# Patient Record
Sex: MALE | Race: BLACK OR AFRICAN AMERICAN | NOT HISPANIC OR LATINO | Employment: OTHER | URBAN - METROPOLITAN AREA
[De-identification: names, ages, dates, MRNs, and addresses within clinical notes are randomized per-mention and may not be internally consistent; named-entity substitution may affect disease eponyms.]

---

## 2017-01-25 ENCOUNTER — TELEPHONE (OUTPATIENT)
Dept: ENDOCRINOLOGY | Facility: CLINIC | Age: 82
End: 2017-01-25

## 2017-01-25 RX ORDER — CLOPIDOGREL BISULFATE 75 MG/1
TABLET ORAL
Qty: 90 TABLET | Refills: 4 | Status: SHIPPED | OUTPATIENT
Start: 2017-01-25 | End: 2017-05-25 | Stop reason: SDUPTHER

## 2017-01-25 NOTE — TELEPHONE ENCOUNTER
----- Message from Erika Cardenas sent at 1/25/2017  9:43 AM CST -----  Contact: Patient's wife Ramila  Patient's wife Ramila called to advised that patient cannot attend appointment. Patient is not feeling well. Thanks,

## 2017-03-09 DIAGNOSIS — E11.9 TYPE 2 DIABETES MELLITUS WITHOUT COMPLICATION: ICD-10-CM

## 2017-03-16 DIAGNOSIS — E11.9 TYPE 2 DIABETES MELLITUS WITHOUT COMPLICATION: ICD-10-CM

## 2017-05-25 ENCOUNTER — TELEPHONE (OUTPATIENT)
Dept: HEMATOLOGY/ONCOLOGY | Facility: CLINIC | Age: 82
End: 2017-05-25

## 2017-05-25 ENCOUNTER — HOSPITAL ENCOUNTER (OUTPATIENT)
Dept: RADIOLOGY | Facility: CLINIC | Age: 82
Discharge: HOME | End: 2017-05-25
Attending: FAMILY MEDICINE
Payer: MEDICARE

## 2017-05-25 ENCOUNTER — OFFICE VISIT (OUTPATIENT)
Dept: FAMILY MEDICINE | Facility: CLINIC | Age: 82
End: 2017-05-25
Payer: MEDICARE

## 2017-05-25 VITALS
HEIGHT: 71 IN | HEART RATE: 71 BPM | SYSTOLIC BLOOD PRESSURE: 134 MMHG | WEIGHT: 213.88 LBS | BODY MASS INDEX: 29.94 KG/M2 | DIASTOLIC BLOOD PRESSURE: 76 MMHG | TEMPERATURE: 99 F

## 2017-05-25 DIAGNOSIS — M79.605 PAIN OF LEFT LEG: ICD-10-CM

## 2017-05-25 DIAGNOSIS — I82.4Z3 DEEP VEIN THROMBOSIS (DVT) OF DISTAL VEIN OF BOTH LOWER EXTREMITIES, UNSPECIFIED CHRONICITY: ICD-10-CM

## 2017-05-25 DIAGNOSIS — I82.4Z3 DEEP VEIN THROMBOSIS (DVT) OF DISTAL VEIN OF BOTH LOWER EXTREMITIES, UNSPECIFIED CHRONICITY: Primary | ICD-10-CM

## 2017-05-25 PROCEDURE — 1126F AMNT PAIN NOTED NONE PRSNT: CPT | Mod: S$GLB,,, | Performed by: FAMILY MEDICINE

## 2017-05-25 PROCEDURE — 99213 OFFICE O/P EST LOW 20 MIN: CPT | Mod: S$GLB,,, | Performed by: FAMILY MEDICINE

## 2017-05-25 PROCEDURE — 1159F MED LIST DOCD IN RCRD: CPT | Mod: S$GLB,,, | Performed by: FAMILY MEDICINE

## 2017-05-25 PROCEDURE — 99499 UNLISTED E&M SERVICE: CPT | Mod: S$GLB,,, | Performed by: FAMILY MEDICINE

## 2017-05-25 PROCEDURE — 99999 PR PBB SHADOW E&M-EST. PATIENT-LVL III: CPT | Mod: PBBFAC,,, | Performed by: FAMILY MEDICINE

## 2017-05-25 RX ORDER — FUROSEMIDE 10 MG/ML
20 INJECTION INTRAMUSCULAR; INTRAVENOUS
Status: DISCONTINUED | OUTPATIENT
Start: 2017-05-25 | End: 2017-06-02

## 2017-05-25 NOTE — TELEPHONE ENCOUNTER
----- Message from Mayra Sung sent at 5/25/2017 11:39 AM CDT -----  Contact: self  Patient [phone] is returning call from this morning/please call

## 2017-05-26 ENCOUNTER — HOSPITAL ENCOUNTER (EMERGENCY)
Facility: HOSPITAL | Age: 82
Discharge: HOME | End: 2017-05-26
Attending: EMERGENCY MEDICINE
Payer: MEDICARE

## 2017-05-26 ENCOUNTER — HOSPITAL ENCOUNTER (OUTPATIENT)
Dept: RADIOLOGY | Facility: CLINIC | Age: 82
Discharge: HOME | End: 2017-05-26
Attending: FAMILY MEDICINE
Payer: MEDICARE

## 2017-05-26 ENCOUNTER — ANTI-COAG VISIT (OUTPATIENT)
Dept: CARDIOLOGY | Facility: CLINIC | Age: 82
End: 2017-05-26

## 2017-05-26 VITALS
WEIGHT: 213 LBS | HEIGHT: 71 IN | TEMPERATURE: 98 F | HEART RATE: 58 BPM | DIASTOLIC BLOOD PRESSURE: 79 MMHG | BODY MASS INDEX: 29.82 KG/M2 | SYSTOLIC BLOOD PRESSURE: 191 MMHG | RESPIRATION RATE: 20 BRPM | OXYGEN SATURATION: 100 %

## 2017-05-26 DIAGNOSIS — I50.9 CHF, ACUTE: ICD-10-CM

## 2017-05-26 DIAGNOSIS — I82.4Y3 ACUTE DEEP VEIN THROMBOSIS (DVT) OF PROXIMAL VEIN OF BOTH LOWER EXTREMITIES: Primary | ICD-10-CM

## 2017-05-26 DIAGNOSIS — Z79.01 LONG TERM (CURRENT) USE OF ANTICOAGULANTS: ICD-10-CM

## 2017-05-26 DIAGNOSIS — I82.4Y3 ACUTE DEEP VEIN THROMBOSIS (DVT) OF PROXIMAL VEIN OF BOTH LOWER EXTREMITIES: ICD-10-CM

## 2017-05-26 DIAGNOSIS — M79.605 PAIN OF LEFT LEG: ICD-10-CM

## 2017-05-26 DIAGNOSIS — Z79.01 CURRENT USE OF LONG TERM ANTICOAGULATION: Primary | ICD-10-CM

## 2017-05-26 DIAGNOSIS — I82.4Z3 DEEP VEIN THROMBOSIS (DVT) OF DISTAL VEIN OF BOTH LOWER EXTREMITIES, UNSPECIFIED CHRONICITY: ICD-10-CM

## 2017-05-26 DIAGNOSIS — L03.116 CELLULITIS OF LEFT LEG: ICD-10-CM

## 2017-05-26 PROCEDURE — 96372 THER/PROPH/DIAG INJ SC/IM: CPT

## 2017-05-26 PROCEDURE — 99284 EMERGENCY DEPT VISIT MOD MDM: CPT | Mod: 25

## 2017-05-26 PROCEDURE — 25000003 PHARM REV CODE 250: Performed by: HOSPITALIST

## 2017-05-26 PROCEDURE — 63600175 PHARM REV CODE 636 W HCPCS: Performed by: EMERGENCY MEDICINE

## 2017-05-26 PROCEDURE — 93970 EXTREMITY STUDY: CPT | Mod: TC,PO

## 2017-05-26 PROCEDURE — 93970 EXTREMITY STUDY: CPT | Mod: 26,,, | Performed by: RADIOLOGY

## 2017-05-26 RX ORDER — CEPHALEXIN 500 MG/1
500 CAPSULE ORAL 4 TIMES DAILY
Qty: 28 CAPSULE | Refills: 0 | Status: SHIPPED | OUTPATIENT
Start: 2017-05-26 | End: 2017-06-02

## 2017-05-26 RX ORDER — WARFARIN SODIUM 5 MG/1
5 TABLET ORAL DAILY
Status: DISCONTINUED | OUTPATIENT
Start: 2017-05-26 | End: 2017-05-26 | Stop reason: HOSPADM

## 2017-05-26 RX ORDER — ENOXAPARIN SODIUM 100 MG/ML
150 INJECTION SUBCUTANEOUS EVERY 24 HOURS
Status: DISCONTINUED | OUTPATIENT
Start: 2017-05-26 | End: 2017-05-26

## 2017-05-26 RX ORDER — WARFARIN SODIUM 5 MG/1
5 TABLET ORAL DAILY
Qty: 30 TABLET | Refills: 0 | Status: SHIPPED | OUTPATIENT
Start: 2017-05-26 | End: 2017-06-08

## 2017-05-26 RX ORDER — ENOXAPARIN SODIUM 150 MG/ML
150 INJECTION SUBCUTANEOUS
Status: DISCONTINUED | OUTPATIENT
Start: 2017-05-26 | End: 2017-05-26 | Stop reason: HOSPADM

## 2017-05-26 RX ORDER — ENOXAPARIN SODIUM 100 MG/ML
150 INJECTION SUBCUTANEOUS DAILY
Qty: 7.5 ML | Refills: 0 | Status: SHIPPED | OUTPATIENT
Start: 2017-05-26 | End: 2017-05-31

## 2017-05-26 RX ADMIN — ENOXAPARIN SODIUM 150 MG: 150 INJECTION, SOLUTION INTRAVENOUS; SUBCUTANEOUS at 03:05

## 2017-05-26 RX ADMIN — WARFARIN SODIUM 5 MG: 5 TABLET ORAL at 02:05

## 2017-05-26 NOTE — ED PROVIDER NOTES
Encounter Date: 5/26/2017    SCRIBE #1 NOTE: ICary, am scribing for, and in the presence of, Dr. Franklin.       History     Chief Complaint   Patient presents with    Deep Vein Thrombosis     sent from Dr whitaker     Review of patient's allergies indicates:   Allergen Reactions    No known drug allergies      05/26/2017  12:56 PM     Chief Complaint: Left leg swelling      The patient is a 81 y.o. male with a PMHx of adrenal insufficiency; anemia; anticoagulant long-term use; blood transfusion; CAD; cancer; COPD; depression; DM; DM type II; DVT; hemorrhoids; HTN; PE; peripheral vascular disease; and UTI who is presenting with worsening chronic left leg swelling that has been worsening over the past couple of weeks with associated pain, warmth, and redness. Pt endorsed chronic bilateral leg swelling, but his left leg has been worsening over the past couple of weeks. Pt sent by Dr. Whitaker today for further evaluation of DVTs in bilateral legs noted on US done yesterday. Pt also c/o SOB on exertion with associated fast HR, which resolves with rest. Pt also c/o left lower back pain. Pt currently only on Plavix for circulation in his legs and he denied compliance with taking his Plavix daily because he forgets to take the medication. Pt denied any pain in right foot, calf, or thigh. No CP. No N/V/D. No dysuria. Pt sees Dr. Lee on a regular basis. Pt has a past surgical history that includes Cholecystectomy; Exploratory laparotomy w/ bowel resection; Right Femur Repair; Coronary stent placement; Small intestine surgery; Colon surgery; and ostate surgery.        The history is provided by the patient, the spouse and medical records.     Past Medical History:   Diagnosis Date    Adrenal insufficiency     Anemia     Anticoagulant long-term use     plavix for blood clots legs and stents years    Blood transfusion     CAD (coronary artery disease)     Cancer     prostate    Cataract     COPD (chronic  obstructive pulmonary disease) 11/6/2013    Depression     Diabetes mellitus     Diabetes mellitus type II     DVT (deep venous thrombosis)     Hemorrhoids     Hypertension     PE (pulmonary embolism)     [facility]    Peripheral vascular disease     Urinary tract infection      Past Surgical History:   Procedure Laterality Date    CHOLECYSTECTOMY  8/2011    COLON SURGERY      CORONARY STENT PLACEMENT      2007, last 2011  stent  3 vessel.    EXPLORATORY LAPAROTOMY W/ BOWEL RESECTION  1987    PROSTATE SURGERY  2001    Radical for cancer - Dr Luke Chung    RIGHT FEMUR REPAIR  1987    SMALL INTESTINE SURGERY       Family History   Problem Relation Age of Onset    Diabetes Mother     Hypertension Mother     Hypertension Father     Diabetes Sister     Diabetes Brother     Cancer Brother      prostate    Cancer Brother      colon    Cancer Brother      prostate    Cancer Brother      prostate     Social History   Substance Use Topics    Smoking status: Never Smoker    Smokeless tobacco: Never Used    Alcohol use No     Review of Systems   Constitutional: Negative for fever.   HENT: Negative for sore throat.    Eyes: Negative for visual disturbance.   Respiratory: Positive for shortness of breath (SOB on exertion with associated fast HR, which resolves with rest.).    Cardiovascular: Positive for leg swelling (Chronic bilateral leg swelling with worsening left leg swelling.). Negative for chest pain.   Gastrointestinal: Negative for diarrhea, nausea and vomiting.   Genitourinary: Negative for dysuria.   Musculoskeletal: Positive for back pain (Left lower back pain.) and myalgias (Left leg pain.).        No pain in right foot, calf, or thigh.    Skin:        +Left leg redness and warmth.   Neurological: Negative for weakness.   Hematological: Does not bruise/bleed easily.   Psychiatric/Behavioral: Negative for confusion.       Physical Exam     Initial Vitals [05/26/17 1234]   BP Pulse Resp  Temp SpO2   (!) 149/70 (!) 58 20 98.2 °F (36.8 °C) 100 %     Physical Exam    Nursing note and vitals reviewed.  Constitutional: He appears well-developed and well-nourished. No distress.   HENT:   Head: Normocephalic and atraumatic.   Eyes: Conjunctivae and EOM are normal. Pupils are equal, round, and reactive to light.   Neck: Neck supple.   Cardiovascular: Normal rate, regular rhythm and normal heart sounds.   Pulses:       Radial pulses are 2+ on the right side, and 2+ on the left side.   Pulmonary/Chest: Breath sounds normal. No respiratory distress. He has no wheezes. He has no rhonchi. He has no rales.   Abdominal: Soft. He exhibits no distension. There is no tenderness.   Old vertical scar down the abdomen.   Musculoskeletal:   Edema in bilateral legs. Left leg edema is much greater than the right. Left leg larger than the right.  Erythema, warmth, and tenderness along the left calf and anterior left leg.  No tenderness over bilateral thighs.  Pain over left lumbosacral region. No muscle spasms. No midline tenderness.   Neurological: He is alert and oriented to person, place, and time.   Pt can only feel pressure on the feet.   Skin:   No mottling of lower extremities.  Baker's cyst over right wrist on the palmar surface.   Psychiatric: He has a normal mood and affect.         ED Course   Procedures  Labs Reviewed - No data to display          Medical Decision Making:   The hospitalist, Dr. Favio Kaur saw the patient in the emergency room and has made plan with the family.  We will start Lovenox 150 mg a day for 5 days and then transition with Coumadin 5 mg per day with follow-up to the clinic clinic.  Patient also follow-up with his primary care physician.  Patient has a filter in place and has had dyspnea on exertion for quite some time.  Any correlation between needs.  He doesn't appear to be unstable at this time.  Chest shows no signs of heart failure.  Patient does not have fall risk.  There is no  signs of cerulea dolens              Scribe Attestation:   Scribe #1: I performed the above scribed service and the documentation accurately describes the services I performed. I attest to the accuracy of the note.    Attending Attestation:           Physician Attestation for Scribe:  Physician Attestation Statement for Scribe #1: I, Dr. Franklin, reviewed documentation, as scribed by Cary Lopez in my presence, and it is both accurate and complete.                 ED Course     Clinical Impression:   The primary encounter diagnosis was Acute deep vein thrombosis (DVT) of proximal vein of both lower extremities. A diagnosis of CHF, acute was also pertinent to this visit.          Erasmo Franklin MD  05/26/17 1088

## 2017-05-26 NOTE — PROGRESS NOTES
Pt still in [facility] ER for DVT.  Could be that he will be admitted.  Will f/u on Monday for enrollment. Per notes in ER:       The patient is a 81 y.o. male with a PMHx of adrenal insufficiency; anemia; anticoagulant long-term use; blood transfusion; CAD; cancer; COPD; depression; DM; DM type II; DVT; hemorrhoids; HTN; PE; peripheral vascular disease; and UTI who is presenting with worsening chronic left leg swelling that has been worsening over the past couple of weeks with associated pain, warmth, and redness. Pt endorsed chronic bilateral leg swelling, but his left leg has been worsening over the past couple of weeks. Pt sent by Dr. Whitaker today for further evaluation of DVTs in bilateral legs noted on US done yesterday. Pt also c/o SOB on exertion with associated fast HR, which resolves with rest. Pt also c/o left lower back pain. Pt currently only on Plavix for circulation in his legs and he denied compliance with taking his Plavix daily because he forgets to take the medication. Pt denied any pain in right foot, calf, or thigh. No CP. No N/V/D. No dysuria. Pt sees Dr. Lee on a regular basis. Pt has a past surgical history that includes Cholecystectomy; Exploratory laparotomy w/ bowel resection; Right Femur Repair; Coronary stent placement; Small intestine surgery; Colon surgery; and ostate surgery      I have placed an order for INR on Monday; seems pt already has a lab this day so if he is out of the hospital, will have the level to address

## 2017-05-26 NOTE — PLAN OF CARE
Spoke with patient's wife, Ramila Hurtado to notify of follow up appointment scheduled with Dr Whitaker for Friday, June 2, 2017 at 1000.  Mrs. Hurtado verbalized understanding and will advise her  of appointment when he awakens from his nap.

## 2017-05-26 NOTE — DISCHARGE INSTRUCTIONS
You need to take Lovenox injections 150 mg once a day in the morning for 5 days and take Coumadin 5 mg every day thereafter.

## 2017-05-26 NOTE — PLAN OF CARE
Spoke with [place] at patient's pharmacy, [address]  ([phone]).  Lovenox 150mg SQ X5 days will have a co-pay of 10.00.  Confirmed with Richmond that medication is available.    Referral for Coumadin Clinic placed, pt will be contacted directly by clinic staff to schedule appointments.    Message left for Dr. Whitaker's nurse requesting follow-up appointment for patient.  Awaiting call back.    Patient and family updated on prescription, coumadin clinic, and awaiting follow up appointment with Dr. Whitaker.    Verified patient contact [phone].  Will call patient with appointment information when available.  Pt verbalized understanding

## 2017-05-29 ENCOUNTER — TELEPHONE (OUTPATIENT)
Dept: FAMILY MEDICINE | Facility: CLINIC | Age: 82
End: 2017-05-29

## 2017-05-29 ENCOUNTER — LAB VISIT (OUTPATIENT)
Dept: LAB | Facility: HOSPITAL | Age: 82
End: 2017-05-29
Attending: INTERNAL MEDICINE
Payer: MEDICARE

## 2017-05-29 DIAGNOSIS — D64.9 ANEMIA, UNSPECIFIED: ICD-10-CM

## 2017-05-29 DIAGNOSIS — C61 PROSTATE CA: ICD-10-CM

## 2017-05-29 DIAGNOSIS — E11.8 UNCONTROLLED TYPE 2 DIABETES MELLITUS WITH COMPLICATION, UNSPECIFIED LONG TERM INSULIN USE STATUS: ICD-10-CM

## 2017-05-29 DIAGNOSIS — D50.8 IRON DEFICIENCY ANEMIA SECONDARY TO INADEQUATE DIETARY IRON INTAKE: ICD-10-CM

## 2017-05-29 DIAGNOSIS — N18.2 CKD (CHRONIC KIDNEY DISEASE) STAGE 2, GFR 60-89 ML/MIN: ICD-10-CM

## 2017-05-29 DIAGNOSIS — E11.65 UNCONTROLLED TYPE 2 DIABETES MELLITUS WITH COMPLICATION, UNSPECIFIED LONG TERM INSULIN USE STATUS: ICD-10-CM

## 2017-05-29 PROBLEM — Z79.01 LONG TERM (CURRENT) USE OF ANTICOAGULANTS: Status: ACTIVE | Noted: 2017-05-29

## 2017-05-29 LAB
ALBUMIN SERPL BCP-MCNC: 3.9 G/DL
ALP SERPL-CCNC: 68 U/L
ALT SERPL W/O P-5'-P-CCNC: 23 U/L
ANION GAP SERPL CALC-SCNC: 9 MMOL/L
AST SERPL-CCNC: 30 U/L
BASOPHILS # BLD AUTO: 0 K/UL
BASOPHILS NFR BLD: 0.2 %
BILIRUB SERPL-MCNC: 0.3 MG/DL
BUN SERPL-MCNC: 23 MG/DL
CALCIUM SERPL-MCNC: 10.9 MG/DL
CHLORIDE SERPL-SCNC: 108 MMOL/L
CO2 SERPL-SCNC: 25 MMOL/L
COMPLEXED PSA SERPL-MCNC: <0.01 NG/ML
CREAT SERPL-MCNC: 1.3 MG/DL
DIFFERENTIAL METHOD: ABNORMAL
EOSINOPHIL # BLD AUTO: 0 K/UL
EOSINOPHIL NFR BLD: 0 %
ERYTHROCYTE [DISTWIDTH] IN BLOOD BY AUTOMATED COUNT: 13.2 %
EST. GFR  (AFRICAN AMERICAN): 60 ML/MIN/1.73 M^2
EST. GFR  (NON AFRICAN AMERICAN): 52 ML/MIN/1.73 M^2
FERRITIN SERPL-MCNC: 120 NG/ML
FERRITIN SERPL-MCNC: 120 NG/ML
GLUCOSE SERPL-MCNC: 135 MG/DL
HCT VFR BLD AUTO: 37.9 %
HGB BLD-MCNC: 12.2 G/DL
LYMPHOCYTES # BLD AUTO: 0.8 K/UL
LYMPHOCYTES NFR BLD: 28 %
MCH RBC QN AUTO: 28.6 PG
MCHC RBC AUTO-ENTMCNC: 32.1 %
MCV RBC AUTO: 89 FL
MONOCYTES # BLD AUTO: 0.4 K/UL
MONOCYTES NFR BLD: 14.5 %
NEUTROPHILS # BLD AUTO: 1.6 K/UL
NEUTROPHILS NFR BLD: 57.3 %
PLATELET # BLD AUTO: 135 K/UL
PMV BLD AUTO: 8.2 FL
POTASSIUM SERPL-SCNC: 4.9 MMOL/L
PROT SERPL-MCNC: 7.2 G/DL
RBC # BLD AUTO: 4.25 M/UL
SODIUM SERPL-SCNC: 142 MMOL/L
WBC # BLD AUTO: 2.8 K/UL

## 2017-05-29 PROCEDURE — 83540 ASSAY OF IRON: CPT

## 2017-05-29 PROCEDURE — 83036 HEMOGLOBIN GLYCOSYLATED A1C: CPT

## 2017-05-29 PROCEDURE — 80053 COMPREHEN METABOLIC PANEL: CPT

## 2017-05-29 PROCEDURE — 82728 ASSAY OF FERRITIN: CPT

## 2017-05-29 PROCEDURE — 84153 ASSAY OF PSA TOTAL: CPT

## 2017-05-29 PROCEDURE — 36415 COLL VENOUS BLD VENIPUNCTURE: CPT

## 2017-05-29 PROCEDURE — 84238 ASSAY NONENDOCRINE RECEPTOR: CPT

## 2017-05-29 PROCEDURE — 85025 COMPLETE CBC W/AUTO DIFF WBC: CPT

## 2017-05-29 NOTE — TELEPHONE ENCOUNTER
Received message from patient's daughter (Nehla) requesting to know why her father was not admitted to the hospital on Friday 5-26-17 after being sent there by Dr. Whitaker. No involvement of care noted. Call placed to patient's daughter to explain the need of an involvement of care or verbal permission from patient in order to preceded with above request. No answer received. Left message. Call also placed to patient regarding. Verbal permission to give personal information to daughter was given by patient. Will attempt to contact patient's daughter regarding.

## 2017-05-29 NOTE — PROGRESS NOTES
Subjective:       Patient ID: Chon Hurtado is a 81 y.o. male.    Chief Complaint: Diabetes; Edema; and Back Pain    82 y/o male with progressive swollen legs, fatigue and tenderness despite blood thinners, and SVC filter. He has poor exercise compliance, poor activity and elevated blood sugars.      Diabetes   He presents for his follow-up diabetic visit. His disease course has been worsening. Hypoglycemia symptoms include confusion, dizziness, mood changes and sleepiness. Pertinent negatives for hypoglycemia include no headaches, pallor, speech difficulty or tremors. Associated symptoms include fatigue. Pertinent negatives for diabetes include no blurred vision, no chest pain, no polydipsia, no polyphagia and no polyuria. Diabetic complications include a CVA, heart disease, nephropathy and peripheral neuropathy. Current diabetic treatment includes intensive insulin program. He is following a generally healthy diet. He never participates in exercise. His breakfast blood glucose is taken between 9-10 am. His breakfast blood glucose range is generally 180-200 mg/dl. His dinner blood glucose is taken between 6-7 pm. His dinner blood glucose range is generally 180-200 mg/dl.   Edema   This is a recurrent problem. The current episode started more than 1 month ago. The problem occurs constantly. Associated symptoms include congestion, diaphoresis, fatigue and a sore throat. Pertinent negatives include no abdominal pain, arthralgias, chest pain, chills, coughing, fever, headaches, neck pain, numbness or rash.   Back Pain   Pertinent negatives include no abdominal pain, chest pain, dysuria, fever, headaches or numbness.     Review of Systems   Constitutional: Positive for diaphoresis, fatigue and unexpected weight change. Negative for activity change, appetite change, chills and fever.   HENT: Positive for congestion, postnasal drip and sore throat. Negative for drooling, ear discharge, ear pain, hearing loss, mouth  sores, nosebleeds, rhinorrhea, sinus pressure, tinnitus, trouble swallowing and voice change.    Eyes: Negative.  Negative for blurred vision, pain, discharge, redness, itching and visual disturbance.   Respiratory: Negative.  Negative for apnea, cough, choking, chest tightness and shortness of breath.    Cardiovascular: Positive for palpitations and leg swelling. Negative for chest pain.   Gastrointestinal: Negative.  Negative for abdominal distention, abdominal pain and anal bleeding.   Endocrine: Negative.  Negative for cold intolerance, heat intolerance, polydipsia, polyphagia and polyuria.   Genitourinary: Negative.  Negative for difficulty urinating, dysuria, enuresis, flank pain, frequency, hematuria, scrotal swelling, testicular pain and urgency.   Musculoskeletal: Positive for back pain. Negative for arthralgias, gait problem, neck pain and neck stiffness.   Skin: Negative.  Negative for color change, pallor and rash.   Allergic/Immunologic: Negative.  Negative for environmental allergies and food allergies.   Neurological: Positive for dizziness. Negative for tremors, syncope, facial asymmetry, speech difficulty, light-headedness, numbness and headaches.   Hematological: Negative for adenopathy. Does not bruise/bleed easily.   Psychiatric/Behavioral: Positive for confusion. Negative for agitation, behavioral problems, decreased concentration, dysphoric mood and hallucinations. The patient is not hyperactive.        Objective:      Physical Exam   Constitutional: He is oriented to person, place, and time. He appears well-developed and well-nourished. No distress.   HENT:   Head: Normocephalic and atraumatic.   Right Ear: External ear normal.   Left Ear: External ear normal.   Nose: Nose normal.   Mouth/Throat: Oropharynx is clear and moist. No oropharyngeal exudate.   Eyes: Conjunctivae and EOM are normal. Pupils are equal, round, and reactive to light. Right eye exhibits no discharge. Left eye exhibits no  discharge. No scleral icterus.   Neck: Normal range of motion. Neck supple. No JVD present. No tracheal deviation present. No thyromegaly present.   Cardiovascular: Normal rate, normal heart sounds and intact distal pulses.    No murmur heard.  Pulmonary/Chest: Effort normal and breath sounds normal. No respiratory distress. He has no wheezes. He has no rales.   Abdominal: Soft. Bowel sounds are normal. He exhibits no distension and no mass. There is no tenderness. There is no rebound and no guarding.   Musculoskeletal: He exhibits no edema or tenderness.        Right knee: He exhibits swelling and effusion.        Left knee: He exhibits swelling and effusion.        Right ankle: He exhibits decreased range of motion and swelling.        Left ankle: He exhibits decreased range of motion and swelling.          Lymphadenopathy:     He has no cervical adenopathy.   Neurological: He is alert and oriented to person, place, and time. No cranial nerve deficit. Coordination normal.   Skin: Skin is warm and dry. No rash noted. He is not diaphoretic. No erythema. No pallor.   Psychiatric: His speech is normal and behavior is normal. Judgment and thought content normal. His affect is inappropriate. Cognition and memory are impaired. He exhibits abnormal recent memory.   Vitals reviewed.      Lab Results   Component Value Date    WBC 4.35 05/25/2017    HGB 12.5 (L) 05/25/2017    HCT 38.7 (L) 05/25/2017    MCV 90 05/25/2017     (L) 05/25/2017   .DVD  With [id number] DVD. Needs vascular and Lovenox and more aggressive diabetes care.    Assessment:       1. Deep vein thrombosis (DVT) of distal vein of both lower extremities, unspecified chronicity    2. Pain of left leg     3. Uncontrolled type 2 diabetes mellitus without complication, without long-term current use of insulin        Plan:       Deep vein thrombosis (DVT) of distal vein of both lower extremities, unspecified chronicity  -     Cancel: US Lower Extremity Veins  Bilateral; Future; Expected date: 05/25/2017  -     Cancel: US Lower Extremity Veins Left; Future; Expected date: 05/25/2017  -     US Lower Extremity Veins Bilateral; Future; Expected date: 05/25/2017    Pain of left leg   -     Cancel: US Lower Extremity Veins Bilateral; Future; Expected date: 05/25/2017  -     Brain natriuretic peptide; Future; Expected date: 05/25/2017  -     furosemide injection 20 mg; Inject 2 mLs (20 mg total) into the muscle one time.  -     Cancel: US Lower Extremity Veins Left; Future; Expected date: 05/25/2017  -     US Lower Extremity Veins Bilateral; Future; Expected date: 05/25/2017    Uncontrolled type 2 diabetes mellitus without complication, without long-term current use of insulin  -     Basic metabolic panel; Future; Expected date: 05/25/2017  -     CBC auto differential; Future; Expected date: 05/25/2017  -     Hemoglobin A1c; Future; Expected date: 05/25/2017      Patient readiness: acceptance and barriers:readiness    During the course of the visit the patient was educated and counseled about the following:     Diabetes:  Discussed general issues about diabetes pathophysiology and management.  Discussed foot care.  Discussed ways to avoid symptomatic hypoglycemia.  Discussed sick day management.  Reminded to bring in blood sugar diary at next visit.  Hypertension:   Regular aerobic exercise.  Check blood pressures weekly and record.  Obesity:   General weight loss/lifestyle modification strategies discussed (elicit support from others; identify saboteurs; non-food rewards, etc).  Informal exercise measures discussed, e.g. taking stairs instead of elevator.    Goals: Diabetes: Maintain Hemoglobin A1C below 7    Did patient meet goals/outcomes: Yes    The following self management tools provided: blood glucose log    Patient Instructions (the written plan) was given to the patient/family.     Time spent with patient: 45 minutes

## 2017-05-29 NOTE — TELEPHONE ENCOUNTER
----- Message from Sharmin Miramontes sent at 5/27/2017 11:06 AM CDT -----  Contact: patient daughter rose goncalves [phone]  patient daughter rose goncalves [phone] stated dr bang told patient to go to ER on yesterday due to SOB and swelling in his legs, he was treated and sent back home and want to know why he wasn't admitted.  Requesting advice, please call

## 2017-05-29 NOTE — PROGRESS NOTES
Pt was not admitted, wife reports she did not know there was lab work ordered for today; pt had labs for Dr Pérez in addition to the INR I ordered.  She will take him this evening and will give 5mg coumadin tonnight  Will set up for clinic appt later this week

## 2017-05-30 ENCOUNTER — TELEPHONE (OUTPATIENT)
Dept: FAMILY MEDICINE | Facility: CLINIC | Age: 82
End: 2017-05-30

## 2017-05-30 ENCOUNTER — ANTI-COAG VISIT (OUTPATIENT)
Dept: CARDIOLOGY | Facility: CLINIC | Age: 82
End: 2017-05-30

## 2017-05-30 DIAGNOSIS — I82.4Y3 ACUTE DEEP VEIN THROMBOSIS (DVT) OF PROXIMAL VEIN OF BOTH LOWER EXTREMITIES: ICD-10-CM

## 2017-05-30 DIAGNOSIS — Z79.01 LONG TERM (CURRENT) USE OF ANTICOAGULANTS: ICD-10-CM

## 2017-05-30 LAB
ESTIMATED AVG GLUCOSE: 203 MG/DL
HBA1C MFR BLD HPLC: 8.7 %
IRON SERPL-MCNC: 103 UG/DL
IRON SERPL-MCNC: 103 UG/DL
SATURATED IRON: 28 %
SATURATED IRON: 28 %
TOTAL IRON BINDING CAPACITY: 364 UG/DL
TOTAL IRON BINDING CAPACITY: 364 UG/DL
TRANSFERRIN SERPL-MCNC: 246 MG/DL
TRANSFERRIN SERPL-MCNC: 246 MG/DL

## 2017-05-30 NOTE — TELEPHONE ENCOUNTER
Patient is requesting a personal telephone call from Dr. Whitaker, states he wants to discuss his blood clots. Patient has an upcoming appointment on 6-2-17.

## 2017-05-30 NOTE — TELEPHONE ENCOUNTER
----- Message from Faye Milner sent at 5/30/2017 12:39 PM CDT -----  Contact: wife - Ramila  Patient would like to speak to the nurse in reference to his blood clots.     Please call patient back at [phone].     Thank you

## 2017-05-31 ENCOUNTER — DOCUMENTATION ONLY (OUTPATIENT)
Dept: FAMILY MEDICINE | Facility: CLINIC | Age: 82
End: 2017-05-31

## 2017-05-31 LAB — STFR SERPL-MCNC: 4.5 MG/L

## 2017-05-31 NOTE — PROGRESS NOTES
Pre-Visit Chart Review  For Appointment Scheduled on 06/01/2017    Health Maintenance Due   Topic Date Due    TETANUS VACCINE  12/25/1953    Zoster Vaccine  12/25/1997    Eye Exam  04/16/2014    Lipid Panel  12/22/2015    Urine Microalbumin  12/22/2015

## 2017-06-02 ENCOUNTER — LAB VISIT (OUTPATIENT)
Dept: LAB | Facility: HOSPITAL | Age: 82
End: 2017-06-02
Attending: FAMILY MEDICINE
Payer: MEDICARE

## 2017-06-02 ENCOUNTER — OFFICE VISIT (OUTPATIENT)
Dept: FAMILY MEDICINE | Facility: CLINIC | Age: 82
End: 2017-06-02
Payer: MEDICARE

## 2017-06-02 VITALS
DIASTOLIC BLOOD PRESSURE: 67 MMHG | WEIGHT: 211.63 LBS | HEIGHT: 72 IN | BODY MASS INDEX: 28.66 KG/M2 | HEART RATE: 59 BPM | TEMPERATURE: 98 F | SYSTOLIC BLOOD PRESSURE: 128 MMHG

## 2017-06-02 DIAGNOSIS — I82.593 CHRONIC DEEP VEIN THROMBOSIS (DVT) OF OTHER VEIN OF BOTH LOWER EXTREMITIES: Primary | ICD-10-CM

## 2017-06-02 DIAGNOSIS — Z79.4 CONTROLLED TYPE 2 DIABETES MELLITUS WITH DIABETIC POLYNEUROPATHY, WITH LONG-TERM CURRENT USE OF INSULIN: ICD-10-CM

## 2017-06-02 DIAGNOSIS — I50.22 CHF (CONGESTIVE HEART FAILURE), NYHA CLASS I, CHRONIC, SYSTOLIC: ICD-10-CM

## 2017-06-02 DIAGNOSIS — N18.30 CONTROLLED TYPE 2 DIABETES MELLITUS WITH STAGE 3 CHRONIC KIDNEY DISEASE, WITH LONG-TERM CURRENT USE OF INSULIN: ICD-10-CM

## 2017-06-02 DIAGNOSIS — E11.22 CONTROLLED TYPE 2 DIABETES MELLITUS WITH STAGE 3 CHRONIC KIDNEY DISEASE, WITH LONG-TERM CURRENT USE OF INSULIN: ICD-10-CM

## 2017-06-02 DIAGNOSIS — I82.593 CHRONIC DEEP VEIN THROMBOSIS (DVT) OF OTHER VEIN OF BOTH LOWER EXTREMITIES: ICD-10-CM

## 2017-06-02 DIAGNOSIS — Z79.4 CONTROLLED TYPE 2 DIABETES MELLITUS WITH STAGE 3 CHRONIC KIDNEY DISEASE, WITH LONG-TERM CURRENT USE OF INSULIN: ICD-10-CM

## 2017-06-02 DIAGNOSIS — E11.42 CONTROLLED TYPE 2 DIABETES MELLITUS WITH DIABETIC POLYNEUROPATHY, WITH LONG-TERM CURRENT USE OF INSULIN: ICD-10-CM

## 2017-06-02 LAB
INR PPP: 1.4
PROTHROMBIN TIME: 14.6 SEC

## 2017-06-02 PROCEDURE — 99214 OFFICE O/P EST MOD 30 MIN: CPT | Mod: S$GLB,,, | Performed by: FAMILY MEDICINE

## 2017-06-02 PROCEDURE — 99999 PR PBB SHADOW E&M-EST. PATIENT-LVL IV: CPT | Mod: PBBFAC,,, | Performed by: FAMILY MEDICINE

## 2017-06-02 PROCEDURE — 1126F AMNT PAIN NOTED NONE PRSNT: CPT | Mod: S$GLB,,, | Performed by: FAMILY MEDICINE

## 2017-06-02 PROCEDURE — 85610 PROTHROMBIN TIME: CPT

## 2017-06-02 PROCEDURE — 99499 UNLISTED E&M SERVICE: CPT | Mod: S$GLB,,, | Performed by: FAMILY MEDICINE

## 2017-06-02 PROCEDURE — 36415 COLL VENOUS BLD VENIPUNCTURE: CPT | Mod: PO

## 2017-06-02 PROCEDURE — 1159F MED LIST DOCD IN RCRD: CPT | Mod: S$GLB,,, | Performed by: FAMILY MEDICINE

## 2017-06-02 RX ORDER — METOPROLOL SUCCINATE 25 MG/1
25 TABLET, EXTENDED RELEASE ORAL DAILY
Qty: 90 TABLET | Refills: 3 | Status: SHIPPED | OUTPATIENT
Start: 2017-06-02 | End: 2018-07-12 | Stop reason: SDUPTHER

## 2017-06-02 RX ORDER — INSULIN GLARGINE 100 [IU]/ML
30 INJECTION, SOLUTION SUBCUTANEOUS NIGHTLY
Qty: 30 ML | Refills: 12 | Status: SHIPPED | OUTPATIENT
Start: 2017-06-02 | End: 2018-12-05 | Stop reason: SDUPTHER

## 2017-06-02 RX ORDER — ENOXAPARIN SODIUM 100 MG/ML
100 INJECTION SUBCUTANEOUS DAILY
Qty: 7 SYRINGE | Refills: 3 | Status: SHIPPED | OUTPATIENT
Start: 2017-06-02 | End: 2017-06-05

## 2017-06-02 RX ORDER — TORSEMIDE 20 MG/1
20 TABLET ORAL
Qty: 30 TABLET | Refills: 4 | Status: SHIPPED | OUTPATIENT
Start: 2017-06-02 | End: 2017-06-27 | Stop reason: SDUPTHER

## 2017-06-02 RX ORDER — CLOPIDOGREL BISULFATE 75 MG/1
75 TABLET ORAL DAILY
Qty: 90 TABLET | Refills: 11 | Status: SHIPPED | OUTPATIENT
Start: 2017-06-02 | End: 2017-08-03

## 2017-06-02 NOTE — PROGRESS NOTES
Subjective:       Patient ID: Chon Hurtado is a 81 y.o. male.    Chief Complaint: Deep Vein Thrombosis    Follow up of bilateral DVT, edema, and poorly controlled diabetes. He has been on Keflex tid, Lovenox bid, Coumadin 5 mg. He has improved the edema, but still elevated bs. Chronic dyspnea, and episodes of confussion.      Review of Systems   Constitutional: Negative.  Negative for activity change, appetite change, chills, diaphoresis, fatigue, fever and unexpected weight change.   HENT: Negative.  Negative for congestion, drooling, ear discharge, ear pain, hearing loss, mouth sores, nosebleeds, postnasal drip, rhinorrhea, sinus pressure, sore throat, tinnitus, trouble swallowing and voice change.    Eyes: Negative.  Negative for pain, discharge, redness, itching and visual disturbance.   Respiratory: Positive for cough and shortness of breath. Negative for apnea, choking and chest tightness.    Cardiovascular: Negative.  Negative for chest pain, palpitations and leg swelling.   Gastrointestinal: Negative.  Negative for abdominal distention, abdominal pain and anal bleeding.   Endocrine: Negative.  Negative for cold intolerance, heat intolerance, polydipsia, polyphagia and polyuria.   Genitourinary: Negative.  Negative for difficulty urinating, dysuria, enuresis, flank pain, frequency, hematuria, scrotal swelling, testicular pain and urgency.   Musculoskeletal: Positive for arthralgias, back pain and gait problem. Negative for neck pain and neck stiffness.   Skin: Negative.  Negative for color change, pallor and rash.   Allergic/Immunologic: Negative.  Negative for environmental allergies and food allergies.   Neurological: Negative for dizziness, tremors, syncope, facial asymmetry, speech difficulty, light-headedness, numbness and headaches.   Hematological: Negative for adenopathy. Does not bruise/bleed easily.   Psychiatric/Behavioral: Positive for behavioral problems, confusion and decreased concentration.  Negative for agitation, dysphoric mood and hallucinations. The patient is not hyperactive.        Objective:      Physical Exam   Constitutional: He is oriented to person, place, and time. He appears well-developed and well-nourished. No distress.   HENT:   Head: Normocephalic and atraumatic.   Right Ear: External ear normal.   Left Ear: External ear normal.   Nose: Nose normal.   Mouth/Throat: Oropharynx is clear and moist. No oropharyngeal exudate.   Eyes: Conjunctivae and EOM are normal. Pupils are equal, round, and reactive to light. Right eye exhibits no discharge. Left eye exhibits no discharge. No scleral icterus.   Neck: Normal range of motion. Neck supple. No JVD present. No tracheal deviation present. No thyromegaly present.   Cardiovascular: Normal rate, normal heart sounds and intact distal pulses.    No murmur heard.  Pulmonary/Chest: He is in respiratory distress. He has decreased breath sounds in the right lower field and the left lower field. He has no wheezes. He has no rales.   Abdominal: Soft. Bowel sounds are normal. He exhibits no distension and no mass. There is no tenderness. There is no rebound and no guarding.   Musculoskeletal: He exhibits edema and tenderness.        Right ankle: He exhibits swelling.        Left ankle: He exhibits swelling.          Lymphadenopathy:     He has no cervical adenopathy.   Neurological: He is alert and oriented to person, place, and time. He displays abnormal reflex. No cranial nerve deficit. Coordination normal.   Skin: Skin is warm and dry. No rash noted. He is not diaphoretic. No erythema. No pallor.   Psychiatric: He has a normal mood and affect. His behavior is normal. Judgment and thought content normal.   Vitals reviewed.      Assessment:       1. Chronic deep vein thrombosis (DVT) of other vein of both lower extremities    2. Controlled type 2 diabetes mellitus with diabetic polyneuropathy, with long-term current use of insulin    3. Controlled type 2  diabetes mellitus with stage 3 chronic kidney disease, with long-term current use of insulin    4. CHF (congestive heart failure), NYHA class I, chronic, systolic        Plan:       Chronic deep vein thrombosis (DVT) of other vein of both lower extremities  -     Ambulatory referral to Home Health  -     CBC auto differential; Future; Expected date: 06/02/2017  -     Protime-INR; Future; Expected date: 06/02/2017  -     Basic metabolic panel; Future; Expected date: 06/02/2017  -     enoxaparin (LOVENOX) 100 mg/mL Syrg; Inject 1 mL (100 mg total) into the skin once daily at 6am.  Dispense: 7 Syringe; Refill: 3  -     Ambulatory consult to Diabetic Education    Controlled type 2 diabetes mellitus with diabetic polyneuropathy, with long-term current use of insulin  -     insulin glargine (LANTUS SOLOSTAR) 100 unit/mL (3 mL) InPn pen; Inject 30 Units into the skin every evening.  Dispense: 30 mL; Refill: 12  -     Ambulatory consult to Diabetic Education    Controlled type 2 diabetes mellitus with stage 3 chronic kidney disease, with long-term current use of insulin  -     torsemide (DEMADEX) 20 MG Tab; Take 1 tablet (20 mg total) by mouth as needed. PRN  Dispense: 30 tablet; Refill: 4  -     Ambulatory consult to Diabetic Education    CHF (congestive heart failure), NYHA class I, chronic, systolic  -     Ambulatory referral to Cardiology  -     Ambulatory consult to [facility]    Other orders  -     clopidogrel (PLAVIX) 75 mg tablet; Take 1 tablet (75 mg total) by mouth once daily.  Dispense: 90 tablet; Refill: 11  -     metoprolol succinate (TOPROL XL) 25 MG 24 hr tablet; Take 1 tablet (25 mg total) by mouth once daily. Every day  Dispense: 90 tablet; Refill: 3  -     blood sugar diagnostic Strp; 1 strip by Misc.(Non-Drug; Combo Route) route once daily.  Dispense: 50 strip; Refill: 11      Patient readiness: acceptance and barriers:readiness    During the course of the visit the patient was educated and  counseled about the following:     Hypertension:   Dietary sodium restriction.  Regular aerobic exercise.  Check blood pressures daily and record.  Obesity:   General weight loss/lifestyle modification strategies discussed (elicit support from others; identify saboteurs; non-food rewards, etc).  Regular aerobic exercise program discussed.    Goals: Hypertension: Reduce Blood Pressure and Obesity: Reduce calorie intake and BMI    Did patient meet goals/outcomes: Yes    The following self management tools provided: blood pressure log  excercise log    Patient Instructions (the written plan) was given to the patient/family.     Time spent with patient: 30 minutes

## 2017-06-05 ENCOUNTER — ANTI-COAG VISIT (OUTPATIENT)
Dept: CARDIOLOGY | Facility: CLINIC | Age: 82
End: 2017-06-05

## 2017-06-05 ENCOUNTER — OFFICE VISIT (OUTPATIENT)
Dept: HEMATOLOGY/ONCOLOGY | Facility: CLINIC | Age: 82
End: 2017-06-05
Payer: MEDICARE

## 2017-06-05 VITALS
HEIGHT: 71 IN | TEMPERATURE: 98 F | RESPIRATION RATE: 20 BRPM | DIASTOLIC BLOOD PRESSURE: 68 MMHG | SYSTOLIC BLOOD PRESSURE: 158 MMHG | HEART RATE: 56 BPM | WEIGHT: 210.56 LBS | BODY MASS INDEX: 29.48 KG/M2

## 2017-06-05 DIAGNOSIS — I82.4Y3 ACUTE DEEP VEIN THROMBOSIS (DVT) OF PROXIMAL VEIN OF BOTH LOWER EXTREMITIES: ICD-10-CM

## 2017-06-05 DIAGNOSIS — Z79.01 LONG TERM (CURRENT) USE OF ANTICOAGULANTS: ICD-10-CM

## 2017-06-05 DIAGNOSIS — E11.21 CONTROLLED TYPE 2 DIABETES MELLITUS WITH DIABETIC NEPHROPATHY, WITHOUT LONG-TERM CURRENT USE OF INSULIN: ICD-10-CM

## 2017-06-05 DIAGNOSIS — Z79.01 LONG TERM (CURRENT) USE OF ANTICOAGULANTS: Primary | ICD-10-CM

## 2017-06-05 DIAGNOSIS — D64.9 SEVERE ANEMIA: ICD-10-CM

## 2017-06-05 DIAGNOSIS — N18.30 CKD (CHRONIC KIDNEY DISEASE) STAGE 3, GFR 30-59 ML/MIN: Chronic | ICD-10-CM

## 2017-06-05 DIAGNOSIS — I25.10 CORONARY ARTERY DISEASE INVOLVING NATIVE CORONARY ARTERY OF NATIVE HEART WITHOUT ANGINA PECTORIS: ICD-10-CM

## 2017-06-05 DIAGNOSIS — C61 PROSTATE CA: ICD-10-CM

## 2017-06-05 DIAGNOSIS — I10 ESSENTIAL HYPERTENSION: ICD-10-CM

## 2017-06-05 DIAGNOSIS — M15.9 PRIMARY OSTEOARTHRITIS INVOLVING MULTIPLE JOINTS: Primary | ICD-10-CM

## 2017-06-05 DIAGNOSIS — I82.403 DEEP VEIN THROMBOSIS (DVT) OF BOTH LOWER EXTREMITIES, UNSPECIFIED CHRONICITY, UNSPECIFIED VEIN: ICD-10-CM

## 2017-06-05 LAB — INR PPP: 2.4

## 2017-06-05 PROCEDURE — 1159F MED LIST DOCD IN RCRD: CPT | Mod: S$GLB,,, | Performed by: INTERNAL MEDICINE

## 2017-06-05 PROCEDURE — 1126F AMNT PAIN NOTED NONE PRSNT: CPT | Mod: S$GLB,,, | Performed by: INTERNAL MEDICINE

## 2017-06-05 PROCEDURE — 99499 UNLISTED E&M SERVICE: CPT | Mod: S$GLB,,, | Performed by: INTERNAL MEDICINE

## 2017-06-05 PROCEDURE — 99214 OFFICE O/P EST MOD 30 MIN: CPT | Mod: S$GLB,,, | Performed by: INTERNAL MEDICINE

## 2017-06-05 PROCEDURE — 99999 PR PBB SHADOW E&M-EST. PATIENT-LVL III: CPT | Mod: PBBFAC,,, | Performed by: INTERNAL MEDICINE

## 2017-06-05 NOTE — PROGRESS NOTES
Subjective:       Patient ID: Chon Hurtado is a 81 y.o. male.    Chief Complaint: Results    HPI:   A 80-year-old -American male who has finally decided to go for radiation   therapy for his prostate cancer. The patient has B12 deficiency and iron   deficiency. He has been following with me for mild anemia for years     The patient completed radiation for prostate ca and f/u has been with good controlled psa, pt is here with his wife, recently had both legs painful and swollen and dx with bilateral extensive DVT and started coumadin, INR being monitored by coumadin clinic, feels well, wife reports eating too much carbs and blood sugar not in good control      REVIEW OF SYSTEMS:     CONSTITUTIONAL some  weight loss. There is no apparent    change in appetite, fever, night sweats, headaches, fatigue, dizziness, or    weakness.      SKIN: Denies rash, issues with nails, non-healing sores, bleeding, blotching    skin or abnormal bruising. Denies new moles or changes to existing moles.      EYES: Denies eye pain, blurred vision, swelling, redness or discharge.      ENT AND MOUTH: Denies runny nose, stuffiness, sinus trouble or sores. Denies    nosebleeds. Denies, hoarseness, change in voice or swelling in front of the    neck.  waiting on the dentures, now edentulous    CARDIOVASCULAR: Denies chest pain, discomfort or palpitations. Denies neck    swelling or episodes of passing out.      RESPIRATORY: Denies cough, sputum production, blood in sputum, and denies    shortness of breath.      GI: Denies trouble swallowing, indigestion, heartburn, abdominal pain, nausea,    vomiting, diarrhea, altered bowel habits, blood in stool, discoloration of    stools, change in nature of stool, bloating, increased abdominal girth.      GENITOURINARY: No discharge. No pelvic pain or lumps. No rash around groin or  lesions. No urinary frequency, hesitation, painful urination or blood in    urine. Denies incontinence. No  problems with intercourse.      New bilat dvt and swelling of both legs    PHYSICAL EXAM:     Vitals:    06/05/17 1050   BP: (!) 158/68   Pulse: (!) 56   Resp: 20   Temp: 98.2 °F (36.8 °C)       GENERAL: Comfortable looking patient. Patient is in no distress.  Awake, alert and oriented to time, person and place.  No anxiety, or agitation.      HEENT: Normal conjunctivae and eyelids. WNL.  PERRLA 3 to 4 mm. No icterus, no pallor, no congestion, and no discharge noted.edentulous     NECK:  Supple. Trachea is central.  No crepitus.  No JVD or masses.    RESPIRATORY:  No intercostal retractions.  No dullness to percussion.  Chest is clear to auscultation.  No rales, rhonchi or wheezes.  No crepitus.  Good air entry bilaterally.    CARDIOVASCULAR:  S1 and S2 are normally heard without murmurs or gallops.  All peripheral pulses are present.    ABDOMEN:  Normal abdomen.  No hepatosplenomegaly.  No free fluid.  Bowel sounds are present.  No hernia noted. No masses.  No rebound or tenderness.  No guarding or rigidity.  Umbilicus is midline.    LYMPHATICS:  No axillary, cervical, supraclavicular, submental, or inguinal lymphadenopathy.    SKIN/MUSCULOSKELETAL:  There is no evidence of excoriation marks or ecchmosis.  No rashes.  No cyanosis.  No clubbing.  No joint or skeletal deformities noted.  Normal range of motion.bilat leg swelling+    NEUROLOGIC:  Higher functions are appropriate.  No cranial nerve deficits.  Normal lisa.  Normal strength.  Motor and sensory functions are normal.  Deep tendon reflexes are normal.      Laboratory:     CBC:  Lab Results   Component Value Date    WBC 2.80 (L) 05/29/2017    RBC 4.25 (L) 05/29/2017    HGB 12.2 (L) 05/29/2017    HCT 37.9 (L) 05/29/2017    MCV 89 05/29/2017    MCH 28.6 05/29/2017    MCHC 32.1 05/29/2017    RDW 13.2 05/29/2017     (L) 05/29/2017    MPV 8.2 (L) 05/29/2017    GRAN 1.6 (L) 05/29/2017    GRAN 57.3 05/29/2017    LYMPH 0.8 (L) 05/29/2017    LYMPH 28.0  05/29/2017    MONO 0.4 05/29/2017    MONO 14.5 05/29/2017    EOS 0.0 05/29/2017    BASO 0.00 05/29/2017    EOSINOPHIL 0.0 05/29/2017    BASOPHIL 0.2 05/29/2017       BMP: BMP  Lab Results   Component Value Date     05/29/2017    K 4.9 05/29/2017     05/29/2017    CO2 25 05/29/2017    BUN 23 05/29/2017    CREATININE 1.3 05/29/2017    CALCIUM 10.9 (H) 05/29/2017    ANIONGAP 9 05/29/2017    ESTGFRAFRICA 60 05/29/2017    EGFRNONAA 52 (A) 05/29/2017       LFT:   Lab Results   Component Value Date    ALT 23 05/29/2017    AST 30 05/29/2017    ALKPHOS 68 05/29/2017    BILITOT 0.3 05/29/2017     Lab Results   Component Value Date    PSA 3.6 11/05/2015    PSA 2.68 01/24/2013    PSA 2.45 04/19/2012   now less than 0.01    Assessment/Plan:       1. Primary osteoarthritis involving multiple joints    2. Severe anemia    3. Coronary artery disease involving native coronary artery of native heart without angina pectoris    4. CKD (chronic kidney disease) stage 3, GFR 30-59 ml/min    5. Essential hypertension    6. Prostate CA    7. Controlled type 2 diabetes mellitus with diabetic nephropathy, without long-term current use of insulin    8. Long term (current) use of anticoagulants [Z79.01]    9. Deep vein thrombosis (DVT) of both lower extremities, unspecified chronicity, unspecified vein        Anemia is stable. Cont  Observation no intervention needed.    ckd and anemia: cont observation  prostate ca : stable and better post xrt cont urology  New dvt would recommend life long anticoag due to cancer hx and increased risk  DM : needs much better control discussed at length  HTN: cont meds  rtc 3 months with cbc. Cmp, edison,

## 2017-06-06 ENCOUNTER — DOCUMENTATION ONLY (OUTPATIENT)
Dept: FAMILY MEDICINE | Facility: CLINIC | Age: 82
End: 2017-06-06

## 2017-06-06 NOTE — PROGRESS NOTES
Pre-Visit Chart Review  For Appointment Scheduled on 06/07/2017    Health Maintenance Due   Topic Date Due    TETANUS VACCINE  12[birth year]25/1953    Zoster Vaccine  12/25/1997    Eye Exam  04/16/2014    Lipid Panel  12/22/2015    Urine Microalbumin  12/22/2015

## 2017-06-08 ENCOUNTER — DOCUMENTATION ONLY (OUTPATIENT)
Dept: FAMILY MEDICINE | Facility: CLINIC | Age: 82
End: 2017-06-08

## 2017-06-08 ENCOUNTER — ANTI-COAG VISIT (OUTPATIENT)
Dept: CARDIOLOGY | Facility: CLINIC | Age: 82
End: 2017-06-08

## 2017-06-08 DIAGNOSIS — Z79.01 LONG TERM (CURRENT) USE OF ANTICOAGULANTS: ICD-10-CM

## 2017-06-08 DIAGNOSIS — I82.4Y3 ACUTE DEEP VEIN THROMBOSIS (DVT) OF PROXIMAL VEIN OF BOTH LOWER EXTREMITIES: ICD-10-CM

## 2017-06-08 NOTE — PROGRESS NOTES
pt is no longer on coumadin, he is currently taking eliquis this was done in the hospital and the pt has confirmed they have switched

## 2017-06-08 NOTE — PROGRESS NOTES
Pre-Visit Chart Review  For Appointment Scheduled on 06/09/2017      Health Maintenance Due   Topic Date Due    TETANUS VACCINE  12/25/1953    Zoster Vaccine  12/25/1997    Eye Exam  04/16/2014    Lipid Panel  12/22/2015    Urine Microalbumin  12/22/2015

## 2017-06-15 ENCOUNTER — LAB VISIT (OUTPATIENT)
Dept: LAB | Facility: HOSPITAL | Age: 82
End: 2017-06-15
Attending: FAMILY MEDICINE
Payer: MEDICARE

## 2017-06-15 DIAGNOSIS — E11.9 DIABETES MELLITUS WITHOUT COMPLICATION: Primary | ICD-10-CM

## 2017-06-15 DIAGNOSIS — I82.553: ICD-10-CM

## 2017-06-15 LAB
ANION GAP SERPL CALC-SCNC: 11 MMOL/L
BASOPHILS # BLD AUTO: 0 K/UL
BASOPHILS NFR BLD: 0 %
BUN SERPL-MCNC: 21 MG/DL
CALCIUM SERPL-MCNC: 9.9 MG/DL
CHLORIDE SERPL-SCNC: 106 MMOL/L
CO2 SERPL-SCNC: 23 MMOL/L
CREAT SERPL-MCNC: 1.4 MG/DL
DIFFERENTIAL METHOD: ABNORMAL
EOSINOPHIL # BLD AUTO: 0 K/UL
EOSINOPHIL NFR BLD: 0 %
ERYTHROCYTE [DISTWIDTH] IN BLOOD BY AUTOMATED COUNT: 12.8 %
EST. GFR  (AFRICAN AMERICAN): 54.9 ML/MIN/1.73 M^2
EST. GFR  (NON AFRICAN AMERICAN): 47.4 ML/MIN/1.73 M^2
GLUCOSE SERPL-MCNC: 161 MG/DL
HCT VFR BLD AUTO: 38 %
HGB BLD-MCNC: 12.2 G/DL
LYMPHOCYTES # BLD AUTO: 1.3 K/UL
LYMPHOCYTES NFR BLD: 36.9 %
MCH RBC QN AUTO: 28.8 PG
MCHC RBC AUTO-ENTMCNC: 32.1 %
MCV RBC AUTO: 90 FL
MONOCYTES # BLD AUTO: 0.4 K/UL
MONOCYTES NFR BLD: 10.7 %
NEUTROPHILS # BLD AUTO: 1.9 K/UL
NEUTROPHILS NFR BLD: 52.1 %
PLATELET # BLD AUTO: 158 K/UL
PMV BLD AUTO: 11 FL
POTASSIUM SERPL-SCNC: 4.7 MMOL/L
RBC # BLD AUTO: 4.24 M/UL
SODIUM SERPL-SCNC: 140 MMOL/L
WBC # BLD AUTO: 3.55 K/UL

## 2017-06-15 PROCEDURE — 80048 BASIC METABOLIC PNL TOTAL CA: CPT

## 2017-06-15 PROCEDURE — 85025 COMPLETE CBC W/AUTO DIFF WBC: CPT

## 2017-06-22 ENCOUNTER — LAB VISIT (OUTPATIENT)
Dept: LAB | Facility: HOSPITAL | Age: 82
End: 2017-06-22
Attending: FAMILY MEDICINE
Payer: MEDICARE

## 2017-06-22 DIAGNOSIS — I82.4Y3: Primary | ICD-10-CM

## 2017-06-22 DIAGNOSIS — E11.22 DIABETES MELLITUS WITH CHRONIC KIDNEY DISEASE: ICD-10-CM

## 2017-06-22 DIAGNOSIS — I12.9 MALIGNANT HYPERTENSIVE KIDNEY DISEASE WITH CHRONIC KIDNEY DISEASE STAGE I THROUGH STAGE IV, OR UNSPECIFIED(403.00): ICD-10-CM

## 2017-06-22 DIAGNOSIS — I25.10 CORONARY ATHEROSCLEROSIS OF UNSPECIFIED TYPE OF VESSEL, NATIVE OR GRAFT: ICD-10-CM

## 2017-06-22 LAB
ANION GAP SERPL CALC-SCNC: 10 MMOL/L
BASOPHILS # BLD AUTO: 0 K/UL
BASOPHILS NFR BLD: 0 %
BUN SERPL-MCNC: 23 MG/DL
CALCIUM SERPL-MCNC: 10.9 MG/DL
CHLORIDE SERPL-SCNC: 107 MMOL/L
CO2 SERPL-SCNC: 24 MMOL/L
CREAT SERPL-MCNC: 1.5 MG/DL
DIFFERENTIAL METHOD: ABNORMAL
EOSINOPHIL # BLD AUTO: 0 K/UL
EOSINOPHIL NFR BLD: 0 %
ERYTHROCYTE [DISTWIDTH] IN BLOOD BY AUTOMATED COUNT: 12.7 %
EST. GFR  (AFRICAN AMERICAN): 50.5 ML/MIN/1.73 M^2
EST. GFR  (NON AFRICAN AMERICAN): 43.7 ML/MIN/1.73 M^2
GLUCOSE SERPL-MCNC: 187 MG/DL
HCT VFR BLD AUTO: 38.9 %
HGB BLD-MCNC: 12.2 G/DL
LYMPHOCYTES # BLD AUTO: 1.1 K/UL
LYMPHOCYTES NFR BLD: 37.1 %
MCH RBC QN AUTO: 28 PG
MCHC RBC AUTO-ENTMCNC: 31.4 %
MCV RBC AUTO: 89 FL
MONOCYTES # BLD AUTO: 0.3 K/UL
MONOCYTES NFR BLD: 10.7 %
NEUTROPHILS # BLD AUTO: 1.6 K/UL
NEUTROPHILS NFR BLD: 51.9 %
PLATELET # BLD AUTO: 128 K/UL
PMV BLD AUTO: 11 FL
POTASSIUM SERPL-SCNC: 5.1 MMOL/L
RBC # BLD AUTO: 4.35 M/UL
SODIUM SERPL-SCNC: 141 MMOL/L
WBC # BLD AUTO: 3.07 K/UL

## 2017-06-22 PROCEDURE — 80048 BASIC METABOLIC PNL TOTAL CA: CPT

## 2017-06-22 PROCEDURE — 85025 COMPLETE CBC W/AUTO DIFF WBC: CPT

## 2017-06-27 ENCOUNTER — TELEPHONE (OUTPATIENT)
Dept: FAMILY MEDICINE | Facility: CLINIC | Age: 82
End: 2017-06-27

## 2017-06-27 DIAGNOSIS — N18.30 CONTROLLED TYPE 2 DIABETES MELLITUS WITH STAGE 3 CHRONIC KIDNEY DISEASE, WITH LONG-TERM CURRENT USE OF INSULIN: ICD-10-CM

## 2017-06-27 DIAGNOSIS — E11.22 CONTROLLED TYPE 2 DIABETES MELLITUS WITH STAGE 3 CHRONIC KIDNEY DISEASE, WITH LONG-TERM CURRENT USE OF INSULIN: ICD-10-CM

## 2017-06-27 DIAGNOSIS — Z79.4 CONTROLLED TYPE 2 DIABETES MELLITUS WITH STAGE 3 CHRONIC KIDNEY DISEASE, WITH LONG-TERM CURRENT USE OF INSULIN: ICD-10-CM

## 2017-06-27 RX ORDER — TORSEMIDE 20 MG/1
20 TABLET ORAL DAILY
Qty: 30 TABLET | Refills: 1 | Status: SHIPPED | OUTPATIENT
Start: 2017-06-27 | End: 2018-03-21 | Stop reason: ALTCHOICE

## 2017-06-27 NOTE — TELEPHONE ENCOUNTER
----- Message from Krystina Goddard sent at 6/27/2017  1:07 PM CDT -----  Karo with [facility] stated patient had six pound weight gain since June 22nd/was a random weight check around noon time/please call back at [phone].

## 2017-06-27 NOTE — TELEPHONE ENCOUNTER
New orders placed regarding weight gain. Karo with [facility] notified. Verbalized understanding. States will notify patient of orders.

## 2017-07-03 ENCOUNTER — TELEPHONE (OUTPATIENT)
Dept: FAMILY MEDICINE | Facility: CLINIC | Age: 82
End: 2017-07-03

## 2017-07-03 NOTE — TELEPHONE ENCOUNTER
----- Message from Delta Vega sent at 7/3/2017 10:30 AM CDT -----  Contact: [facility] nurse-   Kala- [phone]  Patient had 5 lb weight gain since last Tuesday. The did not  adhere or comply with fluid restrictions didn't take medications since Thursday..  Thanks!

## 2017-07-03 NOTE — TELEPHONE ENCOUNTER
Received message from Kala with [facility] stating patient has a 5 pound weight since last Tuesday. Patient was given MD instructions regarding fluid restriction and medication change but did not comply. Writer notified Dr. Whitaker verbally of above, received verbal order for patient to follow orders for fluid restriction and medication change as given previously. Patient notified, verbalized understanding. Call placed to Kala with home health for notification, no answer received. Left message.

## 2017-07-10 RX ORDER — PEN NEEDLE, DIABETIC 30 GX3/16"
NEEDLE, DISPOSABLE MISCELLANEOUS
Qty: 100 EACH | Refills: 11 | Status: SHIPPED | OUTPATIENT
Start: 2017-07-10 | End: 2019-02-25 | Stop reason: SDUPTHER

## 2017-07-10 NOTE — TELEPHONE ENCOUNTER
----- Message from RT Edilson sent at 7/10/2017 11:12 AM CDT -----  Contact: pt    pt , requesting a call back soon to discuss his a medical issue and to confirm a medication refill, thanks.

## 2017-07-27 ENCOUNTER — DOCUMENTATION ONLY (OUTPATIENT)
Dept: FAMILY MEDICINE | Facility: CLINIC | Age: 82
End: 2017-07-27

## 2017-07-27 NOTE — PROGRESS NOTES
Pre-Visit Chart Review  For Appointment Scheduled on 08/03/2017    Health Maintenance Due   Topic Date Due    TETANUS VACCINE  12[birth year]25/1953    Zoster Vaccine  12/25/1997    Eye Exam  04/16/2014    Lipid Panel  12/22/2015    Urine Microalbumin  12/22/2015

## 2017-08-01 ENCOUNTER — DOCUMENTATION ONLY (OUTPATIENT)
Dept: FAMILY MEDICINE | Facility: CLINIC | Age: 82
End: 2017-08-01

## 2017-08-01 NOTE — PROGRESS NOTES
Pre-Visit Chart Review  For Appointment Scheduled on 08/03/2017    Health Maintenance Due   Topic Date Due    TETANUS VACCINE  12[birth year]25/1953    Zoster Vaccine  12/25/1997    Eye Exam  04/16/2014    Lipid Panel  12/22/2015    Urine Microalbumin  12/22/2015    Influenza Vaccine  08/01/2017

## 2017-08-03 ENCOUNTER — LAB VISIT (OUTPATIENT)
Dept: LAB | Facility: HOSPITAL | Age: 82
End: 2017-08-03
Attending: FAMILY MEDICINE
Payer: MEDICARE

## 2017-08-03 ENCOUNTER — OFFICE VISIT (OUTPATIENT)
Dept: FAMILY MEDICINE | Facility: CLINIC | Age: 82
End: 2017-08-03
Payer: MEDICARE

## 2017-08-03 ENCOUNTER — DOCUMENTATION ONLY (OUTPATIENT)
Dept: FAMILY MEDICINE | Facility: CLINIC | Age: 82
End: 2017-08-03

## 2017-08-03 VITALS
TEMPERATURE: 98 F | HEART RATE: 59 BPM | SYSTOLIC BLOOD PRESSURE: 132 MMHG | BODY MASS INDEX: 26.03 KG/M2 | DIASTOLIC BLOOD PRESSURE: 70 MMHG | WEIGHT: 202.81 LBS | HEIGHT: 74 IN

## 2017-08-03 DIAGNOSIS — E11.22 CKD STAGE 3 SECONDARY TO DIABETES: ICD-10-CM

## 2017-08-03 DIAGNOSIS — E11.22 CKD STAGE 3 SECONDARY TO DIABETES: Primary | ICD-10-CM

## 2017-08-03 DIAGNOSIS — N18.30 CKD STAGE 3 SECONDARY TO DIABETES: ICD-10-CM

## 2017-08-03 DIAGNOSIS — N18.30 CKD STAGE 3 SECONDARY TO DIABETES: Primary | ICD-10-CM

## 2017-08-03 LAB
ANION GAP SERPL CALC-SCNC: 10 MMOL/L
BUN SERPL-MCNC: 23 MG/DL
CALCIUM SERPL-MCNC: 11 MG/DL
CHLORIDE SERPL-SCNC: 105 MMOL/L
CO2 SERPL-SCNC: 26 MMOL/L
CREAT SERPL-MCNC: 1.6 MG/DL
EST. GFR  (AFRICAN AMERICAN): 46.7 ML/MIN/1.73 M^2
EST. GFR  (NON AFRICAN AMERICAN): 40.4 ML/MIN/1.73 M^2
GLUCOSE SERPL-MCNC: 187 MG/DL
POTASSIUM SERPL-SCNC: 4.4 MMOL/L
PTH-INTACT SERPL-MCNC: 271 PG/ML
SODIUM SERPL-SCNC: 141 MMOL/L

## 2017-08-03 PROCEDURE — 3078F DIAST BP <80 MM HG: CPT | Mod: S$GLB,,, | Performed by: FAMILY MEDICINE

## 2017-08-03 PROCEDURE — 99499 UNLISTED E&M SERVICE: CPT | Mod: S$GLB,,, | Performed by: FAMILY MEDICINE

## 2017-08-03 PROCEDURE — 80048 BASIC METABOLIC PNL TOTAL CA: CPT

## 2017-08-03 PROCEDURE — 3075F SYST BP GE 130 - 139MM HG: CPT | Mod: S$GLB,,, | Performed by: FAMILY MEDICINE

## 2017-08-03 PROCEDURE — 83970 ASSAY OF PARATHORMONE: CPT

## 2017-08-03 PROCEDURE — 3008F BODY MASS INDEX DOCD: CPT | Mod: S$GLB,,, | Performed by: FAMILY MEDICINE

## 2017-08-03 PROCEDURE — 99214 OFFICE O/P EST MOD 30 MIN: CPT | Mod: S$GLB,,, | Performed by: FAMILY MEDICINE

## 2017-08-03 PROCEDURE — 99999 PR PBB SHADOW E&M-EST. PATIENT-LVL III: CPT | Mod: PBBFAC,,, | Performed by: FAMILY MEDICINE

## 2017-08-03 PROCEDURE — 1159F MED LIST DOCD IN RCRD: CPT | Mod: S$GLB,,, | Performed by: FAMILY MEDICINE

## 2017-08-03 PROCEDURE — 1126F AMNT PAIN NOTED NONE PRSNT: CPT | Mod: S$GLB,,, | Performed by: FAMILY MEDICINE

## 2017-08-03 PROCEDURE — 83036 HEMOGLOBIN GLYCOSYLATED A1C: CPT

## 2017-08-03 PROCEDURE — 36415 COLL VENOUS BLD VENIPUNCTURE: CPT | Mod: PO

## 2017-08-03 NOTE — PROGRESS NOTES
Pre-Visit Chart Review  For Appointment Scheduled on 08/10/2017    Health Maintenance Due   Topic Date Due    TETANUS VACCINE  12[birth year]25/1953    Zoster Vaccine  12/25/1997    Eye Exam  04/16/2014    Lipid Panel  12/22/2015    Urine Microalbumin  12/22/2015    Influenza Vaccine  08/01/2017

## 2017-08-03 NOTE — PROGRESS NOTES
Subjective:       Patient ID: Chon Hurtado is a 81 y.o. male.    Chief Complaint: Diabetes    Diabetes   He presents for his follow-up diabetic visit. He has type 2 diabetes mellitus. His disease course has been fluctuating. Pertinent negatives for hypoglycemia include no dizziness or headaches. Associated symptoms include blurred vision, chest pain, fatigue, foot paresthesias, visual change and weakness.     Review of Systems   Constitutional: Positive for fatigue. Negative for unexpected weight change.   Eyes: Positive for blurred vision.   Respiratory: Negative for chest tightness and shortness of breath.    Cardiovascular: Positive for chest pain. Negative for palpitations and leg swelling.   Gastrointestinal: Negative for abdominal pain.   Musculoskeletal: Negative for arthralgias.   Neurological: Positive for weakness. Negative for dizziness, syncope, light-headedness and headaches.       Objective:      Physical Exam   Constitutional: He is oriented to person, place, and time. He appears well-developed. He appears lethargic. He appears ill.   HENT:   Mouth/Throat: Oropharynx is clear and moist. He has dentures. Abnormal dentition.   Cardiovascular: Normal rate, regular rhythm and normal heart sounds.    Pulmonary/Chest: Effort normal and breath sounds normal.   Musculoskeletal:        Right knee: He exhibits swelling.        Right lower leg: He exhibits swelling and edema.        Left lower leg: He exhibits swelling and edema.   Neurological: He is oriented to person, place, and time. He appears lethargic.   Skin: Skin is warm and dry.   Psychiatric: His speech is normal and behavior is normal. Judgment and thought content normal. His affect is blunt. Cognition and memory are impaired. He exhibits a depressed mood.   Nursing note and vitals reviewed.      Assessment:       1. CKD stage 3 secondary to diabetes        Plan:       CKD stage 3 secondary to diabetes  -     Hemoglobin A1c; Future; Expected date:  08/03/2017  -     URINALYSIS; Future; Expected date: 08/03/2017  -     MICROALBUMIN / CREATININE RATIO URINE; Future; Expected date: 08/03/2017  -     Basic metabolic panel; Future; Expected date: 08/03/2017  -     PTH, intact; Future; Expected date: 08/03/2017      Patient readiness: nonacceptance and barriers:social stressors and household issues    During the course of the visit the patient was educated and counseled about the following:     Diabetes:  Discussed general issues about diabetes pathophysiology and management.  Hypertension:   Screening for causes of secondary hypertension: GFR (chronic kidney disease).  Dietary sodium restriction.  Regular aerobic exercise.  Check blood pressures daily and record.  Follow up: 3 weeks and as needed.  Obesity:   Regular aerobic exercise program discussed.    Goals: Diabetes: Maintain Hemoglobin A1C below 7 and Hypertension: Reduce Blood Pressure    Did patient meet goals/outcomes: No    The following self management tools provided: blood pressure log  excercise log    Patient Instructions (the written plan) was given to the patient/family.     Time spent with patient: 45 minutes

## 2017-08-04 ENCOUNTER — TELEPHONE (OUTPATIENT)
Dept: FAMILY MEDICINE | Facility: CLINIC | Age: 82
End: 2017-08-04

## 2017-08-04 DIAGNOSIS — I48.20 CHRONIC ATRIAL FIBRILLATION: Primary | ICD-10-CM

## 2017-08-04 LAB
ESTIMATED AVG GLUCOSE: 192 MG/DL
HBA1C MFR BLD HPLC: 8.3 %

## 2017-08-04 NOTE — TELEPHONE ENCOUNTER
----- Message from Oliverio KING Bruce sent at 8/4/2017  2:48 PM CDT -----  Contact: Taryn/[facility]  Taryn called in regarding the attached patient.  Taryn stated that she needs to get a new Rx for patients blood thinner ( Eliquis).        [facility] [address] [place] [address] [place] [address]  [address] [address]  Phone: [phone] Fax: [phone]    Taryn's call back number is:  [phone])

## 2017-08-17 ENCOUNTER — TELEPHONE (OUTPATIENT)
Dept: FAMILY MEDICINE | Facility: CLINIC | Age: 82
End: 2017-08-17

## 2017-08-17 DIAGNOSIS — N18.4 CKD (CHRONIC KIDNEY DISEASE) STAGE 4, GFR 15-29 ML/MIN: Primary | ICD-10-CM

## 2017-08-17 NOTE — TELEPHONE ENCOUNTER
----- Message from Isabel Scruggs sent at 8/16/2017  9:46 AM CDT -----  Next available appt with Dr White (kidney ) his associate  would be 10/09 .. But if Dr Whitaker wants the patient seen sooner .. He will need  to notify office / please call pt at   [phone] to advise

## 2017-08-17 NOTE — TELEPHONE ENCOUNTER
----- Message from Sharmin Miramontes sent at 8/17/2017 10:00 AM CDT -----  Contact: denise with [facility] [phone]  denise with [facility] [phone] requesting a call from nurse stated patient was advised to see dr giraldo and need a sooner appt than October 2017, in regards to his kidneys.

## 2017-08-17 NOTE — TELEPHONE ENCOUNTER
Received message from Krystina with [facility] who states patient's appointment with Dr. Gomes is in October, requesting a sooner appointment. Writer placed a call to Dr. Gomes's office, spoke with Karoline who states patient's 10-9-17 appointment is the earliest appointment available. Requested patient be placed on wait list. Call placed to Krystina with [facility] for notification of above. Mrs. Flaherty verbalized understanding and states she will notify the patient.

## 2017-08-24 NOTE — TELEPHONE ENCOUNTER
Spoke to LENI Flaherty, states the second physicians office she tried to schedule this pt with, cannot see pt sooner than September as well.  PLease advise.

## 2017-08-31 ENCOUNTER — LAB VISIT (OUTPATIENT)
Dept: LAB | Facility: HOSPITAL | Age: 82
End: 2017-08-31
Attending: INTERNAL MEDICINE
Payer: MEDICARE

## 2017-08-31 DIAGNOSIS — M15.9 PRIMARY OSTEOARTHRITIS INVOLVING MULTIPLE JOINTS: ICD-10-CM

## 2017-08-31 DIAGNOSIS — D64.9 SEVERE ANEMIA: ICD-10-CM

## 2017-08-31 LAB
ALBUMIN SERPL BCP-MCNC: 4.1 G/DL
ALP SERPL-CCNC: 86 U/L
ALT SERPL W/O P-5'-P-CCNC: 19 U/L
ANION GAP SERPL CALC-SCNC: 7 MMOL/L
AST SERPL-CCNC: 21 U/L
BASOPHILS # BLD AUTO: 0 K/UL
BASOPHILS NFR BLD: 0.2 %
BILIRUB SERPL-MCNC: 0.7 MG/DL
BUN SERPL-MCNC: 25 MG/DL
CALCIUM SERPL-MCNC: 11.3 MG/DL
CHLORIDE SERPL-SCNC: 109 MMOL/L
CO2 SERPL-SCNC: 23 MMOL/L
CREAT SERPL-MCNC: 1.5 MG/DL
DIFFERENTIAL METHOD: ABNORMAL
EOSINOPHIL # BLD AUTO: 0 K/UL
EOSINOPHIL NFR BLD: 0 %
ERYTHROCYTE [DISTWIDTH] IN BLOOD BY AUTOMATED COUNT: 13.6 %
EST. GFR  (AFRICAN AMERICAN): 50 ML/MIN/1.73 M^2
EST. GFR  (NON AFRICAN AMERICAN): 44 ML/MIN/1.73 M^2
GLUCOSE SERPL-MCNC: 149 MG/DL
HCT VFR BLD AUTO: 38.8 %
HGB BLD-MCNC: 12.8 G/DL
LYMPHOCYTES # BLD AUTO: 1.3 K/UL
LYMPHOCYTES NFR BLD: 27.2 %
MCH RBC QN AUTO: 29.6 PG
MCHC RBC AUTO-ENTMCNC: 33 G/DL
MCV RBC AUTO: 90 FL
MONOCYTES # BLD AUTO: 0.5 K/UL
MONOCYTES NFR BLD: 11.6 %
NEUTROPHILS # BLD AUTO: 2.8 K/UL
NEUTROPHILS NFR BLD: 61 %
PLATELET # BLD AUTO: 136 K/UL
PMV BLD AUTO: 8.6 FL
POTASSIUM SERPL-SCNC: 5.1 MMOL/L
PROT SERPL-MCNC: 7.6 G/DL
RBC # BLD AUTO: 4.32 M/UL
SODIUM SERPL-SCNC: 139 MMOL/L
WBC # BLD AUTO: 4.7 K/UL

## 2017-08-31 PROCEDURE — 80053 COMPREHEN METABOLIC PANEL: CPT

## 2017-08-31 PROCEDURE — 85025 COMPLETE CBC W/AUTO DIFF WBC: CPT

## 2017-08-31 PROCEDURE — 36415 COLL VENOUS BLD VENIPUNCTURE: CPT

## 2017-09-01 ENCOUNTER — OFFICE VISIT (OUTPATIENT)
Dept: FAMILY MEDICINE | Facility: CLINIC | Age: 82
End: 2017-09-01
Payer: MEDICARE

## 2017-09-01 VITALS
OXYGEN SATURATION: 99 % | HEART RATE: 70 BPM | SYSTOLIC BLOOD PRESSURE: 128 MMHG | HEIGHT: 74 IN | RESPIRATION RATE: 14 BRPM | DIASTOLIC BLOOD PRESSURE: 80 MMHG | BODY MASS INDEX: 26.25 KG/M2 | WEIGHT: 204.56 LBS

## 2017-09-01 DIAGNOSIS — I10 ESSENTIAL HYPERTENSION: Primary | ICD-10-CM

## 2017-09-01 DIAGNOSIS — I82.5Y3 CHRONIC DEEP VEIN THROMBOSIS (DVT) OF PROXIMAL VEIN OF BOTH LOWER EXTREMITIES: ICD-10-CM

## 2017-09-01 DIAGNOSIS — R79.89 INCREASED PTH LEVEL: ICD-10-CM

## 2017-09-01 DIAGNOSIS — N18.30 CKD (CHRONIC KIDNEY DISEASE) STAGE 3, GFR 30-59 ML/MIN: Chronic | ICD-10-CM

## 2017-09-01 PROCEDURE — 99213 OFFICE O/P EST LOW 20 MIN: CPT | Mod: S$GLB,,, | Performed by: PHYSICIAN ASSISTANT

## 2017-09-01 PROCEDURE — 3074F SYST BP LT 130 MM HG: CPT | Mod: S$GLB,,, | Performed by: PHYSICIAN ASSISTANT

## 2017-09-01 PROCEDURE — 1126F AMNT PAIN NOTED NONE PRSNT: CPT | Mod: S$GLB,,, | Performed by: PHYSICIAN ASSISTANT

## 2017-09-01 PROCEDURE — 1159F MED LIST DOCD IN RCRD: CPT | Mod: S$GLB,,, | Performed by: PHYSICIAN ASSISTANT

## 2017-09-01 PROCEDURE — 99499 UNLISTED E&M SERVICE: CPT | Mod: S$GLB,,, | Performed by: PHYSICIAN ASSISTANT

## 2017-09-01 PROCEDURE — 99999 PR PBB SHADOW E&M-EST. PATIENT-LVL IV: CPT | Mod: PBBFAC,,, | Performed by: PHYSICIAN ASSISTANT

## 2017-09-01 PROCEDURE — 3079F DIAST BP 80-89 MM HG: CPT | Mod: S$GLB,,, | Performed by: PHYSICIAN ASSISTANT

## 2017-09-01 PROCEDURE — 3008F BODY MASS INDEX DOCD: CPT | Mod: S$GLB,,, | Performed by: PHYSICIAN ASSISTANT

## 2017-09-01 NOTE — PROGRESS NOTES
Subjective:       Patient ID: Chon Hurtado is a 81 y.o. male.    Chief Complaint: Follow-up (1mth f/u)    Mr. Hurtado comes to clinic today for follow up. He had blood work completed recently that revealed a decline in renal function and elevated PTH. The patient has an appointment with a nephrologist this month but he cannot recall the name of the nephrologist he will be seeing.  The patient's HGA1c was improved but remains elevated. He has not been monitoring his blood sugar. The patient does have chronic DVT and he reports that his anticoagulant was recently changed while hospitalized at [facility]. We do not have these records available to review. The patient reports he went to [facility] due to feeling weak and short of breath. The studies completed revealed no abnormality.       Review of Systems   Constitutional: Negative for activity change and appetite change.   Eyes: Negative for visual disturbance.   Respiratory: Negative for cough and shortness of breath.    Cardiovascular: Positive for leg swelling. Negative for chest pain.        Chronic leg swelling due to DVT   Gastrointestinal: Negative for abdominal distention and abdominal pain.   Genitourinary: Negative for difficulty urinating and dysuria.   Musculoskeletal: Negative for arthralgias and myalgias.   Neurological: Negative for headaches.   Hematological: Negative for adenopathy.   Psychiatric/Behavioral: The patient is not nervous/anxious.        Objective:      Physical Exam   Constitutional: He is oriented to person, place, and time.   Cardiovascular: Normal rate and regular rhythm.    Pulmonary/Chest: Effort normal and breath sounds normal. He has no wheezes.   Abdominal: Soft. Bowel sounds are normal. He exhibits no distension. There is no tenderness.   Musculoskeletal: He exhibits edema.   Chronic edema due to DVT   Neurological: He is alert and oriented to person, place, and time.   Skin: No erythema.   Psychiatric: His behavior is normal.        Assessment:       1. Essential hypertension    2. Uncontrolled type 2 diabetes mellitus with stage 3 chronic kidney disease, with long-term current use of insulin    3. Chronic deep vein thrombosis (DVT) of proximal vein of both lower extremities        Plan:   Chon was seen today for follow-up.    Diagnoses and all orders for this visit:    Essential hypertension  Controlled  Low salt diet  Continue current medication  Uncontrolled type 2 diabetes mellitus with stage 3 chronic kidney disease, with long-term current use of insulin  Diabetic diet  Monitor and record blood sugars  Chronic deep vein thrombosis (DVT) of proximal vein of both lower extremities  Follow up with Dr. Pérez  Will request records from [facility] as patient reports he had repeat US of lower extremities done there  CKD (chronic kidney disease) stage 3, GFR 30-59 ml/min  Follow up with nephrologist; patient to call back with nephrologist   Increased PTH level  Follow up with nephrologist; patient to call back with nephrologist     Request [facility] records

## 2017-09-01 NOTE — LETTER
September 1, 2017      Doug Whitaker MD  [address] [address]           [facility]  [address]  [place] [address]  Phone: [phone]  Fax: [phone]          Patient: Chon Hurtado   MR Number: [id number]   YOB: 1935   Date of Visit: 9/1/2017       Dear Dr. Doug Whitaker:    Thank you for referring Chon Hurtado to me for evaluation. Attached you will find relevant portions of my assessment and plan of care.    If you have questions, please do not hesitate to call me. I look forward to following Chon Hurtado along with you.    Sincerely,    Meenakshi Masters PA-C    Enclosure  CC:  No Recipients    If you would like to receive this communication electronically, please contact [email or web address] or [phone] to request more information on [employer] access.    For providers and/or their staff who would like to refer a patient to [facility], please contact us through our one-stop-shop provider referral line, [facility], at [phone].    If you feel you have received this communication in error or would no longer like to receive these types of communications, please e-mail [email or web address]

## 2017-09-01 NOTE — PATIENT INSTRUCTIONS
Established High Blood Pressure    High blood pressure (hypertension) is a chronic disease. Often health care providers dont know what causes it. But it can be caused by certain health conditions and medicines.  If you have high blood pressure, you may not have any symptoms. If you do have symptoms, they may include headache, dizziness, changes in your vision, chest pain, and shortness of breath. But even without symptoms, high blood pressure thats not treated raises your risk for heart attack and stroke. High blood pressure is a serious health risk and shouldnt be ignored.  A blood pressure reading is made up of two numbers: a higher number over a lower number. The top number is the systolic pressure. The bottom number is the diastolic pressure. A normal blood pressure is less than 120 over less than 80.  High blood pressure is when either the top number is 140 or higher, or the bottom number is 90 or higher. This must be the result when taking your blood pressure a number of times. The blood pressures between normal and high are called prehypertension.  Home care  If you have high blood pressure, you should do what is listed below to lower your blood pressure. If you are taking medicines for high blood pressure, these methods may reduce or end your need for medicines in the future.  · Begin a weight-loss program if you are overweight.  · Cut back on how much salt you get in your diet. Heres how to do this:  ¨ Dont eat foods that have a lot of salt. These include olives, pickles, smoked meats, and salted potato chips.  ¨ Dont add salt to your food at the table.  ¨ Use only small amounts of salt when cooking.  · Begin an exercise program. Talk with your health care provider about the type of exercise program that would be best for you. It doesn't have to be hard. Even brisk walking for 20 minutes 3 times a week is a good form of exercise.  · Dont take medicines that have heart stimulants. This includes many  cold and sinus decongestant pills and sprays, as well as diet pills. Check the warnings about hypertension on the label. Stimulants such as amphetamine or cocaine could be lethal for someone with high blood pressure. Never take these.  · Limit how much caffeine you get in your diet. Switch to caffeine-free products.  · Stop smoking. If you are a long-time smoker, this can be hard. Enroll in a stop-smoking program to make it more likely that you will quit for good.  · Learn how to handle stress. This is an important part of any program to lower blood pressure. Learn about relaxation methods like meditation, yoga, or biofeedback.  · If your provider prescribed medicines, take them exactly as directed. Missing doses may cause your blood pressure get out of control.  · Consider buying an automatic blood pressure machine. You can get one of these at most pharmacies. Use this to watch your blood pressure at home. Give the results to your provider.  Follow-up care  You will need to make regular visits to your health care provider. This is to check your blood pressure and to make changes to your medicines. Make a follow-up appointment as directed.  When to seek medical advice  Call your health care provider right away if any of these occur:  · Chest pain or shortness of breath  · Severe headache  · Throbbing or rushing sound in the ears  · Nosebleed  · Sudden severe pain in your belly (abdomen)  · Extreme drowsiness, confusion, or fainting  · Dizziness or dizziness with a spinning sensation (vertigo)  · Weakness of an arm or leg or one side of the face  · You have problems speaking or seeing   Date Last Reviewed: 11/25/2014  © 2675-4413 [employer]. [address] [place] [address], [place] [address]. All rights reserved. This information is not intended as a substitute for professional medical care. Always follow your healthcare professional's instructions.            Diabetes (General Information)  Diabetes is a  long-term health problem. It means your body does not make enough insulin. Or it may mean that your body cannot use the insulin it makes. Insulin is a hormone in your body. It lets blood sugar (glucose) reach the cells in your body. All of your cells need glucose for fuel.  When you have diabetes, the glucose in your blood builds up because it cannot get into the cells. This buildup is called high blood sugar (hyperglycemia).  Your blood sugar level depends on several things. It depends on what kind of food you eat and how much of it you eat. It also depends on how much exercise you get, and how much insulin you have in your body. Eating too much of the wrong kinds of food or not taking diabetes medicine on time can cause high blood sugar. Infections can cause high blood sugar even if you are taking medicines correctly.  These things can also cause low blood sugar:  · Missing meals  · Not eating enough food  · Taking too much diabetes medicine  Diabetes can cause serious problems over time if you do not get treated. These problems include heart disease, stroke, kidney failure, and blindness. They also include nerve pain or loss of feeling in your legs and feet, and gangrene of the feet. By keeping your blood sugar under control you can prevent or delay these problems.  Normal blood sugar levels are 80 to 100 before a meal and less than 180 in the 1 to 2 hours after a meal.  Home care  Follow these guidelines when caring for yourself at home:  · Follow the diet your healthcare provider gives you. Take insulin or other diabetes medicine exactly as told to.  · Watch your blood sugar as you are told to. Keep a log of your results. This will help your provider change your medicines to keep your blood sugar under control.  · Try to reach your ideal weight. You may be able to cut back on or not have to take diabetes medicine if you eat the right foods and get exercise.  · Do not smoke. Smoking worsens the effects of  diabetes on your circulation. You are much more likely to have a heart attack if you have diabetes and you smoke.  · Take good care of your feet. If you have lost feeling in your feet, you may not see an injury or infection. Check your feet and between your toes at least once a week.  · Wear a medical alert bracelet or necklace, or carry a card in your wallet that says you have diabetes. This will help healthcare providers give you the right care if you get very ill and cannot tell them that you have diabetes.  Sick day plan  If you get a cold, the flu, or a bacterial or viral infection, take these steps:  · Look at your diabetes sick plan and call your healthcare provider as you were told to. You may need to call your provider right away if:  ¨ Your blood sugar is above 240 while taking your diabetes medicine  ¨ Your urine ketone levels are above normal or high  ¨ You have been vomiting more than 6 hours  ¨ You have trouble breathing or your breath ha s a fruity smell  ¨ You have a high fever  ¨ You have a fever for several days and you are not getting better  ¨ You get light-headed and are sleepier than usual  · Keep taking your diabetes pills (oral medicine) even if you have been vomiting and are feeling sick. Call your provider right away because you may need insulin to lower your blood sugar until you recover from your illness.  · Keep taking your insulin even if you have been vomiting and are feeling sick. Call your provider right away to ask if you need to change your insulin dose. This will depend on your blood sugar results.  · Check your blood sugar every 2 to 4 hours, or at least 4 times a day.  · Check your ketones often. If you are vomiting and having diarrhea, watch them more often.  · Do not skip meals. Try to eat small meals on a regular schedule. Do this even if you do not feel like eating.  · Drink water or other liquids that do not have caffeine or calories. This will keep you from getting  dehydrated. If you are nauseated or vomiting, takes small sips every 5 minutes. To prevent dehydration try to drink a cup (8 ounces) of fluids every hour while you are awake.  General care  Always bring a source of fast-acting sugar with you in case you have symptoms of low blood sugar (below 70). At the first sign of low blood sugar, eat or drink 15 to 20 grams of fast-acting sugar to raise your blood sugar. Examples are:  · 3 to 4 glucose tablets. You can buy these at most [employer].  · 4 ounces (1/2 cup) of regular (not diet) soft drinks  · 4 ounces (1/2 cup) of any fruit juice  · 8 ounces (1 cup) of milk  · 5 to 6 pieces of hard candy  · 1 tablespoon of honey  Check your blood sugar 15 minutes after treating yourself. If it is still below 70, take 15 to 20 more grams of fast-acting sugar. Test again in 15 minutes. If it returns to normal (70 or above), eat a snack or meal to keep your blood sugar in a safe range. If it stays low, call your doctor or go to an emergency room.  Follow-up care  Follow-up with your healthcare provider, or as advised. For more information about diabetes, visit the American Diabetes Association website at www.diabetes.org or call 577-944-3790.  When to seek medical advice  Call your healthcare provider right away if you have any of these symptoms of high blood sugar:  · Frequent urination  · Dizziness  · Drowsiness  · Thirst  · Headache  · Nausea or vomiting  · Abdominal pain  · Eyesight changes  · Fast breathing  · Confusion or loss of consciousness  Also call your provider right away if you have any of these signs of low blood sugar:  · Fatigue  · Headache  · Shakes  · Excess sweating  · Hunger  · Feeling anxious or restless  · Eyesight changes  · Drowsiness  · Weakness  · Confusion or loss of consciousness  Call 911  Call for emergency help right away if any of these occur:  · Chest pain or shortness of breath  · Dizziness or fainting  · Weakness of an arm or leg or one side of the  face  · Trouble speaking or seeing   Date Last Reviewed: 6/1/2016  © 5945-4122 [employer]. [address] [place] [address], [place] [address]. All rights reserved. This information is not intended as a substitute for professional medical care. Always follow your healthcare professional's instructions.

## 2017-09-27 NOTE — PROGRESS NOTES
No answer and no voicemail-pt needs to be advised mychart message sent   
Pt is inpatient at [facility]. Will check status  
None

## 2017-09-28 ENCOUNTER — TELEPHONE (OUTPATIENT)
Dept: FAMILY MEDICINE | Facility: CLINIC | Age: 82
End: 2017-09-28

## 2017-09-28 NOTE — TELEPHONE ENCOUNTER
----- Message from Margie Hernández sent at 9/28/2017  9:14 AM CDT -----  [facility] [phone] is calling concerning the patient has a nephrology consult. The patient needs to know who and where since office set it up. Please call patient with information at [phone]. Call [facility] if you cannot get in touch with patient.

## 2017-10-10 ENCOUNTER — DOCUMENTATION ONLY (OUTPATIENT)
Dept: FAMILY MEDICINE | Facility: CLINIC | Age: 82
End: 2017-10-10

## 2017-10-10 NOTE — PROGRESS NOTES
Pre-Visit Chart Review  For Appointment Scheduled on 10/16/2017      Health Maintenance Due   Topic Date Due    TETANUS VACCINE  [birth year]25/1953    Zoster Vaccine  12/25/1997    Eye Exam  04/16/2014    Lipid Panel  12/22/2015    Influenza Vaccine  08/01/2017

## 2017-10-11 ENCOUNTER — HOSPITAL ENCOUNTER (OUTPATIENT)
Dept: RADIOLOGY | Facility: HOSPITAL | Age: 82
Discharge: HOME | End: 2017-10-11
Attending: INTERNAL MEDICINE
Payer: MEDICARE

## 2017-10-11 DIAGNOSIS — E21.3 HPTH (HYPERPARATHYROIDISM): ICD-10-CM

## 2017-10-11 PROCEDURE — 78071 PARATHYRD PLANAR W/WO SUBTRJ: CPT | Mod: 26,,, | Performed by: RADIOLOGY

## 2017-10-11 PROCEDURE — A9500 TC99M SESTAMIBI: HCPCS

## 2017-10-11 PROCEDURE — 78071 PARATHYRD PLANAR W/WO SUBTRJ: CPT | Mod: TC

## 2017-10-16 ENCOUNTER — OFFICE VISIT (OUTPATIENT)
Dept: FAMILY MEDICINE | Facility: CLINIC | Age: 82
End: 2017-10-16
Payer: MEDICARE

## 2017-10-16 VITALS
HEART RATE: 69 BPM | BODY MASS INDEX: 26.28 KG/M2 | DIASTOLIC BLOOD PRESSURE: 68 MMHG | HEIGHT: 74 IN | SYSTOLIC BLOOD PRESSURE: 114 MMHG | WEIGHT: 204.81 LBS | TEMPERATURE: 98 F

## 2017-10-16 DIAGNOSIS — Z79.4 ENCOUNTER FOR LONG-TERM (CURRENT) USE OF INSULIN: ICD-10-CM

## 2017-10-16 DIAGNOSIS — G31.84 MILD COGNITIVE IMPAIRMENT: ICD-10-CM

## 2017-10-16 DIAGNOSIS — Z23 FLU VACCINE NEED: ICD-10-CM

## 2017-10-16 DIAGNOSIS — E21.4 OTHER SPECIFIED DISORDERS OF PARATHYROID GLAND: ICD-10-CM

## 2017-10-16 DIAGNOSIS — E11.69 HYPERLIPIDEMIA ASSOCIATED WITH TYPE 2 DIABETES MELLITUS: ICD-10-CM

## 2017-10-16 DIAGNOSIS — E27.40 ADRENAL INSUFFICIENCY: ICD-10-CM

## 2017-10-16 DIAGNOSIS — Z00.00 ENCOUNTER FOR PREVENTIVE HEALTH EXAMINATION: ICD-10-CM

## 2017-10-16 DIAGNOSIS — I15.2 HYPERTENSION ASSOCIATED WITH DIABETES: ICD-10-CM

## 2017-10-16 DIAGNOSIS — Z79.4 CONTROLLED TYPE 2 DIABETES MELLITUS WITH DIABETIC NEPHROPATHY, WITH LONG-TERM CURRENT USE OF INSULIN: Primary | ICD-10-CM

## 2017-10-16 DIAGNOSIS — E78.5 HYPERLIPIDEMIA ASSOCIATED WITH TYPE 2 DIABETES MELLITUS: ICD-10-CM

## 2017-10-16 DIAGNOSIS — E11.21 CONTROLLED TYPE 2 DIABETES MELLITUS WITH DIABETIC NEPHROPATHY, WITH LONG-TERM CURRENT USE OF INSULIN: Primary | ICD-10-CM

## 2017-10-16 DIAGNOSIS — N18.30 CKD (CHRONIC KIDNEY DISEASE) STAGE 3, GFR 30-59 ML/MIN: Chronic | ICD-10-CM

## 2017-10-16 DIAGNOSIS — C61 PROSTATE CA: ICD-10-CM

## 2017-10-16 DIAGNOSIS — E11.59 HYPERTENSION ASSOCIATED WITH DIABETES: ICD-10-CM

## 2017-10-16 DIAGNOSIS — I82.403 DEEP VEIN THROMBOSIS (DVT) OF BOTH LOWER EXTREMITIES, UNSPECIFIED CHRONICITY, UNSPECIFIED VEIN: ICD-10-CM

## 2017-10-16 PROBLEM — I82.4Y3 ACUTE DEEP VEIN THROMBOSIS (DVT) OF PROXIMAL VEIN OF BOTH LOWER EXTREMITIES: Status: RESOLVED | Noted: 2017-05-26 | Resolved: 2017-10-16

## 2017-10-16 PROCEDURE — 99499 UNLISTED E&M SERVICE: CPT | Mod: S$GLB,,, | Performed by: PHYSICIAN ASSISTANT

## 2017-10-16 PROCEDURE — 90662 IIV NO PRSV INCREASED AG IM: CPT | Mod: S$GLB,,, | Performed by: PHYSICIAN ASSISTANT

## 2017-10-16 PROCEDURE — G0439 PPPS, SUBSEQ VISIT: HCPCS | Mod: S$GLB,,, | Performed by: PHYSICIAN ASSISTANT

## 2017-10-16 PROCEDURE — G0008 ADMIN INFLUENZA VIRUS VAC: HCPCS | Mod: S$GLB,,, | Performed by: PHYSICIAN ASSISTANT

## 2017-10-16 PROCEDURE — 99999 PR PBB SHADOW E&M-EST. PATIENT-LVL IV: CPT | Mod: PBBFAC,,, | Performed by: PHYSICIAN ASSISTANT

## 2017-10-16 RX ORDER — CLOPIDOGREL BISULFATE 75 MG/1
1 TABLET ORAL DAILY
COMMUNITY
Start: 2017-08-31 | End: 2018-01-08

## 2017-10-16 RX ORDER — ASPIRIN 81 MG/1
81 TABLET ORAL DAILY
COMMUNITY

## 2017-10-16 NOTE — Clinical Note
Primary Care Providers: Doug Whitaker MD, MD (General)  Your patient was seen today for a HRA visit. Gap(s) in care (HEDIS gaps) have been identified during this visit that require additional testing and possible follow up.  Orders Placed This Encounter     Influenza - High Dose (65+) (PF) (IM)     LIPID PANEL         Standing Status: Future         Standing Expiration Date: 12/15/2018     CBC auto differential         Standing Status: Future         Standing Expiration Date: 12/15/2018     Comprehensive metabolic panel         Standing Status: Future         Standing Expiration Date: 12/15/2018     Hemoglobin A1c         Standing Status: Future         Standing Expiration Date: 12/15/2018     Ambulatory Referral to Ophthalmology         Referral Priority:Routine         Referral Type:Consultation         Referral Reason:Specialty Services Required         Referred to Provider:Ramesh Epperson II, MD         Requested Specialty:Ophthalmology         Number of Visits Requested:1 Th

## 2017-10-16 NOTE — PROGRESS NOTES
"Chon Hurtado presented for a  Medicare AWV and comprehensive Health Risk Assessment today. The following components were reviewed and updated:    · Medical history  · Family History  · Social history  · Allergies and Current Medications  · Health Risk Assessment  · Health Maintenance  · Care Team     ** See Completed Assessments for Annual Wellness Visit within the encounter summary.**       The following assessments were completed:  · Living Situation  · CAGE  · Depression Screening  · Timed Get Up and Go  · Whisper Test  · Cognitive Function Screening  · Nutrition Screening  · ADL Screening  · PAQ Screening    Vitals:    10/16/17 1418   BP: 114/68   BP Location: Right arm   Patient Position: Sitting   BP Method: Large (Automatic)   Pulse: 69   Temp: 98.4 °F (36.9 °C)   TempSrc: Oral   Weight: 92.9 kg (204 lb 12.9 oz)   Height: 6' 1.5" (1.867 m)     Body mass index is 26.65 kg/m².  Physical Exam   Constitutional: He is oriented to person, place, and time. He appears well-developed and well-nourished. No distress.   HENT:   Head: Normocephalic and atraumatic.   Eyes: Conjunctivae are normal. Pupils are equal, round, and reactive to light.   Neck: Normal range of motion. Neck supple. No JVD present. No thyromegaly present.   Cardiovascular: Normal rate and regular rhythm.  Exam reveals no gallop and no friction rub.    No murmur heard.  Pulmonary/Chest: Effort normal. No respiratory distress. He has no wheezes. He has no rales. He exhibits no tenderness.   Neurological: He is alert and oriented to person, place, and time.   Skin: He is not diaphoretic.               Diagnoses and health risks identified today and associated recommendations/orders:    Chon was seen today for health risk assessment.    Diagnoses and all orders for this visit:    Controlled type 2 diabetes mellitus with diabetic nephropathy, with long-term current use of insulin  Comments:  uncontrolled, continue meds, labs ordered  Orders:  -     " Ambulatory Referral to Ophthalmology  -     LIPID PANEL; Future  -     CBC auto differential; Future  -     Comprehensive metabolic panel; Future  -     Hemoglobin A1c; Future    Hypertension associated with diabetes  Comments:  controlled, continue meds    Hyperlipidemia associated with type 2 diabetes mellitus  Comments:  uncontrolled, labs ordered    Deep vein thrombosis (DVT) of both lower extremities, unspecified chronicity, unspecified vein  Comments:  stable, continue meds    Other specified disorders of parathyroid gland  Comments:  uncontrolled, refer to endocrine    Adrenal insufficiency  Comments:  uncontrolled, needs to see endocrine    Prostate CA  Comments:  ongoing, followed by heme/onc    Mild cognitive impairment  Comments:  stable, 6/7 on MMSE    CKD (chronic kidney disease) stage 3, GFR 30-59 ml/min  Comments:  controlled, continue to monitor    Encounter for long-term (current) use of insulin  Comments:  continue current dose    Encounter for preventive health examination    Flu vaccine need  -     Influenza - High Dose (65+) (PF) (IM)        Provided Chon with a 5-10 year written screening schedule and personal prevention plan. Recommendations were developed using the USPSTF age appropriate recommendations. Education, counseling, and referrals were provided as needed. After Visit Summary printed and given to patient which includes a list of additional screenings\tests needed.    No Follow-up on file.    JUAN PABLO Dawson     Patient readiness: acceptance and barriers:environmental    During the course of the visit the patient was educated and counseled about the following:     Diabetes:  Discussed general issues about diabetes pathophysiology and management.  Hypertension:   Regular aerobic exercise.    Goals: Diabetes: Maintain Hemoglobin A1C below 7 and Hypertension: Reduce Blood Pressure    Did patient meet goals/outcomes: No    The following self management tools provided: blood  pressure log  blood glucose log    Patient Instructions (the written plan) was given to the patient/family.     Time spent with patient: 55 minutes

## 2017-10-16 NOTE — PROGRESS NOTES
Pt. Identified using  and name. VIS given to pt. Procedure was explained and pt. verbalized full understanding. FLU zone HD administered IM in the left deltoid using sterile technique. No bleeding at injection site. Pt. tolerated procedure well.

## 2017-10-17 ENCOUNTER — LAB VISIT (OUTPATIENT)
Dept: LAB | Facility: HOSPITAL | Age: 82
End: 2017-10-17
Attending: PHYSICIAN ASSISTANT
Payer: MEDICARE

## 2017-10-17 DIAGNOSIS — E11.21 CONTROLLED TYPE 2 DIABETES MELLITUS WITH DIABETIC NEPHROPATHY, WITH LONG-TERM CURRENT USE OF INSULIN: ICD-10-CM

## 2017-10-17 DIAGNOSIS — Z79.4 CONTROLLED TYPE 2 DIABETES MELLITUS WITH DIABETIC NEPHROPATHY, WITH LONG-TERM CURRENT USE OF INSULIN: ICD-10-CM

## 2017-10-17 LAB
ALBUMIN SERPL BCP-MCNC: 3.9 G/DL
ALP SERPL-CCNC: 97 U/L
ALT SERPL W/O P-5'-P-CCNC: 16 U/L
ANION GAP SERPL CALC-SCNC: 8 MMOL/L
AST SERPL-CCNC: 18 U/L
BASOPHILS # BLD AUTO: 0 K/UL
BASOPHILS NFR BLD: 0 %
BILIRUB SERPL-MCNC: 0.5 MG/DL
BUN SERPL-MCNC: 17 MG/DL
CALCIUM SERPL-MCNC: 10.5 MG/DL
CHLORIDE SERPL-SCNC: 109 MMOL/L
CHOLEST SERPL-MCNC: 190 MG/DL
CHOLEST/HDLC SERPL: 5.1 {RATIO}
CO2 SERPL-SCNC: 25 MMOL/L
CREAT SERPL-MCNC: 1.3 MG/DL
DIFFERENTIAL METHOD: ABNORMAL
EOSINOPHIL # BLD AUTO: 0 K/UL
EOSINOPHIL NFR BLD: 0 %
ERYTHROCYTE [DISTWIDTH] IN BLOOD BY AUTOMATED COUNT: 12.6 %
EST. GFR  (AFRICAN AMERICAN): 60 ML/MIN/1.73 M^2
EST. GFR  (NON AFRICAN AMERICAN): 51.9 ML/MIN/1.73 M^2
GLUCOSE SERPL-MCNC: 181 MG/DL
HCT VFR BLD AUTO: 38.2 %
HDLC SERPL-MCNC: 37 MG/DL
HDLC SERPL: 19.5 %
HGB BLD-MCNC: 11.9 G/DL
IMM GRANULOCYTES # BLD AUTO: 0.01 K/UL
IMM GRANULOCYTES NFR BLD AUTO: 0.3 %
LDLC SERPL CALC-MCNC: 129.4 MG/DL
LYMPHOCYTES # BLD AUTO: 0.8 K/UL
LYMPHOCYTES NFR BLD: 26.9 %
MCH RBC QN AUTO: 28.7 PG
MCHC RBC AUTO-ENTMCNC: 31.2 G/DL
MCV RBC AUTO: 92 FL
MONOCYTES # BLD AUTO: 0.4 K/UL
MONOCYTES NFR BLD: 14.5 %
NEUTROPHILS # BLD AUTO: 1.7 K/UL
NEUTROPHILS NFR BLD: 58.3 %
NONHDLC SERPL-MCNC: 153 MG/DL
NRBC BLD-RTO: 0 /100 WBC
PLATELET # BLD AUTO: 146 K/UL
PMV BLD AUTO: 10.9 FL
POTASSIUM SERPL-SCNC: 4.4 MMOL/L
PROT SERPL-MCNC: 7.5 G/DL
RBC # BLD AUTO: 4.14 M/UL
SODIUM SERPL-SCNC: 142 MMOL/L
TRIGL SERPL-MCNC: 118 MG/DL
WBC # BLD AUTO: 2.97 K/UL

## 2017-10-17 PROCEDURE — 80053 COMPREHEN METABOLIC PANEL: CPT

## 2017-10-17 PROCEDURE — 80061 LIPID PANEL: CPT

## 2017-10-17 PROCEDURE — 83036 HEMOGLOBIN GLYCOSYLATED A1C: CPT

## 2017-10-17 PROCEDURE — 36415 COLL VENOUS BLD VENIPUNCTURE: CPT | Mod: PO

## 2017-10-17 PROCEDURE — 85025 COMPLETE CBC W/AUTO DIFF WBC: CPT

## 2017-10-18 LAB
ESTIMATED AVG GLUCOSE: 200 MG/DL
HBA1C MFR BLD HPLC: 8.6 %

## 2017-12-09 RX ORDER — CALCIUM CITRATE/VITAMIN D3 200MG-6.25
TABLET ORAL
Qty: 50 STRIP | Refills: 0 | Status: SHIPPED | OUTPATIENT
Start: 2017-12-09 | End: 2018-01-31 | Stop reason: SDUPTHER

## 2017-12-17 ENCOUNTER — TELEPHONE (OUTPATIENT)
Dept: FAMILY MEDICINE | Facility: CLINIC | Age: 82
End: 2017-12-17

## 2017-12-17 DIAGNOSIS — D70.9 NEUTROPENIA, UNSPECIFIED TYPE: Primary | ICD-10-CM

## 2017-12-21 NOTE — TELEPHONE ENCOUNTER
Lab orders placed by Dr. Whitaker. Patient notified. Verbalized understanding. Lab appointment scheduled for 12-22-17.  Patient agreed to lab appointment date and time.

## 2017-12-28 ENCOUNTER — TELEPHONE (OUTPATIENT)
Dept: HEMATOLOGY/ONCOLOGY | Facility: CLINIC | Age: 82
End: 2017-12-28

## 2017-12-28 NOTE — TELEPHONE ENCOUNTER
----- Message from Shanika Mercado sent at 12/28/2017  3:22 PM CST -----  Contact: Wife Ramila  Patient was discharged on 12/24 [facility] and needs follow up appointment in two weeks from that date.  Please call [phone] (home).  Thank you!

## 2017-12-28 NOTE — TELEPHONE ENCOUNTER
Spoke with pt wife, made a hospital appt for patient on 1/17 at wife's request. She voiced all understanding.

## 2018-01-04 ENCOUNTER — TELEPHONE (OUTPATIENT)
Dept: FAMILY MEDICINE | Facility: CLINIC | Age: 83
End: 2018-01-04

## 2018-01-04 NOTE — TELEPHONE ENCOUNTER
Called pt regarding below message. Pt states he was hospitalized recently and was unable to have labs completed due to the Admission. Pt would like to reschedule labs. Appt date, time, and location given to pt. Pt verbalized understanding with no further questions.     ----- Message from RT Edilson sent at 1/4/2018  1:33 PM CST -----  Contact: Pt    Pt , requesting to have his kidney test rescheduled since his was hospitalized, thanks.

## 2018-01-05 ENCOUNTER — TELEPHONE (OUTPATIENT)
Dept: FAMILY MEDICINE | Facility: CLINIC | Age: 83
End: 2018-01-05

## 2018-01-05 ENCOUNTER — LAB VISIT (OUTPATIENT)
Dept: LAB | Facility: HOSPITAL | Age: 83
End: 2018-01-05
Attending: FAMILY MEDICINE
Payer: MEDICARE

## 2018-01-05 DIAGNOSIS — D70.9 NEUTROPENIA, UNSPECIFIED TYPE: ICD-10-CM

## 2018-01-05 LAB
ANION GAP SERPL CALC-SCNC: 7 MMOL/L
BASOPHILS # BLD AUTO: 0 K/UL
BASOPHILS NFR BLD: 0 %
BUN SERPL-MCNC: 18 MG/DL
CALCIUM SERPL-MCNC: 10.5 MG/DL
CHLORIDE SERPL-SCNC: 108 MMOL/L
CO2 SERPL-SCNC: 27 MMOL/L
CREAT SERPL-MCNC: 1.5 MG/DL
DIFFERENTIAL METHOD: ABNORMAL
EOSINOPHIL # BLD AUTO: 0 K/UL
EOSINOPHIL NFR BLD: 0 %
ERYTHROCYTE [DISTWIDTH] IN BLOOD BY AUTOMATED COUNT: 12.7 %
EST. GFR  (AFRICAN AMERICAN): 50.1 ML/MIN/1.73 M^2
EST. GFR  (NON AFRICAN AMERICAN): 43.3 ML/MIN/1.73 M^2
GLUCOSE SERPL-MCNC: 161 MG/DL
HCT VFR BLD AUTO: 40 %
HGB BLD-MCNC: 12.5 G/DL
IMM GRANULOCYTES # BLD AUTO: 0.01 K/UL
IMM GRANULOCYTES NFR BLD AUTO: 0.2 %
LYMPHOCYTES # BLD AUTO: 0.9 K/UL
LYMPHOCYTES NFR BLD: 21.1 %
MCH RBC QN AUTO: 28.3 PG
MCHC RBC AUTO-ENTMCNC: 31.3 G/DL
MCV RBC AUTO: 91 FL
MONOCYTES # BLD AUTO: 0.5 K/UL
MONOCYTES NFR BLD: 10.9 %
NEUTROPHILS # BLD AUTO: 2.9 K/UL
NEUTROPHILS NFR BLD: 67.8 %
NRBC BLD-RTO: 0 /100 WBC
PLATELET # BLD AUTO: 150 K/UL
PMV BLD AUTO: 11 FL
POTASSIUM SERPL-SCNC: 4.9 MMOL/L
RBC # BLD AUTO: 4.41 M/UL
SODIUM SERPL-SCNC: 142 MMOL/L
WBC # BLD AUTO: 4.31 K/UL

## 2018-01-05 PROCEDURE — 85025 COMPLETE CBC W/AUTO DIFF WBC: CPT

## 2018-01-05 PROCEDURE — 36415 COLL VENOUS BLD VENIPUNCTURE: CPT | Mod: PO

## 2018-01-05 PROCEDURE — 80048 BASIC METABOLIC PNL TOTAL CA: CPT

## 2018-01-05 NOTE — TELEPHONE ENCOUNTER
Patient is coming in for [facility] follow up next week. Please be sure we have his records. Thanks!

## 2018-01-08 ENCOUNTER — LAB VISIT (OUTPATIENT)
Dept: LAB | Facility: HOSPITAL | Age: 83
End: 2018-01-08
Attending: PHYSICIAN ASSISTANT
Payer: MEDICARE

## 2018-01-08 ENCOUNTER — OFFICE VISIT (OUTPATIENT)
Dept: FAMILY MEDICINE | Facility: CLINIC | Age: 83
End: 2018-01-08
Payer: MEDICARE

## 2018-01-08 VITALS
RESPIRATION RATE: 16 BRPM | WEIGHT: 212.5 LBS | DIASTOLIC BLOOD PRESSURE: 70 MMHG | OXYGEN SATURATION: 96 % | TEMPERATURE: 99 F | HEART RATE: 59 BPM | SYSTOLIC BLOOD PRESSURE: 118 MMHG | HEIGHT: 74 IN | BODY MASS INDEX: 27.27 KG/M2

## 2018-01-08 DIAGNOSIS — Z79.4 CONTROLLED TYPE 2 DIABETES MELLITUS WITH DIABETIC NEPHROPATHY, WITH LONG-TERM CURRENT USE OF INSULIN: ICD-10-CM

## 2018-01-08 DIAGNOSIS — D64.9 ANEMIA, UNSPECIFIED TYPE: ICD-10-CM

## 2018-01-08 DIAGNOSIS — E11.59 HYPERTENSION ASSOCIATED WITH DIABETES: ICD-10-CM

## 2018-01-08 DIAGNOSIS — I15.2 HYPERTENSION ASSOCIATED WITH DIABETES: ICD-10-CM

## 2018-01-08 DIAGNOSIS — N18.30 CKD (CHRONIC KIDNEY DISEASE) STAGE 3, GFR 30-59 ML/MIN: Chronic | ICD-10-CM

## 2018-01-08 DIAGNOSIS — R53.81 DEBILITY: ICD-10-CM

## 2018-01-08 DIAGNOSIS — I82.403 ACUTE DEEP VEIN THROMBOSIS (DVT) OF BOTH LOWER EXTREMITIES, UNSPECIFIED VEIN: Primary | ICD-10-CM

## 2018-01-08 DIAGNOSIS — I25.10 CORONARY ARTERY DISEASE INVOLVING NATIVE CORONARY ARTERY OF NATIVE HEART WITHOUT ANGINA PECTORIS: ICD-10-CM

## 2018-01-08 DIAGNOSIS — E11.69 HYPERLIPIDEMIA ASSOCIATED WITH TYPE 2 DIABETES MELLITUS: ICD-10-CM

## 2018-01-08 DIAGNOSIS — E11.21 CONTROLLED TYPE 2 DIABETES MELLITUS WITH DIABETIC NEPHROPATHY, WITH LONG-TERM CURRENT USE OF INSULIN: ICD-10-CM

## 2018-01-08 DIAGNOSIS — E78.5 HYPERLIPIDEMIA ASSOCIATED WITH TYPE 2 DIABETES MELLITUS: ICD-10-CM

## 2018-01-08 LAB
ALBUMIN SERPL BCP-MCNC: 3.8 G/DL
ALP SERPL-CCNC: 87 U/L
ALT SERPL W/O P-5'-P-CCNC: 15 U/L
ANION GAP SERPL CALC-SCNC: 7 MMOL/L
AST SERPL-CCNC: 19 U/L
BASOPHILS # BLD AUTO: 0 K/UL
BASOPHILS NFR BLD: 0 %
BILIRUB SERPL-MCNC: 0.3 MG/DL
BUN SERPL-MCNC: 19 MG/DL
CALCIUM SERPL-MCNC: 10.3 MG/DL
CHLORIDE SERPL-SCNC: 109 MMOL/L
CO2 SERPL-SCNC: 25 MMOL/L
CREAT SERPL-MCNC: 1.4 MG/DL
DIFFERENTIAL METHOD: ABNORMAL
EOSINOPHIL # BLD AUTO: 0 K/UL
EOSINOPHIL NFR BLD: 0 %
ERYTHROCYTE [DISTWIDTH] IN BLOOD BY AUTOMATED COUNT: 12.9 %
EST. GFR  (AFRICAN AMERICAN): 54.5 ML/MIN/1.73 M^2
EST. GFR  (NON AFRICAN AMERICAN): 47.1 ML/MIN/1.73 M^2
ESTIMATED AVG GLUCOSE: 192 MG/DL
FERRITIN SERPL-MCNC: 148 NG/ML
GLUCOSE SERPL-MCNC: 147 MG/DL
HBA1C MFR BLD HPLC: 8.3 %
HCT VFR BLD AUTO: 38.6 %
HGB BLD-MCNC: 12.2 G/DL
IMM GRANULOCYTES # BLD AUTO: 0.01 K/UL
IMM GRANULOCYTES NFR BLD AUTO: 0.3 %
IRON SERPL-MCNC: 62 UG/DL
LYMPHOCYTES # BLD AUTO: 1 K/UL
LYMPHOCYTES NFR BLD: 27.3 %
MCH RBC QN AUTO: 28.4 PG
MCHC RBC AUTO-ENTMCNC: 31.6 G/DL
MCV RBC AUTO: 90 FL
MONOCYTES # BLD AUTO: 0.4 K/UL
MONOCYTES NFR BLD: 9.9 %
NEUTROPHILS # BLD AUTO: 2.2 K/UL
NEUTROPHILS NFR BLD: 62.5 %
NRBC BLD-RTO: 0 /100 WBC
PLATELET # BLD AUTO: 145 K/UL
PMV BLD AUTO: 11.5 FL
POTASSIUM SERPL-SCNC: 4.5 MMOL/L
PROT SERPL-MCNC: 7.2 G/DL
RBC # BLD AUTO: 4.3 M/UL
SATURATED IRON: 16 %
SODIUM SERPL-SCNC: 141 MMOL/L
TOTAL IRON BINDING CAPACITY: 376 UG/DL
TRANSFERRIN SERPL-MCNC: 254 MG/DL
WBC # BLD AUTO: 3.52 K/UL

## 2018-01-08 PROCEDURE — 83540 ASSAY OF IRON: CPT

## 2018-01-08 PROCEDURE — 82728 ASSAY OF FERRITIN: CPT

## 2018-01-08 PROCEDURE — 85025 COMPLETE CBC W/AUTO DIFF WBC: CPT

## 2018-01-08 PROCEDURE — 99499 UNLISTED E&M SERVICE: CPT | Mod: S$GLB,,, | Performed by: PHYSICIAN ASSISTANT

## 2018-01-08 PROCEDURE — 99999 PR PBB SHADOW E&M-EST. PATIENT-LVL IV: CPT | Mod: PBBFAC,,, | Performed by: PHYSICIAN ASSISTANT

## 2018-01-08 PROCEDURE — 99214 OFFICE O/P EST MOD 30 MIN: CPT | Mod: S$GLB,,, | Performed by: PHYSICIAN ASSISTANT

## 2018-01-08 PROCEDURE — 83036 HEMOGLOBIN GLYCOSYLATED A1C: CPT

## 2018-01-08 PROCEDURE — 80053 COMPREHEN METABOLIC PANEL: CPT

## 2018-01-08 PROCEDURE — 36415 COLL VENOUS BLD VENIPUNCTURE: CPT | Mod: PO

## 2018-01-08 NOTE — PATIENT INSTRUCTIONS

## 2018-01-09 NOTE — PROGRESS NOTES
"Subjective:       Patient ID: Chon Hurtado is a 82 y.o. male.    Chief Complaint: Transitional Care    Mr. Hurtado comes to clinic today for hospital follow up. He was admitted to [facility] on 12/22/17 for bilateral acute on chronic DVT of bilateral lower extremities. The patient is prescribed eliquis but he stopped this medication. The patient reports he "forgets" to take his medication. Hematology and cardiology were both consulted while in the hospital. The patient is followed by Dr. Pérez, hematology, and Dr. Lee, cardiology. Dr. Pérez recommended that patient continue on the same anticoagulation. The patient had some anemia and decline in kidney function while hospitalized. The patient had a stress test that revealed baseline abnormalities with no new changes. Patient is symptomatic with MAYES and lightheadedness. The patient has since followed up with Dr. Lee. Dr. Lee has recommended cardiac stenting. They will coordinate with Dr. Pérez, hematology to schedule this. The patient was discharged on 12/24/17; no changes were made to medications. The patient was not discharged with home health. The patient and his wife do report that he might benefit from a shower chair as he does get fatigued and dizzy at times while standing in the shower. The patient did answer positively to the depression screen. He reports he does not feel chronically depressed. He was just a "little down" due to recent hospitalization.      Review of Systems   Constitutional: Negative for activity change and appetite change.   Eyes: Negative for visual disturbance.   Respiratory: Negative for cough and shortness of breath.    Cardiovascular: Positive for leg swelling. Negative for chest pain.        Chronic leg swelling due to DVT   Gastrointestinal: Negative for abdominal distention and abdominal pain.   Genitourinary: Negative for difficulty urinating and dysuria.   Musculoskeletal: Negative for arthralgias and myalgias. "   Neurological: Negative for headaches.   Hematological: Negative for adenopathy.   Psychiatric/Behavioral: The patient is not nervous/anxious.        Objective:      Physical Exam   Constitutional: He is oriented to person, place, and time.   Cardiovascular: Normal rate and regular rhythm.    Pulmonary/Chest: Effort normal and breath sounds normal. He has no wheezes.   Abdominal: Soft. Bowel sounds are normal. He exhibits no distension. There is no tenderness.   Musculoskeletal: He exhibits edema.   Chronic edema due to DVT   Neurological: He is alert and oriented to person, place, and time.   Skin: No erythema.   Psychiatric: His behavior is normal.       Assessment:       1. Acute deep vein thrombosis (DVT) of both lower extremities, unspecified vein    2. Hypertension associated with diabetes    3. Hyperlipidemia associated with type 2 diabetes mellitus    4. Anemia, unspecified type    5. CKD (chronic kidney disease) stage 3, GFR 30-59 ml/min    6. Controlled type 2 diabetes mellitus with diabetic nephropathy, with long-term current use of insulin    7. Debility    8. Coronary artery disease involving native coronary artery of native heart without angina pectoris        Plan:   Chon was seen today for transitional care.    Diagnoses and all orders for this visit:    Acute deep vein thrombosis (DVT) of both lower extremities, unspecified vein  Continue eliquis  Follow up with hematology  Hypertension associated with diabetes  -     Comprehensive metabolic panel; Future  Controlled  Low salt diet  Continue current medication  Hyperlipidemia associated with type 2 diabetes mellitus  High fiber diet    Anemia, unspecified type  -     CBC auto differential; Future  -     Iron and TIBC; Future  -     Ferritin; Future    CKD (chronic kidney disease) stage 3, GFR 30-59 ml/min  -     Comprehensive metabolic panel; Future    Controlled type 2 diabetes mellitus with diabetic nephropathy, with long-term current use of  insulin  -     Comprehensive metabolic panel; Future  -     Hemoglobin A1c; Future    Debility  -     BATH/SHOWER CHAIR FOR HOME USE      Coronary artery disease involving native coronary artery of native heart without angina pectoris  Follow up with Dr. Lee    Patient readiness: acceptance and barriers:none    During the course of the visit the patient was educated and counseled about the following:     Diabetes:  Discussed general issues about diabetes pathophysiology and management.  Educational material distributed.  Addressed ADA diet.  Suggested low cholesterol diet.  Encouraged aerobic exercise.  Discussed foot care.  Reminded to get yearly retinal exam.  Hypertension:   Medication: no change.  Dietary sodium restriction.  Regular aerobic exercise.    Goals: Diabetes: Maintain Hemoglobin A1C below 7 and Hypertension: Reduce Blood Pressure    Did patient meet goals/outcomes: No    The following self management tools provided: declined    Patient Instructions (the written plan) was given to the patient/family.     Time spent with patient: 30 minutes    Barriers to medications present (no )    Adverse reactions to current medications (no)    Over the counter medications reviewed (Yes)

## 2018-01-19 ENCOUNTER — PATIENT MESSAGE (OUTPATIENT)
Dept: FAMILY MEDICINE | Facility: CLINIC | Age: 83
End: 2018-01-19

## 2018-01-24 ENCOUNTER — OFFICE VISIT (OUTPATIENT)
Dept: HEMATOLOGY/ONCOLOGY | Facility: CLINIC | Age: 83
End: 2018-01-24
Payer: MEDICARE

## 2018-01-24 VITALS
DIASTOLIC BLOOD PRESSURE: 70 MMHG | TEMPERATURE: 99 F | HEIGHT: 74 IN | RESPIRATION RATE: 20 BRPM | SYSTOLIC BLOOD PRESSURE: 156 MMHG | HEART RATE: 62 BPM | WEIGHT: 207.69 LBS | BODY MASS INDEX: 26.66 KG/M2

## 2018-01-24 DIAGNOSIS — E11.59 HYPERTENSION ASSOCIATED WITH DIABETES: ICD-10-CM

## 2018-01-24 DIAGNOSIS — E21.4 OTHER SPECIFIED DISORDERS OF PARATHYROID GLAND: ICD-10-CM

## 2018-01-24 DIAGNOSIS — D63.8 ANEMIA OF CHRONIC DISEASE: Primary | ICD-10-CM

## 2018-01-24 DIAGNOSIS — I15.2 HYPERTENSION ASSOCIATED WITH DIABETES: ICD-10-CM

## 2018-01-24 DIAGNOSIS — E78.5 HYPERLIPIDEMIA ASSOCIATED WITH TYPE 2 DIABETES MELLITUS: ICD-10-CM

## 2018-01-24 DIAGNOSIS — E11.69 HYPERLIPIDEMIA ASSOCIATED WITH TYPE 2 DIABETES MELLITUS: ICD-10-CM

## 2018-01-24 DIAGNOSIS — I25.10 CORONARY ARTERY DISEASE INVOLVING NATIVE CORONARY ARTERY OF NATIVE HEART WITHOUT ANGINA PECTORIS: ICD-10-CM

## 2018-01-24 PROCEDURE — 99499 UNLISTED E&M SERVICE: CPT | Mod: S$GLB,,, | Performed by: INTERNAL MEDICINE

## 2018-01-24 PROCEDURE — 99999 PR PBB SHADOW E&M-EST. PATIENT-LVL III: CPT | Mod: PBBFAC,,, | Performed by: INTERNAL MEDICINE

## 2018-01-24 PROCEDURE — 99214 OFFICE O/P EST MOD 30 MIN: CPT | Mod: S$GLB,,, | Performed by: INTERNAL MEDICINE

## 2018-01-26 NOTE — PROGRESS NOTES
Patient, Chon Hurtado (MRN #[id number]), presented with a recent Platelet count less than 150 K/uL consistent with the definition of thrombocytopenia (ICD10 - D69.6).    Platelets   Date Value Ref Range Status   01/08/2018 145 (L) 150 - 350 K/uL Final     The patient's thrombocytopenia was monitored, evaluated, addressed and/or treated. This addendum to the medical record is made on 01/26/2018.

## 2018-01-26 NOTE — PROGRESS NOTES
Subjective:       Patient ID: Chon Hurtado is a 82 y.o. male.    Chief Complaint: Follow-up (2 week follow up (hospital f/u))    HPI:   A 80-year-old -American male who has finally decided to go for radiation   therapy for his prostate cancer. The patient has B12 deficiency and iron   deficiency. He has been following with me for mild anemia for years     The patient completed radiation for prostate ca and f/u has been with good controlled psa, pt is here with his wife, recently had both legs painful and swollen and dx with bilateral extensive DVT and started coumadin, INR being monitored by coumadin clinic, feels well, wife reports eating too much carbs and blood sugar not in good control      REVIEW OF SYSTEMS:     CONSTITUTIONAL some  weight loss. There is no apparent    change in appetite, fever, night sweats, headaches, fatigue, dizziness, or    weakness.      SKIN: Denies rash, issues with nails, non-healing sores, bleeding, blotching    skin or abnormal bruising. Denies new moles or changes to existing moles.      EYES: Denies eye pain, blurred vision, swelling, redness or discharge.      ENT AND MOUTH: Denies runny nose, stuffiness, sinus trouble or sores. Denies    nosebleeds. Denies, hoarseness, change in voice or swelling in front of the    neck.  waiting on the dentures, now edentulous    CARDIOVASCULAR: Denies chest pain, discomfort or palpitations. Denies neck    swelling or episodes of passing out.      RESPIRATORY: Denies cough, sputum production, blood in sputum, and denies    shortness of breath.      GI: Denies trouble swallowing, indigestion, heartburn, abdominal pain, nausea,    vomiting, diarrhea, altered bowel habits, blood in stool, discoloration of    stools, change in nature of stool, bloating, increased abdominal girth.      GENITOURINARY: No discharge. No pelvic pain or lumps. No rash around groin or  lesions. No urinary frequency, hesitation, painful urination or blood in     urine. Denies incontinence. No problems with intercourse.      New bilat dvt and swelling of both legs    PHYSICAL EXAM:     Vitals:    01/24/18 1508   BP: (!) 156/70   Pulse: 62   Resp: 20   Temp: 98.5 °F (36.9 °C)       GENERAL: Comfortable looking patient. Patient is in no distress.  Awake, alert and oriented to time, person and place.  No anxiety, or agitation.      HEENT: Normal conjunctivae and eyelids. WNL.  PERRLA 3 to 4 mm. No icterus, no pallor, no congestion, and no discharge noted.edentulous     NECK:  Supple. Trachea is central.  No crepitus.  No JVD or masses.    RESPIRATORY:  No intercostal retractions.  No dullness to percussion.  Chest is clear to auscultation.  No rales, rhonchi or wheezes.  No crepitus.  Good air entry bilaterally.    CARDIOVASCULAR:  S1 and S2 are normally heard without murmurs or gallops.  All peripheral pulses are present.    ABDOMEN:  Normal abdomen.  No hepatosplenomegaly.  No free fluid.  Bowel sounds are present.  No hernia noted. No masses.  No rebound or tenderness.  No guarding or rigidity.  Umbilicus is midline.    LYMPHATICS:  No axillary, cervical, supraclavicular, submental, or inguinal lymphadenopathy.    SKIN/MUSCULOSKELETAL:  There is no evidence of excoriation marks or ecchmosis.  No rashes.  No cyanosis.  No clubbing.  No joint or skeletal deformities noted.  Normal range of motion.bilat leg swelling+    NEUROLOGIC:  Higher functions are appropriate.  No cranial nerve deficits.  Normal lisa.  Normal strength.  Motor and sensory functions are normal.  Deep tendon reflexes are normal.      Laboratory:     CBC:  Lab Results   Component Value Date    WBC 3.52 (L) 01/08/2018    RBC 4.30 (L) 01/08/2018    HGB 12.2 (L) 01/08/2018    HCT 38.6 (L) 01/08/2018    MCV 90 01/08/2018    MCH 28.4 01/08/2018    MCHC 31.6 (L) 01/08/2018    RDW 12.9 01/08/2018     (L) 01/08/2018    MPV 11.5 01/08/2018    GRAN 2.2 01/08/2018    GRAN 62.5 01/08/2018    LYMPH 1.0  01/08/2018    LYMPH 27.3 01/08/2018    MONO 0.4 01/08/2018    MONO 9.9 01/08/2018    EOS 0.0 01/08/2018    BASO 0.00 01/08/2018    EOSINOPHIL 0.0 01/08/2018    BASOPHIL 0.0 01/08/2018       BMP: BMP  Lab Results   Component Value Date     01/08/2018    K 4.5 01/08/2018     01/08/2018    CO2 25 01/08/2018    BUN 19 01/08/2018    CREATININE 1.4 01/08/2018    CALCIUM 10.3 01/08/2018    ANIONGAP 7 (L) 01/08/2018    ESTGFRAFRICA 54.5 (A) 01/08/2018    EGFRNONAA 47.1 (A) 01/08/2018       LFT:   Lab Results   Component Value Date    ALT 15 01/08/2018    AST 19 01/08/2018    ALKPHOS 87 01/08/2018    BILITOT 0.3 01/08/2018     Lab Results   Component Value Date    PSA 3.6 11/05/2015    PSA 2.68 01/24/2013    PSA 2.45 04/19/2012   now less than 0.01    Assessment/Plan:       1. Anemia of chronic disease    2. Coronary artery disease involving native coronary artery of native heart without angina pectoris    3. Hyperlipidemia associated with type 2 diabetes mellitus    4. Hypertension associated with diabetes    5. Other specified disorders of parathyroid gland        Anemia is stable. Cont  Observation no intervention needed.    ckd and anemia: cont observation  prostate ca : stable and better post xrt cont urology  New dvt would recommend life long anticoag due to cancer hx and increased risk  DM : needs much better control discussed at length  HTN: cont meds  rtc 3 months with cbc. Cmp, [place],

## 2018-01-31 RX ORDER — CALCIUM CITRATE/VITAMIN D3 200MG-6.25
TABLET ORAL
Qty: 50 STRIP | Refills: 0 | Status: SHIPPED | OUTPATIENT
Start: 2018-01-31 | End: 2018-03-26 | Stop reason: SDUPTHER

## 2018-02-08 ENCOUNTER — OFFICE VISIT (OUTPATIENT)
Dept: FAMILY MEDICINE | Facility: CLINIC | Age: 83
End: 2018-02-08
Payer: MEDICARE

## 2018-02-08 ENCOUNTER — DOCUMENTATION ONLY (OUTPATIENT)
Dept: FAMILY MEDICINE | Facility: CLINIC | Age: 83
End: 2018-02-08

## 2018-02-08 VITALS
SYSTOLIC BLOOD PRESSURE: 115 MMHG | HEART RATE: 73 BPM | TEMPERATURE: 99 F | WEIGHT: 205 LBS | HEIGHT: 71 IN | BODY MASS INDEX: 28.7 KG/M2 | DIASTOLIC BLOOD PRESSURE: 71 MMHG

## 2018-02-08 DIAGNOSIS — E53.8 B12 DEFICIENCY: ICD-10-CM

## 2018-02-08 DIAGNOSIS — I67.89 ISCHEMIC ENCEPHALOPATHY: ICD-10-CM

## 2018-02-08 DIAGNOSIS — E16.2 HYPOGLYCEMIA: Primary | ICD-10-CM

## 2018-02-08 DIAGNOSIS — E11.21 TYPE II DIABETES MELLITUS WITH NEPHROPATHY: ICD-10-CM

## 2018-02-08 DIAGNOSIS — F03.90 DEMENTIA WITHOUT BEHAVIORAL DISTURBANCE, UNSPECIFIED DEMENTIA TYPE: ICD-10-CM

## 2018-02-08 LAB — GLUCOSE SERPL-MCNC: 197 MG/DL (ref 70–110)

## 2018-02-08 PROCEDURE — 99214 OFFICE O/P EST MOD 30 MIN: CPT | Mod: S$GLB,,, | Performed by: FAMILY MEDICINE

## 2018-02-08 PROCEDURE — 1159F MED LIST DOCD IN RCRD: CPT | Mod: S$GLB,,, | Performed by: FAMILY MEDICINE

## 2018-02-08 PROCEDURE — 3008F BODY MASS INDEX DOCD: CPT | Mod: S$GLB,,, | Performed by: FAMILY MEDICINE

## 2018-02-08 PROCEDURE — 99999 PR PBB SHADOW E&M-EST. PATIENT-LVL III: CPT | Mod: PBBFAC,,, | Performed by: FAMILY MEDICINE

## 2018-02-08 PROCEDURE — 99499 UNLISTED E&M SERVICE: CPT | Mod: S$GLB,,, | Performed by: FAMILY MEDICINE

## 2018-02-08 PROCEDURE — 1126F AMNT PAIN NOTED NONE PRSNT: CPT | Mod: S$GLB,,, | Performed by: FAMILY MEDICINE

## 2018-02-08 PROCEDURE — 82948 REAGENT STRIP/BLOOD GLUCOSE: CPT | Mod: S$GLB,,, | Performed by: FAMILY MEDICINE

## 2018-02-08 RX ORDER — ACETAMINOPHEN, DIPHENHYDRAMINE HCL, PHENYLEPHRINE HCL 325; 25; 5 MG/1; MG/1; MG/1
5 TABLET ORAL DAILY
Qty: 180 TABLET | Refills: 11 | Status: SHIPPED | OUTPATIENT
Start: 2018-02-08

## 2018-02-08 RX ORDER — FOLIC ACID 1 MG/1
1 TABLET ORAL DAILY
Qty: 90 TABLET | Refills: 4 | Status: SHIPPED | OUTPATIENT
Start: 2018-02-08 | End: 2018-08-07

## 2018-02-08 RX ORDER — SERTRALINE HYDROCHLORIDE 50 MG/1
50 TABLET, FILM COATED ORAL DAILY
Qty: 90 TABLET | Refills: 11 | Status: SHIPPED | OUTPATIENT
Start: 2018-02-08 | End: 2018-08-09

## 2018-02-08 NOTE — PROGRESS NOTES
Pre-Visit Chart Review  For Appointment Scheduled on 02/08/2018    Health Maintenance Due   Topic Date Due    Eye Exam  04/16/2014    Pneumococcal (65+) (2 of 2 - PCV13) 12/21/2017    Foot Exam  12/21/2017

## 2018-02-08 NOTE — PATIENT INSTRUCTIONS
Insulin Reaction (Low Blood Sugar)  You have been treated for an insulin reaction today. This occurs when insulin causes your blood sugar to go too low (hypoglycemia). It may happen if you take too much insulin. It can also occur from taking your usual amount of insulin but not getting enough food. This can be due to vomiting or loss of appetite. Other causes of low blood sugar include heavy exercise, strong emotions, and alcohol use.  Your blood sugar level may also be affected by tobacco, caffeine, and certain medicines, including:  · Aspirin  · Haloperidol  · Propoxyphene  · Chlorpromazine  · Propranolol  · Disopyramide  · ACE inhibitors  · Fluoroquinolone antibiotics  If you suspect that caffeine may be affecting you, switch to caffeine-free drinks. If you smoke, try to quit. Quitting smoking is one of the most important things you can do to protect your health. If you are taking any of the listed medicines, talk to your healthcare provider about switching to another type.  A class of medicines called beta-blockers is used for high blood pressure, rapid heart rate, and other conditions. Beta-blockers may prevent the early symptoms of low blood sugar. If you are taking a beta-blocker, you might not realize that your blood sugar level is getting low. If you are taking a beta-blocker, talk to your healthcare provider about switching to a different class. The beta-blocker class includes:  · Propranolol  · Atenolol  · Metoprolol  · Nadolol  · Labetalol  · Carvedilol  Home care  · During the next 24 hours rest and eat frequent small meals. This will help prevent the return of low blood sugar.  · Learn the signals your body gives as your blood sugar drops (see below).  If symptoms of hypoglycemia return  · Keep a source of fast-acting sugar with you. At the first sign of low blood sugar, eat or drink 15 to 20 grams of fast-acting sugar. Examples include:  ¨ 3 to 4 glucose tablets (found at most drugstores)  ¨ 4  ounces of regular soda  ¨ 4 ounces of fruit juice  ¨ 2 tablespoons of raisins  ¨ 1 tablespoon of honey  · Check your blood sugar 15 minutes after treating yourself. If it is still low, take another 15 to 20 grams of fast-acting sugar. Test again in 15 minutes. If its still low, go to an emergency room.  · Once blood sugar returns to normal, eat a snack or meal to keep your blood sugar in a safe range.     In the future, if you are not able to eat your normal amount at each meal due to illness or vomiting, you may need to reduce your insulin dose. Contact your healthcare provider as soon as possible to ask for a plan for a short-term adjustment of your dose if this happens again.     Check your blood sugar every 4 to 6 hours. Do this until you can begin eating normal amounts again.     Wear a medical alert bracelet or necklace, or carry a card in your wallet or a thumb drive explaining that you have diabetes. If you have a severe hypoglycemic reaction and can't give this information, it will help medical personnel provide proper care.  Follow-up care  Follow up with your healthcare provider, or as advised.  Check and write down your blood sugar and insulin dose twice a day--before breakfast and before dinner. Do this for the next 5 days. See your healthcare provider during the next week to review these records. This will help determine if you need to adjust your insulin dose.  If you have frequent or severe episodes of low blood sugar, your healthcare provider may recommend glucagon injection, which raises your blood sugar. One of your family members or friends would need to learn how to give you this injection.  For more information about diabetes, contact the American Diabetes Association, at www.diabetes.org.  When to seek medical advice  Call your healthcare provider right away if any of these symptoms of low blood sugar occur.  · Fatigue  · Headache  · Shakes  · Excess sweating  · Hunger  · Feeling anxious or  restless  · Vision changes  · Drowsiness  · Weakness  · Confusion  · Personality changes  · Seizure or loss of consciousness  Date Last Reviewed: 6/1/2016  © 8302-0192 [employer]. [address] [place] [address], [place] [address]. All rights reserved. This information is not intended as a substitute for professional medical care. Always follow your healthcare professional's instructions.

## 2018-02-19 NOTE — PROGRESS NOTES
Subjective:       Patient ID: Chon Hurtado is a 82 y.o. male.    Chief Complaint: Diabetes    Diabetes   He presents for his follow-up diabetic visit. He has type 2 diabetes mellitus. His disease course has been fluctuating. Hypoglycemia symptoms include confusion, nervousness/anxiousness, sleepiness and tremors. Pertinent negatives for hypoglycemia include no dizziness or headaches. Associated symptoms include blurred vision, fatigue, foot paresthesias, visual change, weakness and weight loss. Pertinent negatives for diabetes include no chest pain, no foot ulcerations and no polydipsia. Hypoglycemia complications include nocturnal hypoglycemia. Symptoms are improving. Diabetic complications include autonomic neuropathy, nephropathy and peripheral neuropathy. Risk factors for coronary artery disease include diabetes mellitus, hypertension, sedentary lifestyle, family history and stress.     Review of Systems   Constitutional: Positive for fatigue and weight loss. Negative for unexpected weight change.   Eyes: Positive for blurred vision.   Respiratory: Negative for chest tightness and shortness of breath.    Cardiovascular: Negative for chest pain, palpitations and leg swelling.   Gastrointestinal: Negative for abdominal pain.   Endocrine: Negative for polydipsia.   Musculoskeletal: Negative for arthralgias.   Neurological: Positive for tremors and weakness. Negative for dizziness, syncope, light-headedness and headaches.   Psychiatric/Behavioral: Positive for confusion. The patient is nervous/anxious.        Objective:      Physical Exam   Constitutional: He is oriented to person, place, and time. He appears well-developed. He appears lethargic. He appears ill.   HENT:   Mouth/Throat: Oropharynx is clear and moist. He has dentures. Abnormal dentition.   Cardiovascular: Normal rate, regular rhythm and normal heart sounds.    Pulmonary/Chest: Effort normal and breath sounds normal.   Musculoskeletal:        Right  knee: He exhibits swelling.        Right lower leg: He exhibits swelling and edema.        Left lower leg: He exhibits swelling and edema.   Neurological: He is oriented to person, place, and time. He appears lethargic. He displays atrophy. A sensory deficit is present. He exhibits abnormal muscle tone. Coordination abnormal. GCS eye subscore is 4. GCS verbal subscore is 5. GCS motor subscore is 6.   Skin: Skin is warm. He is diaphoretic.   Psychiatric: His speech is normal and behavior is normal. Judgment and thought content normal. His affect is blunt. Cognition and memory are impaired. He exhibits a depressed mood.   Nursing note and vitals reviewed.      Assessment:       1. Hypoglycemia    2. Type II diabetes mellitus with nephropathy    3. B12 deficiency    4. Ischemic encephalopathy    5. Dementia without behavioral disturbance, unspecified dementia type         Plan:       Hypoglycemia  -     POCT Glucose    Type II diabetes mellitus with nephropathy  -     cyanocobalamin, vitamin B-12, 5,000 mcg Subl; Place 5 mg under the tongue once daily.  Dispense: 180 tablet; Refill: 11    B12 deficiency  -     cyanocobalamin, vitamin B-12, 5,000 mcg Subl; Place 5 mg under the tongue once daily.  Dispense: 180 tablet; Refill: 11    Ischemic encephalopathy  -     Magnesium; Future; Expected date: 02/08/2018  -     Vitamin B12; Future; Expected date: 02/08/2018  -     Basic metabolic panel; Future; Expected date: 02/08/2018    Dementia without behavioral disturbance, unspecified dementia type   -     Vitamin B12; Future; Expected date: 02/08/2018    Other orders  -     folic acid (FOLVITE) 1 MG tablet; Take 1 tablet (1 mg total) by mouth once daily.  Dispense: 90 tablet; Refill: 4  -     sertraline (ZOLOFT) 50 MG tablet; Take 1 tablet (50 mg total) by mouth once daily.  Dispense: 90 tablet; Refill: 11      Patient readiness: nonacceptance and barriers:social stressors and household issues    During the course of the visit  the patient was educated and counseled about the following:     Diabetes:  Discussed general issues about diabetes pathophysiology and management.  Hypertension:   Screening for causes of secondary hypertension: GFR (chronic kidney disease).  Dietary sodium restriction.  Regular aerobic exercise.  Check blood pressures daily and record.  Follow up: 3 weeks and as needed.  Obesity:   Regular aerobic exercise program discussed.    Goals: Diabetes: Maintain Hemoglobin A1C below 7 and Hypertension: Reduce Blood Pressure    Did patient meet goals/outcomes: No    The following self management tools provided: blood pressure log  excercise log    Patient Instructions (the written plan) was given to the patient/family.     Time spent with patient: 45 minutes

## 2018-02-22 ENCOUNTER — OUTSIDE PLACE OF SERVICE (OUTPATIENT)
Dept: ADMINISTRATIVE | Facility: OTHER | Age: 83
End: 2018-02-22
Payer: MEDICARE

## 2018-02-22 PROCEDURE — 33508 ENDOSCOPIC VEIN HARVEST: CPT | Mod: ,,, | Performed by: THORACIC SURGERY (CARDIOTHORACIC VASCULAR SURGERY)

## 2018-02-22 PROCEDURE — 33519 CABG ARTERY-VEIN THREE: CPT | Mod: ,,, | Performed by: THORACIC SURGERY (CARDIOTHORACIC VASCULAR SURGERY)

## 2018-02-22 PROCEDURE — 35600 OPEN HRV UXTR ART 1 SGM CAB: CPT | Mod: ,,, | Performed by: THORACIC SURGERY (CARDIOTHORACIC VASCULAR SURGERY)

## 2018-02-22 PROCEDURE — 33534 CABG ARTERIAL TWO: CPT | Mod: ,,, | Performed by: THORACIC SURGERY (CARDIOTHORACIC VASCULAR SURGERY)

## 2018-02-22 PROCEDURE — 99232 SBSQ HOSP IP/OBS MODERATE 35: CPT | Mod: 57,,, | Performed by: THORACIC SURGERY (CARDIOTHORACIC VASCULAR SURGERY)

## 2018-03-12 RX ORDER — GLUCOSAM/CHON-MSM1/C/MANG/BOSW 500-416.6
TABLET ORAL
Qty: 100 EACH | Refills: 0 | Status: SHIPPED | OUTPATIENT
Start: 2018-03-12 | End: 2019-02-25 | Stop reason: SDUPTHER

## 2018-03-21 ENCOUNTER — OFFICE VISIT (OUTPATIENT)
Dept: VASCULAR SURGERY | Facility: CLINIC | Age: 83
End: 2018-03-21
Payer: MEDICARE

## 2018-03-21 VITALS
BODY MASS INDEX: 28.73 KG/M2 | SYSTOLIC BLOOD PRESSURE: 140 MMHG | DIASTOLIC BLOOD PRESSURE: 72 MMHG | HEART RATE: 79 BPM | WEIGHT: 205.25 LBS | HEIGHT: 71 IN

## 2018-03-21 DIAGNOSIS — Z95.1 S/P CABG (CORONARY ARTERY BYPASS GRAFT): ICD-10-CM

## 2018-03-21 PROCEDURE — 99024 POSTOP FOLLOW-UP VISIT: CPT | Mod: S$GLB,,, | Performed by: THORACIC SURGERY (CARDIOTHORACIC VASCULAR SURGERY)

## 2018-03-21 PROCEDURE — 99999 PR PBB SHADOW E&M-EST. PATIENT-LVL III: CPT | Mod: PBBFAC,,, | Performed by: THORACIC SURGERY (CARDIOTHORACIC VASCULAR SURGERY)

## 2018-03-21 RX ORDER — ATORVASTATIN CALCIUM 40 MG/1
TABLET, FILM COATED ORAL
COMMUNITY
Start: 2018-03-03 | End: 2018-08-30 | Stop reason: SDUPTHER

## 2018-03-21 RX ORDER — FUROSEMIDE 20 MG/1
TABLET ORAL
COMMUNITY
Start: 2018-03-03 | End: 2018-07-22 | Stop reason: SDUPTHER

## 2018-03-21 RX ORDER — AMLODIPINE BESYLATE 5 MG/1
5 TABLET ORAL NIGHTLY
COMMUNITY
Start: 2018-03-03 | End: 2020-01-01 | Stop reason: ALTCHOICE

## 2018-03-21 RX ORDER — ISOSORBIDE MONONITRATE 30 MG/1
TABLET, EXTENDED RELEASE ORAL
Refills: 3 | COMMUNITY
Start: 2017-12-17 | End: 2020-01-01 | Stop reason: CLARIF

## 2018-03-21 RX ORDER — ESCITALOPRAM OXALATE 10 MG/1
TABLET ORAL
Refills: 3 | COMMUNITY
Start: 2018-01-03 | End: 2018-03-21 | Stop reason: ALTCHOICE

## 2018-03-21 RX ORDER — PEN NEEDLE, DIABETIC 31 GX5/16"
NEEDLE, DISPOSABLE MISCELLANEOUS
Qty: 100 EACH | Refills: 0 | Status: SHIPPED | OUTPATIENT
Start: 2018-03-21 | End: 2019-06-18 | Stop reason: SDUPTHER

## 2018-03-21 RX ORDER — METOPROLOL SUCCINATE 50 MG/1
TABLET, EXTENDED RELEASE ORAL
COMMUNITY
Start: 2018-03-03 | End: 2018-08-07

## 2018-03-22 NOTE — PROGRESS NOTES
OFFICE NOTE    This is an 82-year-old gentleman status post coronary artery bypass grafting on   02/22/2018.  He returns to the office today in followup.  He has done reasonably   well, but he still feels weak at times and has a low energy level according to   his wife.  Medicines are noted.  They are part of recent EPIC record.  He saw   his cardiologist this week already and changes were made in his medicines, but   on exam today, his vital signs are stable.  His surgical wounds are clean and   dry.  There is no evidence of infection.  His lungs are clear.  His heart is in   a regular rate and rhythm.  His abdomen is benign.    At this point, I think ongoing medical management is indicated.  He should   continue his medical followup with his cardiologist.  I will defer his medical   management to Cardiology Service.  He can see us on an as as-needed basis, but   overall I think despite his age, he is progressing fairly well.  He should   continue to improve.  He can see me on an as-needed basis, but I would   anticipate a fairly good long-term outlook.      TUAN/ANA  dd: 03/21/2018 13:15:05 (CDT)  td: 03/22/2018 01:54:57 (CDT)  Doc ID   #[id number]  Job ID #[id number]    CC: Esvin Lee MD

## 2018-03-26 ENCOUNTER — TELEPHONE (OUTPATIENT)
Dept: FAMILY MEDICINE | Facility: CLINIC | Age: 83
End: 2018-03-26

## 2018-03-26 DIAGNOSIS — Z79.4 CONTROLLED TYPE 2 DIABETES MELLITUS WITH STAGE 3 CHRONIC KIDNEY DISEASE, WITH LONG-TERM CURRENT USE OF INSULIN: Primary | ICD-10-CM

## 2018-03-26 DIAGNOSIS — E11.22 CONTROLLED TYPE 2 DIABETES MELLITUS WITH STAGE 3 CHRONIC KIDNEY DISEASE, WITH LONG-TERM CURRENT USE OF INSULIN: Primary | ICD-10-CM

## 2018-03-26 DIAGNOSIS — N18.30 CONTROLLED TYPE 2 DIABETES MELLITUS WITH STAGE 3 CHRONIC KIDNEY DISEASE, WITH LONG-TERM CURRENT USE OF INSULIN: Primary | ICD-10-CM

## 2018-03-26 NOTE — TELEPHONE ENCOUNTER
Please see attached message from patient's wife (Ramila).  Mrs. Mcgrath states patient needs a prescription for True Metrix Glucose Test Strips.  States patient's last prescription states test once daily, but since patient's surgery patient is now testing twice daily. Please advise.

## 2018-03-26 NOTE — TELEPHONE ENCOUNTER
----- Message from Constance CHRISTINE Jesus sent at 3/26/2018 10:12 AM CDT -----  Contact: wife  Ramila, wife [phone], Calling about the patient needing test strips. Patient is now having to test twice daily after his surgery. They are requesting a new Rx TRUE METRIX GLUCOSE TEST STRIP Strp. Patient is currently out and will need double the amount now. Please advise. Thanks.   [facility] [id number]  [place] - [address] AT [place] [address]  [address] [address]  Phone: [phone] Fax: [phone]

## 2018-03-27 ENCOUNTER — TELEPHONE (OUTPATIENT)
Dept: FAMILY MEDICINE | Facility: CLINIC | Age: 83
End: 2018-03-27

## 2018-03-27 NOTE — TELEPHONE ENCOUNTER
----- Message from RT Edilson sent at 3/27/2018 10:11 AM CDT -----  Contact: Linwoodty,wife,[phone]   Dorothty,wife,[phone], requesting refill glucometer test strips, thanks.

## 2018-03-27 NOTE — TELEPHONE ENCOUNTER
Received message from patient's wife (Ramila) requesting refill of blood glucose testing strips. Upon further inspection it was noted prescription for testing strips was e-scribed on yesterday (3-26-18). Mrs. Mcgrath notified. Verbalized understanding.

## 2018-03-28 NOTE — TELEPHONE ENCOUNTER
Patient's wife (Ramila) notified blood glucose strips were sent to pharmacy. Mrs. Mcgrath verbalized understanding and states she picked the prescription up from pharmacy on last night.

## 2018-04-13 DIAGNOSIS — M25.562 LEFT KNEE PAIN, UNSPECIFIED CHRONICITY: Primary | ICD-10-CM

## 2018-04-16 ENCOUNTER — TELEPHONE (OUTPATIENT)
Dept: FAMILY MEDICINE | Facility: CLINIC | Age: 83
End: 2018-04-16

## 2018-04-16 NOTE — TELEPHONE ENCOUNTER
Please see attached message from patient. Upon further inspection it was noted a prescription for glucose meter, lancets, and strips was e-scribed to pharmacy on 3-26-18.  Call placed to pharmacy to confirm receipt of prescription. Spoke to Mr. Fragoso who states he has a prescription dated 3-26-18 for meter, states he will have this available for patient to . Patient notified. Verbalized understanding.

## 2018-04-16 NOTE — TELEPHONE ENCOUNTER
----- Message from Neeta Wasserman sent at 4/16/2018  9:19 AM CDT -----  Contact: self  Patient called regarding a new blood glucose meter needed. Staring his stopped working and been using his wife's for the past week. Please contact [phone] (home)       [id number] [place] - [address] AT [place] [address]  [address] [address]  Phone: [phone] Fax: [phone]

## 2018-04-25 ENCOUNTER — OFFICE VISIT (OUTPATIENT)
Dept: HEMATOLOGY/ONCOLOGY | Facility: CLINIC | Age: 83
End: 2018-04-25
Payer: MEDICARE

## 2018-04-25 ENCOUNTER — LAB VISIT (OUTPATIENT)
Dept: LAB | Facility: HOSPITAL | Age: 83
End: 2018-04-25
Attending: INTERNAL MEDICINE
Payer: MEDICARE

## 2018-04-25 VITALS
HEART RATE: 106 BPM | DIASTOLIC BLOOD PRESSURE: 60 MMHG | WEIGHT: 203.06 LBS | BODY MASS INDEX: 28.43 KG/M2 | HEIGHT: 71 IN | RESPIRATION RATE: 20 BRPM | TEMPERATURE: 100 F | SYSTOLIC BLOOD PRESSURE: 121 MMHG

## 2018-04-25 DIAGNOSIS — I25.10 CORONARY ARTERY DISEASE INVOLVING NATIVE CORONARY ARTERY OF NATIVE HEART WITHOUT ANGINA PECTORIS: Primary | ICD-10-CM

## 2018-04-25 DIAGNOSIS — L02.419 LEG ABSCESS: ICD-10-CM

## 2018-04-25 DIAGNOSIS — D63.8 ANEMIA OF CHRONIC DISEASE: ICD-10-CM

## 2018-04-25 DIAGNOSIS — N18.30 CKD (CHRONIC KIDNEY DISEASE) STAGE 3, GFR 30-59 ML/MIN: Chronic | ICD-10-CM

## 2018-04-25 DIAGNOSIS — C61 PROSTATE CA: ICD-10-CM

## 2018-04-25 DIAGNOSIS — Z95.1 S/P CABG (CORONARY ARTERY BYPASS GRAFT): ICD-10-CM

## 2018-04-25 DIAGNOSIS — I82.403 DEEP VEIN THROMBOSIS (DVT) OF BOTH LOWER EXTREMITIES, UNSPECIFIED CHRONICITY, UNSPECIFIED VEIN: ICD-10-CM

## 2018-04-25 LAB
ALBUMIN SERPL BCP-MCNC: 3.9 G/DL
ALP SERPL-CCNC: 104 U/L
ALT SERPL W/O P-5'-P-CCNC: 32 U/L
ANION GAP SERPL CALC-SCNC: 10 MMOL/L
AST SERPL-CCNC: 31 U/L
BASOPHILS # BLD AUTO: 0 K/UL
BASOPHILS NFR BLD: 0.2 %
BILIRUB SERPL-MCNC: 0.6 MG/DL
BUN SERPL-MCNC: 21 MG/DL
CALCIUM SERPL-MCNC: 11.1 MG/DL
CHLORIDE SERPL-SCNC: 103 MMOL/L
CO2 SERPL-SCNC: 23 MMOL/L
CREAT SERPL-MCNC: 1.4 MG/DL
DIFFERENTIAL METHOD: ABNORMAL
EOSINOPHIL # BLD AUTO: 0 K/UL
EOSINOPHIL NFR BLD: 0 %
ERYTHROCYTE [DISTWIDTH] IN BLOOD BY AUTOMATED COUNT: 14.1 %
EST. GFR  (AFRICAN AMERICAN): 54 ML/MIN/1.73 M^2
EST. GFR  (NON AFRICAN AMERICAN): 47 ML/MIN/1.73 M^2
GLUCOSE SERPL-MCNC: 262 MG/DL
HCT VFR BLD AUTO: 34.6 %
HGB BLD-MCNC: 11.3 G/DL
LYMPHOCYTES # BLD AUTO: 0.7 K/UL
LYMPHOCYTES NFR BLD: 7.4 %
MCH RBC QN AUTO: 29 PG
MCHC RBC AUTO-ENTMCNC: 32.5 G/DL
MCV RBC AUTO: 89 FL
MONOCYTES # BLD AUTO: 0.8 K/UL
MONOCYTES NFR BLD: 9.3 %
NEUTROPHILS # BLD AUTO: 7.5 K/UL
NEUTROPHILS NFR BLD: 83.1 %
PLATELET # BLD AUTO: 138 K/UL
PMV BLD AUTO: 9.5 FL
POTASSIUM SERPL-SCNC: 4.4 MMOL/L
PROT SERPL-MCNC: 7.4 G/DL
RBC # BLD AUTO: 3.89 M/UL
SODIUM SERPL-SCNC: 136 MMOL/L
WBC # BLD AUTO: 9 K/UL

## 2018-04-25 PROCEDURE — 80053 COMPREHEN METABOLIC PANEL: CPT

## 2018-04-25 PROCEDURE — 3074F SYST BP LT 130 MM HG: CPT | Mod: CPTII,S$GLB,, | Performed by: INTERNAL MEDICINE

## 2018-04-25 PROCEDURE — 85025 COMPLETE CBC W/AUTO DIFF WBC: CPT

## 2018-04-25 PROCEDURE — 99214 OFFICE O/P EST MOD 30 MIN: CPT | Mod: S$GLB,,, | Performed by: INTERNAL MEDICINE

## 2018-04-25 PROCEDURE — 3078F DIAST BP <80 MM HG: CPT | Mod: CPTII,S$GLB,, | Performed by: INTERNAL MEDICINE

## 2018-04-25 PROCEDURE — 36415 COLL VENOUS BLD VENIPUNCTURE: CPT

## 2018-04-25 PROCEDURE — 99999 PR PBB SHADOW E&M-EST. PATIENT-LVL IV: CPT | Mod: PBBFAC,,, | Performed by: INTERNAL MEDICINE

## 2018-04-25 PROCEDURE — 99499 UNLISTED E&M SERVICE: CPT | Mod: S$GLB,,, | Performed by: INTERNAL MEDICINE

## 2018-04-25 NOTE — PROGRESS NOTES
Subjective:       Patient ID: Chon Hurtado is a 82 y.o. male.    Chief Complaint: Follow-up (3 month follow up)    HPI:   A 80-year-old -American male who has finally decided to go for radiation   therapy for his prostate cancer. The patient has B12 deficiency and iron   deficiency. He has been following with me for mild anemia for years     The patient completed radiation for prostate ca and f/u has been with good controlled psa, pt is here with his wife, recently had both legs painful and swollen and dx with bilateral extensive DVT and started coumadin, INR being monitored by coumadin clinic, feels well, wife reports eating too much carbs and blood sugar not in good controlleft leg has hot , red abcsess that is painful and whole leg is swollen    REVIEW OF SYSTEMS:     CONSTITUTIONAL some  weight loss. There is no apparent    change in appetite, fever, night sweats, headaches, fatigue, dizziness, or    weakness.      SKIN: Denies rash, issues with nails, non-healing sores, bleeding, blotching    skin or abnormal bruising. Denies new moles or changes to existing moles.      EYES: Denies eye pain, blurred vision, swelling, redness or discharge.      ENT AND MOUTH: Denies runny nose, stuffiness, sinus trouble or sores. Denies    nosebleeds. Denies, hoarseness, change in voice or swelling in front of the    neck.  waiting on the dentures, now edentulous    CARDIOVASCULAR: Denies chest pain, discomfort or palpitations. Denies neck    swelling or episodes of passing out.      RESPIRATORY: Denies cough, sputum production, blood in sputum, and denies    shortness of breath.      GI: Denies trouble swallowing, indigestion, heartburn, abdominal pain, nausea,    vomiting, diarrhea, altered bowel habits, blood in stool, discoloration of    stools, change in nature of stool, bloating, increased abdominal girth.      GENITOURINARY: No discharge. No pelvic pain or lumps. No rash around groin or  lesions. No urinary  frequency, hesitation, painful urination or blood in    urine. Denies incontinence. No problems with intercourse.      Has bilat dvt now with an abscess    PHYSICAL EXAM:     Vitals:    04/25/18 1442   BP: 121/60   Pulse: 106   Resp: 20   Temp: 99.8 °F (37.7 °C)       GENERAL: Comfortable looking patient. Patient is in no distress.  Awake, alert and oriented to time, person and place.  No anxiety, or agitation.      HEENT: Normal conjunctivae and eyelids. WNL.  PERRLA 3 to 4 mm. No icterus, no pallor, no congestion, and no discharge noted.edentulous     NECK:  Supple. Trachea is central.  No crepitus.  No JVD or masses.    RESPIRATORY:  No intercostal retractions.  No dullness to percussion.  Chest is clear to auscultation.  No rales, rhonchi or wheezes.  No crepitus.  Good air entry bilaterally.    CARDIOVASCULAR:  S1 and S2 are normally heard without murmurs or gallops.  All peripheral pulses are present.    ABDOMEN:  Normal abdomen.  No hepatosplenomegaly.  No free fluid.  Bowel sounds are present.  No hernia noted. No masses.  No rebound or tenderness.  No guarding or rigidity.  Umbilicus is midline.    LYMPHATICS:  No axillary, cervical, supraclavicular, submental, or inguinal lymphadenopathy.    SKIN/MUSCULOSKELETAL:  There is no evidence of excoriation marks or ecchmosis.  No rashes.  No cyanosis.  No clubbing.  No joint or skeletal deformities noted.  Normal range of motion.bilat leg swelling+rt leg absss+ hot and red    NEUROLOGIC:  Higher functions are appropriate.  No cranial nerve deficits.  Normal lisa.  Normal strength.  Motor and sensory functions are normal.  Deep tendon reflexes are normal.      Laboratory:     CBC:  Lab Results   Component Value Date    WBC 9.00 04/25/2018    RBC 3.89 (L) 04/25/2018    HGB 11.3 (L) 04/25/2018    HCT 34.6 (L) 04/25/2018    MCV 89 04/25/2018    MCH 29.0 04/25/2018    MCHC 32.5 04/25/2018    RDW 14.1 04/25/2018     (L) 04/25/2018    MPV 9.5 04/25/2018    GRAN  7.5 04/25/2018    GRAN 83.1 (H) 04/25/2018    LYMPH 0.7 (L) 04/25/2018    LYMPH 7.4 (L) 04/25/2018    MONO 0.8 04/25/2018    MONO 9.3 04/25/2018    EOS 0.0 04/25/2018    BASO 0.00 04/25/2018    EOSINOPHIL 0.0 04/25/2018    BASOPHIL 0.2 04/25/2018       BMP: BMP  Lab Results   Component Value Date     04/25/2018    K 4.4 04/25/2018     04/25/2018    CO2 23 04/25/2018    BUN 21 04/25/2018    CREATININE 1.4 04/25/2018    CALCIUM 11.1 (H) 04/25/2018    ANIONGAP 10 04/25/2018    ESTGFRAFRICA 54 (A) 04/25/2018    EGFRNONAA 47 (A) 04/25/2018       LFT:   Lab Results   Component Value Date    ALT 32 04/25/2018    AST 31 04/25/2018    ALKPHOS 104 04/25/2018    BILITOT 0.6 04/25/2018     Lab Results   Component Value Date    PSA 3.6 11/05/2015    PSA 2.68 01/24/2013    PSA 2.45 04/19/2012   now less than 0.01    Assessment/Plan:       Needs to have the abscess on leg rt Iand D.   May need another usg to r/u dvt on that leg and needs abx . Went to ED for this    Anemia is stable. Cont  Observation no intervention needed.    ckd and anemia: cont observation  prostate ca : stable and better post xrt cont urology  New dvt would recommend life long anticoag due to cancer hx and increased risk  DM : needs much better control discussed at length  HTN: cont meds  rtc 3 months with cbc. Cmp, edison,

## 2018-05-23 ENCOUNTER — PES CALL (OUTPATIENT)
Dept: ADMINISTRATIVE | Facility: CLINIC | Age: 83
End: 2018-05-23

## 2018-07-12 RX ORDER — METOPROLOL SUCCINATE 25 MG/1
TABLET, EXTENDED RELEASE ORAL
Qty: 90 TABLET | Refills: 0 | Status: SHIPPED | OUTPATIENT
Start: 2018-07-12 | End: 2020-01-01 | Stop reason: CLARIF

## 2018-07-17 ENCOUNTER — TELEPHONE (OUTPATIENT)
Dept: HEMATOLOGY/ONCOLOGY | Facility: CLINIC | Age: 83
End: 2018-07-17

## 2018-07-17 NOTE — TELEPHONE ENCOUNTER
Spoke with pt's wife. Rescheduled lab & appointment with Dr. Pérze. Wife verbalized understanding. New appointment letters placed in mail.

## 2018-07-22 RX ORDER — FUROSEMIDE 20 MG/1
TABLET ORAL
Qty: 30 TABLET | Refills: 0 | Status: SHIPPED | OUTPATIENT
Start: 2018-07-22 | End: 2018-08-21 | Stop reason: SDUPTHER

## 2018-08-07 ENCOUNTER — OFFICE VISIT (OUTPATIENT)
Dept: PODIATRY | Facility: CLINIC | Age: 83
End: 2018-08-07
Payer: MEDICARE

## 2018-08-07 VITALS — WEIGHT: 209 LBS | BODY MASS INDEX: 27.7 KG/M2 | HEIGHT: 73 IN | RESPIRATION RATE: 15 BRPM

## 2018-08-07 DIAGNOSIS — E11.49 TYPE II DIABETES MELLITUS WITH NEUROLOGICAL MANIFESTATIONS: ICD-10-CM

## 2018-08-07 DIAGNOSIS — Z86.718 HISTORY OF DEEP VEIN THROMBOSIS (DVT) OF LOWER EXTREMITY: ICD-10-CM

## 2018-08-07 DIAGNOSIS — Z79.4 TYPE 2 DIABETES MELLITUS WITH COMPLICATION, WITH LONG-TERM CURRENT USE OF INSULIN: Primary | ICD-10-CM

## 2018-08-07 DIAGNOSIS — E11.8 TYPE 2 DIABETES MELLITUS WITH COMPLICATION, WITH LONG-TERM CURRENT USE OF INSULIN: Primary | ICD-10-CM

## 2018-08-07 DIAGNOSIS — B35.1 ONYCHOMYCOSIS DUE TO DERMATOPHYTE: Primary | ICD-10-CM

## 2018-08-07 DIAGNOSIS — R60.0 LEG EDEMA: ICD-10-CM

## 2018-08-07 PROCEDURE — 99203 OFFICE O/P NEW LOW 30 MIN: CPT | Mod: S$GLB,,, | Performed by: PODIATRIST

## 2018-08-07 PROCEDURE — 17999 UNLISTD PX SKN MUC MEMB SUBQ: CPT | Mod: CSM,,, | Performed by: PODIATRIST

## 2018-08-07 PROCEDURE — 99999 PR PBB SHADOW E&M-EST. PATIENT-LVL III: CPT | Mod: PBBFAC,,, | Performed by: PODIATRIST

## 2018-08-07 RX ORDER — CICLOPIROX 80 MG/ML
SOLUTION TOPICAL NIGHTLY
Qty: 6.6 ML | Refills: 11 | Status: SHIPPED | OUTPATIENT
Start: 2018-08-07 | End: 2019-06-14

## 2018-08-07 NOTE — PROGRESS NOTES
Subjective:      Patient ID: Chon Hurtado is a 82 y.o. male.    Chief Complaint: Nail Care (left great toenail trim )    Chon is a 82 y.o. male who presents to the clinic for evaluation and treatment of high risk feet. Chon has a past medical history of Adrenal insufficiency; Anemia; Anticoagulant long-term use; Blood transfusion; CAD (coronary artery disease); Cancer; Cataract; COPD (chronic obstructive pulmonary disease) (11/6/2013); Depression; Diabetes mellitus; Diabetes mellitus type II; DVT (deep venous thrombosis); Hemorrhoids; History of colon polyps (6/3/2015); Hypertension; PE (pulmonary embolism); Peripheral vascular disease; and Urinary tract infection. The patient's chief complaint is long, thick toenails. Gradual onset, worsening over past several weeks, aggravated by increased weight bearing, shoe gear, pressure.  No previous medical treatment.  OTC pain med not helping. Denies trauma, surgery.    PCP: Doug Whitaker MD    Date Last Seen by PCP:   Chief Complaint   Patient presents with    Nail Care     left great toenail trim          Current shoe gear:  Affected Foot: Casual shoes     Unaffected Foot: Casual shoes    Hemoglobin A1C   Date Value Ref Range Status   01/08/2018 8.3 (H) 4.0 - 5.6 % Final     Comment:     According to ADA guidelines, hemoglobin A1c <7.0% represents  optimal control in non-pregnant diabetic patients. Different  metrics may apply to specific patient populations.   Standards of Medical Care in Diabetes-2016.  For the purpose of screening for the presence of diabetes:  <5.7%     Consistent with the absence of diabetes  5.7-6.4%  Consistent with increasing risk for diabetes   (prediabetes)  >or=6.5%  Consistent with diabetes  Currently, no consensus exists for use of hemoglobin A1c  for diagnosis of diabetes for children.  This Hemoglobin A1c assay has significant interference with fetal   hemoglobin   (HbF). The results are invalid for patients with abnormal amounts of    HbF,   including those with known Hereditary Persistence   of Fetal Hemoglobin. Heterozygous hemoglobin variants (HbAS, HbAC,   HbAD, HbAE, HbA2) do not significantly interfere with this assay;   however, presence of multiple variants in a sample may impact the %   interference.     10/17/2017 8.6 (H) 4.0 - 5.6 % Final     Comment:     According to ADA guidelines, hemoglobin A1c <7.0% represents  optimal control in non-pregnant diabetic patients. Different  metrics may apply to specific patient populations.   Standards of Medical Care in Diabetes-2016.  For the purpose of screening for the presence of diabetes:  <5.7%     Consistent with the absence of diabetes  5.7-6.4%  Consistent with increasing risk for diabetes   (prediabetes)  >or=6.5%  Consistent with diabetes  Currently, no consensus exists for use of hemoglobin A1c  for diagnosis of diabetes for children.  This Hemoglobin A1c assay has significant interference with fetal   hemoglobin   (HbF). The results are invalid for patients with abnormal amounts of   HbF,   including those with known Hereditary Persistence   of Fetal Hemoglobin. Heterozygous hemoglobin variants (HbAS, HbAC,   HbAD, HbAE, HbA2) do not significantly interfere with this assay;   however, presence of multiple variants in a sample may impact the %   interference.     08/03/2017 8.3 (H) 4.0 - 5.6 % Final     Comment:     According to ADA guidelines, hemoglobin A1c <7.0% represents  optimal control in non-pregnant diabetic patients. Different  metrics may apply to specific patient populations.   Standards of Medical Care in Diabetes-2016.  For the purpose of screening for the presence of diabetes:  <5.7%     Consistent with the absence of diabetes  5.7-6.4%  Consistent with increasing risk for diabetes   (prediabetes)  >or=6.5%  Consistent with diabetes  Currently, no consensus exists for use of hemoglobin A1c  for diagnosis of diabetes for children.  This Hemoglobin A1c assay has  significant interference with fetal   hemoglobin   (HbF). The results are invalid for patients with abnormal amounts of   HbF,   including those with known Hereditary Persistence   of Fetal Hemoglobin. Heterozygous hemoglobin variants (HbAS, HbAC,   HbAD, HbAE, HbA2) do not significantly interfere with this assay;   however, presence of multiple variants in a sample may impact the %   interference.         Review of Systems   Constitution: Negative for chills, diaphoresis, fever, malaise/fatigue and night sweats.   Cardiovascular: Positive for leg swelling. Negative for claudication, cyanosis and syncope.   Skin: Positive for nail changes. Negative for color change, dry skin, rash, suspicious lesions and unusual hair distribution.   Musculoskeletal: Negative for falls, joint pain, joint swelling, muscle cramps, muscle weakness and stiffness.   Gastrointestinal: Negative for constipation, diarrhea, nausea and vomiting.   Neurological: Positive for sensory change. Negative for brief paralysis, disturbances in coordination, focal weakness, numbness, paresthesias and tremors.           Objective:      Physical Exam   Constitutional: He is oriented to person, place, and time. He appears well-developed and well-nourished. He is cooperative. No distress.   Cardiovascular:   Pulses:       Popliteal pulses are 2+ on the right side, and 2+ on the left side.        Dorsalis pedis pulses are 2+ on the right side, and 2+ on the left side.        Posterior tibial pulses are 1+ on the right side, and 1+ on the left side.   Capillary refill 3 seconds all toes/distal feet, all toes/both feet warm to touch.      Negative lymphadenopathy bilateral popliteal fossa and tarsal tunnel.     <2+ pitting lower extremity edema bilateral.     Musculoskeletal:        Right ankle: He exhibits normal range of motion, no swelling, no ecchymosis, no deformity, no laceration and normal pulse. Achilles tendon normal. Achilles tendon exhibits no pain,  no defect and normal Sullivan's test results.   Normal angle, base, station of gait. All ten toes without clubbing, cyanosis, or signs of ischemia.  No pain to palpation bilateral lower extremities.  Range of motion, stability, muscle strength, and muscle tone normal bilateral feet and legs.     Lymphadenopathy: No inguinal adenopathy noted on the right or left side.   Negative lymphadenopathy bilateral popliteal fossa and tarsal tunnel.    Negative lymphangitic streaking bilateral feet/ankles/legs.   Neurological: He is alert and oriented to person, place, and time. He has normal strength. He displays no atrophy and no tremor. A sensory deficit is present. He exhibits normal muscle tone. Gait normal.   Reflex Scores:       Patellar reflexes are 2+ on the right side and 2+ on the left side.       Achilles reflexes are 2+ on the right side and 2+ on the left side.  Negative tinel sign to percussion sural, superficial peroneal, deep peroneal, saphenous, and posterior tibial nerves right and left ankles and feet.  Diminished/loss of protective sensation all toes bilateral to 10 gram monofilament.     Skin: Skin is warm, dry and intact. Capillary refill takes 2 to 3 seconds. No abrasion, no bruising, no burn, no ecchymosis, no laceration, no lesion and no rash noted. He is not diaphoretic. No cyanosis or erythema. No pallor. Nails show no clubbing.     Skin is normal age and health appropriate color, turgor, texture, and temperature bilateral lower extremities without ulceration, hyperpigmentation, discoloration, masses nodules or cords palpated.  No ecchymosis, erythema, edema, or cardinal signs of infection bilateral lower extremities.    Toenails 1st  bilateral are hypertrophic, dystrophic, discolored tanish brown with tan, gray crumbly subungual debris.  Tender to distal nail plate pressure, without periungual skin abnormality of each.     Psychiatric: He has a normal mood and affect.             Assessment:        Encounter Diagnoses   Name Primary?    Onychomycosis due to dermatophyte Yes    Type II diabetes mellitus with neurological manifestations     Leg edema     History of deep vein thrombosis (DVT) of lower extremity          Plan:       Chon was seen today for nail care.    Diagnoses and all orders for this visit:    Onychomycosis due to dermatophyte    Type II diabetes mellitus with neurological manifestations    Leg edema    History of deep vein thrombosis (DVT) of lower extremity    Other orders  -     ciclopirox (PENLAC) 8 % Soln; Apply topically nightly.      I counseled the patient on his conditions, their implications and medical management.    - Shoe inspection. Diabetic Foot Education. Patient reminded of the importance of good nutrition and blood sugar control to help prevent podiatric complications of diabetes. Patient instructed on proper foot hygeine. We discussed wearing proper shoe gear, daily foot inspections, never walking without protective shoe gear, never putting sharp instruments to feet, routine podiatric visits at least annually.      - With patient's permission, nails were aggressively reduced and debrided x 2 to their soft tissue attachment mechanically and with electric , removing all offending nail and debris. Patient relates relief following the procedure. He will continue to monitor the areas daily, inspect his feet, wear protective shoe gear when ambulatory, moisturizer to maintain skin integrity and follow in this office p.r.n.    Continue serial compression LLE.    Rx penlac.    Discussed conservative treatment with shoes of adequate dimensions, material, and style to alleviate symptoms and delay or prevent surgical intervention.         Follow-up in about 1 year (around 8/7/2019).

## 2018-08-08 ENCOUNTER — DOCUMENTATION ONLY (OUTPATIENT)
Dept: FAMILY MEDICINE | Facility: CLINIC | Age: 83
End: 2018-08-08

## 2018-08-08 NOTE — PROGRESS NOTES
Pre-Visit Chart Review  For Appointment Scheduled on 8/9/18    Health Maintenance Due   Topic Date Due    TETANUS VACCINE  12/25/1953    Eye Exam  04/16/2014    Pneumococcal (65+) (2 of 2 - PCV13) 12/21/2017    Foot Exam  12/21/2017    Hemoglobin A1c  07/10/2018    Influenza Vaccine  08/01/2018

## 2018-08-09 ENCOUNTER — OFFICE VISIT (OUTPATIENT)
Dept: FAMILY MEDICINE | Facility: CLINIC | Age: 83
End: 2018-08-09
Payer: MEDICARE

## 2018-08-09 VITALS
HEART RATE: 82 BPM | TEMPERATURE: 98 F | DIASTOLIC BLOOD PRESSURE: 71 MMHG | BODY MASS INDEX: 27.73 KG/M2 | HEIGHT: 73 IN | SYSTOLIC BLOOD PRESSURE: 128 MMHG | WEIGHT: 209.19 LBS | OXYGEN SATURATION: 98 %

## 2018-08-09 VITALS
TEMPERATURE: 98 F | HEIGHT: 73 IN | WEIGHT: 209 LBS | SYSTOLIC BLOOD PRESSURE: 128 MMHG | HEART RATE: 82 BPM | BODY MASS INDEX: 27.7 KG/M2 | DIASTOLIC BLOOD PRESSURE: 71 MMHG

## 2018-08-09 DIAGNOSIS — Z85.46 HISTORY OF PROSTATE CANCER: ICD-10-CM

## 2018-08-09 DIAGNOSIS — I82.403 DEEP VEIN THROMBOSIS (DVT) OF BOTH LOWER EXTREMITIES, UNSPECIFIED CHRONICITY, UNSPECIFIED VEIN: ICD-10-CM

## 2018-08-09 DIAGNOSIS — G31.84 MILD COGNITIVE IMPAIRMENT: ICD-10-CM

## 2018-08-09 DIAGNOSIS — E78.5 HYPERLIPIDEMIA ASSOCIATED WITH TYPE 2 DIABETES MELLITUS: ICD-10-CM

## 2018-08-09 DIAGNOSIS — E11.69 HYPERLIPIDEMIA ASSOCIATED WITH TYPE 2 DIABETES MELLITUS: ICD-10-CM

## 2018-08-09 DIAGNOSIS — N18.30 CONTROLLED TYPE 2 DIABETES MELLITUS WITH STAGE 3 CHRONIC KIDNEY DISEASE, WITH LONG-TERM CURRENT USE OF INSULIN: ICD-10-CM

## 2018-08-09 DIAGNOSIS — E11.22 TYPE 2 DIABETES MELLITUS WITH STAGE 3 CHRONIC KIDNEY DISEASE, WITH LONG-TERM CURRENT USE OF INSULIN: ICD-10-CM

## 2018-08-09 DIAGNOSIS — D64.9 ANEMIA, UNSPECIFIED TYPE: ICD-10-CM

## 2018-08-09 DIAGNOSIS — Z79.01 LONG TERM (CURRENT) USE OF ANTICOAGULANTS: ICD-10-CM

## 2018-08-09 DIAGNOSIS — E11.22 CONTROLLED TYPE 2 DIABETES MELLITUS WITH STAGE 3 CHRONIC KIDNEY DISEASE, WITH LONG-TERM CURRENT USE OF INSULIN: ICD-10-CM

## 2018-08-09 DIAGNOSIS — Z91.81 AT HIGH RISK FOR FALLS: ICD-10-CM

## 2018-08-09 DIAGNOSIS — Z00.00 ENCOUNTER FOR PREVENTIVE HEALTH EXAMINATION: Primary | ICD-10-CM

## 2018-08-09 DIAGNOSIS — I15.2 HYPERTENSION ASSOCIATED WITH DIABETES: ICD-10-CM

## 2018-08-09 DIAGNOSIS — E27.40 ADRENAL INSUFFICIENCY: ICD-10-CM

## 2018-08-09 DIAGNOSIS — E11.59 HYPERTENSION ASSOCIATED WITH DIABETES: ICD-10-CM

## 2018-08-09 DIAGNOSIS — Z13.31 POSITIVE DEPRESSION SCREENING: ICD-10-CM

## 2018-08-09 DIAGNOSIS — I25.10 CORONARY ARTERY DISEASE INVOLVING NATIVE CORONARY ARTERY OF NATIVE HEART WITHOUT ANGINA PECTORIS: ICD-10-CM

## 2018-08-09 DIAGNOSIS — Z79.4 ENCOUNTER FOR LONG-TERM (CURRENT) USE OF INSULIN: ICD-10-CM

## 2018-08-09 DIAGNOSIS — L02.419 LEG ABSCESS: ICD-10-CM

## 2018-08-09 DIAGNOSIS — R63.4 ABNORMAL WEIGHT LOSS: ICD-10-CM

## 2018-08-09 DIAGNOSIS — Z79.4 CONTROLLED TYPE 2 DIABETES MELLITUS WITH STAGE 3 CHRONIC KIDNEY DISEASE, WITH LONG-TERM CURRENT USE OF INSULIN: ICD-10-CM

## 2018-08-09 DIAGNOSIS — Z79.4 TYPE 2 DIABETES MELLITUS WITH STAGE 3 CHRONIC KIDNEY DISEASE, WITH LONG-TERM CURRENT USE OF INSULIN: ICD-10-CM

## 2018-08-09 DIAGNOSIS — N18.30 CKD (CHRONIC KIDNEY DISEASE) STAGE 3, GFR 30-59 ML/MIN: ICD-10-CM

## 2018-08-09 DIAGNOSIS — N18.30 TYPE 2 DIABETES MELLITUS WITH STAGE 3 CHRONIC KIDNEY DISEASE, WITH LONG-TERM CURRENT USE OF INSULIN: ICD-10-CM

## 2018-08-09 DIAGNOSIS — E27.40 ADRENAL INSUFFICIENCY: Primary | ICD-10-CM

## 2018-08-09 DIAGNOSIS — D50.1 IRON DEFICIENCY ANEMIA DUE TO SIDEROPENIC DYSPHAGIA: ICD-10-CM

## 2018-08-09 DIAGNOSIS — E21.4 OTHER SPECIFIED DISORDERS OF PARATHYROID GLAND: ICD-10-CM

## 2018-08-09 PROCEDURE — 99999 PR PBB SHADOW E&M-EST. PATIENT-LVL III: CPT | Mod: PBBFAC,,, | Performed by: FAMILY MEDICINE

## 2018-08-09 PROCEDURE — G0439 PPPS, SUBSEQ VISIT: HCPCS | Mod: S$GLB,,, | Performed by: PHYSICIAN ASSISTANT

## 2018-08-09 PROCEDURE — 99214 OFFICE O/P EST MOD 30 MIN: CPT | Mod: S$GLB,,, | Performed by: FAMILY MEDICINE

## 2018-08-09 PROCEDURE — 3078F DIAST BP <80 MM HG: CPT | Mod: CPTII,S$GLB,, | Performed by: FAMILY MEDICINE

## 2018-08-09 PROCEDURE — 99999 PR PBB SHADOW E&M-EST. PATIENT-LVL V: CPT | Mod: PBBFAC,,, | Performed by: PHYSICIAN ASSISTANT

## 2018-08-09 PROCEDURE — 99499 UNLISTED E&M SERVICE: CPT | Mod: S$GLB,,, | Performed by: PHYSICIAN ASSISTANT

## 2018-08-09 PROCEDURE — 3074F SYST BP LT 130 MM HG: CPT | Mod: CPTII,S$GLB,, | Performed by: FAMILY MEDICINE

## 2018-08-09 PROCEDURE — 3074F SYST BP LT 130 MM HG: CPT | Mod: CPTII,S$GLB,, | Performed by: PHYSICIAN ASSISTANT

## 2018-08-09 PROCEDURE — 99499 UNLISTED E&M SERVICE: CPT | Mod: S$GLB,,, | Performed by: FAMILY MEDICINE

## 2018-08-09 PROCEDURE — 3078F DIAST BP <80 MM HG: CPT | Mod: CPTII,S$GLB,, | Performed by: PHYSICIAN ASSISTANT

## 2018-08-09 NOTE — PATIENT INSTRUCTIONS
Counseling and Referral of Other Preventative  (Italic type indicates deductible and co-insurance are waived)    Patient Name: Chon Hurtado  Today's Date: 8/9/2018    Health Maintenance       Date Due Completion Date    TETANUS VACCINE 12/25/1953 ---    Eye Exam 04/16/2014 4/16/2013 (Done)    Override on 4/16/2013: Done (Dr Epperson)    Foot Exam 12/21/2017 12/21/2016    Hemoglobin A1c 07/10/2018 4/10/2018    Pneumococcal (65+) (2 of 2 - PCV13) 09/01/2018 (Originally 12/21/2017) 12/21/2016    Influenza Vaccine 09/01/2018 (Originally 8/1/2018) 10/16/2017    Lipid Panel 10/17/2018 10/17/2017        The following information is provided to all patients.  This information is to help you find resources for any of the problems found today that may be affecting your health:                Living healthy guide: www.[facility].[place].[facility]      Understanding Diabetes: www.diabetes.org      Eating healthy: www.cdc.gov/healthyweight      CDC home safety checklist: www.cdc.gov/steadi/patient.html      Agency on Aging: www.goea.[place].[facility]      Alcoholics anonymous (AA): www.aa.org      Physical Activity: www.madi.nih.gov/jc7sevf      Tobacco use: www.quitwithusla.org

## 2018-08-10 NOTE — PROGRESS NOTES
"Chon Hurtado presented for a  Medicare AWV and comprehensive Health Risk Assessment today. The following components were reviewed and updated:    · Medical history  · Family History  · Social history  · Allergies and Current Medications  · Health Risk Assessment  · Health Maintenance  · Care Team     ** See Completed Assessments for Annual Wellness Visit within the encounter summary.**       The following assessments were completed:  · Living Situation  · CAGE  · Depression Screening  · Timed Get Up and Go  · Whisper Test  · Cognitive Function Screening  · Nutrition Screening  · ADL Screening  · PAQ Screening    I offered to discuss end of life issues, including information on how to make advance directives that the patient could use to name someone who would make medical decisions on their behalf if they became too ill to make themselves.    ___Patient declined  _X_Patient is interested, I provided paper work and offered to discuss.    Vitals:    08/09/18 1413   BP: 128/71   BP Location: Left arm   Patient Position: Sitting   BP Method: Small (Automatic)   Pulse: 82   Temp: 98.2 °F (36.8 °C)   TempSrc: Oral   SpO2: 98%   Weight: 94.9 kg (209 lb 3.5 oz)   Height: 6' 1" (1.854 m)     Body mass index is 27.6 kg/m².  Physical Exam   Constitutional: He is oriented to person, place, and time. He appears well-developed and well-nourished.   HENT:   Head: Normocephalic and atraumatic.   Right Ear: Hearing and external ear normal.   Left Ear: Hearing and external ear normal.   Eyes: Conjunctivae and EOM are normal. Pupils are equal, round, and reactive to light.   Cardiovascular: Normal rate, regular rhythm, normal heart sounds and intact distal pulses.    Pulmonary/Chest: Effort normal and breath sounds normal.   Neurological: He is alert and oriented to person, place, and time.   Skin: Skin is warm and dry.   Psychiatric: He exhibits a depressed mood.             Diagnoses and health risks identified today and associated " recommendations/orders:    Encounter for preventive health examination    Type 2 diabetes mellitus with stage 3 chronic kidney disease, with long-term current use of insulin  Comments:  Uncontrolled, last Hgba1c 8.3%, due for foot exam (unable to complete due to foot being wrapped for lymphedema)    Mild cognitive impairment  Comments:  Stable, abnormal mini mental status exam, close f/u with PCP and Neurology    Coronary artery disease involving native coronary artery of native heart without angina pectoris  Comments:  Stable, s/p CABG, no sx of chest pain/sob, continue to follow with Dr. Lee every 4-6 months, on ASA/statin/bb    Hyperlipidemia associated with type 2 diabetes mellitus  Comments:  Stable, continue lipitor 40 mg daily per PCP    Hypertension associated with diabetes  Comments:  Controlled, continue norvasc 5 mg daily, imdur 30 mg daily, toprol XL 25 mg daily per PCP    Deep vein thrombosis (DVT) of both lower extremities, unspecified chronicity, unspecified vein  Comments:  Chronic, continue long term anticoagulation per PCP    Long term (current) use of anticoagulants [Z79.01]  Comments:  Chronic, continue eliquis 5 mg daily per PCP    Anemia, unspecified type  Comments:  Stable, continue to follow with PCP    Adrenal insufficiency  Comments:  Stable, continue to follow with PCP    History of prostate cancer  Comments:  Resolved, hx of prostate cancer, continue to follow with Urology    Encounter for long-term (current) use of insulin  Comments:  Chronic, continue insulin for uncontrolled type 2 diabetes per PCP    Positive depression screening  Comments:  Uncontrolled, recommend referral to psychiatry for counseling, close f/u with PCP encouraged    At high risk for falls  Comments:  Prolonged timed get up and go, recommend PT referral, pt declined        Alexander Givens with a 5-10 year written screening schedule and personal prevention plan. Recommendations were developed using the USPSTF age  appropriate recommendations. Education, counseling, and referrals were provided as needed. After Visit Summary printed and given to patient which includes a list of additional screenings\tests needed.    Follow up with PCP today as scheduled    Betsey Conde PA-C     Patient readiness: acceptance and barriers:none    During the course of the visit the patient was educated and counseled about the following:     Diabetes:  Discussed general issues about diabetes pathophysiology and management.  Hypertension:   Medication: no change.    Goals: Diabetes: Maintain Hemoglobin A1C below 7 and Hypertension: Reduce Blood Pressure    Did patient meet goals/outcomes: No to DM, Yes to HTN    The following self management tools provided: declined    Patient Instructions (the written plan) was given to the patient/family.     Time spent with patient: 55 minutes

## 2018-08-13 ENCOUNTER — LAB VISIT (OUTPATIENT)
Dept: LAB | Facility: HOSPITAL | Age: 83
End: 2018-08-13
Attending: INTERNAL MEDICINE
Payer: MEDICARE

## 2018-08-13 DIAGNOSIS — D50.1 IRON DEFICIENCY ANEMIA DUE TO SIDEROPENIC DYSPHAGIA: ICD-10-CM

## 2018-08-13 DIAGNOSIS — E11.22 CONTROLLED TYPE 2 DIABETES MELLITUS WITH STAGE 3 CHRONIC KIDNEY DISEASE, WITH LONG-TERM CURRENT USE OF INSULIN: ICD-10-CM

## 2018-08-13 DIAGNOSIS — N18.30 CONTROLLED TYPE 2 DIABETES MELLITUS WITH STAGE 3 CHRONIC KIDNEY DISEASE, WITH LONG-TERM CURRENT USE OF INSULIN: ICD-10-CM

## 2018-08-13 DIAGNOSIS — Z79.4 CONTROLLED TYPE 2 DIABETES MELLITUS WITH STAGE 3 CHRONIC KIDNEY DISEASE, WITH LONG-TERM CURRENT USE OF INSULIN: ICD-10-CM

## 2018-08-13 DIAGNOSIS — Z95.1 S/P CABG (CORONARY ARTERY BYPASS GRAFT): ICD-10-CM

## 2018-08-13 DIAGNOSIS — I82.403 DEEP VEIN THROMBOSIS (DVT) OF BOTH LOWER EXTREMITIES, UNSPECIFIED CHRONICITY, UNSPECIFIED VEIN: ICD-10-CM

## 2018-08-13 DIAGNOSIS — I25.10 CORONARY ARTERY DISEASE INVOLVING NATIVE CORONARY ARTERY OF NATIVE HEART WITHOUT ANGINA PECTORIS: ICD-10-CM

## 2018-08-13 DIAGNOSIS — E11.8 TYPE 2 DIABETES MELLITUS WITH COMPLICATION, WITH LONG-TERM CURRENT USE OF INSULIN: ICD-10-CM

## 2018-08-13 DIAGNOSIS — L02.419 LEG ABSCESS: ICD-10-CM

## 2018-08-13 DIAGNOSIS — R63.4 ABNORMAL WEIGHT LOSS: ICD-10-CM

## 2018-08-13 DIAGNOSIS — Z79.4 TYPE 2 DIABETES MELLITUS WITH COMPLICATION, WITH LONG-TERM CURRENT USE OF INSULIN: ICD-10-CM

## 2018-08-13 DIAGNOSIS — C61 PROSTATE CA: ICD-10-CM

## 2018-08-13 DIAGNOSIS — N18.30 CKD (CHRONIC KIDNEY DISEASE) STAGE 3, GFR 30-59 ML/MIN: Chronic | ICD-10-CM

## 2018-08-13 LAB
ALBUMIN SERPL BCP-MCNC: 3.8 G/DL
ALP SERPL-CCNC: 93 U/L
ALT SERPL W/O P-5'-P-CCNC: 18 U/L
ANION GAP SERPL CALC-SCNC: 5 MMOL/L
AST SERPL-CCNC: 18 U/L
BASOPHILS # BLD AUTO: 0 K/UL
BASOPHILS NFR BLD: 0 %
BILIRUB SERPL-MCNC: 0.4 MG/DL
BUN SERPL-MCNC: 18 MG/DL
CALCIUM SERPL-MCNC: 10.5 MG/DL
CHLORIDE SERPL-SCNC: 109 MMOL/L
CHOLEST SERPL-MCNC: 137 MG/DL
CHOLEST/HDLC SERPL: 3.6 {RATIO}
CO2 SERPL-SCNC: 26 MMOL/L
CREAT SERPL-MCNC: 1.3 MG/DL
DIFFERENTIAL METHOD: ABNORMAL
EOSINOPHIL # BLD AUTO: 0 K/UL
EOSINOPHIL NFR BLD: 0 %
ERYTHROCYTE [DISTWIDTH] IN BLOOD BY AUTOMATED COUNT: 15.6 %
EST. GFR  (AFRICAN AMERICAN): 60 ML/MIN/1.73 M^2
EST. GFR  (NON AFRICAN AMERICAN): 52 ML/MIN/1.73 M^2
ESTIMATED AVG GLUCOSE: 209 MG/DL
FERRITIN SERPL-MCNC: 32 NG/ML
GLUCOSE SERPL-MCNC: 215 MG/DL
HBA1C MFR BLD HPLC: 8.9 %
HCT VFR BLD AUTO: 35.4 %
HDLC SERPL-MCNC: 38 MG/DL
HDLC SERPL: 27.7 %
HGB BLD-MCNC: 11.4 G/DL
IRON SERPL-MCNC: 64 UG/DL
LDLC SERPL CALC-MCNC: 76.2 MG/DL
LYMPHOCYTES # BLD AUTO: 0.8 K/UL
LYMPHOCYTES NFR BLD: 23.6 %
MAGNESIUM SERPL-MCNC: 1.9 MG/DL
MCH RBC QN AUTO: 28 PG
MCHC RBC AUTO-ENTMCNC: 32.2 G/DL
MCV RBC AUTO: 87 FL
MONOCYTES # BLD AUTO: 0.4 K/UL
MONOCYTES NFR BLD: 11.1 %
NEUTROPHILS # BLD AUTO: 2.3 K/UL
NEUTROPHILS NFR BLD: 65.3 %
NONHDLC SERPL-MCNC: 99 MG/DL
PHOSPHATE SERPL-MCNC: 3.1 MG/DL
PLATELET # BLD AUTO: 131 K/UL
PMV BLD AUTO: 8.9 FL
POTASSIUM SERPL-SCNC: 4.6 MMOL/L
PREALB SERPL-MCNC: 20 MG/DL
PROT SERPL-MCNC: 6.8 G/DL
PTH-INTACT SERPL-MCNC: 278.3 PG/ML
RBC # BLD AUTO: 4.06 M/UL
SATURATED IRON: 16 %
SODIUM SERPL-SCNC: 140 MMOL/L
TOTAL IRON BINDING CAPACITY: 391 UG/DL
TRANSFERRIN SERPL-MCNC: 264 MG/DL
TRIGL SERPL-MCNC: 114 MG/DL
TSH SERPL DL<=0.005 MIU/L-ACNC: 1.17 UIU/ML
WBC # BLD AUTO: 3.6 K/UL

## 2018-08-13 PROCEDURE — 82728 ASSAY OF FERRITIN: CPT

## 2018-08-13 PROCEDURE — 80061 LIPID PANEL: CPT

## 2018-08-13 PROCEDURE — 83970 ASSAY OF PARATHORMONE: CPT

## 2018-08-13 PROCEDURE — 83735 ASSAY OF MAGNESIUM: CPT

## 2018-08-13 PROCEDURE — 84443 ASSAY THYROID STIM HORMONE: CPT

## 2018-08-13 PROCEDURE — 36415 COLL VENOUS BLD VENIPUNCTURE: CPT

## 2018-08-13 PROCEDURE — 84134 ASSAY OF PREALBUMIN: CPT

## 2018-08-13 PROCEDURE — 83036 HEMOGLOBIN GLYCOSYLATED A1C: CPT

## 2018-08-13 PROCEDURE — 84100 ASSAY OF PHOSPHORUS: CPT

## 2018-08-13 PROCEDURE — 83540 ASSAY OF IRON: CPT

## 2018-08-13 PROCEDURE — 80053 COMPREHEN METABOLIC PANEL: CPT

## 2018-08-13 PROCEDURE — 85025 COMPLETE CBC W/AUTO DIFF WBC: CPT

## 2018-08-15 ENCOUNTER — OFFICE VISIT (OUTPATIENT)
Dept: HEMATOLOGY/ONCOLOGY | Facility: CLINIC | Age: 83
End: 2018-08-15
Payer: MEDICARE

## 2018-08-15 VITALS
SYSTOLIC BLOOD PRESSURE: 127 MMHG | TEMPERATURE: 99 F | HEIGHT: 72 IN | HEART RATE: 71 BPM | WEIGHT: 210.13 LBS | DIASTOLIC BLOOD PRESSURE: 59 MMHG | BODY MASS INDEX: 28.46 KG/M2

## 2018-08-15 DIAGNOSIS — Z95.1 S/P CABG (CORONARY ARTERY BYPASS GRAFT): ICD-10-CM

## 2018-08-15 DIAGNOSIS — C61 PROSTATE CA: ICD-10-CM

## 2018-08-15 DIAGNOSIS — I25.10 CORONARY ARTERY DISEASE INVOLVING NATIVE CORONARY ARTERY OF NATIVE HEART WITHOUT ANGINA PECTORIS: Primary | ICD-10-CM

## 2018-08-15 DIAGNOSIS — N18.30 CKD (CHRONIC KIDNEY DISEASE) STAGE 3, GFR 30-59 ML/MIN: Chronic | ICD-10-CM

## 2018-08-15 DIAGNOSIS — E78.5 HYPERLIPIDEMIA ASSOCIATED WITH TYPE 2 DIABETES MELLITUS: ICD-10-CM

## 2018-08-15 DIAGNOSIS — E11.21 CONTROLLED TYPE 2 DIABETES MELLITUS WITH DIABETIC NEPHROPATHY, WITHOUT LONG-TERM CURRENT USE OF INSULIN: ICD-10-CM

## 2018-08-15 DIAGNOSIS — I82.403 DEEP VEIN THROMBOSIS (DVT) OF BOTH LOWER EXTREMITIES, UNSPECIFIED CHRONICITY, UNSPECIFIED VEIN: ICD-10-CM

## 2018-08-15 DIAGNOSIS — E11.69 HYPERLIPIDEMIA ASSOCIATED WITH TYPE 2 DIABETES MELLITUS: ICD-10-CM

## 2018-08-15 PROCEDURE — 99999 PR PBB SHADOW E&M-EST. PATIENT-LVL V: CPT | Mod: PBBFAC,,, | Performed by: INTERNAL MEDICINE

## 2018-08-15 PROCEDURE — 99214 OFFICE O/P EST MOD 30 MIN: CPT | Mod: S$GLB,,, | Performed by: INTERNAL MEDICINE

## 2018-08-15 PROCEDURE — 3078F DIAST BP <80 MM HG: CPT | Mod: CPTII,S$GLB,, | Performed by: INTERNAL MEDICINE

## 2018-08-15 PROCEDURE — 3074F SYST BP LT 130 MM HG: CPT | Mod: CPTII,S$GLB,, | Performed by: INTERNAL MEDICINE

## 2018-08-15 PROCEDURE — 99499 UNLISTED E&M SERVICE: CPT | Mod: S$GLB,,, | Performed by: INTERNAL MEDICINE

## 2018-08-15 NOTE — PROGRESS NOTES
Subjective:       Patient ID: Chon Hurtado is a 82 y.o. male.    Chief Complaint:f/u doing well  HPI:   A 80-year-old -American male who has finally decided to go for radiation   therapy for his prostate cancer. The patient has B12 deficiency and iron   deficiency. He has been following with me for mild anemia for years     The patient completed radiation for prostate ca and f/u has been with good controlled psa, pt is here with his wife, recently had both legs painful and swollen and dx with bilateral extensive DVT and started coumadin, INR being monitored by coumadin clinic, feels well, has been going to lymphedema clinic, no complinats recently got out of hospital again from edema    REVIEW OF SYSTEMS:     CONSTITUTIONAL some  weight loss. There is no apparent    change in appetite, fever, night sweats, headaches, fatigue, dizziness, or    weakness.      SKIN: Denies rash, issues with nails, non-healing sores, bleeding, blotching    skin or abnormal bruising. Denies new moles or changes to existing moles.      EYES: Denies eye pain, blurred vision, swelling, redness or discharge.      ENT AND MOUTH: Denies runny nose, stuffiness, sinus trouble or sores. Denies    nosebleeds. Denies, hoarseness, change in voice or swelling in front of the    neck.  waiting on the dentures, now edentulous    CARDIOVASCULAR: Denies chest pain, discomfort or palpitations. Denies neck    swelling or episodes of passing out.      RESPIRATORY: Denies cough, sputum production, blood in sputum, and denies    shortness of breath.      GI: Denies trouble swallowing, indigestion, heartburn, abdominal pain, nausea,    vomiting, diarrhea, altered bowel habits, blood in stool, discoloration of    stools, change in nature of stool, bloating, increased abdominal girth.      GENITOURINARY: No discharge. No pelvic pain or lumps. No rash around groin or  lesions. No urinary frequency, hesitation, painful urination or blood in    urine.  Denies incontinence. No problems with intercourse.      Has bilat dvt now with an abscess    PHYSICAL EXAM:     Vitals:    08/15/18 1102   BP: (!) 127/59   Pulse: 71   Temp: 98.8 °F (37.1 °C)       GENERAL: Comfortable looking patient. Patient is in no distress.  Awake, alert and oriented to time, person and place.  No anxiety, or agitation.      HEENT: Normal conjunctivae and eyelids. WNL.  PERRLA 3 to 4 mm. No icterus, no pallor, no congestion, and no discharge noted.edentulous     NECK:  Supple. Trachea is central.  No crepitus.  No JVD or masses.    RESPIRATORY:  No intercostal retractions.  No dullness to percussion.  Chest is clear to auscultation.  No rales, rhonchi or wheezes.  No crepitus.  Good air entry bilaterally.    CARDIOVASCULAR:  S1 and S2 are normally heard without murmurs or gallops.  All peripheral pulses are present.    ABDOMEN:  Normal abdomen.  No hepatosplenomegaly.  No free fluid.  Bowel sounds are present.  No hernia noted. No masses.  No rebound or tenderness.  No guarding or rigidity.  Umbilicus is midline.    LYMPHATICS:  No axillary, cervical, supraclavicular, submental, or inguinal lymphadenopathy.lymphedema+ andf has nurse wrap leg daily    SKIN/MUSCULOSKELETAL:  There is no evidence of excoriation marks or ecchmosis.  No rashes.  No cyanosis.  No clubbing.  No joint or skeletal deformities noted.  Normal range of motion.bilat leg swelling+rt leg absss+ hot and red    NEUROLOGIC:  Higher functions are appropriate.  No cranial nerve deficits.  Normal lisa.  Normal strength.  Motor and sensory functions are normal.  Deep tendon reflexes are normal.      Laboratory:     CBC:  Lab Results   Component Value Date    WBC 3.60 (L) 08/13/2018    RBC 4.06 (L) 08/13/2018    HGB 11.4 (L) 08/13/2018    HCT 35.4 (L) 08/13/2018    MCV 87 08/13/2018    MCH 28.0 08/13/2018    MCHC 32.2 08/13/2018    RDW 15.6 (H) 08/13/2018     (L) 08/13/2018    MPV 8.9 (L) 08/13/2018    GRAN 2.3 08/13/2018     GRAN 65.3 08/13/2018    LYMPH 0.8 (L) 08/13/2018    LYMPH 23.6 08/13/2018    MONO 0.4 08/13/2018    MONO 11.1 08/13/2018    EOS 0.0 08/13/2018    BASO 0.00 08/13/2018    EOSINOPHIL 0.0 08/13/2018    BASOPHIL 0.0 08/13/2018       BMP: BMP  Lab Results   Component Value Date     08/13/2018    K 4.6 08/13/2018     08/13/2018    CO2 26 08/13/2018    BUN 18 08/13/2018    CREATININE 1.3 08/13/2018    CALCIUM 10.5 08/13/2018    ANIONGAP 5 (L) 08/13/2018    ESTGFRAFRICA 60 08/13/2018    EGFRNONAA 52 (A) 08/13/2018       LFT:   Lab Results   Component Value Date    ALT 18 08/13/2018    AST 18 08/13/2018    ALKPHOS 93 08/13/2018    BILITOT 0.4 08/13/2018     Lab Results   Component Value Date    PSA 3.6 11/05/2015    PSA 2.68 01/24/2013    PSA 2.45 04/19/2012   now less than 0.01    Assessment/Plan:              Anemia is stable. Cont  Observation no intervention needed.    ckd and anemia: cont observation  prostate ca : stable and better post xrt cont urology  New dvt would recommend life long anticoag due to cancer hx and increased risk   cont with lymphedema mx  DM : needs much better control discussed at length  HTN: cont meds  rtc 3 months with cbc. Cmp, ed[place],

## 2018-08-22 ENCOUNTER — TELEPHONE (OUTPATIENT)
Dept: FAMILY MEDICINE | Facility: CLINIC | Age: 83
End: 2018-08-22

## 2018-08-22 RX ORDER — FUROSEMIDE 20 MG/1
20 TABLET ORAL DAILY
Qty: 90 TABLET | Refills: 3 | Status: SHIPPED | OUTPATIENT
Start: 2018-08-22 | End: 2019-10-28

## 2018-08-27 NOTE — PROGRESS NOTES
Subjective:       Patient ID: Chon Hurtado is a 82 y.o. male.    Chief Complaint: Hypertension and Hyperlipidemia    Mr. Hurtado comes to clinic today for follow up. He had blood work completed recently that revealed a steady decline in renal function and elevated PTH.   The patient's HGA1c was improved but remains elevated. He has not been monitoring his blood sugar and poor calory intake and limited activity. The patient does have chronic MAYES, and claudication, and DVT. We do not have these records available to review. He needs to use oxygen 24/7 hrs.He has dysthymia and mild metabolic encephalopathy.He had CAD, and chronic adrenal insuficient.      Hypertension   Associated symptoms include shortness of breath. Pertinent negatives include no chest pain or headaches.   Hyperlipidemia   Associated symptoms include shortness of breath. Pertinent negatives include no chest pain or myalgias.     Review of Systems   Constitutional: Positive for fatigue and unexpected weight change. Negative for activity change and appetite change.   Eyes: Negative for visual disturbance.   Respiratory: Positive for shortness of breath. Negative for cough.    Cardiovascular: Positive for leg swelling. Negative for chest pain.        Chronic leg swelling due to DVT   Gastrointestinal: Negative for abdominal distention and abdominal pain.   Genitourinary: Negative for difficulty urinating and dysuria.   Musculoskeletal: Positive for arthralgias, back pain and gait problem. Negative for myalgias.   Neurological: Negative for headaches.   Hematological: Negative for adenopathy.   Psychiatric/Behavioral: Positive for behavioral problems and confusion. The patient is not nervous/anxious.        Objective:      Physical Exam   Constitutional: He is oriented to person, place, and time. He appears well-developed. He appears ill.   Cardiovascular: Normal rate and regular rhythm.   Pulmonary/Chest: Effort normal. He has decreased breath sounds  in the right lower field and the left lower field. He has no wheezes.   Abdominal: Soft. Bowel sounds are normal. He exhibits no distension. There is no tenderness.   Musculoskeletal: He exhibits edema.        Cervical back: He exhibits decreased range of motion and tenderness.        Lumbar back: He exhibits decreased range of motion and tenderness.   Chronic edema due to DVT   Neurological: He is alert and oriented to person, place, and time.   Skin: No erythema.   Psychiatric: His behavior is normal. Judgment and thought content normal. His affect is blunt. His speech is delayed. Cognition and memory are impaired. He exhibits a depressed mood.     legs abscess improved, shallow ulcer.Weigh loss due to diuresis.Under Dr Lee and Dr Pérez.Also Dr Gomes.  Assessment:       1. Adrenal insufficiency    2. Controlled type 2 diabetes mellitus with stage 3 chronic kidney disease, with long-term current use of insulin    3. Other specified disorders of parathyroid gland    4. 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 Hyperlipidemia associated with type 2 diabetes mellitus    6. CKD (chronic kidney disease) stage 3, GFR 30-59 ml/min    7. Abnormal weight loss     8. Leg abscess        Plan:   Chon was seen today for follow-up.  Request [facility] records  Adrenal insufficiency    Controlled type 2 diabetes mellitus with stage 3 chronic kidney disease, with long-term current use of insulin  -     Lipid panel; Future  -     flash glucose scanning reader (FREESTYLE SHANTANU READER) Misc; 1 each by Misc.(Non-Drug; Combo Route) route 5 (five) times daily.  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  -     TSH; Future  -     FERRITIN; Future  -     IRON AND TIBC; Future    Hyperlipidemia associated with type 2 diabetes mellitus    CKD (chronic kidney disease) stage 3, GFR 30-59  ml/min  -     PREALBUMIN; Future  -     PTH, intact; Future  -     PHOSPHORUS; Future  -     Magnesium; Future  -     Ambulatory consult to Diabetic Education    Abnormal weight loss   -     TSH; Future    Leg abscess      [facility] goal documentation:  Patient readiness: nonacceptance and barriers:readiness, social stressors, environmental, household issues and occupational issues  During the course of the visit the patient was educated and counseled about the following: Diabetes:  Discussed general issues about diabetes pathophysiology and management.  Discussed foot care.  Discussed ways to avoid symptomatic hypoglycemia.  Discussed sick day management.  Hypertension:   Screening for causes of secondary hypertension: GFR (chronic kidney disease).  Regular aerobic exercise.  Check blood pressures daily and record.  Follow up: 3 months and as needed.  Goals: Diabetes: Maintain Hemoglobin A1C below 7 and Hypertension: Reduce Blood Pressure  Goal/Outcomes met:Hypertension and type 2 DM  The following self management tools provided:Blood sugar log  Patient Instructions (the written plan) was given to the patient/family: Yes  Time spent with patient: 40 minutes    Patient with be reevaluated in 6 weeks or sooner prn    Greater than 50% of this visit was spent counseling as described in above documentation:Yes

## 2018-08-30 RX ORDER — ATORVASTATIN CALCIUM 40 MG/1
TABLET, FILM COATED ORAL
Qty: 90 TABLET | Refills: 2 | Status: SHIPPED | OUTPATIENT
Start: 2018-08-30 | End: 2020-01-01

## 2018-09-12 ENCOUNTER — OFFICE VISIT (OUTPATIENT)
Dept: FAMILY MEDICINE | Facility: CLINIC | Age: 83
End: 2018-09-12
Payer: MEDICARE

## 2018-09-12 VITALS
BODY MASS INDEX: 27.08 KG/M2 | SYSTOLIC BLOOD PRESSURE: 123 MMHG | HEART RATE: 69 BPM | WEIGHT: 199.94 LBS | TEMPERATURE: 99 F | HEIGHT: 72 IN | DIASTOLIC BLOOD PRESSURE: 69 MMHG

## 2018-09-12 DIAGNOSIS — B37.0 THRUSH: Primary | ICD-10-CM

## 2018-09-12 PROCEDURE — 3074F SYST BP LT 130 MM HG: CPT | Mod: CPTII,,, | Performed by: FAMILY MEDICINE

## 2018-09-12 PROCEDURE — 3078F DIAST BP <80 MM HG: CPT | Mod: CPTII,,, | Performed by: FAMILY MEDICINE

## 2018-09-12 PROCEDURE — 99214 OFFICE O/P EST MOD 30 MIN: CPT | Mod: PBBFAC,PO | Performed by: FAMILY MEDICINE

## 2018-09-12 PROCEDURE — 1101F PT FALLS ASSESS-DOCD LE1/YR: CPT | Mod: CPTII,,, | Performed by: FAMILY MEDICINE

## 2018-09-12 PROCEDURE — 99999 PR PBB SHADOW E&M-EST. PATIENT-LVL IV: CPT | Mod: PBBFAC,,, | Performed by: FAMILY MEDICINE

## 2018-09-12 PROCEDURE — 99214 OFFICE O/P EST MOD 30 MIN: CPT | Mod: S$PBB,,, | Performed by: FAMILY MEDICINE

## 2018-09-12 RX ORDER — NYSTATIN 100000 [USP'U]/ML
4 SUSPENSION ORAL 4 TIMES DAILY
Qty: 160 ML | Refills: 0 | Status: SHIPPED | OUTPATIENT
Start: 2018-09-12 | End: 2018-09-22

## 2018-09-12 NOTE — PROGRESS NOTES
Subjective:       Patient ID: Chon Hurtado is a 82 y.o. male.    Chief Complaint: Dizziness (light head for 3 weeks)    White exudate to tongue.    O: 2 weeks  L: tongue  D: improved.  C:  Seven: brushing teeth    Felt dizzy a few days ago, resolved today  Exac:        Review of Systems   Respiratory: Negative for shortness of breath.    Cardiovascular: Negative for chest pain.   Gastrointestinal: Negative for abdominal pain.       Objective:      Physical Exam   Constitutional: He appears well-developed and well-nourished. No distress.   HENT:   Head: Normocephalic and atraumatic.   Nose: Nose normal.   Whitish exudate to tongue   Skin: Skin is warm and dry. No rash noted. He is not diaphoretic. No erythema.   Nursing note and vitals reviewed.      Assessment:       1. Thrush        Plan:     Problem List Items Addressed This Visit        ID    Thrush - Primary    Relevant Medications    nystatin (MYCOSTATIN) 100,000 unit/mL suspension

## 2018-09-21 ENCOUNTER — OFFICE VISIT (OUTPATIENT)
Dept: FAMILY MEDICINE | Facility: CLINIC | Age: 83
End: 2018-09-21
Payer: MEDICARE

## 2018-09-21 ENCOUNTER — LAB VISIT (OUTPATIENT)
Dept: LAB | Facility: HOSPITAL | Age: 83
End: 2018-09-21
Attending: PHYSICIAN ASSISTANT
Payer: MEDICARE

## 2018-09-21 VITALS
WEIGHT: 203.5 LBS | BODY MASS INDEX: 27.56 KG/M2 | SYSTOLIC BLOOD PRESSURE: 124 MMHG | HEART RATE: 65 BPM | DIASTOLIC BLOOD PRESSURE: 68 MMHG | TEMPERATURE: 98 F | OXYGEN SATURATION: 98 % | RESPIRATION RATE: 16 BRPM | HEIGHT: 72 IN

## 2018-09-21 DIAGNOSIS — Z23 NEED FOR PNEUMOCOCCAL VACCINATION: ICD-10-CM

## 2018-09-21 DIAGNOSIS — R42 DIZZINESS: ICD-10-CM

## 2018-09-21 DIAGNOSIS — Z79.4 UNCONTROLLED TYPE 2 DIABETES MELLITUS WITH HYPERGLYCEMIA, WITH LONG-TERM CURRENT USE OF INSULIN: ICD-10-CM

## 2018-09-21 DIAGNOSIS — E21.3 HYPERPARATHYROIDISM: ICD-10-CM

## 2018-09-21 DIAGNOSIS — E78.5 HYPERLIPIDEMIA ASSOCIATED WITH TYPE 2 DIABETES MELLITUS: ICD-10-CM

## 2018-09-21 DIAGNOSIS — E11.65 UNCONTROLLED TYPE 2 DIABETES MELLITUS WITH HYPERGLYCEMIA, WITH LONG-TERM CURRENT USE OF INSULIN: ICD-10-CM

## 2018-09-21 DIAGNOSIS — Z23 NEEDS FLU SHOT: ICD-10-CM

## 2018-09-21 DIAGNOSIS — D64.9 ANEMIA, UNSPECIFIED TYPE: ICD-10-CM

## 2018-09-21 DIAGNOSIS — E11.59 HYPERTENSION ASSOCIATED WITH DIABETES: Primary | ICD-10-CM

## 2018-09-21 DIAGNOSIS — I15.2 HYPERTENSION ASSOCIATED WITH DIABETES: Primary | ICD-10-CM

## 2018-09-21 DIAGNOSIS — E11.69 HYPERLIPIDEMIA ASSOCIATED WITH TYPE 2 DIABETES MELLITUS: ICD-10-CM

## 2018-09-21 LAB
25(OH)D3+25(OH)D2 SERPL-MCNC: 15 NG/ML
ANION GAP SERPL CALC-SCNC: 8 MMOL/L
BASOPHILS # BLD AUTO: 0 K/UL
BASOPHILS NFR BLD: 0 %
BUN SERPL-MCNC: 21 MG/DL
CALCIUM SERPL-MCNC: 10.8 MG/DL
CHLORIDE SERPL-SCNC: 108 MMOL/L
CO2 SERPL-SCNC: 26 MMOL/L
CREAT SERPL-MCNC: 1.5 MG/DL
DIFFERENTIAL METHOD: ABNORMAL
EOSINOPHIL # BLD AUTO: 0 K/UL
EOSINOPHIL NFR BLD: 0 %
ERYTHROCYTE [DISTWIDTH] IN BLOOD BY AUTOMATED COUNT: 13.4 %
EST. GFR  (AFRICAN AMERICAN): 50.1 ML/MIN/1.73 M^2
EST. GFR  (NON AFRICAN AMERICAN): 43.3 ML/MIN/1.73 M^2
FERRITIN SERPL-MCNC: 62 NG/ML
GLUCOSE SERPL-MCNC: 219 MG/DL
HCT VFR BLD AUTO: 38.1 %
HGB BLD-MCNC: 11.7 G/DL
IMM GRANULOCYTES # BLD AUTO: 0.01 K/UL
IMM GRANULOCYTES NFR BLD AUTO: 0.3 %
IRON SERPL-MCNC: 67 UG/DL
LYMPHOCYTES # BLD AUTO: 1 K/UL
LYMPHOCYTES NFR BLD: 26.5 %
MCH RBC QN AUTO: 28 PG
MCHC RBC AUTO-ENTMCNC: 30.7 G/DL
MCV RBC AUTO: 91 FL
MONOCYTES # BLD AUTO: 0.4 K/UL
MONOCYTES NFR BLD: 11.6 %
NEUTROPHILS # BLD AUTO: 2.3 K/UL
NEUTROPHILS NFR BLD: 61.6 %
NRBC BLD-RTO: 0 /100 WBC
PLATELET # BLD AUTO: 159 K/UL
PMV BLD AUTO: 11.1 FL
POTASSIUM SERPL-SCNC: 5.1 MMOL/L
PTH-INTACT SERPL-MCNC: 366 PG/ML
RBC # BLD AUTO: 4.18 M/UL
SATURATED IRON: 17 %
SODIUM SERPL-SCNC: 142 MMOL/L
TOTAL IRON BINDING CAPACITY: 406 UG/DL
TRANSFERRIN SERPL-MCNC: 274 MG/DL
WBC # BLD AUTO: 3.7 K/UL

## 2018-09-21 PROCEDURE — 83540 ASSAY OF IRON: CPT

## 2018-09-21 PROCEDURE — 90670 PCV13 VACCINE IM: CPT | Mod: PBBFAC,PO

## 2018-09-21 PROCEDURE — 82728 ASSAY OF FERRITIN: CPT

## 2018-09-21 PROCEDURE — 99214 OFFICE O/P EST MOD 30 MIN: CPT | Mod: S$PBB,,, | Performed by: PHYSICIAN ASSISTANT

## 2018-09-21 PROCEDURE — 1101F PT FALLS ASSESS-DOCD LE1/YR: CPT | Mod: CPTII,,, | Performed by: PHYSICIAN ASSISTANT

## 2018-09-21 PROCEDURE — 36415 COLL VENOUS BLD VENIPUNCTURE: CPT | Mod: PO

## 2018-09-21 PROCEDURE — 99999 PR PBB SHADOW E&M-EST. PATIENT-LVL V: CPT | Mod: PBBFAC,,, | Performed by: PHYSICIAN ASSISTANT

## 2018-09-21 PROCEDURE — 85025 COMPLETE CBC W/AUTO DIFF WBC: CPT

## 2018-09-21 PROCEDURE — 3074F SYST BP LT 130 MM HG: CPT | Mod: CPTII,,, | Performed by: PHYSICIAN ASSISTANT

## 2018-09-21 PROCEDURE — 3078F DIAST BP <80 MM HG: CPT | Mod: CPTII,,, | Performed by: PHYSICIAN ASSISTANT

## 2018-09-21 PROCEDURE — 99215 OFFICE O/P EST HI 40 MIN: CPT | Mod: PBBFAC,PO,25 | Performed by: PHYSICIAN ASSISTANT

## 2018-09-21 PROCEDURE — 82306 VITAMIN D 25 HYDROXY: CPT

## 2018-09-21 PROCEDURE — 90662 IIV NO PRSV INCREASED AG IM: CPT | Mod: PBBFAC,PO

## 2018-09-21 PROCEDURE — 83970 ASSAY OF PARATHORMONE: CPT

## 2018-09-21 PROCEDURE — 80048 BASIC METABOLIC PNL TOTAL CA: CPT

## 2018-09-21 RX ORDER — INSULIN ASPART 100 [IU]/ML
INJECTION, SOLUTION INTRAVENOUS; SUBCUTANEOUS
Qty: 45 ML | Refills: 12 | Status: SHIPPED | OUTPATIENT
Start: 2018-09-21 | End: 2020-01-01 | Stop reason: SDUPTHER

## 2018-09-21 NOTE — PROGRESS NOTES
Subjective:       Patient ID: Chon Hurtado is a 82 y.o. male.    Chief Complaint: Dizziness; Follow-up (6wk f/u); and Shortness of Breath    Mr. Hurtado comes to clinic today for follow up. The patient recently had labs.    Lab Results       Component                Value               Date                       WBC                      3.60 (L)            08/13/2018                 HGB                      11.4 (L)            08/13/2018                 HCT                      35.4 (L)            08/13/2018                 PLT                      131 (L)             08/13/2018                 CHOL                     137                 08/13/2018                 TRIG                     114                 08/13/2018                 HDL                      38 (L)              08/13/2018                 ALT                      18                  08/13/2018                 AST                      18                  08/13/2018                 NA                       140                 08/13/2018                 K                        4.6                 08/13/2018                 CL                       109                 08/13/2018                 CREATININE               1.3                 08/13/2018                 BUN                      18                  08/13/2018                 CO2                      26                  08/13/2018                 TSH                      1.169               08/13/2018                 PSA                      3.6                 11/05/2015                 INR                      2.4                 06/05/2017                 HGBA1C                   8.9 (H)             08/13/2018              The patient also had an elevated PTH. The patient's blood sugar is elevated. He does not follow a diabetic diet. He is not compliant with his novolog insulin. The patient is followed by cardiologist, Dr. Lee. The patient does have anemia chronically; this is  followed by Dr. Pérez, hematologist. The patient complains of intermittent dizziness. He reports this does not occur when he is lying down. It does get worse when changing position. The patient reports this has been present for the last several months. The dizziness only lasts several minutes then resolves. He is due for flu and pneumonia shot today.       Review of Systems   Constitutional: Negative for activity change, appetite change and fever.   HENT: Negative for postnasal drip, rhinorrhea and sinus pressure.    Eyes: Negative for visual disturbance.   Respiratory: Negative for cough and shortness of breath.    Cardiovascular: Negative for chest pain.   Gastrointestinal: Negative for abdominal distention and abdominal pain.   Genitourinary: Negative for difficulty urinating and dysuria.   Musculoskeletal: Negative for arthralgias and myalgias.   Neurological: Negative for headaches.   Hematological: Negative for adenopathy.   Psychiatric/Behavioral: The patient is not nervous/anxious.        Objective:      Physical Exam   Constitutional: He is oriented to person, place, and time.   Cardiovascular: Normal rate and regular rhythm.   Pulses:       Dorsalis pedis pulses are 1+ on the right side, and 1+ on the left side.        Posterior tibial pulses are 1+ on the right side, and 1+ on the left side.   Pulmonary/Chest: Effort normal and breath sounds normal. He has no wheezes.   Abdominal: Soft. Bowel sounds are normal. There is no tenderness.   Musculoskeletal: He exhibits edema.        Right foot: There is normal range of motion and no deformity.        Left foot: There is normal range of motion and no deformity.   Chronic distal lower extremity edema. Patient wears compression stockings.    Feet:   Right Foot:   Protective Sensation: 10 sites tested. 7 sites sensed.   Skin Integrity: Positive for callus. Negative for ulcer, blister or dry skin.   Left Foot:   Protective Sensation: 10 sites tested. 7 sites  sensed.   Skin Integrity: Positive for callus. Negative for ulcer, blister or dry skin.   Neurological: He is alert and oriented to person, place, and time.   Skin: No erythema.   Psychiatric: His behavior is normal.       Assessment:       1. Hypertension associated with diabetes    2. Hyperlipidemia associated with type 2 diabetes mellitus    3. Uncontrolled type 2 diabetes mellitus with hyperglycemia, with long-term current use of insulin    4. Anemia, unspecified type    5. Dizziness    6. Hyperparathyroidism    7. Need for pneumococcal vaccination    8. Needs flu shot        Plan:       Chon was seen today for dizziness, follow-up and shortness of breath.    Diagnoses and all orders for this visit:    Hypertension associated with diabetes  Well controlled  Low salt diet  Continue current medication  Hyperlipidemia associated with type 2 diabetes mellitus  High fiber diet  Continue current medication  Uncontrolled type 2 diabetes mellitus with hyperglycemia, with long-term current use of insulin  -     insulin aspart U-100 (NOVOLOG FLEXPEN U-100 INSULIN) 100 unit/mL InPn pen; INJECT 6 UNITS BEFORE LUNCH AND DINNER PLUS SLIDING SCALE  Diabetic diet  Continue lantus  Restart novolog at 6 units since patient has not been taking this appropriately.   Anemia, unspecified type  -     Iron and TIBC; Future  -     Ferritin; Future  -     CBC auto differential; Future    Dizziness  -     Iron and TIBC; Future  -     Ferritin; Future  -     CBC auto differential; Future  -     Basic metabolic panel; Future    Hyperparathyroidism  -     Vitamin D; Future  -     Basic metabolic panel; Future  -     PTH, intact; Future    Need for pneumococcal vaccination  -     (In Office Administered) Pneumococcal Conjugate Vaccine (13 Valent) (IM)    Needs flu shot  -     Influenza - High Dose (65+) (PF) (IM)    Patient readiness: acceptance and barriers:none    During the course of the visit the patient was educated and counseled about  the following:     Diabetes:  Discussed general issues about diabetes pathophysiology and management.  Educational material distributed.  Addressed ADA diet.  Suggested low cholesterol diet.  Encouraged aerobic exercise.  Discussed foot care.  Reminded to get yearly retinal exam.  Hypertension:   Medication: no change.  Dietary sodium restriction.  Regular aerobic exercise.  Obesity:   General weight loss/lifestyle modification strategies discussed (elicit support from others; identify saboteurs; non-food rewards, etc).  Informal exercise measures discussed, e.g. taking stairs instead of elevator.  Regular aerobic exercise program discussed.    Goals: Diabetes: Maintain Hemoglobin A1C below 7, Hypertension: Reduce Blood Pressure and Obesity: Reduce calorie intake and BMI    Did patient meet goals/outcomes: No    The following self management tools provided: blood glucose log    Patient Instructions (the written plan) was given to the patient/family.     Time spent with patient: 30 minutes    Barriers to medications present (no )    Adverse reactions to current medications (no)    Over the counter medications reviewed (Yes)

## 2018-09-21 NOTE — PATIENT INSTRUCTIONS
Start back on novolog. Start 6 units with lunch and dinner. Increase by 2 units every 2 days to control blood sugar.  Drop off blood sugar log in 2 weeks.

## 2018-09-24 ENCOUNTER — PATIENT MESSAGE (OUTPATIENT)
Dept: FAMILY MEDICINE | Facility: CLINIC | Age: 83
End: 2018-09-24

## 2018-09-24 DIAGNOSIS — E55.9 VITAMIN D DEFICIENCY: ICD-10-CM

## 2018-09-24 DIAGNOSIS — E21.3 HYPERPARATHYROIDISM: Primary | ICD-10-CM

## 2018-09-24 DIAGNOSIS — D64.9 ANEMIA, UNSPECIFIED TYPE: ICD-10-CM

## 2018-09-24 RX ORDER — ERGOCALCIFEROL 1.25 MG/1
50000 CAPSULE ORAL WEEKLY
Qty: 12 CAPSULE | Refills: 1 | Status: SHIPPED | OUTPATIENT
Start: 2018-09-24 | End: 2019-05-10 | Stop reason: SDUPTHER

## 2018-09-24 RX ORDER — FERROUS SULFATE 325(65) MG
325 TABLET ORAL
Qty: 90 TABLET | Refills: 1 | Status: SHIPPED | OUTPATIENT
Start: 2018-09-24 | End: 2020-01-01 | Stop reason: SDUPTHER

## 2018-09-30 ENCOUNTER — HISTORICAL (OUTPATIENT)
Dept: ADMINISTRATIVE | Facility: HOSPITAL | Age: 83
End: 2018-09-30

## 2018-11-19 ENCOUNTER — TELEPHONE (OUTPATIENT)
Dept: HEMATOLOGY/ONCOLOGY | Facility: CLINIC | Age: 83
End: 2018-11-19

## 2018-11-19 NOTE — TELEPHONE ENCOUNTER
Place call to patient for rescheduling. Busy signal heard, hung up and redialed. Busy signal continued unable to leave message.

## 2018-12-03 DIAGNOSIS — I48.20 CHRONIC ATRIAL FIBRILLATION: ICD-10-CM

## 2018-12-03 DIAGNOSIS — Z79.4 CONTROLLED TYPE 2 DIABETES MELLITUS WITH DIABETIC POLYNEUROPATHY, WITH LONG-TERM CURRENT USE OF INSULIN: ICD-10-CM

## 2018-12-03 DIAGNOSIS — E11.42 CONTROLLED TYPE 2 DIABETES MELLITUS WITH DIABETIC POLYNEUROPATHY, WITH LONG-TERM CURRENT USE OF INSULIN: ICD-10-CM

## 2018-12-05 RX ORDER — INSULIN GLARGINE 100 [IU]/ML
INJECTION, SOLUTION SUBCUTANEOUS
Qty: 30 ML | Refills: 0 | Status: SHIPPED | OUTPATIENT
Start: 2018-12-05 | End: 2019-01-29 | Stop reason: SDUPTHER

## 2018-12-05 RX ORDER — APIXABAN 5 MG/1
TABLET, FILM COATED ORAL
Qty: 180 TABLET | Refills: 0 | Status: SHIPPED | OUTPATIENT
Start: 2018-12-05 | End: 2020-01-01 | Stop reason: SDUPTHER

## 2019-01-01 ENCOUNTER — OFFICE VISIT (OUTPATIENT)
Dept: HEMATOLOGY/ONCOLOGY | Facility: CLINIC | Age: 84
End: 2019-01-01
Payer: MEDICARE

## 2019-01-01 ENCOUNTER — HOSPITAL ENCOUNTER (OUTPATIENT)
Dept: RADIOLOGY | Facility: HOSPITAL | Age: 84
Discharge: HOME | End: 2019-12-03
Attending: INTERNAL MEDICINE
Payer: MEDICARE

## 2019-01-01 ENCOUNTER — LAB VISIT (OUTPATIENT)
Dept: LAB | Facility: HOSPITAL | Age: 84
End: 2019-01-01
Attending: INTERNAL MEDICINE
Payer: MEDICARE

## 2019-01-01 VITALS
HEART RATE: 71 BPM | HEIGHT: 71 IN | TEMPERATURE: 98 F | WEIGHT: 208.75 LBS | DIASTOLIC BLOOD PRESSURE: 72 MMHG | BODY MASS INDEX: 29.23 KG/M2 | RESPIRATION RATE: 12 BRPM | SYSTOLIC BLOOD PRESSURE: 153 MMHG | OXYGEN SATURATION: 99 %

## 2019-01-01 DIAGNOSIS — Z95.1 S/P CABG (CORONARY ARTERY BYPASS GRAFT): ICD-10-CM

## 2019-01-01 DIAGNOSIS — C61 PROSTATE CA: ICD-10-CM

## 2019-01-01 DIAGNOSIS — I25.10 CORONARY ARTERY DISEASE INVOLVING NATIVE CORONARY ARTERY OF NATIVE HEART WITHOUT ANGINA PECTORIS: ICD-10-CM

## 2019-01-01 DIAGNOSIS — C61 PROSTATE CA: Primary | ICD-10-CM

## 2019-01-01 DIAGNOSIS — N18.30 CKD (CHRONIC KIDNEY DISEASE) STAGE 3, GFR 30-59 ML/MIN: ICD-10-CM

## 2019-01-01 DIAGNOSIS — R42 DIZZINESS: ICD-10-CM

## 2019-01-01 DIAGNOSIS — D64.9 ANEMIA, UNSPECIFIED TYPE: ICD-10-CM

## 2019-01-01 LAB
ALBUMIN SERPL BCP-MCNC: 4.3 G/DL (ref 3.5–5.2)
ALP SERPL-CCNC: 110 U/L (ref 55–135)
ALT SERPL W/O P-5'-P-CCNC: 20 U/L (ref 10–44)
ANION GAP SERPL CALC-SCNC: 9 MMOL/L (ref 8–16)
AST SERPL-CCNC: 18 U/L (ref 10–40)
BASOPHILS # BLD AUTO: 0.01 K/UL (ref 0–0.2)
BASOPHILS NFR BLD: 0.2 % (ref 0–1.9)
BILIRUB SERPL-MCNC: 0.4 MG/DL (ref 0.1–1)
BUN SERPL-MCNC: 17 MG/DL (ref 8–23)
CALCIUM SERPL-MCNC: 11.5 MG/DL (ref 8.7–10.5)
CHLORIDE SERPL-SCNC: 104 MMOL/L (ref 95–110)
CO2 SERPL-SCNC: 26 MMOL/L (ref 23–29)
COMPLEXED PSA SERPL-MCNC: 0.02 NG/ML (ref 0–4)
CREAT SERPL-MCNC: 1.4 MG/DL (ref 0.5–1.4)
DIFFERENTIAL METHOD: ABNORMAL
EOSINOPHIL # BLD AUTO: 0 K/UL (ref 0–0.5)
EOSINOPHIL NFR BLD: 0 % (ref 0–8)
ERYTHROCYTE [DISTWIDTH] IN BLOOD BY AUTOMATED COUNT: 12.2 % (ref 11.5–14.5)
EST. GFR  (AFRICAN AMERICAN): 54 ML/MIN/1.73 M^2
EST. GFR  (NON AFRICAN AMERICAN): 47 ML/MIN/1.73 M^2
FERRITIN SERPL-MCNC: 165 NG/ML (ref 20–300)
GLUCOSE SERPL-MCNC: 275 MG/DL (ref 70–110)
HCT VFR BLD AUTO: 41.6 % (ref 40–54)
HGB BLD-MCNC: 13 G/DL (ref 14–18)
IMM GRANULOCYTES # BLD AUTO: 0.03 K/UL (ref 0–0.04)
IRON SERPL-MCNC: 98 UG/DL (ref 45–160)
LYMPHOCYTES # BLD AUTO: 1 K/UL (ref 1–4.8)
LYMPHOCYTES NFR BLD: 21.7 % (ref 18–48)
MCH RBC QN AUTO: 28.8 PG (ref 27–31)
MCHC RBC AUTO-ENTMCNC: 31.3 G/DL (ref 32–36)
MCV RBC AUTO: 92 FL (ref 82–98)
MONOCYTES # BLD AUTO: 0.5 K/UL (ref 0.3–1)
MONOCYTES NFR BLD: 11.4 % (ref 4–15)
NEUTROPHILS # BLD AUTO: 3.1 K/UL (ref 1.8–7.7)
NEUTROPHILS NFR BLD: 66.1 % (ref 38–73)
NRBC BLD-RTO: 0 /100 WBC
PLATELET # BLD AUTO: 137 K/UL (ref 150–350)
PMV BLD AUTO: 11.2 FL (ref 9.2–12.9)
POTASSIUM SERPL-SCNC: 4.4 MMOL/L (ref 3.5–5.1)
PROT SERPL-MCNC: 7.6 G/DL (ref 6–8.4)
RBC # BLD AUTO: 4.51 M/UL (ref 4.6–6.2)
SATURATED IRON: 29 % (ref 20–50)
SODIUM SERPL-SCNC: 139 MMOL/L (ref 136–145)
STFR SERPL-MCNC: 5 MG/L (ref 1.8–4.6)
TOTAL IRON BINDING CAPACITY: 343 UG/DL (ref 250–450)
TRANSFERRIN SERPL-MCNC: 232 MG/DL (ref 200–375)
WBC # BLD AUTO: 4.75 K/UL (ref 3.9–12.7)

## 2019-01-01 PROCEDURE — 84153 ASSAY OF PSA TOTAL: CPT | Mod: HCNC

## 2019-01-01 PROCEDURE — 99999 PR PBB SHADOW E&M-EST. PATIENT-LVL IV: ICD-10-PCS | Mod: PBBFAC,HCNC,, | Performed by: INTERNAL MEDICINE

## 2019-01-01 PROCEDURE — 82728 ASSAY OF FERRITIN: CPT | Mod: HCNC

## 2019-01-01 PROCEDURE — 1101F PR PT FALLS ASSESS DOC 0-1 FALLS W/OUT INJ PAST YR: ICD-10-PCS | Mod: HCNC,CPTII,S$GLB, | Performed by: INTERNAL MEDICINE

## 2019-01-01 PROCEDURE — 99999 PR PBB SHADOW E&M-EST. PATIENT-LVL IV: CPT | Mod: PBBFAC,HCNC,, | Performed by: INTERNAL MEDICINE

## 2019-01-01 PROCEDURE — 80053 COMPREHEN METABOLIC PANEL: CPT | Mod: HCNC

## 2019-01-01 PROCEDURE — 3077F SYST BP >= 140 MM HG: CPT | Mod: HCNC,CPTII,S$GLB, | Performed by: INTERNAL MEDICINE

## 2019-01-01 PROCEDURE — 3078F PR MOST RECENT DIASTOLIC BLOOD PRESSURE < 80 MM HG: ICD-10-PCS | Mod: HCNC,CPTII,S$GLB, | Performed by: INTERNAL MEDICINE

## 2019-01-01 PROCEDURE — 99214 PR OFFICE/OUTPT VISIT, EST, LEVL IV, 30-39 MIN: ICD-10-PCS | Mod: HCNC,S$GLB,, | Performed by: INTERNAL MEDICINE

## 2019-01-01 PROCEDURE — 84238 ASSAY NONENDOCRINE RECEPTOR: CPT | Mod: HCNC

## 2019-01-01 PROCEDURE — 1159F PR MEDICATION LIST DOCUMENTED IN MEDICAL RECORD: ICD-10-PCS | Mod: HCNC,S$GLB,, | Performed by: INTERNAL MEDICINE

## 2019-01-01 PROCEDURE — 99499 UNLISTED E&M SERVICE: CPT | Mod: HCNC,S$GLB,, | Performed by: INTERNAL MEDICINE

## 2019-01-01 PROCEDURE — 36415 COLL VENOUS BLD VENIPUNCTURE: CPT | Mod: HCNC

## 2019-01-01 PROCEDURE — 3078F DIAST BP <80 MM HG: CPT | Mod: HCNC,CPTII,S$GLB, | Performed by: INTERNAL MEDICINE

## 2019-01-01 PROCEDURE — 99214 OFFICE O/P EST MOD 30 MIN: CPT | Mod: HCNC,S$GLB,, | Performed by: INTERNAL MEDICINE

## 2019-01-01 PROCEDURE — 99499 RISK ADDL DX/OHS AUDIT: ICD-10-PCS | Mod: HCNC,S$GLB,, | Performed by: INTERNAL MEDICINE

## 2019-01-01 PROCEDURE — 70551 MRI BRAIN STEM W/O DYE: CPT | Mod: TC

## 2019-01-01 PROCEDURE — 85025 COMPLETE CBC W/AUTO DIFF WBC: CPT | Mod: HCNC

## 2019-01-01 PROCEDURE — 1159F MED LIST DOCD IN RCRD: CPT | Mod: HCNC,S$GLB,, | Performed by: INTERNAL MEDICINE

## 2019-01-01 PROCEDURE — 3077F PR MOST RECENT SYSTOLIC BLOOD PRESSURE >= 140 MM HG: ICD-10-PCS | Mod: HCNC,CPTII,S$GLB, | Performed by: INTERNAL MEDICINE

## 2019-01-01 PROCEDURE — 1126F PR PAIN SEVERITY QUANTIFIED, NO PAIN PRESENT: ICD-10-PCS | Mod: HCNC,S$GLB,, | Performed by: INTERNAL MEDICINE

## 2019-01-01 PROCEDURE — 83540 ASSAY OF IRON: CPT | Mod: HCNC

## 2019-01-01 PROCEDURE — 1126F AMNT PAIN NOTED NONE PRSNT: CPT | Mod: HCNC,S$GLB,, | Performed by: INTERNAL MEDICINE

## 2019-01-01 PROCEDURE — 1101F PT FALLS ASSESS-DOCD LE1/YR: CPT | Mod: HCNC,CPTII,S$GLB, | Performed by: INTERNAL MEDICINE

## 2019-01-29 DIAGNOSIS — Z79.4 CONTROLLED TYPE 2 DIABETES MELLITUS WITH DIABETIC POLYNEUROPATHY, WITH LONG-TERM CURRENT USE OF INSULIN: ICD-10-CM

## 2019-01-29 DIAGNOSIS — E11.42 CONTROLLED TYPE 2 DIABETES MELLITUS WITH DIABETIC POLYNEUROPATHY, WITH LONG-TERM CURRENT USE OF INSULIN: ICD-10-CM

## 2019-01-29 RX ORDER — INSULIN GLARGINE 100 [IU]/ML
INJECTION, SOLUTION SUBCUTANEOUS
Qty: 30 ML | Refills: 3 | Status: SHIPPED | OUTPATIENT
Start: 2019-01-29 | End: 2019-02-25 | Stop reason: SDUPTHER

## 2019-01-29 NOTE — TELEPHONE ENCOUNTER
----- Message from Kristel Lopez sent at 1/29/2019 10:50 AM CST -----  Contact: Ramila Hurtado (Spouse)  Type:  RX Refill Request    Who Called: Ramila Hurtado (Spouse)  Refill or New Rx:  Refill  RX Name and Strength: LANTUS SOLOSTAR U-100 INSULIN 100 unit/mL (3 mL) InPn pen  How is the patient currently taking it? (ex. 1XDay): ADMINISTER 30 UNITS UNDER THE SKIN EVERY EVENING  Is this a 30 day or 90 day RX:  30 mL  Preferred Pharmacy with phone number:   [facility] - [place], [place] - [address]  [address] [address]  Phone: [phone] Fax: [phone]  Local or Mail Order: Mail Order  Ordering Provider:  Dr Doug Whitaker  Best Call Back Number:   or   Additional Information:  Calling in to request a refill. Please advise

## 2019-02-11 ENCOUNTER — PATIENT OUTREACH (OUTPATIENT)
Dept: ADMINISTRATIVE | Facility: HOSPITAL | Age: 84
End: 2019-02-11

## 2019-02-11 DIAGNOSIS — Z79.4 TYPE 2 DIABETES MELLITUS WITH COMPLICATION, WITH LONG-TERM CURRENT USE OF INSULIN: Primary | ICD-10-CM

## 2019-02-11 DIAGNOSIS — E11.8 TYPE 2 DIABETES MELLITUS WITH COMPLICATION, WITH LONG-TERM CURRENT USE OF INSULIN: Primary | ICD-10-CM

## 2019-02-11 NOTE — LETTER
February 11, 2019    Chon Hurtado  [address] [address]             [facility]  [address]  [facility] [address]  Phone: [phone] Dear [facility] is committed to your overall health and would like to ensure that you are up to date on your recommended test and/or procedures.   Doug Whitaker MD  has found that your chart shows you may be due for the following:    HEMOGLOBIN  A1C      If you have had any of the above done at another facility, please let us know so that we may obtain copies from that facility.  If you have a copy of these records, please provide a copy for us to scan into your chart.  You are welcome to request that the report be faxed to us at  ([phone]).     Otherwise, please schedule these appointments at your earliest convenience by calling [phone] or going to [email or web address].    If you have an upcoming scheduled appointment for the item above, please disregard this letter.    Sincerely,  Your [facility]  MD Lisa Holt LPN Clinical Care Coordinator  [facility]  [address] [address]  Phone [phone]  Fax [phone]

## 2019-02-25 ENCOUNTER — LAB VISIT (OUTPATIENT)
Dept: LAB | Facility: HOSPITAL | Age: 84
End: 2019-02-25
Attending: FAMILY MEDICINE
Payer: MEDICARE

## 2019-02-25 ENCOUNTER — OFFICE VISIT (OUTPATIENT)
Dept: FAMILY MEDICINE | Facility: CLINIC | Age: 84
End: 2019-02-25
Payer: MEDICARE

## 2019-02-25 VITALS
HEIGHT: 74 IN | DIASTOLIC BLOOD PRESSURE: 70 MMHG | WEIGHT: 209.44 LBS | SYSTOLIC BLOOD PRESSURE: 138 MMHG | TEMPERATURE: 99 F | BODY MASS INDEX: 26.88 KG/M2 | HEART RATE: 62 BPM

## 2019-02-25 DIAGNOSIS — E11.22 CKD STAGE 3 DUE TO TYPE 2 DIABETES MELLITUS: Primary | ICD-10-CM

## 2019-02-25 DIAGNOSIS — Z79.4 CONTROLLED TYPE 2 DIABETES MELLITUS WITH DIABETIC POLYNEUROPATHY, WITH LONG-TERM CURRENT USE OF INSULIN: ICD-10-CM

## 2019-02-25 DIAGNOSIS — N18.30 CKD STAGE 3 DUE TO TYPE 2 DIABETES MELLITUS: ICD-10-CM

## 2019-02-25 DIAGNOSIS — I50.22 CHF (CONGESTIVE HEART FAILURE), NYHA CLASS III, CHRONIC, SYSTOLIC: ICD-10-CM

## 2019-02-25 DIAGNOSIS — E11.42 CONTROLLED TYPE 2 DIABETES MELLITUS WITH DIABETIC POLYNEUROPATHY, WITH LONG-TERM CURRENT USE OF INSULIN: ICD-10-CM

## 2019-02-25 DIAGNOSIS — E11.65 UNCONTROLLED TYPE 2 DIABETES MELLITUS WITH HYPERGLYCEMIA, WITH LONG-TERM CURRENT USE OF INSULIN: ICD-10-CM

## 2019-02-25 DIAGNOSIS — D50.8 OTHER IRON DEFICIENCY ANEMIA: ICD-10-CM

## 2019-02-25 DIAGNOSIS — Z79.4 UNCONTROLLED TYPE 2 DIABETES MELLITUS WITH HYPERGLYCEMIA, WITH LONG-TERM CURRENT USE OF INSULIN: ICD-10-CM

## 2019-02-25 DIAGNOSIS — E11.22 CONTROLLED TYPE 2 DIABETES MELLITUS WITH STAGE 3 CHRONIC KIDNEY DISEASE, WITH LONG-TERM CURRENT USE OF INSULIN: ICD-10-CM

## 2019-02-25 DIAGNOSIS — N18.30 CONTROLLED TYPE 2 DIABETES MELLITUS WITH STAGE 3 CHRONIC KIDNEY DISEASE, WITH LONG-TERM CURRENT USE OF INSULIN: ICD-10-CM

## 2019-02-25 DIAGNOSIS — Z79.4 CONTROLLED TYPE 2 DIABETES MELLITUS WITH STAGE 3 CHRONIC KIDNEY DISEASE, WITH LONG-TERM CURRENT USE OF INSULIN: ICD-10-CM

## 2019-02-25 DIAGNOSIS — N18.30 CKD STAGE 3 DUE TO TYPE 2 DIABETES MELLITUS: Primary | ICD-10-CM

## 2019-02-25 DIAGNOSIS — E11.22 CKD STAGE 3 DUE TO TYPE 2 DIABETES MELLITUS: ICD-10-CM

## 2019-02-25 LAB
ALBUMIN SERPL BCP-MCNC: 4.4 G/DL
ALP SERPL-CCNC: 82 U/L
ALT SERPL W/O P-5'-P-CCNC: 20 U/L
ANION GAP SERPL CALC-SCNC: 8 MMOL/L
AST SERPL-CCNC: 23 U/L
BASOPHILS # BLD AUTO: 0 K/UL
BASOPHILS NFR BLD: 0.3 %
BILIRUB SERPL-MCNC: 0.4 MG/DL
BNP SERPL-MCNC: 154 PG/ML
BUN SERPL-MCNC: 18 MG/DL
CALCIUM SERPL-MCNC: 11.2 MG/DL
CHLORIDE SERPL-SCNC: 106 MMOL/L
CO2 SERPL-SCNC: 26 MMOL/L
CREAT SERPL-MCNC: 1.4 MG/DL
DIFFERENTIAL METHOD: ABNORMAL
EOSINOPHIL # BLD AUTO: 0 K/UL
EOSINOPHIL NFR BLD: 0 %
ERYTHROCYTE [DISTWIDTH] IN BLOOD BY AUTOMATED COUNT: 13.6 %
EST. GFR  (AFRICAN AMERICAN): 54 ML/MIN/1.73 M^2
EST. GFR  (NON AFRICAN AMERICAN): 47 ML/MIN/1.73 M^2
FERRITIN SERPL-MCNC: 67 NG/ML
GLUCOSE SERPL-MCNC: 224 MG/DL
HCT VFR BLD AUTO: 40.7 %
HGB BLD-MCNC: 12.9 G/DL
IRON SERPL-MCNC: 62 UG/DL
LYMPHOCYTES # BLD AUTO: 0.9 K/UL
LYMPHOCYTES NFR BLD: 21.8 %
MAGNESIUM SERPL-MCNC: 2 MG/DL
MCH RBC QN AUTO: 28.7 PG
MCHC RBC AUTO-ENTMCNC: 31.8 G/DL
MCV RBC AUTO: 90 FL
MONOCYTES # BLD AUTO: 0.4 K/UL
MONOCYTES NFR BLD: 10.5 %
NEUTROPHILS # BLD AUTO: 2.8 K/UL
NEUTROPHILS NFR BLD: 67.4 %
PHOSPHATE SERPL-MCNC: 2.6 MG/DL
PLATELET # BLD AUTO: 137 K/UL
PMV BLD AUTO: 9.7 FL
POTASSIUM SERPL-SCNC: 5.2 MMOL/L
PROT SERPL-MCNC: 7.5 G/DL
PTH-INTACT SERPL-MCNC: 292 PG/ML
RBC # BLD AUTO: 4.51 M/UL
SATURATED IRON: 17 %
SODIUM SERPL-SCNC: 140 MMOL/L
TOTAL IRON BINDING CAPACITY: 367 UG/DL
TRANSFERRIN SERPL-MCNC: 248 MG/DL
WBC # BLD AUTO: 4.1 K/UL

## 2019-02-25 PROCEDURE — 83880 ASSAY OF NATRIURETIC PEPTIDE: CPT | Mod: HCNC

## 2019-02-25 PROCEDURE — 99214 OFFICE O/P EST MOD 30 MIN: CPT | Mod: HCNC,S$GLB,, | Performed by: FAMILY MEDICINE

## 2019-02-25 PROCEDURE — 82728 ASSAY OF FERRITIN: CPT | Mod: HCNC

## 2019-02-25 PROCEDURE — 3078F DIAST BP <80 MM HG: CPT | Mod: HCNC,CPTII,S$GLB, | Performed by: FAMILY MEDICINE

## 2019-02-25 PROCEDURE — 3078F PR MOST RECENT DIASTOLIC BLOOD PRESSURE < 80 MM HG: ICD-10-PCS | Mod: HCNC,CPTII,S$GLB, | Performed by: FAMILY MEDICINE

## 2019-02-25 PROCEDURE — 99214 PR OFFICE/OUTPT VISIT, EST, LEVL IV, 30-39 MIN: ICD-10-PCS | Mod: HCNC,S$GLB,, | Performed by: FAMILY MEDICINE

## 2019-02-25 PROCEDURE — 83540 ASSAY OF IRON: CPT | Mod: HCNC

## 2019-02-25 PROCEDURE — 99999 PR PBB SHADOW E&M-EST. PATIENT-LVL V: CPT | Mod: PBBFAC,HCNC,, | Performed by: FAMILY MEDICINE

## 2019-02-25 PROCEDURE — 36415 COLL VENOUS BLD VENIPUNCTURE: CPT | Mod: HCNC

## 2019-02-25 PROCEDURE — 3075F SYST BP GE 130 - 139MM HG: CPT | Mod: HCNC,CPTII,S$GLB, | Performed by: FAMILY MEDICINE

## 2019-02-25 PROCEDURE — 1101F PR PT FALLS ASSESS DOC 0-1 FALLS W/OUT INJ PAST YR: ICD-10-PCS | Mod: HCNC,CPTII,S$GLB, | Performed by: FAMILY MEDICINE

## 2019-02-25 PROCEDURE — 1101F PT FALLS ASSESS-DOCD LE1/YR: CPT | Mod: HCNC,CPTII,S$GLB, | Performed by: FAMILY MEDICINE

## 2019-02-25 PROCEDURE — 83970 ASSAY OF PARATHORMONE: CPT | Mod: HCNC

## 2019-02-25 PROCEDURE — 80053 COMPREHEN METABOLIC PANEL: CPT | Mod: HCNC

## 2019-02-25 PROCEDURE — 85025 COMPLETE CBC W/AUTO DIFF WBC: CPT | Mod: HCNC

## 2019-02-25 PROCEDURE — 84100 ASSAY OF PHOSPHORUS: CPT | Mod: HCNC

## 2019-02-25 PROCEDURE — 83735 ASSAY OF MAGNESIUM: CPT | Mod: HCNC

## 2019-02-25 PROCEDURE — 99499 UNLISTED E&M SERVICE: CPT | Mod: HCNC,S$GLB,, | Performed by: FAMILY MEDICINE

## 2019-02-25 PROCEDURE — 99999 PR PBB SHADOW E&M-EST. PATIENT-LVL V: ICD-10-PCS | Mod: PBBFAC,HCNC,, | Performed by: FAMILY MEDICINE

## 2019-02-25 PROCEDURE — 3075F PR MOST RECENT SYSTOLIC BLOOD PRESS GE 130-139MM HG: ICD-10-PCS | Mod: HCNC,CPTII,S$GLB, | Performed by: FAMILY MEDICINE

## 2019-02-25 PROCEDURE — 99499 RISK ADDL DX/OHS AUDIT: ICD-10-PCS | Mod: HCNC,S$GLB,, | Performed by: FAMILY MEDICINE

## 2019-02-25 PROCEDURE — 83036 HEMOGLOBIN GLYCOSYLATED A1C: CPT | Mod: HCNC

## 2019-02-25 RX ORDER — INSULIN GLARGINE 100 [IU]/ML
INJECTION, SOLUTION SUBCUTANEOUS
Qty: 30 ML | Refills: 3 | Status: SHIPPED | OUTPATIENT
Start: 2019-02-25 | End: 2019-04-22 | Stop reason: SDUPTHER

## 2019-02-25 RX ORDER — PEN NEEDLE, DIABETIC 30 GX3/16"
NEEDLE, DISPOSABLE MISCELLANEOUS
Qty: 100 EACH | Refills: 0 | Status: CANCELLED | OUTPATIENT
Start: 2019-02-25

## 2019-02-25 RX ORDER — PEN NEEDLE, DIABETIC 30 GX3/16"
NEEDLE, DISPOSABLE MISCELLANEOUS
Qty: 100 EACH | Refills: 11 | Status: SHIPPED | OUTPATIENT
Start: 2019-02-25 | End: 2020-01-01 | Stop reason: SDUPTHER

## 2019-02-25 RX ORDER — INSULIN GLARGINE 100 [IU]/ML
INJECTION, SOLUTION SUBCUTANEOUS
Qty: 30 ML | Refills: 3 | Status: SHIPPED | OUTPATIENT
Start: 2019-02-25 | End: 2019-02-25 | Stop reason: SDUPTHER

## 2019-02-25 NOTE — PROGRESS NOTES
Subjective:       Patient ID: Chon Hurtado is a 83 y.o. male.    Chief Complaint: Annual Exam and Hypertension  HT   antonietta Hurtado comes to clinic today for follow up. He had blood work completed recently that revealed a steady decline in renal function and elevated PTH.   The patient's HGA1c was improved but has frequent hypoglycemia remains unstable. He has infrequent monitoring his blood sugar and poor calory intake and limited activity. The patient does have chronic MAYES, and claudication, and DVT. We do not have nephrology nor cardiology records available to review. He needs to use oxygen 24/7 hrs.He has dysthymia and mild metabolic encephalopathy.He had CAD, and chronic adrenal insuficient.      Hypertension   Associated symptoms include shortness of breath. Pertinent negatives include no chest pain or headaches.   Hyperlipidemia   Associated symptoms include shortness of breath. Pertinent negatives include no chest pain or myalgias.     HPI  Review of Systems   Constitutional: Positive for activity change, appetite change, diaphoresis, fatigue and unexpected weight change.   HENT: Positive for hearing loss and trouble swallowing.    Eyes: Positive for visual disturbance.   Respiratory: Positive for shortness of breath. Negative for chest tightness.    Cardiovascular: Positive for palpitations and leg swelling. Negative for chest pain.   Gastrointestinal: Negative for abdominal pain.   Endocrine: Positive for cold intolerance and polyuria.   Genitourinary: Positive for decreased urine volume, frequency and urgency.   Musculoskeletal: Positive for arthralgias, gait problem, joint swelling, myalgias and neck pain.   Skin: Positive for pallor.   Neurological: Positive for dizziness, tremors, syncope and numbness. Negative for light-headedness and headaches.   Psychiatric/Behavioral: Positive for confusion and decreased concentration.       Patient Active Problem List   Diagnosis    Other B-complex deficiencies     Other specified disorders of parathyroid gland    Adrenal insufficiency    CAD (coronary artery disease)    Prostate CA    Uncontrolled type 2 diabetes mellitus with hyperglycemia, with long-term current use of insulin    Hypertension associated with diabetes    Anemia    Mild cognitive impairment    CKD (chronic kidney disease) stage 3, GFR 30-59 ml/min    Long term (current) use of anticoagulants [Z79.01]    DVT, bilateral lower limbs    Hyperlipidemia associated with type 2 diabetes mellitus    Encounter for long-term (current) use of insulin    S/P CABG (coronary artery bypass graft)    Leg abscess    Thrush       Objective:      Physical Exam   Constitutional: He is oriented to person, place, and time. Vital signs are normal. He appears cachectic. He is cooperative. He has a sickly appearance.   HENT:   Right Ear: Tympanic membrane mobility is abnormal. Decreased hearing is noted.   Left Ear: Tympanic membrane mobility is abnormal. Decreased hearing is noted.   Nose: Nose normal.   Mouth/Throat: Oropharynx is clear and moist. Mucous membranes are pale. Abnormal dentition.   Eyes: EOM and lids are normal. Pupils are equal, round, and reactive to light.   Cardiovascular: Normal rate and normal pulses. A regularly irregular rhythm present. PMI is not displaced. Exam reveals distant heart sounds.   Pulmonary/Chest: Effort normal. He has decreased breath sounds in the right lower field and the left lower field.   Abdominal: Soft. Normal appearance. He exhibits no ascites. Bowel sounds are decreased. There is no hepatosplenomegaly. There is no tenderness.   Musculoskeletal: He exhibits no edema.        Right knee: He exhibits decreased range of motion.        Left knee: He exhibits decreased range of motion.        Right ankle: He exhibits swelling.        Left ankle: He exhibits swelling.   Neurological: He is alert and oriented to person, place, and time. He displays atrophy. A cranial nerve deficit  is present. He exhibits abnormal muscle tone. Coordination abnormal. GCS eye subscore is 4. GCS verbal subscore is 5. GCS motor subscore is 6.   Reflex Scores:       Tricep reflexes are 1+ on the right side and 1+ on the left side.       Bicep reflexes are 1+ on the right side and 1+ on the left side.       Brachioradialis reflexes are 1+ on the right side and 0 on the left side.       Patellar reflexes are 0 on the right side and 0 on the left side.       Achilles reflexes are 0 on the right side and 0 on the left side.  Skin: Skin is warm and dry.   Psychiatric: His speech is normal. Thought content normal. His affect is blunt. He is slowed. Cognition and memory are impaired. He expresses impulsivity. He exhibits a depressed mood.   Nursing note and vitals reviewed.      Lab Results   Component Value Date    WBC 3.70 (L) 09/21/2018    HGB 11.7 (L) 09/21/2018    HCT 38.1 (L) 09/21/2018     09/21/2018    CHOL 137 08/13/2018    TRIG 114 08/13/2018    HDL 38 (L) 08/13/2018    ALT 18 08/13/2018    AST 18 08/13/2018     09/21/2018    K 5.1 09/21/2018     09/21/2018    CREATININE 1.5 (H) 09/21/2018    BUN 21 09/21/2018    CO2 26 09/21/2018    TSH 1.169 08/13/2018    PSA 3.6 11/05/2015    INR 2.4 06/05/2017    HGBA1C 8.9 (H) 08/13/2018     The ASCVD Risk score (Nica DC Jr., et al., 2013) failed to calculate for the following reasons:    The 2013 ASCVD risk score is only valid for ages 40 to 79    Assessment:       1. CKD stage 3 due to type 2 diabetes mellitus    2. Other iron deficiency anemia    3. Uncontrolled type 2 diabetes mellitus with hyperglycemia, with long-term current use of insulin    4. CHF (congestive heart failure), NYHA class III, chronic, systolic    5. Controlled type 2 diabetes mellitus with diabetic polyneuropathy, with long-term current use of insulin        Plan:       CKD stage 3 due to type 2 diabetes mellitus  -     PTH, intact; Future; Expected date: 02/25/2019  -      Comprehensive metabolic panel; Future; Expected date: 02/25/2019  -     PHOSPHORUS; Future; Expected date: 02/25/2019  -     Hemoglobin A1c; Future; Expected date: 02/25/2019  -     CBC auto differential; Future; Expected date: 02/25/2019  -     Iron and TIBC; Future; Expected date: 02/25/2019  -     Ferritin; Future; Expected date: 02/25/2019  -     Magnesium; Future; Expected date: 02/25/2019  -     Brain natriuretic peptide; Future; Expected date: 02/25/2019  -     Ambulatory consult to Diabetic Education  -     Ambulatory referral to Nephrology    Other iron deficiency anemia  -     PTH, intact; Future; Expected date: 02/25/2019  -     Comprehensive metabolic panel; Future; Expected date: 02/25/2019  -     PHOSPHORUS; Future; Expected date: 02/25/2019  -     Hemoglobin A1c; Future; Expected date: 02/25/2019  -     CBC auto differential; Future; Expected date: 02/25/2019  -     Iron and TIBC; Future; Expected date: 02/25/2019  -     Ferritin; Future; Expected date: 02/25/2019  -     Magnesium; Future; Expected date: 02/25/2019  -     Brain natriuretic peptide; Future; Expected date: 02/25/2019  -     Ambulatory consult to Diabetic Education  -     Ambulatory referral to Nephrology    Uncontrolled type 2 diabetes mellitus with hyperglycemia, with long-term current use of insulin  -     PTH, intact; Future; Expected date: 02/25/2019  -     Comprehensive metabolic panel; Future; Expected date: 02/25/2019  -     PHOSPHORUS; Future; Expected date: 02/25/2019  -     Hemoglobin A1c; Future; Expected date: 02/25/2019  -     CBC auto differential; Future; Expected date: 02/25/2019  -     Iron and TIBC; Future; Expected date: 02/25/2019  -     Ferritin; Future; Expected date: 02/25/2019  -     Magnesium; Future; Expected date: 02/25/2019  -     Brain natriuretic peptide; Future; Expected date: 02/25/2019  -     Ambulatory consult to Diabetic Education  -     Ambulatory referral to Nephrology    CHF (congestive heart  "failure), NYHA class III, chronic, systolic  -     Comprehensive metabolic panel; Future; Expected date: 02/25/2019  -     Brain natriuretic peptide; Future; Expected date: 02/25/2019  -     Ambulatory consult to Diabetic Education  -     Ambulatory referral to Nephrology    Controlled type 2 diabetes mellitus with diabetic polyneuropathy, with long-term current use of insulin  -     Discontinue: insulin (LANTUS SOLOSTAR U-100 INSULIN) glargine 100 units/mL (3mL) SubQ pen; ADMINISTER 30 UNITS UNDER THE SKIN EVERY EVENING  Dispense: 30 mL; Refill: 3  -     insulin (LANTUS SOLOSTAR U-100 INSULIN) glargine 100 units/mL (3mL) SubQ pen; ADMINISTER 30 UNITS UNDER THE SKIN EVERY EVENING  Dispense: 30 mL; Refill: 3  -     pen needle, diabetic (BD ULTRA-FINE MINI PEN NEEDLE) 31 gauge x 3/16" Ndle; USE THREE TIMES DAILY BEFORE A MEAL AND AT BEDTIME FOR INSULIN INJECTION  Dispense: 100 each; Refill: 11  -     Ambulatory consult to Diabetic Education  -     Ambulatory referral to Nephrology      Patient readiness: nonacceptance and barriers:readiness, social stressors and household issues    During the course of the visit the patient was educated and counseled about the following:     Diabetes:  Discussed general issues about diabetes pathophysiology and management.  Encouraged aerobic exercise.  Reminded to get yearly retinal exam.  Discussed ways to avoid symptomatic hypoglycemia.  Discussed sick day management.  Labs: fasting blood sugar, hemoglobin A1C and microalbuminuria.  Reminded to bring in blood sugar diary at next visit.  Follow up in 6 weeks or as needed.  Hypertension:   Check blood pressures daily and record.    Goals: Diabetes: Maintain Hemoglobin A1C below 7, Hypertension: Reduce Blood Pressure and Underweight: Increase calorie intake and BMI      Did patient meet goals/outcomes: Yes    The following self management tools provided: blood pressure log  blood glucose log    Patient Instructions (the written plan) " was given to the patient/family.     Time spent with patient: 45 minutes    Barriers to medications present (yes )    Adverse reactions to current medications (yes)    Over the counter medications reviewed (Yes)        40-minute visit. 30 minutes spent counseling patient on diet, exercise, and weight loss.  This has been fully explained to the patient, who indicates understanding.

## 2019-02-25 NOTE — PATIENT INSTRUCTIONS

## 2019-02-26 LAB
ESTIMATED AVG GLUCOSE: 266 MG/DL
HBA1C MFR BLD HPLC: 10.9 %

## 2019-03-11 RX ORDER — BLOOD-GLUCOSE CONTROL, NORMAL
EACH MISCELLANEOUS
Qty: 100 EACH | Refills: 0 | Status: SHIPPED | OUTPATIENT
Start: 2019-03-11 | End: 2020-01-01 | Stop reason: SDUPTHER

## 2019-03-15 ENCOUNTER — TELEPHONE (OUTPATIENT)
Dept: ADMINISTRATIVE | Facility: HOSPITAL | Age: 84
End: 2019-03-15

## 2019-03-20 ENCOUNTER — PATIENT OUTREACH (OUTPATIENT)
Dept: ADMINISTRATIVE | Facility: HOSPITAL | Age: 84
End: 2019-03-20

## 2019-03-29 ENCOUNTER — PES CALL (OUTPATIENT)
Dept: ADMINISTRATIVE | Facility: CLINIC | Age: 84
End: 2019-03-29

## 2019-04-22 DIAGNOSIS — Z79.4 CONTROLLED TYPE 2 DIABETES MELLITUS WITH DIABETIC POLYNEUROPATHY, WITH LONG-TERM CURRENT USE OF INSULIN: ICD-10-CM

## 2019-04-22 DIAGNOSIS — E11.42 CONTROLLED TYPE 2 DIABETES MELLITUS WITH DIABETIC POLYNEUROPATHY, WITH LONG-TERM CURRENT USE OF INSULIN: ICD-10-CM

## 2019-04-22 NOTE — TELEPHONE ENCOUNTER
----- Message from Teresa Montes sent at 4/22/2019 11:56 AM CDT -----  Contact: Radha  Type: Needs Medical Advice    Who Called:  Patients-wife    Best Call Back Number: [phone] (home)   Additional Information: pt needs Lantus Pens called in to the [facility] Mail order because it's cheaper please call to confirm when Rx is called in.

## 2019-04-23 RX ORDER — INSULIN GLARGINE 100 [IU]/ML
INJECTION, SOLUTION SUBCUTANEOUS
Qty: 30 ML | Refills: 3 | Status: SHIPPED | OUTPATIENT
Start: 2019-04-23 | End: 2020-01-01 | Stop reason: SDUPTHER

## 2019-05-10 DIAGNOSIS — E55.9 VITAMIN D DEFICIENCY: ICD-10-CM

## 2019-05-10 DIAGNOSIS — E21.3 HYPERPARATHYROIDISM: ICD-10-CM

## 2019-05-10 RX ORDER — ERGOCALCIFEROL 1.25 MG/1
CAPSULE ORAL
Qty: 13 CAPSULE | Refills: 0 | Status: SHIPPED | OUTPATIENT
Start: 2019-05-10 | End: 2019-10-28 | Stop reason: ALTCHOICE

## 2019-05-10 RX ORDER — ERGOCALCIFEROL 1.25 MG/1
CAPSULE ORAL
Qty: 12 CAPSULE | Refills: 0 | Status: SHIPPED | OUTPATIENT
Start: 2019-05-10 | End: 2019-05-10 | Stop reason: SDUPTHER

## 2019-05-31 ENCOUNTER — DOCUMENTATION ONLY (OUTPATIENT)
Dept: FAMILY MEDICINE | Facility: CLINIC | Age: 84
End: 2019-05-31

## 2019-05-31 NOTE — PROGRESS NOTES
Pre-Visit Chart Review  For Appointment Scheduled on 06/03/2019    Health Maintenance Due   Topic Date Due    TETANUS VACCINE  12/25/1953    Hemoglobin A1c  05/25/2019    Urine Microalbumin  05/29/2019

## 2019-06-14 ENCOUNTER — OFFICE VISIT (OUTPATIENT)
Dept: PODIATRY | Facility: CLINIC | Age: 84
End: 2019-06-14
Payer: MEDICARE

## 2019-06-14 VITALS
WEIGHT: 209 LBS | BODY MASS INDEX: 26.82 KG/M2 | HEART RATE: 58 BPM | HEIGHT: 74 IN | DIASTOLIC BLOOD PRESSURE: 67 MMHG | SYSTOLIC BLOOD PRESSURE: 120 MMHG

## 2019-06-14 DIAGNOSIS — B35.1 ONYCHOMYCOSIS DUE TO DERMATOPHYTE: Primary | ICD-10-CM

## 2019-06-14 DIAGNOSIS — R60.0 LEG EDEMA: ICD-10-CM

## 2019-06-14 DIAGNOSIS — Z79.01 LONG TERM (CURRENT) USE OF ANTICOAGULANTS: ICD-10-CM

## 2019-06-14 DIAGNOSIS — E11.49 TYPE II DIABETES MELLITUS WITH NEUROLOGICAL MANIFESTATIONS: ICD-10-CM

## 2019-06-14 PROCEDURE — 99999 PR PBB SHADOW E&M-EST. PATIENT-LVL III: CPT | Mod: PBBFAC,HCNC,, | Performed by: PODIATRIST

## 2019-06-14 PROCEDURE — 11721 DEBRIDE NAIL 6 OR MORE: CPT | Mod: HCNC,S$GLB,, | Performed by: PODIATRIST

## 2019-06-14 PROCEDURE — 11721 PR DEBRIDEMENT OF NAILS, 6 OR MORE: ICD-10-PCS | Mod: HCNC,S$GLB,, | Performed by: PODIATRIST

## 2019-06-14 PROCEDURE — 99499 RISK ADDL DX/OHS AUDIT: ICD-10-PCS | Mod: HCNC,S$GLB,, | Performed by: PODIATRIST

## 2019-06-14 PROCEDURE — 1101F PT FALLS ASSESS-DOCD LE1/YR: CPT | Mod: HCNC,CPTII,S$GLB, | Performed by: PODIATRIST

## 2019-06-14 PROCEDURE — 99213 OFFICE O/P EST LOW 20 MIN: CPT | Mod: 25,HCNC,S$GLB, | Performed by: PODIATRIST

## 2019-06-14 PROCEDURE — 3078F DIAST BP <80 MM HG: CPT | Mod: HCNC,CPTII,S$GLB, | Performed by: PODIATRIST

## 2019-06-14 PROCEDURE — 3074F PR MOST RECENT SYSTOLIC BLOOD PRESSURE < 130 MM HG: ICD-10-PCS | Mod: HCNC,CPTII,S$GLB, | Performed by: PODIATRIST

## 2019-06-14 PROCEDURE — 3074F SYST BP LT 130 MM HG: CPT | Mod: HCNC,CPTII,S$GLB, | Performed by: PODIATRIST

## 2019-06-14 PROCEDURE — 99213 PR OFFICE/OUTPT VISIT, EST, LEVL III, 20-29 MIN: ICD-10-PCS | Mod: 25,HCNC,S$GLB, | Performed by: PODIATRIST

## 2019-06-14 PROCEDURE — 1101F PR PT FALLS ASSESS DOC 0-1 FALLS W/OUT INJ PAST YR: ICD-10-PCS | Mod: HCNC,CPTII,S$GLB, | Performed by: PODIATRIST

## 2019-06-14 PROCEDURE — 99999 PR PBB SHADOW E&M-EST. PATIENT-LVL III: ICD-10-PCS | Mod: PBBFAC,HCNC,, | Performed by: PODIATRIST

## 2019-06-14 PROCEDURE — 3078F PR MOST RECENT DIASTOLIC BLOOD PRESSURE < 80 MM HG: ICD-10-PCS | Mod: HCNC,CPTII,S$GLB, | Performed by: PODIATRIST

## 2019-06-14 PROCEDURE — 99499 UNLISTED E&M SERVICE: CPT | Mod: HCNC,S$GLB,, | Performed by: PODIATRIST

## 2019-06-14 RX ORDER — CICLOPIROX 80 MG/ML
SOLUTION TOPICAL NIGHTLY
Qty: 6.6 ML | Refills: 11 | Status: SHIPPED | OUTPATIENT
Start: 2019-06-14 | End: 2019-10-28 | Stop reason: ALTCHOICE

## 2019-06-14 NOTE — PROGRESS NOTES
Subjective:      Patient ID: Chon Hurtado is a 83 y.o. male.    Chief Complaint: Diabetic Foot Exam (PCP-Dr. Whitaker 2/25/19)    Chon is a 83 y.o. male who presents to the clinic for evaluation and treatment of high risk feet. Chon has a past medical history of Adrenal insufficiency, Anemia, Anticoagulant long-term use, Blood transfusion, CAD (coronary artery disease), Cancer, Cataract, COPD (chronic obstructive pulmonary disease) (11/6/2013), Depression, Diabetes mellitus, Diabetes mellitus type II, DVT (deep venous thrombosis), Hemorrhoids, History of colon polyps (6/3/2015), Hypertension, PE (pulmonary embolism), Peripheral vascular disease, and Urinary tract infection. The patient's chief complaint is long, thick toenails. Gradual onset, worsening over past several weeks, aggravated by increased weight bearing, shoe gear, pressure.  Periodic debridement helps symptoms. Denies trauma, surgery.    PCP: Doug Whitaker MD    Date Last Seen by PCP:   Chief Complaint   Patient presents with    Diabetic Foot Exam     PCP-Dr. Whitaker 2/25/19         Current shoe gear:  Affected Foot: Casual shoes     Unaffected Foot: Casual shoes    Hemoglobin A1C   Date Value Ref Range Status   02/25/2019 10.9 (H) 4.0 - 5.6 % Final     Comment:     ADA Screening Guidelines:  5.7-6.4%  Consistent with prediabetes  >or=6.5%  Consistent with diabetes  High levels of fetal hemoglobin interfere with the HbA1C  assay. Heterozygous hemoglobin variants (HbS, HgC, etc)do  not significantly interfere with this assay.   However, presence of multiple variants may affect accuracy.     08/13/2018 8.9 (H) 4.0 - 5.6 % Final     Comment:     ADA Screening Guidelines:  5.7-6.4%  Consistent with prediabetes  >or=6.5%  Consistent with diabetes  High levels of fetal hemoglobin interfere with the HbA1C  assay. Heterozygous hemoglobin variants (HbS, HgC, etc)do  not significantly interfere with this assay.   However, presence of multiple variants may affect  accuracy.     01/08/2018 8.3 (H) 4.0 - 5.6 % Final     Comment:     According to ADA guidelines, hemoglobin A1c <7.0% represents  optimal control in non-pregnant diabetic patients. Different  metrics may apply to specific patient populations.   Standards of Medical Care in Diabetes-2016.  For the purpose of screening for the presence of diabetes:  <5.7%     Consistent with the absence of diabetes  5.7-6.4%  Consistent with increasing risk for diabetes   (prediabetes)  >or=6.5%  Consistent with diabetes  Currently, no consensus exists for use of hemoglobin A1c  for diagnosis of diabetes for children.  This Hemoglobin A1c assay has significant interference with fetal   hemoglobin   (HbF). The results are invalid for patients with abnormal amounts of   HbF,   including those with known Hereditary Persistence   of Fetal Hemoglobin. Heterozygous hemoglobin variants (HbAS, HbAC,   HbAD, HbAE, HbA2) do not significantly interfere with this assay;   however, presence of multiple variants in a sample may impact the %   interference.         Review of Systems   Constitution: Negative for chills, diaphoresis, fever, malaise/fatigue and night sweats.   Cardiovascular: Positive for leg swelling. Negative for claudication, cyanosis and syncope.   Skin: Positive for nail changes. Negative for color change, dry skin, rash, suspicious lesions and unusual hair distribution.   Musculoskeletal: Negative for falls, joint pain, joint swelling, muscle cramps, muscle weakness and stiffness.   Gastrointestinal: Negative for constipation, diarrhea, nausea and vomiting.   Neurological: Positive for sensory change. Negative for brief paralysis, disturbances in coordination, focal weakness, numbness, paresthesias and tremors.           Objective:      Physical Exam   Constitutional: He is oriented to person, place, and time. He appears well-developed and well-nourished. He is cooperative. No distress.   Cardiovascular:   Pulses:       Popliteal  pulses are 2+ on the right side, and 2+ on the left side.        Dorsalis pedis pulses are 2+ on the right side, and 2+ on the left side.        Posterior tibial pulses are 1+ on the right side, and 1+ on the left side.   Capillary refill 3 seconds all toes/distal feet, all toes/both feet warm to touch.      Negative lymphadenopathy bilateral popliteal fossa and tarsal tunnel.     <2+ pitting lower extremity edema bilateral.     Musculoskeletal:        Right ankle: He exhibits normal range of motion, no swelling, no ecchymosis, no deformity, no laceration and normal pulse. Achilles tendon normal. Achilles tendon exhibits no pain, no defect and normal Sullivan's test results.   Normal angle, base, station of gait. All ten toes without clubbing, cyanosis, or signs of ischemia.  No pain to palpation bilateral lower extremities.  Range of motion, stability, muscle strength, and muscle tone normal bilateral feet and legs.     Lymphadenopathy: No inguinal adenopathy noted on the right or left side.   Negative lymphadenopathy bilateral popliteal fossa and tarsal tunnel.    Negative lymphangitic streaking bilateral feet/ankles/legs.   Neurological: He is alert and oriented to person, place, and time. He has normal strength. He displays no atrophy and no tremor. A sensory deficit is present. He exhibits normal muscle tone. Gait normal.   Reflex Scores:       Patellar reflexes are 2+ on the right side and 2+ on the left side.       Achilles reflexes are 2+ on the right side and 2+ on the left side.  Negative tinel sign to percussion sural, superficial peroneal, deep peroneal, saphenous, and posterior tibial nerves right and left ankles and feet.  Diminished/loss of protective sensation all toes bilateral to 10 gram monofilament.     Skin: Skin is warm, dry and intact. Capillary refill takes 2 to 3 seconds. No abrasion, no bruising, no burn, no ecchymosis, no laceration, no lesion and no rash noted. He is not diaphoretic. No  cyanosis or erythema. No pallor. Nails show no clubbing.     Skin is normal age and health appropriate color, turgor, texture, and temperature bilateral lower extremities without ulceration, hyperpigmentation, discoloration, masses nodules or cords palpated.  No ecchymosis, erythema, edema, or cardinal signs of infection bilateral lower extremities.    Toenails 1st  bilateral are hypertrophic, dystrophic, discolored tanish brown with tan, gray crumbly subungual debris.  Tender to distal nail plate pressure, without periungual skin abnormality of each.     Psychiatric: He has a normal mood and affect.             Assessment:       Encounter Diagnoses   Name Primary?    Onychomycosis due to dermatophyte Yes    Type II diabetes mellitus with neurological manifestations     Leg edema     Long term (current) use of anticoagulants          Plan:       Chon was seen today for diabetic foot exam.    Diagnoses and all orders for this visit:    Onychomycosis due to dermatophyte    Type II diabetes mellitus with neurological manifestations    Leg edema    Long term (current) use of anticoagulants      I counseled the patient on his conditions, their implications and medical management.    - Shoe inspection. Diabetic Foot Education. Patient reminded of the importance of good nutrition and blood sugar control to help prevent podiatric complications of diabetes. Patient instructed on proper foot hygeine. We discussed wearing proper shoe gear, daily foot inspections, never walking without protective shoe gear, never putting sharp instruments to feet, routine podiatric visits at least annually.      - With patient's permission, nails were aggressively reduced and debrided x 2 to their soft tissue attachment mechanically, removing all offending nail and debris. Patient relates relief following the procedure. He will continue to monitor the areas daily, inspect his feet, wear protective shoe gear when ambulatory, moisturizer to  maintain skin integrity and follow in this office p.r.n.    Continue serial compression LLE.    Rx penlac.    Discussed conservative treatment with shoes of adequate dimensions, material, and style to alleviate symptoms and delay or prevent surgical intervention.         Follow up in about 1 year (around 6/14/2020).

## 2019-06-18 RX ORDER — PEN NEEDLE, DIABETIC 30 GX3/16"
NEEDLE, DISPOSABLE MISCELLANEOUS
Qty: 100 EACH | Refills: 0 | Status: SHIPPED | OUTPATIENT
Start: 2019-06-18 | End: 2020-01-01 | Stop reason: SDUPTHER

## 2019-09-24 ENCOUNTER — OFFICE VISIT (OUTPATIENT)
Dept: FAMILY MEDICINE | Facility: CLINIC | Age: 84
End: 2019-09-24
Payer: MEDICARE

## 2019-09-24 ENCOUNTER — HOSPITAL ENCOUNTER (OUTPATIENT)
Dept: RADIOLOGY | Facility: CLINIC | Age: 84
Discharge: HOME | End: 2019-09-24
Attending: PHYSICIAN ASSISTANT
Payer: MEDICARE

## 2019-09-24 ENCOUNTER — DOCUMENTATION ONLY (OUTPATIENT)
Dept: FAMILY MEDICINE | Facility: CLINIC | Age: 84
End: 2019-09-24

## 2019-09-24 VITALS
HEART RATE: 82 BPM | HEIGHT: 73 IN | SYSTOLIC BLOOD PRESSURE: 150 MMHG | WEIGHT: 202.38 LBS | OXYGEN SATURATION: 98 % | TEMPERATURE: 99 F | BODY MASS INDEX: 26.82 KG/M2 | DIASTOLIC BLOOD PRESSURE: 78 MMHG

## 2019-09-24 DIAGNOSIS — R42 LIGHTHEADED: ICD-10-CM

## 2019-09-24 DIAGNOSIS — R06.09 DOE (DYSPNEA ON EXERTION): ICD-10-CM

## 2019-09-24 DIAGNOSIS — E11.59 HYPERTENSION ASSOCIATED WITH DIABETES: ICD-10-CM

## 2019-09-24 DIAGNOSIS — R09.89 OTHER SPECIFIED SYMPTOMS AND SIGNS INVOLVING THE CIRCULATORY AND RESPIRATORY SYSTEMS: ICD-10-CM

## 2019-09-24 DIAGNOSIS — R10.13 EPIGASTRIC PAIN: ICD-10-CM

## 2019-09-24 DIAGNOSIS — I15.2 HYPERTENSION ASSOCIATED WITH DIABETES: ICD-10-CM

## 2019-09-24 DIAGNOSIS — R00.2 PALPITATIONS: Primary | ICD-10-CM

## 2019-09-24 PROCEDURE — 93005 EKG 12-LEAD: ICD-10-PCS | Mod: HCNC,S$GLB,, | Performed by: PHYSICIAN ASSISTANT

## 2019-09-24 PROCEDURE — 1101F PT FALLS ASSESS-DOCD LE1/YR: CPT | Mod: HCNC,CPTII,S$GLB, | Performed by: PHYSICIAN ASSISTANT

## 2019-09-24 PROCEDURE — 93005 ELECTROCARDIOGRAM TRACING: CPT | Mod: HCNC,S$GLB,, | Performed by: PHYSICIAN ASSISTANT

## 2019-09-24 PROCEDURE — 99999 PR PBB SHADOW E&M-EST. PATIENT-LVL V: ICD-10-PCS | Mod: PBBFAC,HCNC,, | Performed by: PHYSICIAN ASSISTANT

## 2019-09-24 PROCEDURE — 71046 X-RAY EXAM CHEST 2 VIEWS: CPT | Mod: 26,HCNC,, | Performed by: RADIOLOGY

## 2019-09-24 PROCEDURE — 93010 ELECTROCARDIOGRAM REPORT: CPT | Mod: HCNC,S$GLB,, | Performed by: INTERNAL MEDICINE

## 2019-09-24 PROCEDURE — 71046 X-RAY EXAM CHEST 2 VIEWS: CPT | Mod: TC,HCNC,FY,PO

## 2019-09-24 PROCEDURE — 1101F PR PT FALLS ASSESS DOC 0-1 FALLS W/OUT INJ PAST YR: ICD-10-PCS | Mod: HCNC,CPTII,S$GLB, | Performed by: PHYSICIAN ASSISTANT

## 2019-09-24 PROCEDURE — 3078F DIAST BP <80 MM HG: CPT | Mod: HCNC,CPTII,S$GLB, | Performed by: PHYSICIAN ASSISTANT

## 2019-09-24 PROCEDURE — 3078F PR MOST RECENT DIASTOLIC BLOOD PRESSURE < 80 MM HG: ICD-10-PCS | Mod: HCNC,CPTII,S$GLB, | Performed by: PHYSICIAN ASSISTANT

## 2019-09-24 PROCEDURE — 93010 EKG 12-LEAD: ICD-10-PCS | Mod: HCNC,S$GLB,, | Performed by: INTERNAL MEDICINE

## 2019-09-24 PROCEDURE — 3077F PR MOST RECENT SYSTOLIC BLOOD PRESSURE >= 140 MM HG: ICD-10-PCS | Mod: HCNC,CPTII,S$GLB, | Performed by: PHYSICIAN ASSISTANT

## 2019-09-24 PROCEDURE — 99214 PR OFFICE/OUTPT VISIT, EST, LEVL IV, 30-39 MIN: ICD-10-PCS | Mod: HCNC,S$GLB,, | Performed by: PHYSICIAN ASSISTANT

## 2019-09-24 PROCEDURE — 99999 PR PBB SHADOW E&M-EST. PATIENT-LVL V: CPT | Mod: PBBFAC,HCNC,, | Performed by: PHYSICIAN ASSISTANT

## 2019-09-24 PROCEDURE — 71046 XR CHEST PA AND LATERAL: ICD-10-PCS | Mod: 26,HCNC,, | Performed by: RADIOLOGY

## 2019-09-24 PROCEDURE — 99214 OFFICE O/P EST MOD 30 MIN: CPT | Mod: HCNC,S$GLB,, | Performed by: PHYSICIAN ASSISTANT

## 2019-09-24 PROCEDURE — 3077F SYST BP >= 140 MM HG: CPT | Mod: HCNC,CPTII,S$GLB, | Performed by: PHYSICIAN ASSISTANT

## 2019-09-24 RX ORDER — MECLIZINE HCL 12.5 MG 12.5 MG/1
TABLET ORAL
Refills: 6 | COMMUNITY
Start: 2019-09-06 | End: 2019-10-28 | Stop reason: ALTCHOICE

## 2019-09-24 RX ORDER — FAMOTIDINE 20 MG/1
20 TABLET, FILM COATED ORAL DAILY
Qty: 30 TABLET | Refills: 2 | Status: SHIPPED | OUTPATIENT
Start: 2019-09-24 | End: 2020-01-01

## 2019-09-24 NOTE — PROGRESS NOTES
Pre-Visit Chart Review  For Appointment Scheduled on (9/24/19)    Health Maintenance Due   Topic Date Due    TETANUS VACCINE  12/25/1953    Hemoglobin A1c  05/25/2019    Urine Microalbumin  05/29/2019    Lipid Panel  08/13/2019

## 2019-09-25 ENCOUNTER — HOSPITAL ENCOUNTER (OUTPATIENT)
Dept: RADIOLOGY | Facility: HOSPITAL | Age: 84
Discharge: HOME | End: 2019-09-25
Attending: PHYSICIAN ASSISTANT
Payer: MEDICARE

## 2019-09-25 ENCOUNTER — HOSPITAL ENCOUNTER (OUTPATIENT)
Dept: RESPIRATORY THERAPY | Facility: HOSPITAL | Age: 84
Discharge: HOME | End: 2019-09-25
Attending: PHYSICIAN ASSISTANT
Payer: MEDICARE

## 2019-09-25 DIAGNOSIS — R42 LIGHTHEADED: ICD-10-CM

## 2019-09-25 DIAGNOSIS — R06.09 DOE (DYSPNEA ON EXERTION): ICD-10-CM

## 2019-09-25 DIAGNOSIS — R09.89 OTHER SPECIFIED SYMPTOMS AND SIGNS INVOLVING THE CIRCULATORY AND RESPIRATORY SYSTEMS: ICD-10-CM

## 2019-09-25 DIAGNOSIS — R00.2 PALPITATIONS: ICD-10-CM

## 2019-09-25 PROCEDURE — 93880 EXTRACRANIAL BILAT STUDY: CPT | Mod: TC,HCNC

## 2019-09-25 PROCEDURE — 94618 PULMONARY STRESS TESTING: CPT | Mod: 26,HCNC,, | Performed by: INTERNAL MEDICINE

## 2019-09-25 PROCEDURE — 94727 PR PULM FUNCTION TEST BY GAS: ICD-10-PCS | Mod: 26,HCNC,, | Performed by: INTERNAL MEDICINE

## 2019-09-25 PROCEDURE — 94727 GAS DIL/WSHOT DETER LNG VOL: CPT | Mod: 26,HCNC,, | Performed by: INTERNAL MEDICINE

## 2019-09-25 PROCEDURE — 94618 PULMONARY STRESS TESTING: ICD-10-PCS | Mod: 26,HCNC,, | Performed by: INTERNAL MEDICINE

## 2019-09-25 PROCEDURE — 94729 DIFFUSING CAPACITY: CPT | Mod: HCNC

## 2019-09-25 PROCEDURE — 93880 EXTRACRANIAL BILAT STUDY: CPT | Mod: 26,HCNC,, | Performed by: RADIOLOGY

## 2019-09-25 PROCEDURE — 94618 PULMONARY STRESS TESTING: CPT | Mod: HCNC

## 2019-09-25 PROCEDURE — 93880 US CAROTID BILATERAL: ICD-10-PCS | Mod: 26,HCNC,, | Performed by: RADIOLOGY

## 2019-09-25 PROCEDURE — 94727 GAS DIL/WSHOT DETER LNG VOL: CPT | Mod: HCNC

## 2019-09-25 PROCEDURE — 94060 EVALUATION OF WHEEZING: CPT | Mod: HCNC

## 2019-09-25 PROCEDURE — 94060 EVALUATION OF WHEEZING: CPT | Mod: 26,59,HCNC, | Performed by: INTERNAL MEDICINE

## 2019-09-25 PROCEDURE — 94060 PR EVAL OF BRONCHOSPASM: ICD-10-PCS | Mod: 26,59,HCNC, | Performed by: INTERNAL MEDICINE

## 2019-09-26 LAB
BR6MWT: NORMAL
BRPFT: ABNORMAL
ERVN2 LLN: 0.99
ERVN2 PRE REF: 49.1 %
ERVN2 PRE: 0.49 L (ref 0.99–0.99)
ERVN2 REF: 0.99
FEF 25 75 CHG: 11.1 %
FEF 25 75 LLN: 0.53
FEF 25 75 POST REF: 130.3 %
FEF 25 75 PRE REF: 117.3 %
FEF 25 75 REF: 1.83
FET100 CHG: 18.7 %
FEV1 CHG: 1.1 %
FEV1 FVC CHG: 3.2 %
FEV1 FVC LLN: 60
FEV1 FVC POST REF: 105.5 %
FEV1 FVC PRE REF: 102.2 %
FEV1 FVC REF: 75
FEV1 LLN: 1.71
FEV1 POST REF: 95.1 %
FEV1 PRE REF: 94.1 %
FEV1 REF: 2.63
FRCN2 LLN: 2.98
FRCN2 PRE REF: 45.8 %
FRCN2 REF: 3.97
FVC CHG: -2 %
FVC LLN: 2.48
FVC POST REF: 89.3 %
FVC PRE REF: 91.2 %
FVC REF: 3.57
PEF CHG: -12.5 %
PEF LLN: 5.4
PEF POST REF: 66.2 %
PEF PRE REF: 75.6 %
PEF REF: 8.27
POST FEF 25 75: 2.39 L/S (ref 0.53–3.13)
POST FET 100: 9.51 SEC
POST FEV1 FVC: 78.63 % (ref 60.14–88.99)
POST FEV1: 2.5 L (ref 1.71–3.55)
POST FVC: 3.19 L (ref 2.48–4.66)
POST PEF: 5.47 L/S (ref 5.4–11.15)
PRE FEF 25 75: 2.15 L/S (ref 0.53–3.13)
PRE FET 100: 8.02 SEC
PRE FEV1 FVC: 76.22 % (ref 60.14–88.99)
PRE FEV1: 2.48 L (ref 1.71–3.55)
PRE FRC N2: 1.82 L
PRE FVC: 3.25 L (ref 2.48–4.66)
PRE PEF: 6.25 L/S (ref 5.4–11.15)
RVN2 LLN: 2.3
RVN2 PRE REF: 43.1 %
RVN2 PRE: 1.28 L (ref 2.3–3.65)
RVN2 REF: 2.98
RVN2TLCN2 LLN: 36.57
RVN2TLCN2 PRE REF: 61.4 %
RVN2TLCN2 PRE: 27.98 % (ref 36.57–54.53)
RVN2TLCN2 REF: 45.55
TLCN2 LLN: 6.55
TLCN2 PRE REF: 59.5 %
TLCN2 PRE: 4.58 L (ref 6.55–8.85)
TLCN2 REF: 7.7
VCMAXN2 LLN: 2.48
VCMAXN2 PRE REF: 92.6 %
VCMAXN2 PRE: 3.3 L (ref 2.48–4.66)
VCMAXN2 REF: 3.57

## 2019-09-27 ENCOUNTER — TELEPHONE (OUTPATIENT)
Dept: FAMILY MEDICINE | Facility: CLINIC | Age: 84
End: 2019-09-27

## 2019-09-27 DIAGNOSIS — N18.9 CHRONIC KIDNEY DISEASE, UNSPECIFIED CKD STAGE: ICD-10-CM

## 2019-09-27 DIAGNOSIS — R09.02 HYPOXIA: Primary | ICD-10-CM

## 2019-09-27 NOTE — TELEPHONE ENCOUNTER
Patient qualifies for home oxygen   Orders printed   Fax to [facility] and fax with his 6 min walk results   Let patient know

## 2019-10-01 ENCOUNTER — TELEPHONE (OUTPATIENT)
Dept: FAMILY MEDICINE | Facility: CLINIC | Age: 84
End: 2019-10-01

## 2019-10-01 DIAGNOSIS — J96.11 CHRONIC RESPIRATORY FAILURE WITH HYPOXIA: Primary | ICD-10-CM

## 2019-10-01 DIAGNOSIS — J98.4 RESTRICTIVE LUNG DISEASE: ICD-10-CM

## 2019-10-02 ENCOUNTER — TELEPHONE (OUTPATIENT)
Dept: FAMILY MEDICINE | Facility: CLINIC | Age: 84
End: 2019-10-02

## 2019-10-02 NOTE — TELEPHONE ENCOUNTER
----- Message from Margie Hernández sent at 10/2/2019 11:26 AM CDT -----  Type:  Patient Returning Call    Who Called:  Daughter/Nehal Hurtado  Who Left Message for Patient:  n/a  Does the patient know what this is regarding?:  Asking if the patient received his oxygen  Best Call Back Number:  [phone]  Additional Information:  Patient did not know that he was even supposed to be on oxygen. Please call to advise.

## 2019-10-07 ENCOUNTER — OFFICE VISIT (OUTPATIENT)
Dept: PULMONOLOGY | Facility: CLINIC | Age: 84
End: 2019-10-07
Payer: MEDICARE

## 2019-10-07 VITALS
HEART RATE: 68 BPM | WEIGHT: 211.88 LBS | HEIGHT: 73 IN | SYSTOLIC BLOOD PRESSURE: 132 MMHG | DIASTOLIC BLOOD PRESSURE: 78 MMHG | OXYGEN SATURATION: 97 % | BODY MASS INDEX: 28.08 KG/M2

## 2019-10-07 DIAGNOSIS — R41.89 COGNITIVE DECLINE: ICD-10-CM

## 2019-10-07 DIAGNOSIS — G31.84 MILD COGNITIVE IMPAIRMENT WITH MEMORY LOSS: ICD-10-CM

## 2019-10-07 DIAGNOSIS — J98.4 RESTRICTIVE LUNG DISEASE: Primary | ICD-10-CM

## 2019-10-07 PROCEDURE — 3078F DIAST BP <80 MM HG: CPT | Mod: HCNC,CPTII,S$GLB, | Performed by: NURSE PRACTITIONER

## 2019-10-07 PROCEDURE — 1101F PT FALLS ASSESS-DOCD LE1/YR: CPT | Mod: HCNC,CPTII,S$GLB, | Performed by: NURSE PRACTITIONER

## 2019-10-07 PROCEDURE — 3075F SYST BP GE 130 - 139MM HG: CPT | Mod: HCNC,CPTII,S$GLB, | Performed by: NURSE PRACTITIONER

## 2019-10-07 PROCEDURE — 1101F PR PT FALLS ASSESS DOC 0-1 FALLS W/OUT INJ PAST YR: ICD-10-PCS | Mod: HCNC,CPTII,S$GLB, | Performed by: NURSE PRACTITIONER

## 2019-10-07 PROCEDURE — 99204 OFFICE O/P NEW MOD 45 MIN: CPT | Mod: HCNC,S$GLB,, | Performed by: NURSE PRACTITIONER

## 2019-10-07 PROCEDURE — 3075F PR MOST RECENT SYSTOLIC BLOOD PRESS GE 130-139MM HG: ICD-10-PCS | Mod: HCNC,CPTII,S$GLB, | Performed by: NURSE PRACTITIONER

## 2019-10-07 PROCEDURE — 3078F PR MOST RECENT DIASTOLIC BLOOD PRESSURE < 80 MM HG: ICD-10-PCS | Mod: HCNC,CPTII,S$GLB, | Performed by: NURSE PRACTITIONER

## 2019-10-07 PROCEDURE — 99999 PR PBB SHADOW E&M-EST. PATIENT-LVL IV: ICD-10-PCS | Mod: PBBFAC,HCNC,, | Performed by: NURSE PRACTITIONER

## 2019-10-07 PROCEDURE — 99999 PR PBB SHADOW E&M-EST. PATIENT-LVL IV: CPT | Mod: PBBFAC,HCNC,, | Performed by: NURSE PRACTITIONER

## 2019-10-07 PROCEDURE — 99204 PR OFFICE/OUTPT VISIT, NEW, LEVL IV, 45-59 MIN: ICD-10-PCS | Mod: HCNC,S$GLB,, | Performed by: NURSE PRACTITIONER

## 2019-10-07 RX ORDER — ALBUTEROL SULFATE 90 UG/1
2 AEROSOL, METERED RESPIRATORY (INHALATION) EVERY 4 HOURS PRN
Qty: 18 G | Refills: 11 | Status: SHIPPED | OUTPATIENT
Start: 2019-10-07

## 2019-10-07 NOTE — LETTER
October 7, 2019      JUAN PABLO Ortiz  [address]  [place] [address]           [facility]  [address]  [place] [address]  Phone: [phone]  Fax: [phone]          Patient: Chon Hurtado   MR Number: [id number]   YOB: 1935   Date of Visit: 10/7/2019       Dear Christel Almanza:    Thank you for referring Chon Hurtado to me for evaluation. Attached you will find relevant portions of my assessment and plan of care.    If you have questions, please do not hesitate to call me. I look forward to following Chon Hurtado along with you.    Sincerely,    Lisa Weaver, CLARICE    Enclosure  CC:  No Recipients    If you would like to receive this communication electronically, please contact [email or web address] or [phone] to request more information on [employer] access.    For providers and/or their staff who would like to refer a patient to [facility], please contact us through our one-stop-shop provider referral line, [facility], at [phone].    If you feel you have received this communication in error or would no longer like to receive these types of communications, please e-mail [email or web address]

## 2019-10-07 NOTE — PROGRESS NOTES
10/7/2019    Chon Hurtado  [facility] Patient Consult    Chief Complaint   Patient presents with    Shortness of Breath    abnormal 6 min walk       HPI:  10/7/2019- Shortness of breath- sever onset 2 yrs, early 2018, walk to bathroom has palpitations and SOB, relieved with rest only. Does not have albuterol inhaler. No nocturnal arousals, no cough, no wheeze, SOB started after DVT in 1980 has filter placed, worsened gradually over time. Seen by PCP ordered supplemental oxygen, pt not sure why. Short term memory loss- onset 1 yr, wife started noticing, went to neurologist in [place]. Had testing that was not diagnostic.   Social hx: Retired  20 yrs, ; Possible Asbestosis exposure in buildings 1990's, No smoking hx. No pets in home  Family Hx: no Lung cancer, NO COPD, no Asthma  Medical Hx: CABG 2018, followed by cardiology Dr. Lee, requesting notes and last Echo from his office,  no previous pneumonia,          The chief compliant  problem is new to me  PFSH:  Past Medical History:   Diagnosis Date    Adrenal insufficiency     Anemia     Anticoagulant long-term use     plavix for blood clots legs and stents years    Blood transfusion     CAD (coronary artery disease)     Cancer     prostate    Cataract     COPD (chronic obstructive pulmonary disease) 11/6/2013    Depression     Diabetes mellitus     Diabetes mellitus type II     DVT (deep venous thrombosis)     Hemorrhoids     History of colon polyps 6/3/2015    Hypertension     PE (pulmonary embolism)     [facility]    Peripheral vascular disease     Urinary tract infection          Past Surgical History:   Procedure Laterality Date    CATARACT EXTRACTION, BILATERAL      CHOLECYSTECTOMY  8/2011    COLON SURGERY      CORONARY ARTERY BYPASS GRAFT       x 5    CORONARY STENT PLACEMENT      2007, last 2011  stent  3 vessel.    EXPLORATORY LAPAROTOMY W/ BOWEL RESECTION  1987    PROSTATE SURGERY  2001    Radical for  cancer - Dr Luke Chung    RIGHT FEMUR REPAIR  1987    SMALL INTESTINE SURGERY       Social History     Tobacco Use    Smoking status: Never Smoker    Smokeless tobacco: Never Used   Substance Use Topics    Alcohol use: No    Drug use: No     Family History   Problem Relation Age of Onset    Diabetes Mother     Hypertension Mother     Kidney disease Mother     Hypertension Father     Heart disease Father         MI    Diabetes Sister     Diabetes Brother     Cancer Brother         prostate    Cancer Brother         colon    Heart disease Brother     Cancer Brother         prostate    Diabetes Brother     Hyperlipidemia Brother     Hypertension Brother     Heart disease Brother     No Known Problems Daughter     No Known Problems Son     No Known Problems Daughter      Review of patient's allergies indicates:   Allergen Reactions    No known drug allergies      I have reviewed past medical, family, and social history. I have reviewed previous nurse notes.    Performance Status:The patient's activity level is mobility with asit devices.          Review of Systems   Constitutional: Negative for activity change, appetite change, chills, diaphoresis, fever and unexpected weight change. Positive for Fatigue  HENT: Negative for dental problem, postnasal drip, rhinorrhea, sinus pressure, sinus pain, sneezing, sore throat, trouble swallowing and voice change.    Respiratory: Negative for apnea, cough, chest tightness, wheezing and stridor.  Positive for Shortness of breath  Cardiovascular: Negative for chest pain,  Positive for palpitations and leg swelling.  Gastrointestinal: Negative for abdominal distention, abdominal pain, constipation and nausea.   Musculoskeletal: Negative for myalgias and neck pain. Positive for gait problems  Skin: Negative for color change and pallor.   Allergic/Immunologic: Negative for environmental allergies and food allergies.   Neurological: Negative for dizziness,  "speech difficulty,  numbness and headaches. Positive for short term memory loss, weakness, light-headedness,  Hematological: Negative for adenopathy. Does not bruise/bleed easily.   Psychiatric/Behavioral: Negative for dysphoric mood and sleep disturbance. The patient is not nervous/anxious.           Exam:Comprehensive exam done. /78 (BP Location: Right arm, Patient Position: Sitting)   Pulse 68   Ht 6' 1" (1.854 m)   Wt 96.1 kg (211 lb 13.8 oz)   SpO2 97% Comment: on room air  BMI 27.95 kg/m²   Exam included Vitals as listed  Constitutional: He is oriented to person, place, and time. He appears well-developed. No distress.   Nose: Nose normal.   Mouth/Throat: Uvula is midline, oropharynx is clear and moist and mucous membranes are normal. No dental caries. No oropharyngeal exudate, posterior oropharyngeal edema, posterior oropharyngeal erythema or tonsillar abscesses.  M[place] (M) score 2  Eyes: Pupils are equal, round, and reactive to light.   Neck: No JVD present. No thyromegaly present.   Cardiovascular: Normal rate, regular rhythm and normal heart sounds. Exam reveals no gallop and no friction rub.   No murmur heard.  Pulmonary/Chest: Effort normal and breath sounds normal. No accessory muscle usage or stridor. No apnea and no tachypnea. No respiratory distress, decreased breath sounds, wheezes, rhonchi, rales, or tenderness.   Abdominal: Soft. He exhibits no mass. There is no tenderness. No hepatosplenomegaly, hernias and normoactive bowel sounds  Musculoskeletal: Normal range of motion. exhibits bilateral lower extremity edema.   Lymphadenopathy:     He has no cervical adenopathy.     He has no axillary adenopathy.   Neurological:  alert and oriented to person, place, and time. not disoriented.   Skin: Skin is warm and dry. Capillary refill takes less 2 sec. No cyanosis or erythema. No pallor. Nails show no clubbing.   Psychiatric: normal mood and affect. behavior is normal. Judgment and " thought content normal.       Radiographs (ct chest and cxr) reviewed: view by direct vision   X-Ray Chest PA And Lateral 09/24/2019 clear        Labs reviewed       Lab Results   Component Value Date    WBC 5.20 09/24/2019    RBC 4.95 09/24/2019    HGB 14.0 09/24/2019    HCT 44.1 09/24/2019    MCV 89 09/24/2019    MCH 28.3 09/24/2019    MCHC 31.7 (L) 09/24/2019    RDW 12.8 09/24/2019     (L) 09/24/2019    MPV 11.5 09/24/2019    GRAN 3.3 09/24/2019    GRAN 64.3 09/24/2019    LYMPH 1.4 09/24/2019    LYMPH 26.3 09/24/2019    MONO 0.5 09/24/2019    MONO 9.0 09/24/2019    EOS 0.0 09/24/2019    BASO 0.00 09/24/2019    EOSINOPHIL 0.0 09/24/2019    BASOPHIL 0.0 09/24/2019     Results for ELIZABETH ALVARADO (MRN [id number]) as of 10/7/2019 12:10   Ref. Range 9/24/2019 16:02   BNP Latest Ref Range: 0 - 99 pg/mL 12     PFT reviewed  Pulmonary Functions Testing Results:  spirometry with bronchodilator, lung volume by gas dilution was done September 25, 2019.  The FEV1 FVC ratio was 76%, this indicates no airflow obstruction was measured by spirometry technique.  The FEV1 was 94% predicted at 2.5 L.  There was no   significant improvement following bronchodilator.  Total lung capacity was recorded at 60% of predicted on lung volume by gas dilution.       Patient has normal spirometry with no bronchodilator response of significance.  And lung volumes do suggest restriction.  Clinical correlation recommended.       6 min walk study was accomplished September 25, 2019.  Baseline room air saturations 95%.  With ambulation O2 sat felt on 85% by 2 min.  Patient needed 2 L of oxygen with O2 sat fell to 88% and 3 L of oxygen was needed, and then 4 L of oxygen was   eventually need to keep sat in the low 90s.  Patient walked 32% of reference distance at 170 m.     Mini Mental status Exam in clinic 10/7/2019= 17 Severe Cognitive impairment    Plan:  Clinical impression is resonably certain and repeated evaluation prn +/- follow up will  be needed as below.    Chon was seen today for shortness of breath and abnormal 6 min walk.    Diagnoses and all orders for this visit:    Restrictive lung disease  -     albuterol (VENTOLIN HFA) 90 mcg/actuation inhaler; Inhale 2 puffs into the lungs every 4 (four) hours as needed for Shortness of Breath. Rescue  -     OXYGEN FOR HOME USE    Cognitive decline  -     Ambulatory Referral to Neurology    Mild cognitive impairment with memory loss  -     Ambulatory Referral to Neurology        Follow up in about 4 weeks (around 11/4/2019), or if symptoms worsen or fail to improve.    Discussed with patient above for education the following:      Patient Instructions   I have requested your last Echocardiogram from Cardiologist office,     Your Pulmonary function test does not show Chronic obstructive pulmonary disease. Does show restrictive lung disease.     Your shortness of breath is affected by the heart heart  Will start with a rescue inhaler. 2 puffs every 4 hours when needed for shortness of breath.   Mini mental status exam was abnormal referral to neurology for further evaluation of short term memory loss    Albuterol Inhaler 1-2 puffs every 4 hours, for cough or shortness of breath    Order for supplemental oxygen, wear daily and at night to oxygen saturation above 90%  Recommend purchasing pulse oximeter for home use.

## 2019-10-07 NOTE — PATIENT INSTRUCTIONS
I have requested your last Echocardiogram from Cardiologist office,     Your Pulmonary function test does not show Chronic obstructive pulmonary disease. Does show restrictive lung disease.     Your shortness of breath is affected by the heart heart  Will start with a rescue inhaler. 2 puffs every 4 hours when needed for shortness of breath.   Mini mental status exam was abnormal referral to neurology for further evaluation of short term memory loss    Albuterol Inhaler 1-2 puffs every 4 hours, for cough or shortness of breath    Order for supplemental oxygen, wear daily and at night to oxygen saturation above 90%  Recommend purchasing pulse oximeter for home use.

## 2019-10-10 ENCOUNTER — TELEPHONE (OUTPATIENT)
Dept: NEUROLOGY | Facility: CLINIC | Age: 84
End: 2019-10-10

## 2019-10-10 NOTE — TELEPHONE ENCOUNTER
Spoke with pt wife and informed her of no available appointments until January schedule opens. Given number to Kareen Ferrara for sooner appt. Verbalized understanding.

## 2019-10-28 ENCOUNTER — LAB VISIT (OUTPATIENT)
Dept: LAB | Facility: HOSPITAL | Age: 84
End: 2019-10-28
Attending: FAMILY MEDICINE
Payer: MEDICARE

## 2019-10-28 ENCOUNTER — OFFICE VISIT (OUTPATIENT)
Dept: FAMILY MEDICINE | Facility: CLINIC | Age: 84
End: 2019-10-28
Payer: MEDICARE

## 2019-10-28 VITALS
HEIGHT: 71 IN | DIASTOLIC BLOOD PRESSURE: 68 MMHG | HEART RATE: 55 BPM | OXYGEN SATURATION: 97 % | BODY MASS INDEX: 28.58 KG/M2 | SYSTOLIC BLOOD PRESSURE: 128 MMHG | WEIGHT: 204.13 LBS | TEMPERATURE: 98 F

## 2019-10-28 DIAGNOSIS — N18.30 CKD STAGE 3 DUE TO TYPE 2 DIABETES MELLITUS: ICD-10-CM

## 2019-10-28 DIAGNOSIS — E21.3 HYPERPARATHYROIDISM: ICD-10-CM

## 2019-10-28 DIAGNOSIS — J96.11 CHRONIC RESPIRATORY FAILURE WITH HYPOXIA: ICD-10-CM

## 2019-10-28 DIAGNOSIS — E11.22 CKD STAGE 3 DUE TO TYPE 2 DIABETES MELLITUS: ICD-10-CM

## 2019-10-28 DIAGNOSIS — J98.4 RESTRICTIVE LUNG DISEASE: ICD-10-CM

## 2019-10-28 DIAGNOSIS — I50.22 CHF (CONGESTIVE HEART FAILURE), NYHA CLASS III, CHRONIC, SYSTOLIC: Primary | ICD-10-CM

## 2019-10-28 PROCEDURE — 82306 VITAMIN D 25 HYDROXY: CPT | Mod: HCNC

## 2019-10-28 PROCEDURE — 3078F PR MOST RECENT DIASTOLIC BLOOD PRESSURE < 80 MM HG: ICD-10-PCS | Mod: HCNC,CPTII,S$GLB, | Performed by: FAMILY MEDICINE

## 2019-10-28 PROCEDURE — 93005 EKG 12-LEAD: ICD-10-PCS | Mod: HCNC,S$GLB,, | Performed by: FAMILY MEDICINE

## 2019-10-28 PROCEDURE — 93005 ELECTROCARDIOGRAM TRACING: CPT | Mod: HCNC,S$GLB,, | Performed by: FAMILY MEDICINE

## 2019-10-28 PROCEDURE — 3074F SYST BP LT 130 MM HG: CPT | Mod: HCNC,CPTII,S$GLB, | Performed by: FAMILY MEDICINE

## 2019-10-28 PROCEDURE — 3078F DIAST BP <80 MM HG: CPT | Mod: HCNC,CPTII,S$GLB, | Performed by: FAMILY MEDICINE

## 2019-10-28 PROCEDURE — 83970 ASSAY OF PARATHORMONE: CPT | Mod: HCNC

## 2019-10-28 PROCEDURE — 99214 PR OFFICE/OUTPT VISIT, EST, LEVL IV, 30-39 MIN: ICD-10-PCS | Mod: HCNC,25,S$GLB, | Performed by: FAMILY MEDICINE

## 2019-10-28 PROCEDURE — 90662 FLU VACCINE - HIGH DOSE (65+) PRESERVATIVE FREE IM: ICD-10-PCS | Mod: HCNC,S$GLB,, | Performed by: FAMILY MEDICINE

## 2019-10-28 PROCEDURE — G0008 FLU VACCINE - HIGH DOSE (65+) PRESERVATIVE FREE IM: ICD-10-PCS | Mod: HCNC,S$GLB,, | Performed by: FAMILY MEDICINE

## 2019-10-28 PROCEDURE — 1101F PT FALLS ASSESS-DOCD LE1/YR: CPT | Mod: HCNC,CPTII,S$GLB, | Performed by: FAMILY MEDICINE

## 2019-10-28 PROCEDURE — 80048 BASIC METABOLIC PNL TOTAL CA: CPT | Mod: HCNC

## 2019-10-28 PROCEDURE — 1101F PR PT FALLS ASSESS DOC 0-1 FALLS W/OUT INJ PAST YR: ICD-10-PCS | Mod: HCNC,CPTII,S$GLB, | Performed by: FAMILY MEDICINE

## 2019-10-28 PROCEDURE — 99999 PR PBB SHADOW E&M-EST. PATIENT-LVL IV: CPT | Mod: PBBFAC,HCNC,, | Performed by: FAMILY MEDICINE

## 2019-10-28 PROCEDURE — 99499 RISK ADDL DX/OHS AUDIT: ICD-10-PCS | Mod: HCNC,S$GLB,, | Performed by: FAMILY MEDICINE

## 2019-10-28 PROCEDURE — 83735 ASSAY OF MAGNESIUM: CPT | Mod: HCNC

## 2019-10-28 PROCEDURE — 3074F PR MOST RECENT SYSTOLIC BLOOD PRESSURE < 130 MM HG: ICD-10-PCS | Mod: HCNC,CPTII,S$GLB, | Performed by: FAMILY MEDICINE

## 2019-10-28 PROCEDURE — 99214 OFFICE O/P EST MOD 30 MIN: CPT | Mod: HCNC,25,S$GLB, | Performed by: FAMILY MEDICINE

## 2019-10-28 PROCEDURE — G0008 ADMIN INFLUENZA VIRUS VAC: HCPCS | Mod: HCNC,S$GLB,, | Performed by: FAMILY MEDICINE

## 2019-10-28 PROCEDURE — 93010 ELECTROCARDIOGRAM REPORT: CPT | Mod: HCNC,S$GLB,, | Performed by: INTERNAL MEDICINE

## 2019-10-28 PROCEDURE — 90662 IIV NO PRSV INCREASED AG IM: CPT | Mod: HCNC,S$GLB,, | Performed by: FAMILY MEDICINE

## 2019-10-28 PROCEDURE — 93010 EKG 12-LEAD: ICD-10-PCS | Mod: HCNC,S$GLB,, | Performed by: INTERNAL MEDICINE

## 2019-10-28 PROCEDURE — 36415 COLL VENOUS BLD VENIPUNCTURE: CPT | Mod: HCNC,PO

## 2019-10-28 PROCEDURE — 82330 ASSAY OF CALCIUM: CPT | Mod: HCNC

## 2019-10-28 PROCEDURE — 99499 UNLISTED E&M SERVICE: CPT | Mod: HCNC,S$GLB,, | Performed by: FAMILY MEDICINE

## 2019-10-28 PROCEDURE — 84100 ASSAY OF PHOSPHORUS: CPT | Mod: HCNC

## 2019-10-28 PROCEDURE — 99999 PR PBB SHADOW E&M-EST. PATIENT-LVL IV: ICD-10-PCS | Mod: PBBFAC,HCNC,, | Performed by: FAMILY MEDICINE

## 2019-10-28 RX ORDER — FUROSEMIDE 20 MG/1
20 TABLET ORAL DAILY
Qty: 90 TABLET | Refills: 3
Start: 2019-10-28 | End: 2020-01-01 | Stop reason: SDUPTHER

## 2019-10-28 NOTE — PROGRESS NOTES
Subjective:       Patient ID: Chon Hurtado is a 83 y.o. male.    Chief Complaint: No chief complaint on file.    Fatigue   This is a chronic problem. The current episode started more than 1 year ago. Associated symptoms include arthralgias and joint swelling. Pertinent negatives include no abdominal pain, chest pain, congestion, fatigue, headaches, myalgias, nausea, rash, sore throat, swollen glands or urinary symptoms. He has tried NSAIDs for the symptoms.     Review of Systems   Constitutional: Negative for fatigue and unexpected weight change.   HENT: Negative for congestion and sore throat.    Respiratory: Negative for chest tightness and shortness of breath.    Cardiovascular: Negative for chest pain, palpitations and leg swelling.   Gastrointestinal: Negative for abdominal pain and nausea.   Musculoskeletal: Positive for arthralgias and joint swelling. Negative for myalgias.   Skin: Negative for rash.   Neurological: Negative for dizziness, syncope, light-headedness and headaches.       Patient Active Problem List   Diagnosis    Other B-complex deficiencies    Other specified disorders of parathyroid gland    Adrenal insufficiency    CAD (coronary artery disease)    Prostate CA    Uncontrolled type 2 diabetes mellitus with hyperglycemia, with long-term current use of insulin    Hypertension associated with diabetes    Anemia    CKD (chronic kidney disease) stage 3, GFR 30-59 ml/min    Long term (current) use of anticoagulants [Z79.01]    DVT, bilateral lower limbs    Hyperlipidemia associated with type 2 diabetes mellitus    Encounter for long-term (current) use of insulin    S/P CABG (coronary artery bypass graft)    Leg abscess    Thrush    Cognitive decline    Mild cognitive impairment with memory loss    Restrictive lung disease    Restrictive lung disease       Objective:      Physical Exam   Constitutional: He is oriented to person, place, and time. He appears well-developed and  well-nourished. No distress.   HENT:   Head: Normocephalic and atraumatic.   Right Ear: External ear normal.   Left Ear: External ear normal.   Nose: Nose normal.   Mouth/Throat: Oropharynx is clear and moist. No oropharyngeal exudate.   Eyes: Pupils are equal, round, and reactive to light. Conjunctivae and EOM are normal. Right eye exhibits no discharge. Left eye exhibits no discharge. No scleral icterus.   Neck: Normal range of motion. Neck supple. No JVD present. No tracheal deviation present. No thyromegaly present.   Cardiovascular: Normal rate, regular rhythm and intact distal pulses. Frequent extrasystoles are present.   Murmur heard.   Systolic murmur is present.  Pulmonary/Chest: Effort normal. No respiratory distress. He has decreased breath sounds in the right lower field and the left lower field. He has no wheezes. He has no rales.   Abdominal: Soft. He exhibits no distension and no mass. Bowel sounds are decreased. There is no tenderness. There is no rebound and no guarding.   Musculoskeletal: He exhibits no edema or tenderness.   Lymphadenopathy:     He has no cervical adenopathy.   Neurological: He is alert and oriented to person, place, and time. No cranial nerve deficit. Coordination normal.   Skin: Skin is warm and dry. No rash noted. He is not diaphoretic. No erythema. No pallor.   Psychiatric: He has a normal mood and affect. His behavior is normal. Judgment and thought content normal.   Vitals reviewed.      Lab Results   Component Value Date    WBC 5.20 09/24/2019    HGB 14.0 09/24/2019    HCT 44.1 09/24/2019     (L) 09/24/2019    CHOL 198 09/24/2019    TRIG 88 09/24/2019    HDL 43 09/24/2019    ALT 23 09/24/2019    AST 25 09/24/2019     09/24/2019    K 4.6 09/24/2019     09/24/2019    CREATININE 1.7 (H) 09/24/2019    BUN 27 (H) 09/24/2019    CO2 24 09/24/2019    TSH 1.169 08/13/2018    PSA 3.6 11/05/2015    INR 2.4 06/05/2017    HGBA1C 10.5 (H) 09/24/2019     The ASCVD Risk  score (Nica ANDRADE Jr., et al., 2013) failed to calculate for the following reasons:    The 2013 ASCVD risk score is only valid for ages 40 to 79    Assessment:       1. CKD stage 3 due to type 2 diabetes mellitus    2. Chronic respiratory failure with hypoxia    3. CHF (congestive heart failure), NYHA class III, chronic, systolic        Plan:       CKD stage 3 due to type 2 diabetes mellitus  -     Basic metabolic panel; Future; Expected date: 10/28/2019  -     Calcium, ionized; Future; Expected date: 10/28/2019  -     PTH, intact; Future; Expected date: 10/28/2019  -     Magnesium; Future; Expected date: 10/28/2019  -     PHOSPHORUS; Future; Expected date: 10/28/2019  -     Vitamin D; Future; Expected date: 10/28/2019  -     IN OFFICE EKG 12-LEAD (to Muse)  -     furosemide (LASIX) 20 MG tablet; Take 1 tablet (20 mg total) by mouth once daily.  Dispense: 90 tablet; Refill: 3    Chronic respiratory failure with hypoxia  -     furosemide (LASIX) 20 MG tablet; Take 1 tablet (20 mg total) by mouth once daily.  Dispense: 90 tablet; Refill: 3    CHF (congestive heart failure), NYHA class III, chronic, systolic  -     furosemide (LASIX) 20 MG tablet; Take 1 tablet (20 mg total) by mouth once daily.  Dispense: 90 tablet; Refill: 3    Other orders  -     Influenza - High Dose (65+) (PF) (IM)      Patient readiness: acceptance and barriers:readiness    During the course of the visit the patient was educated and counseled about the following:     Hypertension:   Dietary sodium restriction.  Regular aerobic exercise.  Check blood pressures daily and record.  Obesity:   General weight loss/lifestyle modification strategies discussed (elicit support from others; identify saboteurs; non-food rewards, etc).    Goals: Hypertension: Reduce Blood Pressure and Obesity: Reduce calorie intake and BMI    Did patient meet goals/outcomes: Yes    The following self management tools provided: blood pressure log  excercise log    Patient  Instructions (the written plan) was given to the patient/family.     Time spent with patient: 45 minutes    Barriers to medications present (yes )    Adverse reactions to current medications (yes)    Over the counter medications reviewed (Yes)      40minutes spent counseling patient on diet, exercise, and weight loss.  This has been fully explained to the patient, who indicates understanding.

## 2019-10-28 NOTE — PATIENT INSTRUCTIONS
Low-Salt Diet  This diet removes foods that are high in salt. It also limits the amount of salt you use when cooking. It is most often used for people with high blood pressure, edema (fluid retention), and kidney, liver, or heart disease.  Table salt contains the mineral sodium. Your body needs sodium to work normally. But too much sodium can make your health problems worse. Your healthcare provider is recommending a low-salt (also called low-sodium) diet for you. Your total daily allowance of salt is 1,500 to 2,300 milligrams (mg). It is less than 1 teaspoon of table salt. This means you can have only about 500 to 700 mg of sodium at each meal. People with certain health problems should limit salt intake to the lower end of the recommended range.    When you cook, dont add much salt. If you can cook without using salt, even better. Dont add salt to your food at the table.  When shopping, read food labels. Salt is often called sodium on the label. Choose foods that are salt-free, low salt, or very low salt. Note that foods with reduced salt may not lower your salt intake enough.    Beans, potatoes, and pasta  Ok: Dry beans, split peas, lentils, potatoes, rice, macaroni, pasta, spaghetti without added salt  Avoid: Potato chips, tortilla chips, and similar products  Breads and cereals  Ok: Low-sodium breads, rolls, cereals, and cakes; low-salt crackers, matzo crackers  Avoid: Salted crackers, pretzels, popcorn, Polish toast, pancakes, muffins  Dairy  Ok: Milk, chocolate milk, hot chocolate mix, low-salt cheeses, and yogurt  Avoid: Processed cheese and cheese spreads; Roquefort, Camembert, and cottage cheese; buttermilk, instant breakfast drink  Desserts  Ok: Ice cream, frozen yogurt, juice bars, gelatin, cookies and pies, sugar, honey, jelly, hard candy  Avoid: Most pies, cakes and cookies prepared or processed with salt; instant pudding  Drinks  Ok: Tea, coffee, fizzy (carbonated) drinks, juices  Avoid: Flavored  coffees, electrolyte replacement drinks, sports drinks  Meats  Ok: All fresh meat, fish, poultry, low-salt tuna, eggs, egg substitute  Avoid: Smoked, pickled, brine-cured, or salted meats and fish. This includes goldsmith, chipped beef, corned beef, hot dogs, deli meats, ham, kosher meats, salt pork, sausage, canned tuna, salted codfish, smoked salmon, herring, sardines, or anchovies.  Seasonings and spices  Ok: Most seasonings are okay. Good substitutes for salt include: fresh herb blends, hot sauce, lemon, garlic, nguyen, vinegar, dry mustard, parsley, cilantro, horseradish, tomato paste, regular margarine, mayonnaise, unsalted butter, cream cheese, vegetable oil, cream, low-salt salad dressing and gravy.  Avoid: Regular ketchup, relishes, pickles, soy sauce, teriyaki sauce, Worcestershire sauce, BBQ sauce, tartar sauce, meat tenderizer, chili sauce, regular gravy, regular salad dressing, salted butter  Soups  Ok: Low-salt soups and broths made with allowed foods  Avoid: Bouillon cubes, soups with smoked or salted meats, regular soup and broth  Vegetables  Ok: Most vegetables are okay; also low-salt tomato and vegetable juices  Avoid: Sauerkraut and other brine-soaked vegetables; pickles and other pickled vegetables; tomato juice, olives  Date Last Reviewed: 8/1/2016 © 2000-2017 [employer]. [address] [place] [id number]. All rights reserved. This information is not intended as a substitute for professional medical care. Always follow your healthcare professional's instructions.        Diabetes: Activity Tips    Being more active can help you manage your diabetes. The tips on this sheet can help you get the most from your exercise. They can also help you stay safe.  Staying Active  Its important for adults to spend less time sitting and being inactive. This is especially true if you have type 2 diabetes. When you are sitting for long periods of time, get up for short sessions of light  activity every 30 minutes.  You should aim for at least 150 minutes a week of exercise or physical activity. Dont let more than 2 days go by without being active.  Benefit from briskness  Brisk activity gets your heart beating faster. This can help you increase your fitness, lose extra weight, and manage your blood sugar level. Try brisk walking. Or, if you have foot or leg problems, you can try swimming or bike riding. You can break up your exercise into chunks throughout the day. Work up to at least 30 minutes of steady, brisk exercise on most days.  Warm up and cool down  Warming up and cooling down reduce your risk of injury. They also help limit muscle soreness. Do a mild version of your activity for 5 minutes before and after your routine. You can also learn stretches that will help keep your muscles loose. Your healthcare provider may show you good ways to warm up and stretch.  Do the talk-sing test  The talk-sing test is a simple way to tell how hard youre exercising. If you can talk while exercising, youre in a safe range. If youre out of breath, slow down. If you can carry a tune, its time to  the pace. Walk up a hill. Increase the resistance on your stationary bike. Or swim faster.  What about eating?  You may be told to plan your exercise for 1 to 2 hours after a meal. In most cases, you dont need to eat while being active. If you take insulin or medicine that can cause low blood sugar, test your blood sugar before exercising. And carry a fast-acting sugar that will raise your blood sugar level quickly. This includes glucose tablets or hard candy. Use it if you feel low blood sugar symptoms.  Safety tips  These tips can help you stay safe as you become fit:  · Exercise with a friend or carry a cell phone if you have one.  · Carry or wear identification, such as a necklace or bracelet, that says you have diabetes.  · Use the proper footwear and safety equipment for your activity.  · Drink  water before, during, and after exercise.  · Dress properly for the weather.  · Dont exercise in very hot or very cold weather.  · Dont exercise if you are sick.  · If you are instructed to do so, test your blood sugar before and after you exercise. Have a small carbohydrate snack if your blood sugar is low before you start exercising.   When to stop exercising and call your healthcare provider  Stop exercising and call your healthcare provider right away if you notice any of the following:  · Pain, pressure, tightness, or heaviness in the chest  · Pain or heaviness in the neck, shoulders, back, arms, legs, or feet  · Unusual shortness of breath  · Dizziness or lightheadedness  · Unusually rapid or slow pulse  · Increased joint or muscle pain  · Nausea or vomiting  Date Last Reviewed: 5/1/2016 © 2000-2017 [employer]. [address] [place] [address]. All rights reserved. This information is not intended as a substitute for professional medical care. Always follow your healthcare professional's instructions.        Understanding Primary Hyperparathyroidism    The parathyroid glands are 4 tiny glands in the neck. They make parathyroid hormone (PTH). PTH controls the amount of calcium and phosphorus in your blood. Primary hyperparathyroidism is when there is too much PTH in your blood. It occurs when one or more of the glands are too active.  The job of PTH is to tell the body how to control calcium. Too much PTH means the body increases the amount of calcium in the blood. This leads to a problem called hypercalcemia. This is when the amount of calcium in the blood is too high. Hypercalcemia can cause serious health problems.  Causes  Hyperparathyroidism can occur when a parathyroid gland becomes enlarged. It can also occur as a complication of another health conditions, such as kidney failure or rickets. In these conditions, calcium is usually not high. This is called secondary  hyperparathyroidism.  Whos at risk  The risk factors for this condition include:  · Being a woman (its less common in men)  · Being older (its more likely to occur with age)  · Having parents or siblings with the condition or other endocrine tumors  · Having certain kidney problems  · Taking certain medicines  · Having had radiation treatment in the head or neck  Symptoms  Symptoms of the condition can include:  · Muscle weakness  · Depression  · Tiredness  · Confusion and memory loss  · Poor memory  · Nausea and vomiting  · Pain in the stomach area (abdomen)  · Hard stools (constipation)  · Stomach ulcers  · Need to urinate often  · Kidney stones  · Joint or bone pain  · Bone disease (osteopenia or osteoporosis), an increase in bone fractures  · High blood pressure  · Increased thirst  Treatment  If primary hyperparathyroidism is not treated, it can get worse over time. Treatments include:  · Surgery. This may be done to remove any enlarged parathyroid glands. This lets the amount of calcium in the blood go back to normal. You may need to take vitamin D and calcium supplements before the surgery. This will reduce the risk of low calcium after the surgery.   · Medicine. This lowers the amount of parathyroid hormone made by the overactive glands.   You and your healthcare provider can discuss your treatment options. Make sure to ask any questions you have.  Date Last Reviewed: 11/1/2016  © 3003-2506 [employer]. [address] [place] [address], [place] [address]. All rights reserved. This information is not intended as a substitute for professional medical care. Always follow your healthcare professional's instructions.        Calcium (Blood)  Does this test have other names?  Total calcium, ionized calcium  What is this test?  A calcium blood test measures how much calcium is in your blood. Your health care provider can use this test to help diagnose and watch many conditions. There are two types of calcium  "blood tests. One is total calcium and the other is ionized calcium. Ionized calcium measures the "free" calcium in your blood. This is the calcium not bound to other parts of the blood.   Why do I need this test?  Your health care provider may order a calcium blood test to help diagnose a variety of disorders. These include kidney disease, pancreatitis, and disease of the parathyroid gland. Calcium levels may also be abnormal in many types of cancer. Your provider might also order this test as part of a routine health check.  A normal calcium level in the blood is a good sign that your body is likely working as it should. Calcium levels that are too low (hypocalcemia) or too high (hypercalcemia) can mean of a number of problems.  People with abnormal calcium levels may not have any symptoms. Very low calcium levels can cause seizures, irregular heartbeat, muscle spasms, or tingling in the hands or feet. People with high calcium levels may have nausea, vomiting, severe thirst, or constipation. Your health care provider will use the results of a blood calcium test to figure out how to treat the underlying cause of any health problems you may have.  What other tests might I have along with this test?  Calcium can be tested for a number of reasons. Other tests will vary based on what your health care provider is looking for.   Your provider may also order tests of kidney function, vitamin D, phosphorus levels, and parathyroid hormone. These tests can help figure out what is causing your abnormal calcium levels.  What do my test results mean?  Many things may affect your lab test results. These include the method each lab uses to do the test. Some laboratories may have slightly different normal values than the ones below. Even if your test results are different from the normal value, you may not have a problem. To learn what the results mean for you, talk with your health care provider.  A normal range of total blood " calcium in adults is usually between 8.5 and 10.3 milligrams/deciliter (mg/dL). Ionized calcium generally should be higher than 4.6 mg/dL to be a normal level.   How is this test done?  The test requires a blood sample, which is drawn through a needle from a vein in your arm.  Does this test pose any risks?  Taking a blood sample with a needle carries risks that include bleeding, infection, bruising, or feeling dizzy. When the needle pricks your arm, you may feel a slight stinging sensation or pain. Afterward, the site may be slightly sore.  What might affect my test results?  A number of things can affect the results of a calcium blood test. This test is typically done at the same time as other blood tests to get a better picture of your overall health. Certain medicines can change blood calcium levels and affect the test results.   How do I get ready for this test?  You don't need to prepare for this test. Be sure your health care provider knows about all medicines, herbs, vitamins, and supplements you are taking. This includes medicines that don't need a prescription and any illicit drugs you may use.    © 6173-6589 [employer]. [address] [place] [address], [place] [address]. All rights reserved. This information is not intended as a substitute for professional medical care. Always follow your healthcare professional's instructions.

## 2019-10-29 LAB
25(OH)D3+25(OH)D2 SERPL-MCNC: 20 NG/ML (ref 30–96)
ANION GAP SERPL CALC-SCNC: 8 MMOL/L (ref 8–16)
BUN SERPL-MCNC: 20 MG/DL (ref 8–23)
CA-I BLDV-SCNC: 1.53 MMOL/L (ref 1.06–1.42)
CALCIUM SERPL-MCNC: 11.6 MG/DL (ref 8.7–10.5)
CHLORIDE SERPL-SCNC: 108 MMOL/L (ref 95–110)
CO2 SERPL-SCNC: 28 MMOL/L (ref 23–29)
CREAT SERPL-MCNC: 1.5 MG/DL (ref 0.5–1.4)
EST. GFR  (AFRICAN AMERICAN): 49.8 ML/MIN/1.73 M^2
EST. GFR  (NON AFRICAN AMERICAN): 43 ML/MIN/1.73 M^2
GLUCOSE SERPL-MCNC: 226 MG/DL (ref 70–110)
MAGNESIUM SERPL-MCNC: 1.7 MG/DL (ref 1.6–2.6)
PHOSPHATE SERPL-MCNC: 2.8 MG/DL (ref 2.7–4.5)
POTASSIUM SERPL-SCNC: 5.2 MMOL/L (ref 3.5–5.1)
PTH-INTACT SERPL-MCNC: 210 PG/ML (ref 9–77)
SODIUM SERPL-SCNC: 144 MMOL/L (ref 136–145)

## 2019-10-30 DIAGNOSIS — R42 DIZZINESS: Primary | ICD-10-CM

## 2019-11-07 ENCOUNTER — OFFICE VISIT (OUTPATIENT)
Dept: PULMONOLOGY | Facility: CLINIC | Age: 84
End: 2019-11-07
Payer: MEDICARE

## 2019-11-07 VITALS
SYSTOLIC BLOOD PRESSURE: 154 MMHG | WEIGHT: 200.75 LBS | BODY MASS INDEX: 28.1 KG/M2 | DIASTOLIC BLOOD PRESSURE: 80 MMHG | HEART RATE: 73 BPM | OXYGEN SATURATION: 98 % | HEIGHT: 71 IN

## 2019-11-07 DIAGNOSIS — J98.4 RESTRICTIVE LUNG DISEASE: Primary | ICD-10-CM

## 2019-11-07 DIAGNOSIS — R06.02 SOB (SHORTNESS OF BREATH) ON EXERTION: ICD-10-CM

## 2019-11-07 PROCEDURE — 3077F SYST BP >= 140 MM HG: CPT | Mod: HCNC,CPTII,S$GLB, | Performed by: NURSE PRACTITIONER

## 2019-11-07 PROCEDURE — 3079F PR MOST RECENT DIASTOLIC BLOOD PRESSURE 80-89 MM HG: ICD-10-PCS | Mod: HCNC,CPTII,S$GLB, | Performed by: NURSE PRACTITIONER

## 2019-11-07 PROCEDURE — 99213 PR OFFICE/OUTPT VISIT, EST, LEVL III, 20-29 MIN: ICD-10-PCS | Mod: HCNC,S$GLB,, | Performed by: NURSE PRACTITIONER

## 2019-11-07 PROCEDURE — 1101F PT FALLS ASSESS-DOCD LE1/YR: CPT | Mod: HCNC,CPTII,S$GLB, | Performed by: NURSE PRACTITIONER

## 2019-11-07 PROCEDURE — 3077F PR MOST RECENT SYSTOLIC BLOOD PRESSURE >= 140 MM HG: ICD-10-PCS | Mod: HCNC,CPTII,S$GLB, | Performed by: NURSE PRACTITIONER

## 2019-11-07 PROCEDURE — 99213 OFFICE O/P EST LOW 20 MIN: CPT | Mod: HCNC,S$GLB,, | Performed by: NURSE PRACTITIONER

## 2019-11-07 PROCEDURE — 99999 PR PBB SHADOW E&M-EST. PATIENT-LVL III: CPT | Mod: PBBFAC,HCNC,, | Performed by: NURSE PRACTITIONER

## 2019-11-07 PROCEDURE — 1101F PR PT FALLS ASSESS DOC 0-1 FALLS W/OUT INJ PAST YR: ICD-10-PCS | Mod: HCNC,CPTII,S$GLB, | Performed by: NURSE PRACTITIONER

## 2019-11-07 PROCEDURE — 3079F DIAST BP 80-89 MM HG: CPT | Mod: HCNC,CPTII,S$GLB, | Performed by: NURSE PRACTITIONER

## 2019-11-07 PROCEDURE — 99999 PR PBB SHADOW E&M-EST. PATIENT-LVL III: ICD-10-PCS | Mod: PBBFAC,HCNC,, | Performed by: NURSE PRACTITIONER

## 2019-11-07 NOTE — PATIENT INSTRUCTIONS
[facility] [phone], this is an option to purchase oxygen concentrator for home use with out going through insurance.    Working on getting clearance for oxygen if able will have to repeat six minute walk.     Use Albuterol rescue inhaler when short of breath, coughing, chest tightness,     Use before you go for walk.

## 2019-11-07 NOTE — PROGRESS NOTES
11/7/2019    [facility]  Office Note    Chief Complaint   Patient presents with    Shortness of Breath    RESTRICTIVE lung disease       HPI:  11/7/2019- Shortness of breath unchanged, daily worse with exertion, has albuterol rescue inhaler but has not used yet. Insurance denied oxygen order due to pt only dx restrictive lung disease. Pt had previous CABG Echo is good. PFT good.   No complaints of breathing when laying flat on back.     10/7/2019- Shortness of breath- severe onset 2 yrs, early 2018, walk to bathroom has palpitations and SOB, relieved with rest only. Does not have albuterol inhaler. No nocturnal arousals, no cough, no wheeze, SOB started after DVT in 1980 has filter placed, worsened gradually over time. Seen by PCP ordered supplemental oxygen, pt not sure why. Short term memory loss- onset 1 yr, wife started noticing, went to neurologist in [place]. Had testing that was not diagnostic.   Social hx: Retired  20 yrs, ; Possible Asbestosis exposure in buildings 1990's, No smoking hx. No pets in home  Family Hx: no Lung cancer, NO COPD, no Asthma  Medical Hx: CABG 2018, followed by cardiology Dr. Lee, requesting notes and last Echo from his office,  no previous pneumonia,          The chief compliant  problem is stable  PFSH:  Past Medical History:   Diagnosis Date    Adrenal insufficiency     Anemia     Anticoagulant long-term use     plavix for blood clots legs and stents years    Blood transfusion     CAD (coronary artery disease)     Cancer     prostate    Cataract     COPD (chronic obstructive pulmonary disease) 11/6/2013    Depression     Diabetes mellitus     Diabetes mellitus type II     DVT (deep venous thrombosis)     Hemorrhoids     History of colon polyps 6/3/2015    Hypertension     PE (pulmonary embolism)     [facility] 2007    Peripheral vascular disease     Urinary tract infection          Past Surgical History:   Procedure Laterality Date     CATARACT EXTRACTION, BILATERAL      CHOLECYSTECTOMY  8/2011    COLON SURGERY      CORONARY ARTERY BYPASS GRAFT       x 5    CORONARY STENT PLACEMENT      2007, last 2011  stent  3 vessel.    EXPLORATORY LAPAROTOMY W/ BOWEL RESECTION  1987    PROSTATE SURGERY  2001    Radical for cancer - Dr Luke Chung    RIGHT FEMUR REPAIR  1987    SMALL INTESTINE SURGERY       Social History     Tobacco Use    Smoking status: Never Smoker    Smokeless tobacco: Never Used   Substance Use Topics    Alcohol use: No    Drug use: No     Family History   Problem Relation Age of Onset    Diabetes Mother     Hypertension Mother     Kidney disease Mother     Hypertension Father     Heart disease Father         MI    Diabetes Sister     Diabetes Brother     Cancer Brother         prostate    Cancer Brother         colon    Heart disease Brother     Cancer Brother         prostate    Diabetes Brother     Hyperlipidemia Brother     Hypertension Brother     Heart disease Brother     No Known Problems Daughter     No Known Problems Son     No Known Problems Daughter      Review of patient's allergies indicates:   Allergen Reactions    No known drug allergies      I have reviewed past medical, family, and social history. I have reviewed previous nurse notes.    Performance Status:The patient's activity level is mobility with asit devices.          Review of Systems   Constitutional: Negative for activity change, appetite change, chills, diaphoresis, fever and unexpected weight change. Positive for Fatigue  HENT: Negative for dental problem, postnasal drip, rhinorrhea, sinus pressure, sinus pain, sneezing, sore throat, trouble swallowing and voice change.    Respiratory: Negative for apnea, cough, chest tightness, wheezing and stridor.  Positive for Shortness of breath  Cardiovascular: Negative for chest pain,  Positive for palpitations and leg swelling.  Gastrointestinal: Negative for abdominal distention,  "abdominal pain, constipation and nausea.   Musculoskeletal: Negative for myalgias and neck pain. Positive for gait problems  Skin: Negative for color change and pallor.   Allergic/Immunologic: Negative for environmental allergies and food allergies.   Neurological: Negative for dizziness, speech difficulty,  numbness and headaches. Positive for short term memory loss, weakness, light-headedness,  Hematological: Negative for adenopathy. Does not bruise/bleed easily.   Psychiatric/Behavioral: Negative for dysphoric mood and sleep disturbance. The patient is not nervous/anxious.           Exam:Comprehensive exam done. BP (!) 154/80 (BP Location: Left arm, Patient Position: Sitting)   Pulse 73   Ht 5' 11" (1.803 m)   Wt 91 kg (200 lb 11.7 oz)   SpO2 98% Comment: on room air at rest  BMI 28.00 kg/m²   Exam included Vitals as listed  Constitutional: He is oriented to person, place, and time. He appears well-developed. No distress.   Nose: Nose normal.   Mouth/Throat: Uvula is midline, oropharynx is clear and moist and mucous membranes are normal. No dental caries. No oropharyngeal exudate, posterior oropharyngeal edema, posterior oropharyngeal erythema or tonsillar abscesses.  Mallapatti (M) score 2  Eyes: Pupils are equal, round, and reactive to light.   Neck: No JVD present. No thyromegaly present.   Cardiovascular: Normal rate, regular rhythm and normal heart sounds. Exam reveals no gallop and no friction rub.   No murmur heard.  Pulmonary/Chest: Effort normal and breath sounds normal. No accessory muscle usage or stridor. No apnea and no tachypnea. No respiratory distress, decreased breath sounds, wheezes, rhonchi, rales, or tenderness.   Musculoskeletal: Normal range of motion walks with cane. exhibits bilateral lower extremity edema.   Lymphadenopathy:     He has no cervical adenopathy.   Neurological:  alert and oriented to person, place, and time. not disoriented.   Skin: Skin is warm and dry. Capillary " refill takes less 2 sec. No cyanosis or erythema. No pallor. Nails show no clubbing.   Psychiatric: normal mood and affect. behavior is normal. Judgment and thought content normal.       Radiographs (ct chest and cxr) reviewed: view by direct vision   X-Ray Chest PA And Lateral 09/24/2019 clear        Labs reviewed       Lab Results   Component Value Date    WBC 5.20 09/24/2019    RBC 4.95 09/24/2019    HGB 14.0 09/24/2019    HCT 44.1 09/24/2019    MCV 89 09/24/2019    MCH 28.3 09/24/2019    MCHC 31.7 (L) 09/24/2019    RDW 12.8 09/24/2019     (L) 09/24/2019    MPV 11.5 09/24/2019    GRAN 3.3 09/24/2019    GRAN 64.3 09/24/2019    LYMPH 1.4 09/24/2019    LYMPH 26.3 09/24/2019    MONO 0.5 09/24/2019    MONO 9.0 09/24/2019    EOS 0.0 09/24/2019    BASO 0.00 09/24/2019    EOSINOPHIL 0.0 09/24/2019    BASOPHIL 0.0 09/24/2019     Results for ELIZABETH ALVARADO (MRN [id number]) as of 10/7/2019 12:10   Ref. Range 9/24/2019 16:02   BNP Latest Ref Range: 0 - 99 pg/mL 12     PFT reviewed  Pulmonary Functions Testing Results:  spirometry with bronchodilator, lung volume by gas dilution was done September 25, 2019.  The FEV1 FVC ratio was 76%, this indicates no airflow obstruction was measured by spirometry technique.  The FEV1 was 94% predicted at 2.5 L.  There was no   significant improvement following bronchodilator.  Total lung capacity was recorded at 60% of predicted on lung volume by gas dilution.       Patient has normal spirometry with no bronchodilator response of significance.  And lung volumes do suggest restriction.  Clinical correlation recommended.       6 min walk study was accomplished September 25, 2019.  Baseline room air saturations 95%.  With ambulation O2 sat felt on 85% by 2 min.  Patient needed 2 L of oxygen with O2 sat fell to 88% and 3 L of oxygen was needed, and then 4 L of oxygen was   eventually need to keep sat in the low 90s.  Patient walked 32% of reference distance at 170 m.     Mini Mental  status Exam in clinic 10/7/2019= 17 Severe Cognitive impairment    Plan:  Clinical impression is resonably certain and repeated evaluation prn +/- follow up will be needed as below.    Chon was seen today for shortness of breath and restrictive lung disease.    Diagnoses and all orders for this visit:    Restrictive lung disease   - continue to monitor    SOB (shortness of breath) on exertion   - continue current medication regiment      Follow up in about 3 months (around 2/7/2020), or if symptoms worsen or fail to improve.    Discussed with patient above for education the following:      Patient Instructions   [facility] [phone], this is an option to purchase oxygen concentrator for home use with out going through insurance.    Working on getting clearance for oxygen if able will have to repeat six minute walk.     Use Albuterol rescue inhaler when short of breath, coughing, chest tightness,     Use before you go for walk.

## 2019-11-13 NOTE — PROGRESS NOTES
Patient, Chon Hurtado (MRN #[id number]), presented with a recent Platelet count less than 150 K/uL consistent with the definition of thrombocytopenia (ICD10 - D69.6).    Platelets   Date Value Ref Range Status   09/24/2019 141 (L) 150 - 350 K/uL Final     The patient's thrombocytopenia was monitored, evaluated, addressed and/or treated. This addendum to the medical record is made on 11/13/2019.

## 2019-11-15 DIAGNOSIS — C61 PROSTATE CA: Primary | ICD-10-CM

## 2019-11-15 DIAGNOSIS — D64.9 ANEMIA, UNSPECIFIED TYPE: ICD-10-CM

## 2019-12-13 NOTE — PROGRESS NOTES
Subjective:       Patient ID: Chon Hurtado is a 83 y.o. male.    Chief Complaint:f/u doing well  HPI:   A 83year-old -American male who has finally decided to go for radiation   therapy for his prostate cancer. The patient has B12 deficiency and iron   deficiency. He has been following with me for mild anemia for years     The patient completed radiation for prostate ca and f/u has been with good controlled psa, pt is here with his wife, recently had both legs painful and swollen and dx with bilateral extensive DVT and started coumadin, INR being monitored by coumadin clinic, feels well, has been going to lymphedema clinic, no complinats recently got out of hospital again from edema    REVIEW OF SYSTEMS:     CONSTITUTIONAL some  weight loss. There is no apparent    change in appetite, fever, night sweats, headaches, fatigue, dizziness, or    weakness.      SKIN: Denies rash, issues with nails, non-healing sores, bleeding, blotching    skin or abnormal bruising. Denies new moles or changes to existing moles.      EYES: Denies eye pain, blurred vision, swelling, redness or discharge.      ENT AND MOUTH: Denies runny nose, stuffiness, sinus trouble or sores. Denies    nosebleeds. Denies, hoarseness, change in voice or swelling in front of the    neck.  waiting on the dentures, now edentulous    CARDIOVASCULAR: Denies chest pain, discomfort or palpitations. Denies neck    swelling or episodes of passing out.      RESPIRATORY: Denies cough, sputum production, blood in sputum, and denies    shortness of breath.      GI: Denies trouble swallowing, indigestion, heartburn, abdominal pain, nausea,    vomiting, diarrhea, altered bowel habits, blood in stool, discoloration of    stools, change in nature of stool, bloating, increased abdominal girth.      GENITOURINARY: No discharge. No pelvic pain or lumps. No rash around groin or  lesions. No urinary frequency, hesitation, painful urination or blood in    urine.  Denies incontinence. No problems with intercourse.      Has bilat dvt now with an abscess    PHYSICAL EXAM:     There were no vitals filed for this visit.    GENERAL: Comfortable looking patient. Patient is in no distress.  Awake, alert and oriented to time, person and place.  No anxiety, or agitation.      HEENT: Normal conjunctivae and eyelids. WNL.  PERRLA 3 to 4 mm. No icterus, no pallor, no congestion, and no discharge noted.edentulous     NECK:  Supple. Trachea is central.  No crepitus.  No JVD or masses.    RESPIRATORY:  No intercostal retractions.  No dullness to percussion.  Chest is clear to auscultation.  No rales, rhonchi or wheezes.  No crepitus.  Good air entry bilaterally.    CARDIOVASCULAR:  S1 and S2 are normally heard without murmurs or gallops.  All peripheral pulses are present.    ABDOMEN:  Normal abdomen.  No hepatosplenomegaly.  No free fluid.  Bowel sounds are present.  No hernia noted. No masses.  No rebound or tenderness.  No guarding or rigidity.  Umbilicus is midline.    LYMPHATICS:  No axillary, cervical, supraclavicular, submental, or inguinal lymphadenopathy.lymphedema+ andf has nurse wrap leg daily    SKIN/MUSCULOSKELETAL:  There is no evidence of excoriation marks or ecchmosis.  No rashes.  No cyanosis.  No clubbing.  No joint or skeletal deformities noted.  Normal range of motion.bilat leg swelling+rt leg absss+ hot and red    NEUROLOGIC:  Higher functions are appropriate.  No cranial nerve deficits.  Normal lisa.  Normal strength.  Motor and sensory functions are normal.  Deep tendon reflexes are normal.      Laboratory:     CBC:  Lab Results   Component Value Date    WBC 4.75 12/12/2019    RBC 4.51 (L) 12/12/2019    HGB 13.0 (L) 12/12/2019    HCT 41.6 12/12/2019    MCV 92 12/12/2019    MCH 28.8 12/12/2019    MCHC 31.3 (L) 12/12/2019    RDW 12.2 12/12/2019     (L) 12/12/2019    MPV 11.2 12/12/2019    GRAN 3.1 12/12/2019    GRAN 66.1 12/12/2019    LYMPH 1.0 12/12/2019     LYMPH 21.7 12/12/2019    MONO 0.5 12/12/2019    MONO 11.4 12/12/2019    EOS 0.0 12/12/2019    BASO 0.01 12/12/2019    EOSINOPHIL 0.0 12/12/2019    BASOPHIL 0.2 12/12/2019       BMP: BMP  Lab Results   Component Value Date     12/12/2019    K 4.4 12/12/2019     12/12/2019    CO2 26 12/12/2019    BUN 17 12/12/2019    CREATININE 1.4 12/12/2019    CALCIUM 11.5 (H) 12/12/2019    ANIONGAP 9 12/12/2019    ESTGFRAFRICA 54 (A) 12/12/2019    EGFRNONAA 47 (A) 12/12/2019       LFT:   Lab Results   Component Value Date    ALT 20 12/12/2019    AST 18 12/12/2019    ALKPHOS 110 12/12/2019    BILITOT 0.4 12/12/2019     Lab Results   Component Value Date    PSA 3.6 11/05/2015    PSA 2.68 01/24/2013    PSA 2.45 04/19/2012   now less than 0.01  Lab Results   Component Value Date    IRON 98 12/12/2019    TIBC 343 12/12/2019    FERRITIN 165 12/12/2019     Assessment/Plan:        0.02 12/2019  Lbgymrhngd08/2019       1.  No finding to suggest an acute infarct or intracranial bleed.    2.  Mild chronic/involutional findings as above.         Anemia is stable. Cont  Observation no intervention needed.    ckd and anemia: cont observation  prostate ca : stable and better post xrt cont urology  New dvt would recommend life long anticoag due to cancer hx and increased risk   cont with lymphedema mx  DM : needs much better control discussed at length. Pt admits to non compliance  HTN: cont meds  rtc 3 months with cbc. Cmp, edison,

## 2020-01-01 ENCOUNTER — CLINICAL SUPPORT (OUTPATIENT)
Dept: CARDIOLOGY | Facility: HOSPITAL | Age: 85
DRG: 284 | End: 2020-01-01
Attending: INTERNAL MEDICINE
Payer: MEDICARE

## 2020-01-01 ENCOUNTER — TELEPHONE (OUTPATIENT)
Dept: FAMILY MEDICINE | Facility: CLINIC | Age: 85
End: 2020-01-01

## 2020-01-01 ENCOUNTER — PES CALL (OUTPATIENT)
Dept: ADMINISTRATIVE | Facility: CLINIC | Age: 85
End: 2020-01-01

## 2020-01-01 ENCOUNTER — DOCUMENT SCAN (OUTPATIENT)
Dept: HOME HEALTH SERVICES | Facility: HOSPITAL | Age: 85
End: 2020-01-01
Payer: MEDICARE

## 2020-01-01 ENCOUNTER — OFFICE VISIT (OUTPATIENT)
Dept: FAMILY MEDICINE | Facility: CLINIC | Age: 85
End: 2020-01-01
Payer: MEDICARE

## 2020-01-01 ENCOUNTER — ANESTHESIA EVENT (OUTPATIENT)
Dept: CARDIOLOGY | Facility: HOSPITAL | Age: 85
DRG: 284 | End: 2020-01-01
Payer: MEDICARE

## 2020-01-01 ENCOUNTER — TELEPHONE (OUTPATIENT)
Dept: HEMATOLOGY/ONCOLOGY | Facility: CLINIC | Age: 85
End: 2020-01-01

## 2020-01-01 ENCOUNTER — OFFICE VISIT (OUTPATIENT)
Dept: HOME HEALTH SERVICES | Facility: CLINIC | Age: 85
End: 2020-01-01
Payer: MEDICARE

## 2020-01-01 ENCOUNTER — OUTPATIENT CASE MANAGEMENT (OUTPATIENT)
Dept: ADMINISTRATIVE | Facility: OTHER | Age: 85
End: 2020-01-01

## 2020-01-01 ENCOUNTER — LAB VISIT (OUTPATIENT)
Dept: LAB | Facility: HOSPITAL | Age: 85
End: 2020-01-01
Attending: INTERNAL MEDICINE
Payer: MEDICARE

## 2020-01-01 ENCOUNTER — EXTERNAL HOME HEALTH (OUTPATIENT)
Dept: HOME HEALTH SERVICES | Facility: HOSPITAL | Age: 85
End: 2020-01-01
Payer: MEDICARE

## 2020-01-01 ENCOUNTER — ANESTHESIA (OUTPATIENT)
Dept: CARDIOLOGY | Facility: HOSPITAL | Age: 85
DRG: 284 | End: 2020-01-01
Payer: MEDICARE

## 2020-01-01 ENCOUNTER — LAB VISIT (OUTPATIENT)
Dept: LAB | Facility: HOSPITAL | Age: 85
End: 2020-01-01
Attending: NURSE PRACTITIONER
Payer: MEDICARE

## 2020-01-01 ENCOUNTER — HOSPITAL ENCOUNTER (EMERGENCY)
Facility: HOSPITAL | Age: 85
Discharge: HOME | End: 2020-07-23
Attending: EMERGENCY MEDICINE
Payer: MEDICARE

## 2020-01-01 ENCOUNTER — LAB VISIT (OUTPATIENT)
Dept: LAB | Facility: HOSPITAL | Age: 85
End: 2020-01-01
Attending: FAMILY MEDICINE
Payer: MEDICARE

## 2020-01-01 ENCOUNTER — PATIENT MESSAGE (OUTPATIENT)
Dept: FAMILY MEDICINE | Facility: CLINIC | Age: 85
End: 2020-01-01

## 2020-01-01 ENCOUNTER — HOSPITAL ENCOUNTER (OUTPATIENT)
Dept: RADIOLOGY | Facility: HOSPITAL | Age: 85
Discharge: HOME | End: 2020-11-06
Attending: INTERNAL MEDICINE
Payer: MEDICARE

## 2020-01-01 ENCOUNTER — PATIENT MESSAGE (OUTPATIENT)
Dept: OTHER | Facility: OTHER | Age: 85
End: 2020-01-01

## 2020-01-01 ENCOUNTER — OFFICE VISIT (OUTPATIENT)
Dept: HEMATOLOGY/ONCOLOGY | Facility: CLINIC | Age: 85
End: 2020-01-01
Payer: MEDICARE

## 2020-01-01 ENCOUNTER — PATIENT OUTREACH (OUTPATIENT)
Dept: ADMINISTRATIVE | Facility: OTHER | Age: 85
End: 2020-01-01

## 2020-01-01 ENCOUNTER — HOSPITAL ENCOUNTER (OUTPATIENT)
Dept: RADIOLOGY | Facility: HOSPITAL | Age: 85
Discharge: HOME | End: 2020-05-01
Attending: FAMILY MEDICINE
Payer: MEDICARE

## 2020-01-01 ENCOUNTER — HOSPITAL ENCOUNTER (EMERGENCY)
Facility: HOSPITAL | Age: 85
Discharge: HOME | End: 2020-09-22
Attending: EMERGENCY MEDICINE
Payer: MEDICARE

## 2020-01-01 ENCOUNTER — PATIENT MESSAGE (OUTPATIENT)
Dept: ADMINISTRATIVE | Facility: HOSPITAL | Age: 85
End: 2020-01-01

## 2020-01-01 ENCOUNTER — HOSPITAL ENCOUNTER (EMERGENCY)
Facility: HOSPITAL | Age: 85
Discharge: HOME | End: 2020-05-12
Attending: EMERGENCY MEDICINE
Payer: MEDICARE

## 2020-01-01 ENCOUNTER — HOSPITAL ENCOUNTER (INPATIENT)
Facility: HOSPITAL | Age: 85
LOS: 9 days | DRG: 284 | End: 2020-11-24
Attending: EMERGENCY MEDICINE | Admitting: INTERNAL MEDICINE
Payer: MEDICARE

## 2020-01-01 VITALS
WEIGHT: 208.13 LBS | DIASTOLIC BLOOD PRESSURE: 64 MMHG | HEART RATE: 89 BPM | SYSTOLIC BLOOD PRESSURE: 111 MMHG | RESPIRATION RATE: 18 BRPM | TEMPERATURE: 98 F | OXYGEN SATURATION: 99 % | TEMPERATURE: 98 F | HEIGHT: 75 IN | HEART RATE: 58 BPM | BODY MASS INDEX: 25.88 KG/M2 | HEIGHT: 74 IN | SYSTOLIC BLOOD PRESSURE: 146 MMHG | RESPIRATION RATE: 16 BRPM | BODY MASS INDEX: 27.05 KG/M2 | DIASTOLIC BLOOD PRESSURE: 62 MMHG | OXYGEN SATURATION: 100 % | WEIGHT: 210.75 LBS

## 2020-01-01 VITALS
BODY MASS INDEX: 26.44 KG/M2 | DIASTOLIC BLOOD PRESSURE: 67 MMHG | TEMPERATURE: 98 F | HEART RATE: 56 BPM | SYSTOLIC BLOOD PRESSURE: 149 MMHG | WEIGHT: 206 LBS | OXYGEN SATURATION: 99 % | HEIGHT: 74 IN

## 2020-01-01 VITALS
DIASTOLIC BLOOD PRESSURE: 79 MMHG | TEMPERATURE: 98 F | SYSTOLIC BLOOD PRESSURE: 179 MMHG | HEART RATE: 50 BPM | RESPIRATION RATE: 18 BRPM | OXYGEN SATURATION: 100 % | WEIGHT: 220 LBS | HEIGHT: 72 IN | BODY MASS INDEX: 29.8 KG/M2

## 2020-01-01 VITALS — BODY MASS INDEX: 26.95 KG/M2 | WEIGHT: 210 LBS | HEIGHT: 74 IN

## 2020-01-01 VITALS — HEIGHT: 74 IN | WEIGHT: 215 LBS | BODY MASS INDEX: 27.59 KG/M2

## 2020-01-01 VITALS
HEIGHT: 74 IN | RESPIRATION RATE: 18 BRPM | OXYGEN SATURATION: 99 % | DIASTOLIC BLOOD PRESSURE: 70 MMHG | BODY MASS INDEX: 26.82 KG/M2 | SYSTOLIC BLOOD PRESSURE: 172 MMHG | TEMPERATURE: 98 F | HEART RATE: 54 BPM | WEIGHT: 209 LBS

## 2020-01-01 VITALS
SYSTOLIC BLOOD PRESSURE: 158 MMHG | HEIGHT: 74 IN | DIASTOLIC BLOOD PRESSURE: 69 MMHG | TEMPERATURE: 98 F | OXYGEN SATURATION: 98 % | WEIGHT: 212.5 LBS | HEART RATE: 69 BPM | BODY MASS INDEX: 27.27 KG/M2

## 2020-01-01 VITALS
SYSTOLIC BLOOD PRESSURE: 181 MMHG | HEART RATE: 52 BPM | OXYGEN SATURATION: 99 % | DIASTOLIC BLOOD PRESSURE: 72 MMHG | TEMPERATURE: 98 F | RESPIRATION RATE: 20 BRPM

## 2020-01-01 DIAGNOSIS — D69.6 THROMBOCYTOPENIA, UNSPECIFIED: ICD-10-CM

## 2020-01-01 DIAGNOSIS — Z79.4 UNCONTROLLED TYPE 2 DIABETES MELLITUS WITH HYPERGLYCEMIA, WITH LONG-TERM CURRENT USE OF INSULIN: Primary | ICD-10-CM

## 2020-01-01 DIAGNOSIS — C61 PROSTATE CA: ICD-10-CM

## 2020-01-01 DIAGNOSIS — Z79.4 CONTROLLED TYPE 2 DIABETES MELLITUS WITH DIABETIC POLYNEUROPATHY, WITH LONG-TERM CURRENT USE OF INSULIN: ICD-10-CM

## 2020-01-01 DIAGNOSIS — N18.30 CONTROLLED TYPE 2 DIABETES MELLITUS WITH STAGE 3 CHRONIC KIDNEY DISEASE, WITH LONG-TERM CURRENT USE OF INSULIN: ICD-10-CM

## 2020-01-01 DIAGNOSIS — E11.22 CKD STAGE 3 DUE TO TYPE 2 DIABETES MELLITUS: ICD-10-CM

## 2020-01-01 DIAGNOSIS — K62.5 RECTAL BLEEDING: Primary | ICD-10-CM

## 2020-01-01 DIAGNOSIS — I25.10 CORONARY ARTERY DISEASE INVOLVING NATIVE CORONARY ARTERY OF NATIVE HEART WITHOUT ANGINA PECTORIS: ICD-10-CM

## 2020-01-01 DIAGNOSIS — Z79.4 UNCONTROLLED TYPE 2 DIABETES MELLITUS WITH HYPERGLYCEMIA, WITH LONG-TERM CURRENT USE OF INSULIN: ICD-10-CM

## 2020-01-01 DIAGNOSIS — E27.40 ADRENAL INSUFFICIENCY: ICD-10-CM

## 2020-01-01 DIAGNOSIS — I20.9 ANGINA PECTORIS, UNSPECIFIED: ICD-10-CM

## 2020-01-01 DIAGNOSIS — J96.11 CHRONIC RESPIRATORY FAILURE WITH HYPOXIA: ICD-10-CM

## 2020-01-01 DIAGNOSIS — L03.119 CELLULITIS AND ABSCESS OF LEG: Primary | ICD-10-CM

## 2020-01-01 DIAGNOSIS — R42 DIZZINESS: ICD-10-CM

## 2020-01-01 DIAGNOSIS — I50.9 CONGESTIVE HEART FAILURE, UNSPECIFIED HF CHRONICITY, UNSPECIFIED HEART FAILURE TYPE: ICD-10-CM

## 2020-01-01 DIAGNOSIS — I46.9 CARDIAC ARREST: ICD-10-CM

## 2020-01-01 DIAGNOSIS — B37.0 THRUSH: ICD-10-CM

## 2020-01-01 DIAGNOSIS — Z79.4 ENCOUNTER FOR LONG-TERM (CURRENT) USE OF INSULIN: ICD-10-CM

## 2020-01-01 DIAGNOSIS — N18.30 CKD STAGE 3 DUE TO TYPE 2 DIABETES MELLITUS: ICD-10-CM

## 2020-01-01 DIAGNOSIS — R07.9 CHEST PAIN: ICD-10-CM

## 2020-01-01 DIAGNOSIS — R30.0 DYSURIA: ICD-10-CM

## 2020-01-01 DIAGNOSIS — Z00.00 ENCOUNTER FOR PREVENTIVE HEALTH EXAMINATION: Primary | ICD-10-CM

## 2020-01-01 DIAGNOSIS — G31.84 MILD COGNITIVE IMPAIRMENT WITH MEMORY LOSS: Primary | ICD-10-CM

## 2020-01-01 DIAGNOSIS — D64.9 ANEMIA, UNSPECIFIED TYPE: ICD-10-CM

## 2020-01-01 DIAGNOSIS — J96.01 ACUTE RESPIRATORY FAILURE WITH HYPOXEMIA: Primary | ICD-10-CM

## 2020-01-01 DIAGNOSIS — L02.419 CELLULITIS AND ABSCESS OF LEG: ICD-10-CM

## 2020-01-01 DIAGNOSIS — E11.42 CONTROLLED TYPE 2 DIABETES MELLITUS WITH DIABETIC POLYNEUROPATHY, WITH LONG-TERM CURRENT USE OF INSULIN: ICD-10-CM

## 2020-01-01 DIAGNOSIS — C61 PROSTATE CA: Primary | ICD-10-CM

## 2020-01-01 DIAGNOSIS — E11.65 UNCONTROLLED TYPE 2 DIABETES MELLITUS WITH HYPERGLYCEMIA, WITH LONG-TERM CURRENT USE OF INSULIN: Primary | ICD-10-CM

## 2020-01-01 DIAGNOSIS — I48.20 CHRONIC ATRIAL FIBRILLATION: ICD-10-CM

## 2020-01-01 DIAGNOSIS — N18.30 CKD (CHRONIC KIDNEY DISEASE) STAGE 3, GFR 30-59 ML/MIN: Chronic | ICD-10-CM

## 2020-01-01 DIAGNOSIS — E11.22 CONTROLLED TYPE 2 DIABETES MELLITUS WITH STAGE 3 CHRONIC KIDNEY DISEASE, WITH LONG-TERM CURRENT USE OF INSULIN: ICD-10-CM

## 2020-01-01 DIAGNOSIS — Z79.4 TYPE 2 DIABETES MELLITUS WITHOUT COMPLICATION, WITH LONG-TERM CURRENT USE OF INSULIN: ICD-10-CM

## 2020-01-01 DIAGNOSIS — M79.89 LEG SWELLING: Primary | ICD-10-CM

## 2020-01-01 DIAGNOSIS — G31.84 MILD COGNITIVE IMPAIRMENT WITH MEMORY LOSS: ICD-10-CM

## 2020-01-01 DIAGNOSIS — L02.419 CELLULITIS AND ABSCESS OF LEG: Primary | ICD-10-CM

## 2020-01-01 DIAGNOSIS — I50.22 CHF (CONGESTIVE HEART FAILURE), NYHA CLASS III, CHRONIC, SYSTOLIC: ICD-10-CM

## 2020-01-01 DIAGNOSIS — I73.9 PVD (PERIPHERAL VASCULAR DISEASE): ICD-10-CM

## 2020-01-01 DIAGNOSIS — R50.9 FUO (FEVER OF UNKNOWN ORIGIN): ICD-10-CM

## 2020-01-01 DIAGNOSIS — N18.30 CKD (CHRONIC KIDNEY DISEASE) STAGE 3, GFR 30-59 ML/MIN: ICD-10-CM

## 2020-01-01 DIAGNOSIS — I80.02 PHLEBITIS AND THROMBOPHLEBITIS OF SUPERFICIAL VESSELS OF LEFT LOWER EXTREMITY: ICD-10-CM

## 2020-01-01 DIAGNOSIS — F34.1: Primary | ICD-10-CM

## 2020-01-01 DIAGNOSIS — E66.3 OVERWEIGHT (BMI 25.0-29.9): ICD-10-CM

## 2020-01-01 DIAGNOSIS — L03.119 CELLULITIS AND ABSCESS OF LEG: ICD-10-CM

## 2020-01-01 DIAGNOSIS — Z79.4 CONTROLLED TYPE 2 DIABETES MELLITUS WITH STAGE 3 CHRONIC KIDNEY DISEASE, WITH LONG-TERM CURRENT USE OF INSULIN: ICD-10-CM

## 2020-01-01 DIAGNOSIS — R06.02 SHORTNESS OF BREATH: ICD-10-CM

## 2020-01-01 DIAGNOSIS — N18.30 STAGE 3 CHRONIC KIDNEY DISEASE, UNSPECIFIED WHETHER STAGE 3A OR 3B CKD: Chronic | ICD-10-CM

## 2020-01-01 DIAGNOSIS — D51.8 OTHER VITAMIN B12 DEFICIENCY ANEMIAS: ICD-10-CM

## 2020-01-01 DIAGNOSIS — R19.7 DIARRHEA OF PRESUMED INFECTIOUS ORIGIN: ICD-10-CM

## 2020-01-01 DIAGNOSIS — E11.59 HYPERTENSION ASSOCIATED WITH DIABETES: ICD-10-CM

## 2020-01-01 DIAGNOSIS — R06.00 DYSPNEA, UNSPECIFIED TYPE: Primary | ICD-10-CM

## 2020-01-01 DIAGNOSIS — J98.4 RESTRICTIVE LUNG DISEASE: ICD-10-CM

## 2020-01-01 DIAGNOSIS — R06.02 SOB (SHORTNESS OF BREATH): ICD-10-CM

## 2020-01-01 DIAGNOSIS — D69.6 THROMBOCYTOPENIA: ICD-10-CM

## 2020-01-01 DIAGNOSIS — I15.2 HYPERTENSION ASSOCIATED WITH DIABETES: ICD-10-CM

## 2020-01-01 DIAGNOSIS — I21.4 NSTEMI (NON-ST ELEVATED MYOCARDIAL INFARCTION): Primary | ICD-10-CM

## 2020-01-01 DIAGNOSIS — F34.1 DYSTHYMIC DISORDER: Primary | ICD-10-CM

## 2020-01-01 DIAGNOSIS — R78.81 POSITIVE BLOOD CULTURES: ICD-10-CM

## 2020-01-01 DIAGNOSIS — J84.10 LUNG GRANULOMA: ICD-10-CM

## 2020-01-01 DIAGNOSIS — E11.65 UNCONTROLLED TYPE 2 DIABETES MELLITUS WITH HYPERGLYCEMIA, WITH LONG-TERM CURRENT USE OF INSULIN: ICD-10-CM

## 2020-01-01 DIAGNOSIS — I82.412 FEMORAL VEIN THROMBOSIS, LEFT: ICD-10-CM

## 2020-01-01 DIAGNOSIS — K92.2 GI BLEED: ICD-10-CM

## 2020-01-01 DIAGNOSIS — K21.9 GASTROESOPHAGEAL REFLUX DISEASE, ESOPHAGITIS PRESENCE NOT SPECIFIED: Primary | ICD-10-CM

## 2020-01-01 DIAGNOSIS — N18.30 CHRONIC KIDNEY DISEASE, STAGE III (MODERATE): Primary | ICD-10-CM

## 2020-01-01 DIAGNOSIS — E11.9 TYPE 2 DIABETES MELLITUS WITHOUT COMPLICATION, WITH LONG-TERM CURRENT USE OF INSULIN: ICD-10-CM

## 2020-01-01 DIAGNOSIS — E11.69 HYPERLIPIDEMIA ASSOCIATED WITH TYPE 2 DIABETES MELLITUS: ICD-10-CM

## 2020-01-01 DIAGNOSIS — E78.5 HYPERLIPIDEMIA ASSOCIATED WITH TYPE 2 DIABETES MELLITUS: ICD-10-CM

## 2020-01-01 DIAGNOSIS — F34.1: ICD-10-CM

## 2020-01-01 DIAGNOSIS — R42 DIZZINESS: Primary | ICD-10-CM

## 2020-01-01 DIAGNOSIS — R10.13 EPIGASTRIC PAIN: ICD-10-CM

## 2020-01-01 DIAGNOSIS — I82.432 ACUTE DEEP VEIN THROMBOSIS (DVT) OF POPLITEAL VEIN OF LEFT LOWER EXTREMITY: Primary | ICD-10-CM

## 2020-01-01 DIAGNOSIS — R60.0 BILATERAL LOWER EXTREMITY EDEMA: ICD-10-CM

## 2020-01-01 DIAGNOSIS — E21.4 OTHER SPECIFIED DISORDERS OF PARATHYROID GLAND: ICD-10-CM

## 2020-01-01 DIAGNOSIS — K64.0 GRADE I HEMORRHOIDS: ICD-10-CM

## 2020-01-01 DIAGNOSIS — I82.403 DEEP VEIN THROMBOSIS (DVT) OF BOTH LOWER EXTREMITIES, UNSPECIFIED CHRONICITY, UNSPECIFIED VEIN: ICD-10-CM

## 2020-01-01 LAB
ABO + RH BLD: NORMAL
ALBUMIN SERPL BCP-MCNC: 2.1 G/DL (ref 3.5–5.2)
ALBUMIN SERPL BCP-MCNC: 2.5 G/DL (ref 3.5–5.2)
ALBUMIN SERPL BCP-MCNC: 2.9 G/DL (ref 3.5–5.2)
ALBUMIN SERPL BCP-MCNC: 3.6 G/DL (ref 3.5–5.2)
ALBUMIN SERPL BCP-MCNC: 3.9 G/DL (ref 3.5–5.2)
ALBUMIN SERPL BCP-MCNC: 4 G/DL (ref 3.5–5.2)
ALBUMIN SERPL BCP-MCNC: 4.3 G/DL (ref 3.5–5.2)
ALLENS TEST: ABNORMAL
ALP SERPL-CCNC: 107 U/L (ref 55–135)
ALP SERPL-CCNC: 107 U/L (ref 55–135)
ALP SERPL-CCNC: 58 U/L (ref 55–135)
ALP SERPL-CCNC: 59 U/L (ref 55–135)
ALP SERPL-CCNC: 59 U/L (ref 55–135)
ALP SERPL-CCNC: 71 U/L (ref 55–135)
ALP SERPL-CCNC: 72 U/L (ref 55–135)
ALP SERPL-CCNC: 82 U/L (ref 55–135)
ALP SERPL-CCNC: 84 U/L (ref 55–135)
ALP SERPL-CCNC: 87 U/L (ref 55–135)
ALT SERPL W/O P-5'-P-CCNC: 14 U/L (ref 10–44)
ALT SERPL W/O P-5'-P-CCNC: 15 U/L (ref 10–44)
ALT SERPL W/O P-5'-P-CCNC: 19 U/L (ref 10–44)
ALT SERPL W/O P-5'-P-CCNC: 20 U/L (ref 10–44)
ALT SERPL W/O P-5'-P-CCNC: 23 U/L (ref 10–44)
ALT SERPL W/O P-5'-P-CCNC: 23 U/L (ref 10–44)
ALT SERPL W/O P-5'-P-CCNC: 25 U/L (ref 10–44)
ALT SERPL W/O P-5'-P-CCNC: 27 U/L (ref 10–44)
AMYLASE SERPL-CCNC: 60 U/L (ref 20–110)
ANA SER-ACNC: NEGATIVE
ANION GAP SERPL CALC-SCNC: 10 MMOL/L (ref 8–16)
ANION GAP SERPL CALC-SCNC: 11 MMOL/L (ref 8–16)
ANION GAP SERPL CALC-SCNC: 12 MMOL/L (ref 8–16)
ANION GAP SERPL CALC-SCNC: 13 MMOL/L (ref 8–16)
ANION GAP SERPL CALC-SCNC: 14 MMOL/L (ref 8–16)
ANION GAP SERPL CALC-SCNC: 16 MMOL/L (ref 8–16)
ANION GAP SERPL CALC-SCNC: 5 MMOL/L (ref 8–16)
ANION GAP SERPL CALC-SCNC: 7 MMOL/L (ref 8–16)
ANION GAP SERPL CALC-SCNC: 8 MMOL/L (ref 8–16)
ANION GAP SERPL CALC-SCNC: 9 MMOL/L (ref 8–16)
AORTIC ROOT ANNULUS: 3.44 CM
AORTIC VALVE CUSP SEPERATION: 2.23 CM
APTT PPP: 103.5 SEC (ref 23.6–33.3)
APTT PPP: 108.5 SEC (ref 23.6–33.3)
APTT PPP: 37.2 SEC (ref 23.6–33.3)
APTT PPP: 47.1 SEC (ref 23.6–33.3)
APTT PPP: 49.5 SEC (ref 23.6–33.3)
APTT PPP: 49.5 SEC (ref 23.6–33.3)
AST SERPL-CCNC: 145 U/L (ref 10–40)
AST SERPL-CCNC: 19 U/L (ref 10–40)
AST SERPL-CCNC: 19 U/L (ref 10–40)
AST SERPL-CCNC: 20 U/L (ref 10–40)
AST SERPL-CCNC: 20 U/L (ref 10–40)
AST SERPL-CCNC: 22 U/L (ref 10–40)
AST SERPL-CCNC: 23 U/L (ref 10–40)
AST SERPL-CCNC: 49 U/L (ref 10–40)
AV INDEX (PROSTH): 0.61
AV MEAN GRADIENT: 8 MMHG
AV PEAK GRADIENT: 13 MMHG
AV VALVE AREA: 1.93 CM2
AV VELOCITY RATIO: 55.1
BACTERIA #/AREA URNS HPF: NEGATIVE /HPF
BACTERIA #/AREA URNS HPF: NEGATIVE /HPF
BACTERIA BLD CULT: ABNORMAL
BACTERIA UR CULT: NO GROWTH
BASOPHILS # BLD AUTO: 0 K/UL (ref 0–0.2)
BASOPHILS # BLD AUTO: 0.01 K/UL (ref 0–0.2)
BASOPHILS # BLD AUTO: 0.02 K/UL (ref 0–0.2)
BASOPHILS # BLD AUTO: 0.02 K/UL (ref 0–0.2)
BASOPHILS NFR BLD: 0 % (ref 0–1.9)
BASOPHILS NFR BLD: 0.1 % (ref 0–1.9)
BASOPHILS NFR BLD: 0.2 % (ref 0–1.9)
BILIRUB SERPL-MCNC: 0.2 MG/DL (ref 0.1–1)
BILIRUB SERPL-MCNC: 0.3 MG/DL (ref 0.1–1)
BILIRUB SERPL-MCNC: 0.4 MG/DL (ref 0.1–1)
BILIRUB SERPL-MCNC: 0.4 MG/DL (ref 0.1–1)
BILIRUB SERPL-MCNC: 0.5 MG/DL (ref 0.1–1)
BILIRUB SERPL-MCNC: 0.6 MG/DL (ref 0.1–1)
BILIRUB SERPL-MCNC: 0.6 MG/DL (ref 0.1–1)
BILIRUB SERPL-MCNC: 0.8 MG/DL (ref 0.1–1)
BILIRUB SERPL-MCNC: 0.9 MG/DL (ref 0.1–1)
BILIRUB SERPL-MCNC: 1.3 MG/DL (ref 0.1–1)
BILIRUB SERPL-MCNC: ABNORMAL MG/DL
BILIRUB UR QL STRIP: NEGATIVE
BILIRUB UR QL STRIP: NEGATIVE
BLD GP AB SCN CELLS X3 SERPL QL: NORMAL
BLOOD URINE, POC: ABNORMAL
BNP SERPL-MCNC: 103 PG/ML (ref 0–99)
BNP SERPL-MCNC: 3469 PG/ML (ref 0–99)
BNP SERPL-MCNC: 39 PG/ML (ref 0–99)
BNP SERPL-MCNC: 59 PG/ML (ref 0–99)
BNP SERPL-MCNC: 697 PG/ML (ref 0–99)
BSA FOR ECHO PROCEDURE: 2.23 M2
BSA FOR ECHO PROCEDURE: 2.26 M2
BUN SERPL-MCNC: 15 MG/DL (ref 8–23)
BUN SERPL-MCNC: 19 MG/DL (ref 8–23)
BUN SERPL-MCNC: 24 MG/DL (ref 8–23)
BUN SERPL-MCNC: 27 MG/DL (ref 8–23)
BUN SERPL-MCNC: 30 MG/DL (ref 8–23)
BUN SERPL-MCNC: 33 MG/DL (ref 8–23)
BUN SERPL-MCNC: 34 MG/DL (ref 8–23)
BUN SERPL-MCNC: 36 MG/DL (ref 8–23)
BUN SERPL-MCNC: 37 MG/DL (ref 8–23)
BUN SERPL-MCNC: 39 MG/DL (ref 8–23)
BUN SERPL-MCNC: 41 MG/DL (ref 8–23)
BUN SERPL-MCNC: 43 MG/DL (ref 8–23)
BUN SERPL-MCNC: 47 MG/DL (ref 8–23)
BUN SERPL-MCNC: 74 MG/DL (ref 8–23)
BUN SERPL-MCNC: 79 MG/DL (ref 8–23)
BUN SERPL-MCNC: 86 MG/DL (ref 8–23)
C DIFF GDH STL QL: NEGATIVE
C DIFF TOX A+B STL QL IA: NEGATIVE
CALCIUM SERPL-MCNC: 10 MG/DL (ref 8.7–10.5)
CALCIUM SERPL-MCNC: 10.1 MG/DL (ref 8.7–10.5)
CALCIUM SERPL-MCNC: 10.3 MG/DL (ref 8.7–10.5)
CALCIUM SERPL-MCNC: 10.5 MG/DL (ref 8.7–10.5)
CALCIUM SERPL-MCNC: 10.8 MG/DL (ref 8.7–10.5)
CALCIUM SERPL-MCNC: 10.9 MG/DL (ref 8.7–10.5)
CALCIUM SERPL-MCNC: 8 MG/DL (ref 8.7–10.5)
CALCIUM SERPL-MCNC: 9.2 MG/DL (ref 8.7–10.5)
CALCIUM SERPL-MCNC: 9.5 MG/DL (ref 8.7–10.5)
CALCIUM SERPL-MCNC: 9.6 MG/DL (ref 8.7–10.5)
CALCIUM SERPL-MCNC: 9.7 MG/DL (ref 8.7–10.5)
CALCIUM SERPL-MCNC: 9.9 MG/DL (ref 8.7–10.5)
CHLORIDE SERPL-SCNC: 105 MMOL/L (ref 95–110)
CHLORIDE SERPL-SCNC: 106 MMOL/L (ref 95–110)
CHLORIDE SERPL-SCNC: 107 MMOL/L (ref 95–110)
CHLORIDE SERPL-SCNC: 107 MMOL/L (ref 95–110)
CHLORIDE SERPL-SCNC: 108 MMOL/L (ref 95–110)
CHLORIDE SERPL-SCNC: 109 MMOL/L (ref 95–110)
CHLORIDE SERPL-SCNC: 110 MMOL/L (ref 95–110)
CHLORIDE SERPL-SCNC: 112 MMOL/L (ref 95–110)
CK MB SERPL-MCNC: 10 NG/ML (ref 0.1–6.5)
CK SERPL-CCNC: 314 U/L (ref 20–200)
CLARITY UR: CLEAR
CLARITY UR: CLEAR
CLARITY, POC UA: CLEAR
CO2 SERPL-SCNC: 10 MMOL/L (ref 23–29)
CO2 SERPL-SCNC: 13 MMOL/L (ref 23–29)
CO2 SERPL-SCNC: 18 MMOL/L (ref 23–29)
CO2 SERPL-SCNC: 19 MMOL/L (ref 23–29)
CO2 SERPL-SCNC: 20 MMOL/L (ref 23–29)
CO2 SERPL-SCNC: 20 MMOL/L (ref 23–29)
CO2 SERPL-SCNC: 21 MMOL/L (ref 23–29)
CO2 SERPL-SCNC: 22 MMOL/L (ref 23–29)
CO2 SERPL-SCNC: 23 MMOL/L (ref 23–29)
CO2 SERPL-SCNC: 24 MMOL/L (ref 23–29)
CO2 SERPL-SCNC: 25 MMOL/L (ref 23–29)
CO2 SERPL-SCNC: 25 MMOL/L (ref 23–29)
COLOR UR: YELLOW
COLOR UR: YELLOW
COLOR, POC UA: YELLOW
COMPLEXED PSA SERPL-MCNC: 0.02 NG/ML (ref 0–4)
CORTIS SERPL-MCNC: 16.3 UG/DL
CREAT SERPL-MCNC: 1.3 MG/DL (ref 0.5–1.4)
CREAT SERPL-MCNC: 1.4 MG/DL (ref 0.5–1.4)
CREAT SERPL-MCNC: 1.4 MG/DL (ref 0.5–1.4)
CREAT SERPL-MCNC: 1.5 MG/DL (ref 0.5–1.4)
CREAT SERPL-MCNC: 1.6 MG/DL (ref 0.5–1.4)
CREAT SERPL-MCNC: 1.6 MG/DL (ref 0.5–1.4)
CREAT SERPL-MCNC: 1.7 MG/DL (ref 0.5–1.4)
CREAT SERPL-MCNC: 1.7 MG/DL (ref 0.5–1.4)
CREAT SERPL-MCNC: 1.8 MG/DL (ref 0.5–1.4)
CREAT SERPL-MCNC: 1.9 MG/DL (ref 0.5–1.4)
CREAT SERPL-MCNC: 2.4 MG/DL (ref 0.5–1.4)
CREAT SERPL-MCNC: 2.5 MG/DL (ref 0.5–1.4)
CREAT SERPL-MCNC: 2.9 MG/DL (ref 0.5–1.4)
CRP SERPL-MCNC: 14.76 MG/DL
CRP SERPL-MCNC: 15.42 MG/DL
CRP SERPL-MCNC: 15.59 MG/DL
CRP SERPL-MCNC: 16.22 MG/DL
CRP SERPL-MCNC: 16.8 MG/DL
CRP SERPL-MCNC: 17.45 MG/DL
CRP SERPL-MCNC: 18.02 MG/DL
CRP SERPL-MCNC: 19.4 MG/DL
CV ECHO LV RWT: 0.46 CM
DELSYS: ABNORMAL
DIFFERENTIAL METHOD: ABNORMAL
DOP CALC AO PEAK VEL: 1.83 M/S
DOP CALC AO VTI: 39.8 CM
DOP CALC LVOT AREA: 3.1 CM2
DOP CALC LVOT DIAMETER: 2 CM
DOP CALC LVOT PEAK VEL: 100.83 M/S
DOP CALC LVOT STROKE VOLUME: 76.84 CM3
DOP CALCLVOT PEAK VEL VTI: 24.47 CM
E WAVE DECELERATION TIME: 208.11 MSEC
E/A RATIO: 2.22
E/E' RATIO: 20.15 M/S
ECHO LV POSTERIOR WALL: 1.09 CM (ref 0.6–1.1)
EOSINOPHIL # BLD AUTO: 0 K/UL (ref 0–0.5)
EOSINOPHIL NFR BLD: 0 % (ref 0–8)
ERYTHROCYTE [DISTWIDTH] IN BLOOD BY AUTOMATED COUNT: 12.3 % (ref 11.5–14.5)
ERYTHROCYTE [DISTWIDTH] IN BLOOD BY AUTOMATED COUNT: 12.4 % (ref 11.5–14.5)
ERYTHROCYTE [DISTWIDTH] IN BLOOD BY AUTOMATED COUNT: 12.5 % (ref 11.5–14.5)
ERYTHROCYTE [DISTWIDTH] IN BLOOD BY AUTOMATED COUNT: 12.5 % (ref 11.5–14.5)
ERYTHROCYTE [DISTWIDTH] IN BLOOD BY AUTOMATED COUNT: 12.6 % (ref 11.5–14.5)
ERYTHROCYTE [DISTWIDTH] IN BLOOD BY AUTOMATED COUNT: 12.6 % (ref 11.5–14.5)
ERYTHROCYTE [DISTWIDTH] IN BLOOD BY AUTOMATED COUNT: 12.8 % (ref 11.5–14.5)
ERYTHROCYTE [DISTWIDTH] IN BLOOD BY AUTOMATED COUNT: 13.1 % (ref 11.5–14.5)
ERYTHROCYTE [DISTWIDTH] IN BLOOD BY AUTOMATED COUNT: 13.2 % (ref 11.5–14.5)
ERYTHROCYTE [DISTWIDTH] IN BLOOD BY AUTOMATED COUNT: 13.2 % (ref 11.5–14.5)
ERYTHROCYTE [DISTWIDTH] IN BLOOD BY AUTOMATED COUNT: 13.3 % (ref 11.5–14.5)
ERYTHROCYTE [DISTWIDTH] IN BLOOD BY AUTOMATED COUNT: 13.6 % (ref 11.5–14.5)
ERYTHROCYTE [DISTWIDTH] IN BLOOD BY AUTOMATED COUNT: 13.7 % (ref 11.5–14.5)
ERYTHROCYTE [DISTWIDTH] IN BLOOD BY AUTOMATED COUNT: 13.8 % (ref 11.5–14.5)
ERYTHROCYTE [DISTWIDTH] IN BLOOD BY AUTOMATED COUNT: 14.1 % (ref 11.5–14.5)
EST. GFR  (AFRICAN AMERICAN): 22.3 ML/MIN/1.73 M^2
EST. GFR  (AFRICAN AMERICAN): 26.6 ML/MIN/1.73 M^2
EST. GFR  (AFRICAN AMERICAN): 28 ML/MIN/1.73 M^2
EST. GFR  (AFRICAN AMERICAN): 37.1 ML/MIN/1.73 M^2
EST. GFR  (AFRICAN AMERICAN): 39.6 ML/MIN/1.73 M^2
EST. GFR  (AFRICAN AMERICAN): 42.5 ML/MIN/1.73 M^2
EST. GFR  (AFRICAN AMERICAN): 42.5 ML/MIN/1.73 M^2
EST. GFR  (AFRICAN AMERICAN): 45.7 ML/MIN/1.73 M^2
EST. GFR  (AFRICAN AMERICAN): 46 ML/MIN/1.73 M^2
EST. GFR  (AFRICAN AMERICAN): 49 ML/MIN/1.73 M^2
EST. GFR  (AFRICAN AMERICAN): 53.7 ML/MIN/1.73 M^2
EST. GFR  (AFRICAN AMERICAN): 54 ML/MIN/1.73 M^2
EST. GFR  (AFRICAN AMERICAN): 58.7 ML/MIN/1.73 M^2
EST. GFR  (NON AFRICAN AMERICAN): 19.3 ML/MIN/1.73 M^2
EST. GFR  (NON AFRICAN AMERICAN): 23 ML/MIN/1.73 M^2
EST. GFR  (NON AFRICAN AMERICAN): 24.2 ML/MIN/1.73 M^2
EST. GFR  (NON AFRICAN AMERICAN): 32.1 ML/MIN/1.73 M^2
EST. GFR  (NON AFRICAN AMERICAN): 34.3 ML/MIN/1.73 M^2
EST. GFR  (NON AFRICAN AMERICAN): 36.7 ML/MIN/1.73 M^2
EST. GFR  (NON AFRICAN AMERICAN): 36.7 ML/MIN/1.73 M^2
EST. GFR  (NON AFRICAN AMERICAN): 39.5 ML/MIN/1.73 M^2
EST. GFR  (NON AFRICAN AMERICAN): 40 ML/MIN/1.73 M^2
EST. GFR  (NON AFRICAN AMERICAN): 43 ML/MIN/1.73 M^2
EST. GFR  (NON AFRICAN AMERICAN): 46 ML/MIN/1.73 M^2
EST. GFR  (NON AFRICAN AMERICAN): 46.5 ML/MIN/1.73 M^2
EST. GFR  (NON AFRICAN AMERICAN): 50.8 ML/MIN/1.73 M^2
ESTIMATED AVG GLUCOSE: 192 MG/DL (ref 68–131)
FERRITIN SERPL-MCNC: 157 NG/ML (ref 20–300)
FERRITIN SERPL-MCNC: 229 NG/ML (ref 20–300)
FLOW: 2
FRACTIONAL SHORTENING: 29 % (ref 28–44)
GLUCOSE SERPL-MCNC: 115 MG/DL (ref 70–110)
GLUCOSE SERPL-MCNC: 131 MG/DL (ref 70–110)
GLUCOSE SERPL-MCNC: 135 MG/DL (ref 70–110)
GLUCOSE SERPL-MCNC: 143 MG/DL (ref 70–110)
GLUCOSE SERPL-MCNC: 144 MG/DL (ref 70–110)
GLUCOSE SERPL-MCNC: 149 MG/DL (ref 70–110)
GLUCOSE SERPL-MCNC: 158 MG/DL (ref 70–110)
GLUCOSE SERPL-MCNC: 162 MG/DL (ref 70–110)
GLUCOSE SERPL-MCNC: 177 MG/DL (ref 70–110)
GLUCOSE SERPL-MCNC: 177 MG/DL (ref 70–110)
GLUCOSE SERPL-MCNC: 180 MG/DL (ref 70–110)
GLUCOSE SERPL-MCNC: 184 MG/DL (ref 70–110)
GLUCOSE SERPL-MCNC: 186 MG/DL (ref 70–110)
GLUCOSE SERPL-MCNC: 189 MG/DL (ref 70–110)
GLUCOSE SERPL-MCNC: 190 MG/DL (ref 70–110)
GLUCOSE SERPL-MCNC: 190 MG/DL (ref 70–110)
GLUCOSE SERPL-MCNC: 192 MG/DL (ref 70–110)
GLUCOSE SERPL-MCNC: 194 MG/DL (ref 70–110)
GLUCOSE SERPL-MCNC: 197 MG/DL (ref 70–110)
GLUCOSE SERPL-MCNC: 198 MG/DL (ref 70–110)
GLUCOSE SERPL-MCNC: 198 MG/DL (ref 70–110)
GLUCOSE SERPL-MCNC: 201 MG/DL (ref 70–110)
GLUCOSE SERPL-MCNC: 201 MG/DL (ref 70–110)
GLUCOSE SERPL-MCNC: 202 MG/DL (ref 70–110)
GLUCOSE SERPL-MCNC: 203 MG/DL (ref 70–110)
GLUCOSE SERPL-MCNC: 205 MG/DL (ref 70–110)
GLUCOSE SERPL-MCNC: 213 MG/DL (ref 70–110)
GLUCOSE SERPL-MCNC: 214 MG/DL (ref 70–110)
GLUCOSE SERPL-MCNC: 215 MG/DL (ref 70–110)
GLUCOSE SERPL-MCNC: 221 MG/DL (ref 70–110)
GLUCOSE SERPL-MCNC: 226 MG/DL (ref 70–110)
GLUCOSE SERPL-MCNC: 234 MG/DL (ref 70–110)
GLUCOSE SERPL-MCNC: 237 MG/DL (ref 70–110)
GLUCOSE SERPL-MCNC: 238 MG/DL (ref 70–110)
GLUCOSE SERPL-MCNC: 239 MG/DL (ref 70–110)
GLUCOSE SERPL-MCNC: 243 MG/DL (ref 70–110)
GLUCOSE SERPL-MCNC: 246 MG/DL (ref 70–110)
GLUCOSE SERPL-MCNC: 247 MG/DL (ref 70–110)
GLUCOSE SERPL-MCNC: 264 MG/DL (ref 70–110)
GLUCOSE SERPL-MCNC: 267 MG/DL (ref 70–110)
GLUCOSE SERPL-MCNC: 270 MG/DL (ref 70–110)
GLUCOSE SERPL-MCNC: 270 MG/DL (ref 70–110)
GLUCOSE SERPL-MCNC: 274 MG/DL (ref 70–110)
GLUCOSE SERPL-MCNC: 276 MG/DL (ref 70–110)
GLUCOSE SERPL-MCNC: 278 MG/DL (ref 70–110)
GLUCOSE SERPL-MCNC: 281 MG/DL (ref 70–110)
GLUCOSE SERPL-MCNC: 289 MG/DL (ref 70–110)
GLUCOSE SERPL-MCNC: 298 MG/DL (ref 70–110)
GLUCOSE SERPL-MCNC: 301 MG/DL (ref 70–110)
GLUCOSE SERPL-MCNC: 302 MG/DL (ref 70–110)
GLUCOSE SERPL-MCNC: 311 MG/DL (ref 70–110)
GLUCOSE SERPL-MCNC: 313 MG/DL (ref 70–110)
GLUCOSE SERPL-MCNC: 340 MG/DL (ref 70–110)
GLUCOSE SERPL-MCNC: 69 MG/DL (ref 70–110)
GLUCOSE SERPL-MCNC: 85 MG/DL (ref 70–110)
GLUCOSE SERPL-MCNC: 98 MG/DL (ref 70–110)
GLUCOSE UR QL STRIP: 100
GLUCOSE UR QL STRIP: NEGATIVE
GLUCOSE UR QL STRIP: NEGATIVE
HBA1C MFR BLD HPLC: 8.3 % (ref 4.5–6.2)
HCO3 UR-SCNC: 6.6 MMOL/L (ref 24–28)
HCT VFR BLD AUTO: 27.2 % (ref 40–54)
HCT VFR BLD AUTO: 28.1 % (ref 40–54)
HCT VFR BLD AUTO: 28.4 % (ref 40–54)
HCT VFR BLD AUTO: 29.2 % (ref 40–54)
HCT VFR BLD AUTO: 29.4 % (ref 40–54)
HCT VFR BLD AUTO: 29.8 % (ref 40–54)
HCT VFR BLD AUTO: 30.4 % (ref 40–54)
HCT VFR BLD AUTO: 30.6 % (ref 40–54)
HCT VFR BLD AUTO: 31 % (ref 40–54)
HCT VFR BLD AUTO: 34.3 % (ref 40–54)
HCT VFR BLD AUTO: 35.7 % (ref 40–54)
HCT VFR BLD AUTO: 35.8 % (ref 40–54)
HCT VFR BLD AUTO: 36.2 % (ref 40–54)
HCT VFR BLD AUTO: 38.8 % (ref 40–54)
HCT VFR BLD AUTO: 39.6 % (ref 40–54)
HCT VFR BLD AUTO: 39.7 % (ref 40–54)
HCT VFR BLD AUTO: 40.1 % (ref 40–54)
HCT VFR BLD CALC: 34 %PCV (ref 36–54)
HGB BLD-MCNC: 10.8 G/DL (ref 14–18)
HGB BLD-MCNC: 10.8 G/DL (ref 14–18)
HGB BLD-MCNC: 11 G/DL (ref 14–18)
HGB BLD-MCNC: 11.2 G/DL (ref 14–18)
HGB BLD-MCNC: 11.5 G/DL (ref 14–18)
HGB BLD-MCNC: 12 G/DL (ref 14–18)
HGB BLD-MCNC: 8 G/DL (ref 14–18)
HGB BLD-MCNC: 8.7 G/DL (ref 14–18)
HGB BLD-MCNC: 9 G/DL (ref 14–18)
HGB BLD-MCNC: 9 G/DL (ref 14–18)
HGB BLD-MCNC: 9.3 G/DL (ref 14–18)
HGB BLD-MCNC: 9.4 G/DL (ref 14–18)
HGB BLD-MCNC: 9.4 G/DL (ref 14–18)
HGB BLD-MCNC: 9.6 G/DL (ref 14–18)
HGB BLD-MCNC: 9.8 G/DL (ref 14–18)
HGB UR QL STRIP: ABNORMAL
HGB UR QL STRIP: NEGATIVE
HYALINE CASTS #/AREA URNS LPF: 1 /LPF
HYALINE CASTS #/AREA URNS LPF: 28 /LPF
IMM GRANULOCYTES # BLD AUTO: 0.01 K/UL (ref 0–0.04)
IMM GRANULOCYTES # BLD AUTO: 0.02 K/UL (ref 0–0.04)
IMM GRANULOCYTES # BLD AUTO: 0.02 K/UL (ref 0–0.04)
IMM GRANULOCYTES # BLD AUTO: 0.09 K/UL (ref 0–0.04)
IMM GRANULOCYTES # BLD AUTO: 0.11 K/UL (ref 0–0.04)
IMM GRANULOCYTES # BLD AUTO: 0.13 K/UL (ref 0–0.04)
IMM GRANULOCYTES # BLD AUTO: 0.27 K/UL (ref 0–0.04)
IMM GRANULOCYTES # BLD AUTO: 0.43 K/UL (ref 0–0.04)
IMM GRANULOCYTES NFR BLD AUTO: 0.2 % (ref 0–0.5)
IMM GRANULOCYTES NFR BLD AUTO: 0.3 % (ref 0–0.5)
IMM GRANULOCYTES NFR BLD AUTO: 0.4 % (ref 0–0.5)
IMM GRANULOCYTES NFR BLD AUTO: 0.5 % (ref 0–0.5)
IMM GRANULOCYTES NFR BLD AUTO: 0.7 % (ref 0–0.5)
IMM GRANULOCYTES NFR BLD AUTO: 0.9 % (ref 0–0.5)
IMM GRANULOCYTES NFR BLD AUTO: 0.9 % (ref 0–0.5)
IMM GRANULOCYTES NFR BLD AUTO: 1.7 % (ref 0–0.5)
IMM GRANULOCYTES NFR BLD AUTO: 2.8 % (ref 0–0.5)
INFLUENZA A, MOLECULAR: NEGATIVE
INFLUENZA B, MOLECULAR: NEGATIVE
INR PPP: 0.9 (ref 0.8–1.2)
INR PPP: 1.2
INR PPP: 1.3
INR PPP: 1.7
INR PPP: 1.8
INTERVENTRICULAR SEPTUM: 1.38 CM (ref 0.6–1.1)
IRON SERPL-MCNC: 56 UG/DL (ref 45–160)
IRON SERPL-MCNC: 59 UG/DL (ref 45–160)
IVRT: 74.59 MSEC
KETONES UR QL STRIP: ABNORMAL
KETONES UR QL STRIP: NEGATIVE
KETONES UR QL STRIP: NEGATIVE
LACTATE SERPL-SCNC: 1.5 MMOL/L (ref 0.5–1.9)
LEFT ATRIUM SIZE: 4.19 CM
LEFT INTERNAL DIMENSION IN SYSTOLE: 3.38 CM (ref 2.1–4)
LEFT VENTRICLE MASS INDEX: 100 G/M2
LEFT VENTRICULAR INTERNAL DIMENSION IN DIASTOLE: 4.74 CM (ref 3.5–6)
LEFT VENTRICULAR MASS: 223.83 G
LEUKOCYTE ESTERASE UR QL STRIP: ABNORMAL
LEUKOCYTE ESTERASE UR QL STRIP: NEGATIVE
LEUKOCYTE ESTERASE URINE, POC: ABNORMAL
LIPASE SERPL-CCNC: 26 U/L (ref 4–60)
LV LATERAL E/E' RATIO: 21.83 M/S
LV SEPTAL E/E' RATIO: 18.71 M/S
LYMPHOCYTES # BLD AUTO: 0.6 K/UL (ref 1–4.8)
LYMPHOCYTES # BLD AUTO: 0.6 K/UL (ref 1–4.8)
LYMPHOCYTES # BLD AUTO: 0.7 K/UL (ref 1–4.8)
LYMPHOCYTES # BLD AUTO: 0.8 K/UL (ref 1–4.8)
LYMPHOCYTES # BLD AUTO: 0.8 K/UL (ref 1–4.8)
LYMPHOCYTES # BLD AUTO: 0.9 K/UL (ref 1–4.8)
LYMPHOCYTES # BLD AUTO: 1 K/UL (ref 1–4.8)
LYMPHOCYTES # BLD AUTO: 1.1 K/UL (ref 1–4.8)
LYMPHOCYTES # BLD AUTO: 1.2 K/UL (ref 1–4.8)
LYMPHOCYTES # BLD AUTO: 1.2 K/UL (ref 1–4.8)
LYMPHOCYTES # BLD AUTO: 1.8 K/UL (ref 1–4.8)
LYMPHOCYTES NFR BLD: 11.9 % (ref 18–48)
LYMPHOCYTES NFR BLD: 18.2 % (ref 18–48)
LYMPHOCYTES NFR BLD: 18.6 % (ref 18–48)
LYMPHOCYTES NFR BLD: 20 % (ref 18–48)
LYMPHOCYTES NFR BLD: 20.7 % (ref 18–48)
LYMPHOCYTES NFR BLD: 21.8 % (ref 18–48)
LYMPHOCYTES NFR BLD: 24.7 % (ref 18–48)
LYMPHOCYTES NFR BLD: 25.4 % (ref 18–48)
LYMPHOCYTES NFR BLD: 27.6 % (ref 18–48)
LYMPHOCYTES NFR BLD: 4.3 % (ref 18–48)
LYMPHOCYTES NFR BLD: 4.3 % (ref 18–48)
LYMPHOCYTES NFR BLD: 5.7 % (ref 18–48)
LYMPHOCYTES NFR BLD: 6.6 % (ref 18–48)
MAGNESIUM SERPL-MCNC: 1.6 MG/DL (ref 1.6–2.6)
MAGNESIUM SERPL-MCNC: 1.7 MG/DL (ref 1.6–2.6)
MAGNESIUM SERPL-MCNC: 2 MG/DL (ref 1.6–2.6)
MAGNESIUM SERPL-MCNC: 2.2 MG/DL (ref 1.6–2.6)
MAGNESIUM SERPL-MCNC: 2.3 MG/DL (ref 1.6–2.6)
MAGNESIUM SERPL-MCNC: 2.6 MG/DL (ref 1.6–2.6)
MCH RBC QN AUTO: 27.8 PG (ref 27–31)
MCH RBC QN AUTO: 27.9 PG (ref 27–31)
MCH RBC QN AUTO: 28.1 PG (ref 27–31)
MCH RBC QN AUTO: 28.1 PG (ref 27–31)
MCH RBC QN AUTO: 28.2 PG (ref 27–31)
MCH RBC QN AUTO: 28.3 PG (ref 27–31)
MCH RBC QN AUTO: 28.5 PG (ref 27–31)
MCH RBC QN AUTO: 28.6 PG (ref 27–31)
MCH RBC QN AUTO: 28.9 PG (ref 27–31)
MCH RBC QN AUTO: 29.1 PG (ref 27–31)
MCH RBC QN AUTO: 29.2 PG (ref 27–31)
MCHC RBC AUTO-ENTMCNC: 29.4 G/DL (ref 32–36)
MCHC RBC AUTO-ENTMCNC: 29.6 G/DL (ref 32–36)
MCHC RBC AUTO-ENTMCNC: 29.8 G/DL (ref 32–36)
MCHC RBC AUTO-ENTMCNC: 29.8 G/DL (ref 32–36)
MCHC RBC AUTO-ENTMCNC: 29.9 G/DL (ref 32–36)
MCHC RBC AUTO-ENTMCNC: 30.2 G/DL (ref 32–36)
MCHC RBC AUTO-ENTMCNC: 30.3 G/DL (ref 32–36)
MCHC RBC AUTO-ENTMCNC: 30.7 G/DL (ref 32–36)
MCHC RBC AUTO-ENTMCNC: 30.8 G/DL (ref 32–36)
MCHC RBC AUTO-ENTMCNC: 31.3 G/DL (ref 32–36)
MCHC RBC AUTO-ENTMCNC: 31.5 G/DL (ref 32–36)
MCHC RBC AUTO-ENTMCNC: 31.5 G/DL (ref 32–36)
MCHC RBC AUTO-ENTMCNC: 31.6 G/DL (ref 32–36)
MCHC RBC AUTO-ENTMCNC: 31.7 G/DL (ref 32–36)
MCHC RBC AUTO-ENTMCNC: 32 G/DL (ref 32–36)
MCV RBC AUTO: 88 FL (ref 82–98)
MCV RBC AUTO: 89 FL (ref 82–98)
MCV RBC AUTO: 90 FL (ref 82–98)
MCV RBC AUTO: 91 FL (ref 82–98)
MCV RBC AUTO: 91 FL (ref 82–98)
MCV RBC AUTO: 92 FL (ref 82–98)
MCV RBC AUTO: 92 FL (ref 82–98)
MCV RBC AUTO: 93 FL (ref 82–98)
MCV RBC AUTO: 94 FL (ref 82–98)
MCV RBC AUTO: 95 FL (ref 82–98)
MCV RBC AUTO: 96 FL (ref 82–98)
MCV RBC AUTO: 97 FL (ref 82–98)
MCV RBC AUTO: 98 FL (ref 82–98)
MICROSCOPIC COMMENT: ABNORMAL
MICROSCOPIC COMMENT: ABNORMAL
MODE: ABNORMAL
MONOCYTES # BLD AUTO: 0.5 K/UL (ref 0.3–1)
MONOCYTES # BLD AUTO: 0.6 K/UL (ref 0.3–1)
MONOCYTES # BLD AUTO: 0.6 K/UL (ref 0.3–1)
MONOCYTES # BLD AUTO: 0.7 K/UL (ref 0.3–1)
MONOCYTES # BLD AUTO: 1.1 K/UL (ref 0.3–1)
MONOCYTES # BLD AUTO: 1.1 K/UL (ref 0.3–1)
MONOCYTES # BLD AUTO: 1.3 K/UL (ref 0.3–1)
MONOCYTES # BLD AUTO: 1.3 K/UL (ref 0.3–1)
MONOCYTES # BLD AUTO: 1.4 K/UL (ref 0.3–1)
MONOCYTES NFR BLD: 10.6 % (ref 4–15)
MONOCYTES NFR BLD: 11 % (ref 4–15)
MONOCYTES NFR BLD: 11.8 % (ref 4–15)
MONOCYTES NFR BLD: 11.9 % (ref 4–15)
MONOCYTES NFR BLD: 12.9 % (ref 4–15)
MONOCYTES NFR BLD: 12.9 % (ref 4–15)
MONOCYTES NFR BLD: 13 % (ref 4–15)
MONOCYTES NFR BLD: 13.3 % (ref 4–15)
MONOCYTES NFR BLD: 14.9 % (ref 4–15)
MONOCYTES NFR BLD: 7.2 % (ref 4–15)
MONOCYTES NFR BLD: 7.8 % (ref 4–15)
MONOCYTES NFR BLD: 8.9 % (ref 4–15)
MONOCYTES NFR BLD: 9.3 % (ref 4–15)
MV PEAK A VEL: 0.59 M/S
MV PEAK E VEL: 1.31 M/S
NEUTROPHILS # BLD AUTO: 10 K/UL (ref 1.8–7.7)
NEUTROPHILS # BLD AUTO: 11 K/UL (ref 1.8–7.7)
NEUTROPHILS # BLD AUTO: 11.9 K/UL (ref 1.8–7.7)
NEUTROPHILS # BLD AUTO: 12.6 K/UL (ref 1.8–7.7)
NEUTROPHILS # BLD AUTO: 13.4 K/UL (ref 1.8–7.7)
NEUTROPHILS # BLD AUTO: 2.5 K/UL (ref 1.8–7.7)
NEUTROPHILS # BLD AUTO: 2.6 K/UL (ref 1.8–7.7)
NEUTROPHILS # BLD AUTO: 2.6 K/UL (ref 1.8–7.7)
NEUTROPHILS # BLD AUTO: 2.8 K/UL (ref 1.8–7.7)
NEUTROPHILS # BLD AUTO: 2.9 K/UL (ref 1.8–7.7)
NEUTROPHILS # BLD AUTO: 3 K/UL (ref 1.8–7.7)
NEUTROPHILS # BLD AUTO: 3.1 K/UL (ref 1.8–7.7)
NEUTROPHILS # BLD AUTO: 3.1 K/UL (ref 1.8–7.7)
NEUTROPHILS NFR BLD: 60 % (ref 38–73)
NEUTROPHILS NFR BLD: 60.2 % (ref 38–73)
NEUTROPHILS NFR BLD: 61.3 % (ref 38–73)
NEUTROPHILS NFR BLD: 65 % (ref 38–73)
NEUTROPHILS NFR BLD: 68 % (ref 38–73)
NEUTROPHILS NFR BLD: 68.1 % (ref 38–73)
NEUTROPHILS NFR BLD: 68.1 % (ref 38–73)
NEUTROPHILS NFR BLD: 68.2 % (ref 38–73)
NEUTROPHILS NFR BLD: 78 % (ref 38–73)
NEUTROPHILS NFR BLD: 82.7 % (ref 38–73)
NEUTROPHILS NFR BLD: 83.8 % (ref 38–73)
NEUTROPHILS NFR BLD: 85.4 % (ref 38–73)
NEUTROPHILS NFR BLD: 86 % (ref 38–73)
NITRITE UR QL STRIP: NEGATIVE
NITRITE UR QL STRIP: NEGATIVE
NITRITE, POC UA: ABNORMAL
NRBC BLD-RTO: 0 /100 WBC
NRBC BLD-RTO: 1 /100 WBC
NRBC BLD-RTO: 3 /100 WBC
O+P STL TRI STN: NORMAL
OB PNL STL: NEGATIVE
OB PNL STL: POSITIVE
PCO2 BLDA: 16.6 MMHG (ref 35–45)
PH SMN: 7.21 [PH] (ref 7.35–7.45)
PH UR STRIP: 5 [PH] (ref 5–8)
PH UR STRIP: 6 [PH] (ref 5–8)
PH, POC UA: 5
PHOSPHATE SERPL-MCNC: 4.6 MG/DL (ref 2.7–4.5)
PISA TR MAX VEL: 2.71 M/S
PLATELET # BLD AUTO: 104 K/UL (ref 150–350)
PLATELET # BLD AUTO: 105 K/UL (ref 150–350)
PLATELET # BLD AUTO: 105 K/UL (ref 150–350)
PLATELET # BLD AUTO: 106 K/UL (ref 150–350)
PLATELET # BLD AUTO: 109 K/UL (ref 150–350)
PLATELET # BLD AUTO: 110 K/UL (ref 150–350)
PLATELET # BLD AUTO: 133 K/UL (ref 150–350)
PLATELET # BLD AUTO: 134 K/UL (ref 150–350)
PLATELET # BLD AUTO: 141 K/UL (ref 150–350)
PLATELET # BLD AUTO: 143 K/UL (ref 150–350)
PLATELET # BLD AUTO: 144 K/UL (ref 150–350)
PLATELET # BLD AUTO: 175 K/UL (ref 150–350)
PLATELET # BLD AUTO: 206 K/UL (ref 150–350)
PLATELET # BLD AUTO: 232 K/UL (ref 150–350)
PLATELET # BLD AUTO: 242 K/UL (ref 150–350)
PLATELET # BLD AUTO: 89 K/UL (ref 150–350)
PLATELET # BLD AUTO: 90 K/UL (ref 150–350)
PMV BLD AUTO: 10.6 FL (ref 9.2–12.9)
PMV BLD AUTO: 10.7 FL (ref 9.2–12.9)
PMV BLD AUTO: 10.7 FL (ref 9.2–12.9)
PMV BLD AUTO: 10.8 FL (ref 9.2–12.9)
PMV BLD AUTO: 10.9 FL (ref 9.2–12.9)
PMV BLD AUTO: 11 FL (ref 9.2–12.9)
PMV BLD AUTO: 11 FL (ref 9.2–12.9)
PMV BLD AUTO: 11.4 FL (ref 9.2–12.9)
PMV BLD AUTO: 11.6 FL (ref 9.2–12.9)
PMV BLD AUTO: 11.7 FL (ref 9.2–12.9)
PMV BLD AUTO: 11.8 FL (ref 9.2–12.9)
PMV BLD AUTO: 11.9 FL (ref 9.2–12.9)
PMV BLD AUTO: 12 FL (ref 9.2–12.9)
PMV BLD AUTO: 12.2 FL (ref 9.2–12.9)
PMV BLD AUTO: 12.3 FL (ref 9.2–12.9)
PMV BLD AUTO: 12.4 FL (ref 9.2–12.9)
PMV BLD AUTO: 13 FL (ref 9.2–12.9)
PO2 BLDA: 134 MMHG (ref 80–100)
POC BE: -21 MMOL/L
POC IONIZED CALCIUM: 1.34 MMOL/L (ref 1.06–1.42)
POC SATURATED O2: 98 % (ref 95–100)
POC TCO2: 7 MMOL/L (ref 23–27)
POTASSIUM BLD-SCNC: 5.7 MMOL/L (ref 3.5–5.1)
POTASSIUM SERPL-SCNC: 3.4 MMOL/L (ref 3.5–5.1)
POTASSIUM SERPL-SCNC: 3.6 MMOL/L (ref 3.5–5.1)
POTASSIUM SERPL-SCNC: 3.6 MMOL/L (ref 3.5–5.1)
POTASSIUM SERPL-SCNC: 3.9 MMOL/L (ref 3.5–5.1)
POTASSIUM SERPL-SCNC: 4 MMOL/L (ref 3.5–5.1)
POTASSIUM SERPL-SCNC: 4.2 MMOL/L (ref 3.5–5.1)
POTASSIUM SERPL-SCNC: 4.4 MMOL/L (ref 3.5–5.1)
POTASSIUM SERPL-SCNC: 4.6 MMOL/L (ref 3.5–5.1)
POTASSIUM SERPL-SCNC: 4.6 MMOL/L (ref 3.5–5.1)
POTASSIUM SERPL-SCNC: 4.7 MMOL/L (ref 3.5–5.1)
POTASSIUM SERPL-SCNC: 4.8 MMOL/L (ref 3.5–5.1)
POTASSIUM SERPL-SCNC: 4.9 MMOL/L (ref 3.5–5.1)
POTASSIUM SERPL-SCNC: 5.2 MMOL/L (ref 3.5–5.1)
POTASSIUM SERPL-SCNC: 5.5 MMOL/L (ref 3.5–5.1)
POTASSIUM SERPL-SCNC: 5.5 MMOL/L (ref 3.5–5.1)
PROSTATE SPECIFIC ANTIGEN, TOTAL: 0.01 NG/ML (ref 0–4)
PROT SERPL-MCNC: 5.3 G/DL (ref 6–8.4)
PROT SERPL-MCNC: 5.6 G/DL (ref 6–8.4)
PROT SERPL-MCNC: 6.2 G/DL (ref 6–8.4)
PROT SERPL-MCNC: 6.4 G/DL (ref 6–8.4)
PROT SERPL-MCNC: 6.7 G/DL (ref 6–8.4)
PROT SERPL-MCNC: 6.9 G/DL (ref 6–8.4)
PROT SERPL-MCNC: 7.3 G/DL (ref 6–8.4)
PROT SERPL-MCNC: 7.5 G/DL (ref 6–8.4)
PROT UR QL STRIP: ABNORMAL
PROT UR QL STRIP: NEGATIVE
PROTEIN, POC: 30
PROTHROMBIN TIME: 10.1 SEC (ref 9–12.5)
PROTHROMBIN TIME: 14.8 SEC (ref 10.6–14.8)
PROTHROMBIN TIME: 15.3 SEC (ref 10.6–14.8)
PROTHROMBIN TIME: 18.8 SEC (ref 10.6–14.8)
PROTHROMBIN TIME: 19.8 SEC (ref 10.6–14.8)
PSA FREE MFR SERPL: NORMAL %
PSA FREE SERPL-MCNC: <0.01 NG/ML (ref 0.01–1.5)
PV PEAK VELOCITY: 70.89 CM/S
RA PRESSURE: 8 MMHG
RBC # BLD AUTO: 2.77 M/UL (ref 4.6–6.2)
RBC # BLD AUTO: 3.07 M/UL (ref 4.6–6.2)
RBC # BLD AUTO: 3.11 M/UL (ref 4.6–6.2)
RBC # BLD AUTO: 3.23 M/UL (ref 4.6–6.2)
RBC # BLD AUTO: 3.3 M/UL (ref 4.6–6.2)
RBC # BLD AUTO: 3.35 M/UL (ref 4.6–6.2)
RBC # BLD AUTO: 3.37 M/UL (ref 4.6–6.2)
RBC # BLD AUTO: 3.37 M/UL (ref 4.6–6.2)
RBC # BLD AUTO: 3.42 M/UL (ref 4.6–6.2)
RBC # BLD AUTO: 3.77 M/UL (ref 4.6–6.2)
RBC # BLD AUTO: 3.78 M/UL (ref 4.6–6.2)
RBC # BLD AUTO: 3.81 M/UL (ref 4.6–6.2)
RBC # BLD AUTO: 3.83 M/UL (ref 4.6–6.2)
RBC # BLD AUTO: 4.02 M/UL (ref 4.6–6.2)
RBC # BLD AUTO: 4.21 M/UL (ref 4.6–6.2)
RBC # BLD AUTO: 4.3 M/UL (ref 4.6–6.2)
RBC # BLD AUTO: 4.31 M/UL (ref 4.6–6.2)
RBC #/AREA URNS HPF: 1 /HPF (ref 0–4)
RBC #/AREA URNS HPF: 6 /HPF (ref 0–4)
RHEUMATOID FACT SERPL-ACNC: 16.8 IU/ML (ref 0–13.9)
SAMPLE: ABNORMAL
SARS-COV-2 RDRP RESP QL NAA+PROBE: NEGATIVE
SARS-COV-2 RDRP RESP QL NAA+PROBE: NEGATIVE
SATURATED IRON: 17 % (ref 20–50)
SATURATED IRON: 18 % (ref 20–50)
SITE: ABNORMAL
SODIUM BLD-SCNC: 132 MMOL/L (ref 136–145)
SODIUM SERPL-SCNC: 134 MMOL/L (ref 136–145)
SODIUM SERPL-SCNC: 134 MMOL/L (ref 136–145)
SODIUM SERPL-SCNC: 135 MMOL/L (ref 136–145)
SODIUM SERPL-SCNC: 136 MMOL/L (ref 136–145)
SODIUM SERPL-SCNC: 136 MMOL/L (ref 136–145)
SODIUM SERPL-SCNC: 137 MMOL/L (ref 136–145)
SODIUM SERPL-SCNC: 137 MMOL/L (ref 136–145)
SODIUM SERPL-SCNC: 138 MMOL/L (ref 136–145)
SODIUM SERPL-SCNC: 139 MMOL/L (ref 136–145)
SODIUM SERPL-SCNC: 140 MMOL/L (ref 136–145)
SODIUM SERPL-SCNC: 141 MMOL/L (ref 136–145)
SODIUM SERPL-SCNC: 142 MMOL/L (ref 136–145)
SP GR UR STRIP: 1.01 (ref 1–1.03)
SP GR UR STRIP: 1.01 (ref 1–1.03)
SPECIFIC GRAVITY, POC UA: 1.02
SPECIMEN SOURCE: NORMAL
SQUAMOUS #/AREA URNS HPF: 1 /HPF
SQUAMOUS #/AREA URNS HPF: 6 /HPF
STOOL CULTURE: NORMAL
STOOL CULTURE: NORMAL
TDI LATERAL: 0.06 M/S
TDI SEPTAL: 0.07 M/S
TDI: 0.07 M/S
TOTAL IRON BINDING CAPACITY: 321 UG/DL (ref 250–450)
TOTAL IRON BINDING CAPACITY: 339 UG/DL (ref 250–450)
TR MAX PG: 29 MMHG
TRANSFERRIN SERPL-MCNC: 217 MG/DL (ref 200–375)
TRANSFERRIN SERPL-MCNC: 229 MG/DL (ref 200–375)
TROPONIN I SERPL DL<=0.01 NG/ML-MCNC: 0.47 NG/ML
TROPONIN I SERPL DL<=0.01 NG/ML-MCNC: 1.05 NG/ML
TROPONIN I SERPL DL<=0.01 NG/ML-MCNC: 4.93 NG/ML
TROPONIN I SERPL DL<=0.01 NG/ML-MCNC: 7.38 NG/ML
TROPONIN I SERPL DL<=0.01 NG/ML-MCNC: 8.08 NG/ML
TROPONIN I SERPL DL<=0.01 NG/ML-MCNC: <0.006 NG/ML (ref 0–0.03)
TROPONIN I SERPL DL<=0.01 NG/ML-MCNC: <0.03 NG/ML
TV REST PULMONARY ARTERY PRESSURE: 37 MMHG
URATE CRY URNS QL MICRO: ABNORMAL
URN SPEC COLLECT METH UR: ABNORMAL
URN SPEC COLLECT METH UR: NORMAL
UROBILINOGEN UR STRIP-ACNC: ABNORMAL EU/DL
UROBILINOGEN UR STRIP-ACNC: NEGATIVE EU/DL
UROBILINOGEN, POC UA: NORMAL
VANCOMYCIN TROUGH SERPL-MCNC: 10.8 UG/ML (ref 10–22)
VANCOMYCIN TROUGH SERPL-MCNC: 14.7 UG/ML (ref 10–22)
WBC # BLD AUTO: 12.12 K/UL (ref 3.9–12.7)
WBC # BLD AUTO: 12.22 K/UL (ref 3.9–12.7)
WBC # BLD AUTO: 12.25 K/UL (ref 3.9–12.7)
WBC # BLD AUTO: 12.75 K/UL (ref 3.9–12.7)
WBC # BLD AUTO: 13.1 K/UL (ref 3.9–12.7)
WBC # BLD AUTO: 14.77 K/UL (ref 3.9–12.7)
WBC # BLD AUTO: 15.28 K/UL (ref 3.9–12.7)
WBC # BLD AUTO: 15.99 K/UL (ref 3.9–12.7)
WBC # BLD AUTO: 16.26 K/UL (ref 3.9–12.7)
WBC # BLD AUTO: 3.95 K/UL (ref 3.9–12.7)
WBC # BLD AUTO: 4.02 K/UL (ref 3.9–12.7)
WBC # BLD AUTO: 4.24 K/UL (ref 3.9–12.7)
WBC # BLD AUTO: 4.38 K/UL (ref 3.9–12.7)
WBC # BLD AUTO: 4.4 K/UL (ref 3.9–12.7)
WBC # BLD AUTO: 4.51 K/UL (ref 3.9–12.7)
WBC # BLD AUTO: 4.55 K/UL (ref 3.9–12.7)
WBC # BLD AUTO: 4.7 K/UL (ref 3.9–12.7)
WBC #/AREA STL HPF: NORMAL /[HPF]
WBC #/AREA URNS HPF: 1 /HPF (ref 0–5)
WBC #/AREA URNS HPF: 2 /HPF (ref 0–5)

## 2020-01-01 PROCEDURE — 84295 ASSAY OF SERUM SODIUM: CPT

## 2020-01-01 PROCEDURE — 27000221 HC OXYGEN, UP TO 24 HOURS

## 2020-01-01 PROCEDURE — 80048 BASIC METABOLIC PNL TOTAL CA: CPT

## 2020-01-01 PROCEDURE — 82330 ASSAY OF CALCIUM: CPT

## 2020-01-01 PROCEDURE — 63600175 PHARM REV CODE 636 W HCPCS: Performed by: INTERNAL MEDICINE

## 2020-01-01 PROCEDURE — 99232 PR SUBSEQUENT HOSPITAL CARE,LEVL II: ICD-10-PCS | Mod: ,,, | Performed by: INTERNAL MEDICINE

## 2020-01-01 PROCEDURE — 84484 ASSAY OF TROPONIN QUANT: CPT | Mod: 91

## 2020-01-01 PROCEDURE — 99285 EMERGENCY DEPT VISIT HI MDM: CPT | Mod: 25

## 2020-01-01 PROCEDURE — 97530 THERAPEUTIC ACTIVITIES: CPT

## 2020-01-01 PROCEDURE — 96376 TX/PRO/DX INJ SAME DRUG ADON: CPT

## 2020-01-01 PROCEDURE — 25000003 PHARM REV CODE 250: Performed by: NURSE ANESTHETIST, CERTIFIED REGISTERED

## 2020-01-01 PROCEDURE — 25000003 PHARM REV CODE 250: Performed by: INTERNAL MEDICINE

## 2020-01-01 PROCEDURE — 86140 C-REACTIVE PROTEIN: CPT

## 2020-01-01 PROCEDURE — 86850 RBC ANTIBODY SCREEN: CPT

## 2020-01-01 PROCEDURE — 82272 OCCULT BLD FECES 1-3 TESTS: CPT

## 2020-01-01 PROCEDURE — 93010 EKG 12-LEAD: ICD-10-PCS | Mod: ,,, | Performed by: INTERNAL MEDICINE

## 2020-01-01 PROCEDURE — 99223 PR INITIAL HOSPITAL CARE,LEVL III: ICD-10-PCS | Mod: ,,, | Performed by: INTERNAL MEDICINE

## 2020-01-01 PROCEDURE — 93005 ELECTROCARDIOGRAM TRACING: CPT | Performed by: INTERNAL MEDICINE

## 2020-01-01 PROCEDURE — 83735 ASSAY OF MAGNESIUM: CPT

## 2020-01-01 PROCEDURE — 93010 ELECTROCARDIOGRAM REPORT: CPT | Mod: ,,, | Performed by: INTERNAL MEDICINE

## 2020-01-01 PROCEDURE — 36569 INSJ PICC 5 YR+ W/O IMAGING: CPT

## 2020-01-01 PROCEDURE — 93971 US LOWER EXTREMITY VEINS LEFT: ICD-10-PCS | Mod: 26,HCNC,LT, | Performed by: RADIOLOGY

## 2020-01-01 PROCEDURE — 63600175 PHARM REV CODE 636 W HCPCS: Performed by: NURSE PRACTITIONER

## 2020-01-01 PROCEDURE — 85025 COMPLETE CBC W/AUTO DIFF WBC: CPT | Mod: HCNC

## 2020-01-01 PROCEDURE — 83880 ASSAY OF NATRIURETIC PEPTIDE: CPT | Mod: HCNC

## 2020-01-01 PROCEDURE — 87045 FECES CULTURE AEROBIC BACT: CPT

## 2020-01-01 PROCEDURE — 3078F DIAST BP <80 MM HG: CPT | Mod: CPTII,S$GLB,, | Performed by: NURSE PRACTITIONER

## 2020-01-01 PROCEDURE — 12000002 HC ACUTE/MED SURGE SEMI-PRIVATE ROOM

## 2020-01-01 PROCEDURE — G0180 MD CERTIFICATION HHA PATIENT: HCPCS | Mod: ,,, | Performed by: FAMILY MEDICINE

## 2020-01-01 PROCEDURE — 85730 THROMBOPLASTIN TIME PARTIAL: CPT | Mod: 91

## 2020-01-01 PROCEDURE — 82962 GLUCOSE BLOOD TEST: CPT

## 2020-01-01 PROCEDURE — 87147 CULTURE TYPE IMMUNOLOGIC: CPT

## 2020-01-01 PROCEDURE — 25500020 PHARM REV CODE 255: Mod: HCNC

## 2020-01-01 PROCEDURE — 83880 ASSAY OF NATRIURETIC PEPTIDE: CPT

## 2020-01-01 PROCEDURE — 1125F PR PAIN SEVERITY QUANTIFIED, PAIN PRESENT: ICD-10-PCS | Mod: HCNC,S$GLB,, | Performed by: INTERNAL MEDICINE

## 2020-01-01 PROCEDURE — 72070 XR THORACIC SPINE AP LATERAL: ICD-10-PCS | Mod: 26,HCNC,, | Performed by: RADIOLOGY

## 2020-01-01 PROCEDURE — 97530 THERAPEUTIC ACTIVITIES: CPT | Mod: CQ

## 2020-01-01 PROCEDURE — 99233 SBSQ HOSP IP/OBS HIGH 50: CPT | Mod: ,,, | Performed by: NURSE PRACTITIONER

## 2020-01-01 PROCEDURE — 72070 X-RAY EXAM THORAC SPINE 2VWS: CPT | Mod: 26,HCNC,, | Performed by: RADIOLOGY

## 2020-01-01 PROCEDURE — 99900035 HC TECH TIME PER 15 MIN (STAT)

## 2020-01-01 PROCEDURE — 84100 ASSAY OF PHOSPHORUS: CPT

## 2020-01-01 PROCEDURE — G0439 PPPS, SUBSEQ VISIT: HCPCS | Mod: S$GLB,,, | Performed by: NURSE PRACTITIONER

## 2020-01-01 PROCEDURE — 83605 ASSAY OF LACTIC ACID: CPT

## 2020-01-01 PROCEDURE — 99223 1ST HOSP IP/OBS HIGH 75: CPT | Mod: 25,,, | Performed by: INTERNAL MEDICINE

## 2020-01-01 PROCEDURE — 93971 EXTREMITY STUDY: CPT | Mod: 26,HCNC,LT, | Performed by: RADIOLOGY

## 2020-01-01 PROCEDURE — 93306 ECHO (CUPID ONLY): ICD-10-PCS | Mod: 26,,, | Performed by: INTERNAL MEDICINE

## 2020-01-01 PROCEDURE — 84484 ASSAY OF TROPONIN QUANT: CPT

## 2020-01-01 PROCEDURE — 97110 THERAPEUTIC EXERCISES: CPT

## 2020-01-01 PROCEDURE — 93005 ELECTROCARDIOGRAM TRACING: CPT | Mod: HCNC

## 2020-01-01 PROCEDURE — 85610 PROTHROMBIN TIME: CPT | Mod: 91

## 2020-01-01 PROCEDURE — 37000009 HC ANESTHESIA EA ADD 15 MINS: Performed by: INTERNAL MEDICINE

## 2020-01-01 PROCEDURE — G0180 PR HOME HEALTH MD CERTIFICATION: ICD-10-PCS | Mod: ,,, | Performed by: FAMILY MEDICINE

## 2020-01-01 PROCEDURE — 1159F PR MEDICATION LIST DOCUMENTED IN MEDICAL RECORD: ICD-10-PCS | Mod: HCNC,S$GLB,, | Performed by: INTERNAL MEDICINE

## 2020-01-01 PROCEDURE — 85025 COMPLETE CBC W/AUTO DIFF WBC: CPT

## 2020-01-01 PROCEDURE — 85610 PROTHROMBIN TIME: CPT

## 2020-01-01 PROCEDURE — 21400001 HC TELEMETRY ROOM

## 2020-01-01 PROCEDURE — 25000003 PHARM REV CODE 250: Performed by: NURSE PRACTITIONER

## 2020-01-01 PROCEDURE — 93325 DOPPLER ECHO COLOR FLOW MAPG: CPT | Mod: 26,,, | Performed by: INTERNAL MEDICINE

## 2020-01-01 PROCEDURE — 80053 COMPREHEN METABOLIC PANEL: CPT

## 2020-01-01 PROCEDURE — 99232 SBSQ HOSP IP/OBS MODERATE 35: CPT | Mod: ,,, | Performed by: INTERNAL MEDICINE

## 2020-01-01 PROCEDURE — 87077 CULTURE AEROBIC IDENTIFY: CPT

## 2020-01-01 PROCEDURE — 85027 COMPLETE CBC AUTOMATED: CPT

## 2020-01-01 PROCEDURE — 80202 ASSAY OF VANCOMYCIN: CPT

## 2020-01-01 PROCEDURE — 87046 STOOL CULTR AEROBIC BACT EA: CPT

## 2020-01-01 PROCEDURE — 27000675 HC TUBING MICRODRIP: Performed by: NURSE ANESTHETIST, CERTIFIED REGISTERED

## 2020-01-01 PROCEDURE — 36415 COLL VENOUS BLD VENIPUNCTURE: CPT

## 2020-01-01 PROCEDURE — C9113 INJ PANTOPRAZOLE SODIUM, VIA: HCPCS | Performed by: INTERNAL MEDICINE

## 2020-01-01 PROCEDURE — 1101F PR PT FALLS ASSESS DOC 0-1 FALLS W/OUT INJ PAST YR: ICD-10-PCS | Mod: CPTII,S$GLB,, | Performed by: NURSE PRACTITIONER

## 2020-01-01 PROCEDURE — 84484 ASSAY OF TROPONIN QUANT: CPT | Mod: HCNC

## 2020-01-01 PROCEDURE — 94761 N-INVAS EAR/PLS OXIMETRY MLT: CPT

## 2020-01-01 PROCEDURE — 25000242 PHARM REV CODE 250 ALT 637 W/ HCPCS: Performed by: INTERNAL MEDICINE

## 2020-01-01 PROCEDURE — 99233 PR SUBSEQUENT HOSPITAL CARE,LEVL III: ICD-10-PCS | Mod: ,,, | Performed by: NURSE PRACTITIONER

## 2020-01-01 PROCEDURE — 25000003 PHARM REV CODE 250

## 2020-01-01 PROCEDURE — 82553 CREATINE MB FRACTION: CPT

## 2020-01-01 PROCEDURE — 99999 PR PBB SHADOW E&M-EST. PATIENT-LVL V: ICD-10-PCS | Mod: PBBFAC,HCNC,, | Performed by: INTERNAL MEDICINE

## 2020-01-01 PROCEDURE — 80053 COMPREHEN METABOLIC PANEL: CPT | Mod: HCNC

## 2020-01-01 PROCEDURE — 83540 ASSAY OF IRON: CPT | Mod: HCNC

## 2020-01-01 PROCEDURE — 99214 OFFICE O/P EST MOD 30 MIN: CPT | Mod: HCNC,25,S$GLB, | Performed by: NURSE PRACTITIONER

## 2020-01-01 PROCEDURE — 99499 RISK ADDL DX/OHS AUDIT: ICD-10-PCS | Mod: HCNC,S$GLB,, | Performed by: NURSE PRACTITIONER

## 2020-01-01 PROCEDURE — 81001 URINALYSIS AUTO W/SCOPE: CPT

## 2020-01-01 PROCEDURE — 87186 SC STD MICRODIL/AGAR DIL: CPT

## 2020-01-01 PROCEDURE — G0439 PR MEDICARE ANNUAL WELLNESS SUBSEQUENT VISIT: ICD-10-PCS | Mod: S$GLB,,, | Performed by: NURSE PRACTITIONER

## 2020-01-01 PROCEDURE — 27202103: Performed by: NURSE ANESTHETIST, CERTIFIED REGISTERED

## 2020-01-01 PROCEDURE — 87040 BLOOD CULTURE FOR BACTERIA: CPT

## 2020-01-01 PROCEDURE — 99499 UNLISTED E&M SERVICE: CPT | Mod: HCNC,S$GLB,, | Performed by: NURSE PRACTITIONER

## 2020-01-01 PROCEDURE — 99214 OFFICE O/P EST MOD 30 MIN: CPT | Mod: HCNC,95,, | Performed by: FAMILY MEDICINE

## 2020-01-01 PROCEDURE — 1101F PT FALLS ASSESS-DOCD LE1/YR: CPT | Mod: HCNC,CPTII,95, | Performed by: FAMILY MEDICINE

## 2020-01-01 PROCEDURE — 99499 UNLISTED E&M SERVICE: CPT | Mod: S$GLB,,, | Performed by: NURSE PRACTITIONER

## 2020-01-01 PROCEDURE — 97162 PT EVAL MOD COMPLEX 30 MIN: CPT

## 2020-01-01 PROCEDURE — 94002 VENT MGMT INPAT INIT DAY: CPT

## 2020-01-01 PROCEDURE — 99499 UNLISTED E&M SERVICE: CPT | Mod: S$GLB,,, | Performed by: INTERNAL MEDICINE

## 2020-01-01 PROCEDURE — 87077 CULTURE AEROBIC IDENTIFY: CPT | Mod: 59

## 2020-01-01 PROCEDURE — 93971 EXTREMITY STUDY: CPT | Mod: TC,HCNC,LT

## 2020-01-01 PROCEDURE — 99999 PR PBB SHADOW E&M-EST. PATIENT-LVL IV: CPT | Mod: PBBFAC,HCNC,, | Performed by: NURSE PRACTITIONER

## 2020-01-01 PROCEDURE — 3077F SYST BP >= 140 MM HG: CPT | Mod: HCNC,CPTII,S$GLB, | Performed by: INTERNAL MEDICINE

## 2020-01-01 PROCEDURE — 93312 ECHO TRANSESOPHAGEAL: CPT | Mod: 26,,, | Performed by: INTERNAL MEDICINE

## 2020-01-01 PROCEDURE — 1159F PR MEDICATION LIST DOCUMENTED IN MEDICAL RECORD: ICD-10-PCS | Mod: HCNC,95,, | Performed by: FAMILY MEDICINE

## 2020-01-01 PROCEDURE — 85730 THROMBOPLASTIN TIME PARTIAL: CPT

## 2020-01-01 PROCEDURE — 63700000 PHARM REV CODE 250 ALT 637 W/O HCPCS: Performed by: EMERGENCY MEDICINE

## 2020-01-01 PROCEDURE — 3078F PR MOST RECENT DIASTOLIC BLOOD PRESSURE < 80 MM HG: ICD-10-PCS | Mod: HCNC,CPTII,S$GLB, | Performed by: INTERNAL MEDICINE

## 2020-01-01 PROCEDURE — 72040 X-RAY EXAM NECK SPINE 2-3 VW: CPT | Mod: 26,HCNC,, | Performed by: RADIOLOGY

## 2020-01-01 PROCEDURE — 99499 RISK ADDL DX/OHS AUDIT: ICD-10-PCS | Mod: S$GLB,,, | Performed by: INTERNAL MEDICINE

## 2020-01-01 PROCEDURE — G0179 MD RECERTIFICATION HHA PT: HCPCS | Mod: ,,, | Performed by: FAMILY MEDICINE

## 2020-01-01 PROCEDURE — 63600175 PHARM REV CODE 636 W HCPCS

## 2020-01-01 PROCEDURE — U0002 COVID-19 LAB TEST NON-CDC: HCPCS | Mod: HCNC

## 2020-01-01 PROCEDURE — 99223 PR INITIAL HOSPITAL CARE,LEVL III: ICD-10-PCS | Mod: 25,,, | Performed by: INTERNAL MEDICINE

## 2020-01-01 PROCEDURE — 99285 EMERGENCY DEPT VISIT HI MDM: CPT | Mod: 25,HCNC

## 2020-01-01 PROCEDURE — 82533 TOTAL CORTISOL: CPT

## 2020-01-01 PROCEDURE — 84132 ASSAY OF SERUM POTASSIUM: CPT

## 2020-01-01 PROCEDURE — 93010 ELECTROCARDIOGRAM REPORT: CPT | Mod: HCNC,,, | Performed by: INTERNAL MEDICINE

## 2020-01-01 PROCEDURE — 3288F FALL RISK ASSESSMENT DOCD: CPT | Mod: CPTII,S$GLB,, | Performed by: NURSE PRACTITIONER

## 2020-01-01 PROCEDURE — 3078F PR MOST RECENT DIASTOLIC BLOOD PRESSURE < 80 MM HG: ICD-10-PCS | Mod: HCNC,CPTII,S$GLB, | Performed by: NURSE PRACTITIONER

## 2020-01-01 PROCEDURE — 81002 URINALYSIS NONAUTO W/O SCOPE: CPT | Mod: HCNC,S$GLB,, | Performed by: NURSE PRACTITIONER

## 2020-01-01 PROCEDURE — 1101F PT FALLS ASSESS-DOCD LE1/YR: CPT | Mod: HCNC,CPTII,S$GLB, | Performed by: NURSE PRACTITIONER

## 2020-01-01 PROCEDURE — 3078F DIAST BP <80 MM HG: CPT | Mod: HCNC,CPTII,S$GLB, | Performed by: NURSE PRACTITIONER

## 2020-01-01 PROCEDURE — 1125F AMNT PAIN NOTED PAIN PRSNT: CPT | Mod: HCNC,S$GLB,, | Performed by: INTERNAL MEDICINE

## 2020-01-01 PROCEDURE — 1159F MED LIST DOCD IN RCRD: CPT | Mod: HCNC,S$GLB,, | Performed by: INTERNAL MEDICINE

## 2020-01-01 PROCEDURE — G0179 PR HOME HEALTH MD RECERTIFICATION: ICD-10-PCS | Mod: ,,, | Performed by: FAMILY MEDICINE

## 2020-01-01 PROCEDURE — 93306 TTE W/DOPPLER COMPLETE: CPT

## 2020-01-01 PROCEDURE — 3074F PR MOST RECENT SYSTOLIC BLOOD PRESSURE < 130 MM HG: ICD-10-PCS | Mod: HCNC,CPTII,S$GLB, | Performed by: NURSE PRACTITIONER

## 2020-01-01 PROCEDURE — 27000284 HC CANNULA NASAL: Performed by: NURSE ANESTHETIST, CERTIFIED REGISTERED

## 2020-01-01 PROCEDURE — 36415 COLL VENOUS BLD VENIPUNCTURE: CPT | Mod: HCNC

## 2020-01-01 PROCEDURE — 99291 CRITICAL CARE FIRST HOUR: CPT | Mod: ,,, | Performed by: INTERNAL MEDICINE

## 2020-01-01 PROCEDURE — 3078F DIAST BP <80 MM HG: CPT | Mod: HCNC,CPTII,S$GLB, | Performed by: INTERNAL MEDICINE

## 2020-01-01 PROCEDURE — 93321 DOPPLER ECHO F-UP/LMTD STD: CPT | Mod: 26,,, | Performed by: INTERNAL MEDICINE

## 2020-01-01 PROCEDURE — 72040 XR CERVICAL SPINE AP LATERAL: ICD-10-PCS | Mod: 26,HCNC,, | Performed by: RADIOLOGY

## 2020-01-01 PROCEDURE — 1159F MED LIST DOCD IN RCRD: CPT | Mod: HCNC,95,, | Performed by: FAMILY MEDICINE

## 2020-01-01 PROCEDURE — 36415 COLL VENOUS BLD VENIPUNCTURE: CPT | Mod: HCNC,PO

## 2020-01-01 PROCEDURE — 3078F PR MOST RECENT DIASTOLIC BLOOD PRESSURE < 80 MM HG: ICD-10-PCS | Mod: CPTII,S$GLB,, | Performed by: NURSE PRACTITIONER

## 2020-01-01 PROCEDURE — 31500 INSERT EMERGENCY AIRWAY: CPT

## 2020-01-01 PROCEDURE — 1101F PR PT FALLS ASSESS DOC 0-1 FALLS W/OUT INJ PAST YR: ICD-10-PCS | Mod: HCNC,CPTII,S$GLB, | Performed by: INTERNAL MEDICINE

## 2020-01-01 PROCEDURE — 36600 WITHDRAWAL OF ARTERIAL BLOOD: CPT

## 2020-01-01 PROCEDURE — 85014 HEMATOCRIT: CPT

## 2020-01-01 PROCEDURE — 1159F PR MEDICATION LIST DOCUMENTED IN MEDICAL RECORD: ICD-10-PCS | Mod: HCNC,S$GLB,, | Performed by: NURSE PRACTITIONER

## 2020-01-01 PROCEDURE — 96374 THER/PROPH/DIAG INJ IV PUSH: CPT

## 2020-01-01 PROCEDURE — 97166 OT EVAL MOD COMPLEX 45 MIN: CPT

## 2020-01-01 PROCEDURE — 97110 THERAPEUTIC EXERCISES: CPT | Mod: CO

## 2020-01-01 PROCEDURE — 99214 PR OFFICE/OUTPT VISIT, EST, LEVL IV, 30-39 MIN: ICD-10-PCS | Mod: HCNC,95,, | Performed by: FAMILY MEDICINE

## 2020-01-01 PROCEDURE — 97535 SELF CARE MNGMENT TRAINING: CPT

## 2020-01-01 PROCEDURE — 99499 RISK ADDL DX/OHS AUDIT: ICD-10-PCS | Mod: S$GLB,,, | Performed by: NURSE PRACTITIONER

## 2020-01-01 PROCEDURE — 83690 ASSAY OF LIPASE: CPT

## 2020-01-01 PROCEDURE — 89055 LEUKOCYTE ASSESSMENT FECAL: CPT

## 2020-01-01 PROCEDURE — 3077F PR MOST RECENT SYSTOLIC BLOOD PRESSURE >= 140 MM HG: ICD-10-PCS | Mod: CPTII,S$GLB,, | Performed by: NURSE PRACTITIONER

## 2020-01-01 PROCEDURE — 87209 SMEAR COMPLEX STAIN: CPT

## 2020-01-01 PROCEDURE — 99999 PR PBB SHADOW E&M-EST. PATIENT-LVL V: CPT | Mod: PBBFAC,HCNC,, | Performed by: INTERNAL MEDICINE

## 2020-01-01 PROCEDURE — 3077F PR MOST RECENT SYSTOLIC BLOOD PRESSURE >= 140 MM HG: ICD-10-PCS | Mod: HCNC,CPTII,S$GLB, | Performed by: INTERNAL MEDICINE

## 2020-01-01 PROCEDURE — U0002 COVID-19 LAB TEST NON-CDC: HCPCS

## 2020-01-01 PROCEDURE — 25000003 PHARM REV CODE 250: Performed by: EMERGENCY MEDICINE

## 2020-01-01 PROCEDURE — 93010 EKG 12-LEAD: ICD-10-PCS | Mod: HCNC,,, | Performed by: INTERNAL MEDICINE

## 2020-01-01 PROCEDURE — 99291 PR CRITICAL CARE, E/M 30-74 MINUTES: ICD-10-PCS | Mod: ,,, | Performed by: INTERNAL MEDICINE

## 2020-01-01 PROCEDURE — 1159F MED LIST DOCD IN RCRD: CPT | Mod: HCNC,S$GLB,, | Performed by: NURSE PRACTITIONER

## 2020-01-01 PROCEDURE — 71046 XR CHEST PA AND LATERAL: ICD-10-PCS | Mod: 26,HCNC,, | Performed by: RADIOLOGY

## 2020-01-01 PROCEDURE — C9399 UNCLASSIFIED DRUGS OR BIOLOG: HCPCS | Performed by: INTERNAL MEDICINE

## 2020-01-01 PROCEDURE — 87086 URINE CULTURE/COLONY COUNT: CPT | Mod: HCNC

## 2020-01-01 PROCEDURE — 99214 PR OFFICE/OUTPT VISIT, EST, LEVL IV, 30-39 MIN: ICD-10-PCS | Mod: HCNC,25,S$GLB, | Performed by: NURSE PRACTITIONER

## 2020-01-01 PROCEDURE — 37000008 HC ANESTHESIA 1ST 15 MINUTES: Performed by: INTERNAL MEDICINE

## 2020-01-01 PROCEDURE — 27000671 HC TUBING MICROBORE EXT: Performed by: NURSE ANESTHETIST, CERTIFIED REGISTERED

## 2020-01-01 PROCEDURE — 93306 TTE W/DOPPLER COMPLETE: CPT | Mod: 26,,, | Performed by: INTERNAL MEDICINE

## 2020-01-01 PROCEDURE — 3077F SYST BP >= 140 MM HG: CPT | Mod: CPTII,S$GLB,, | Performed by: NURSE PRACTITIONER

## 2020-01-01 PROCEDURE — 86038 ANTINUCLEAR ANTIBODIES: CPT

## 2020-01-01 PROCEDURE — 87449 NOS EACH ORGANISM AG IA: CPT

## 2020-01-01 PROCEDURE — 82803 BLOOD GASES ANY COMBINATION: CPT

## 2020-01-01 PROCEDURE — 99223 1ST HOSP IP/OBS HIGH 75: CPT | Mod: ,,, | Performed by: INTERNAL MEDICINE

## 2020-01-01 PROCEDURE — 72040 X-RAY EXAM NECK SPINE 2-3 VW: CPT | Mod: TC,HCNC,FY

## 2020-01-01 PROCEDURE — 72070 X-RAY EXAM THORAC SPINE 2VWS: CPT | Mod: TC,HCNC,FY

## 2020-01-01 PROCEDURE — 1101F PT FALLS ASSESS-DOCD LE1/YR: CPT | Mod: HCNC,CPTII,S$GLB, | Performed by: INTERNAL MEDICINE

## 2020-01-01 PROCEDURE — 92610 EVALUATE SWALLOWING FUNCTION: CPT

## 2020-01-01 PROCEDURE — 81003 URINALYSIS AUTO W/O SCOPE: CPT

## 2020-01-01 PROCEDURE — 1101F PT FALLS ASSESS-DOCD LE1/YR: CPT | Mod: CPTII,S$GLB,, | Performed by: NURSE PRACTITIONER

## 2020-01-01 PROCEDURE — 93321 TRANSESOPHAGEAL ECHO (TEE) (CUPID ONLY): ICD-10-PCS | Mod: 26,,, | Performed by: INTERNAL MEDICINE

## 2020-01-01 PROCEDURE — 63600175 PHARM REV CODE 636 W HCPCS: Performed by: NURSE ANESTHETIST, CERTIFIED REGISTERED

## 2020-01-01 PROCEDURE — 82728 ASSAY OF FERRITIN: CPT | Mod: HCNC

## 2020-01-01 PROCEDURE — 3288F PR FALLS RISK ASSESSMENT DOCUMENTED: ICD-10-PCS | Mod: CPTII,S$GLB,, | Performed by: NURSE PRACTITIONER

## 2020-01-01 PROCEDURE — 87015 SPECIMEN INFECT AGNT CONCNTJ: CPT

## 2020-01-01 PROCEDURE — 87502 INFLUENZA DNA AMP PROBE: CPT

## 2020-01-01 PROCEDURE — 93321 DOPPLER ECHO F-UP/LMTD STD: CPT

## 2020-01-01 PROCEDURE — 99214 OFFICE O/P EST MOD 30 MIN: CPT | Mod: HCNC,S$GLB,, | Performed by: INTERNAL MEDICINE

## 2020-01-01 PROCEDURE — 71046 X-RAY EXAM CHEST 2 VIEWS: CPT | Mod: TC,HCNC,FY

## 2020-01-01 PROCEDURE — 87324 CLOSTRIDIUM AG IA: CPT

## 2020-01-01 PROCEDURE — 82550 ASSAY OF CK (CPK): CPT

## 2020-01-01 PROCEDURE — 99214 PR OFFICE/OUTPT VISIT, EST, LEVL IV, 30-39 MIN: ICD-10-PCS | Mod: HCNC,S$GLB,, | Performed by: INTERNAL MEDICINE

## 2020-01-01 PROCEDURE — 87507 IADNA-DNA/RNA PROBE TQ 12-25: CPT

## 2020-01-01 PROCEDURE — 84154 ASSAY OF PSA FREE: CPT | Mod: HCNC

## 2020-01-01 PROCEDURE — 85610 PROTHROMBIN TIME: CPT | Mod: HCNC

## 2020-01-01 PROCEDURE — 81002 POCT URINE DIPSTICK WITHOUT MICROSCOPE: ICD-10-PCS | Mod: HCNC,S$GLB,, | Performed by: NURSE PRACTITIONER

## 2020-01-01 PROCEDURE — 82150 ASSAY OF AMYLASE: CPT

## 2020-01-01 PROCEDURE — 84153 ASSAY OF PSA TOTAL: CPT | Mod: HCNC

## 2020-01-01 PROCEDURE — 99999 PR PBB SHADOW E&M-EST. PATIENT-LVL IV: ICD-10-PCS | Mod: PBBFAC,HCNC,, | Performed by: NURSE PRACTITIONER

## 2020-01-01 PROCEDURE — 93312 TRANSESOPHAGEAL ECHO (TEE) (CUPID ONLY): ICD-10-PCS | Mod: 26,,, | Performed by: INTERNAL MEDICINE

## 2020-01-01 PROCEDURE — 3074F SYST BP LT 130 MM HG: CPT | Mod: HCNC,CPTII,S$GLB, | Performed by: NURSE PRACTITIONER

## 2020-01-01 PROCEDURE — 71046 X-RAY EXAM CHEST 2 VIEWS: CPT | Mod: 26,HCNC,, | Performed by: RADIOLOGY

## 2020-01-01 PROCEDURE — 1101F PR PT FALLS ASSESS DOC 0-1 FALLS W/OUT INJ PAST YR: ICD-10-PCS | Mod: HCNC,CPTII,S$GLB, | Performed by: NURSE PRACTITIONER

## 2020-01-01 PROCEDURE — 87040 BLOOD CULTURE FOR BACTERIA: CPT | Mod: 59

## 2020-01-01 PROCEDURE — 86431 RHEUMATOID FACTOR QUANT: CPT

## 2020-01-01 PROCEDURE — 1101F PR PT FALLS ASSESS DOC 0-1 FALLS W/OUT INJ PAST YR: ICD-10-PCS | Mod: HCNC,CPTII,95, | Performed by: FAMILY MEDICINE

## 2020-01-01 PROCEDURE — 83036 HEMOGLOBIN GLYCOSYLATED A1C: CPT

## 2020-01-01 PROCEDURE — 63600175 PHARM REV CODE 636 W HCPCS: Performed by: EMERGENCY MEDICINE

## 2020-01-01 PROCEDURE — 93325 TRANSESOPHAGEAL ECHO (TEE) (CUPID ONLY): ICD-10-PCS | Mod: 26,,, | Performed by: INTERNAL MEDICINE

## 2020-01-01 RX ORDER — FUROSEMIDE 10 MG/ML
20 INJECTION INTRAMUSCULAR; INTRAVENOUS
Status: COMPLETED | OUTPATIENT
Start: 2020-01-01 | End: 2020-01-01

## 2020-01-01 RX ORDER — FUROSEMIDE 20 MG/1
20 TABLET ORAL DAILY
COMMUNITY
Start: 2020-01-01 | End: 2020-01-01 | Stop reason: SDUPTHER

## 2020-01-01 RX ORDER — INSULIN GLARGINE 100 [IU]/ML
INJECTION, SOLUTION SUBCUTANEOUS
Qty: 30 ML | Refills: 3 | Status: SHIPPED | OUTPATIENT
Start: 2020-01-01 | End: 2020-01-01 | Stop reason: SDUPTHER

## 2020-01-01 RX ORDER — INSULIN GLARGINE 100 [IU]/ML
INJECTION, SOLUTION SUBCUTANEOUS
Qty: 30 ML | Refills: 3 | Status: SHIPPED | OUTPATIENT
Start: 2020-01-01

## 2020-01-01 RX ORDER — FUROSEMIDE 20 MG/1
20 TABLET ORAL DAILY
Qty: 90 TABLET | Refills: 3
Start: 2020-01-01 | End: 2020-01-01 | Stop reason: SDUPTHER

## 2020-01-01 RX ORDER — FUROSEMIDE 10 MG/ML
40 INJECTION INTRAMUSCULAR; INTRAVENOUS ONCE
Status: COMPLETED | OUTPATIENT
Start: 2020-01-01 | End: 2020-01-01

## 2020-01-01 RX ORDER — INSULIN ASPART 100 [IU]/ML
INJECTION, SOLUTION INTRAVENOUS; SUBCUTANEOUS
Qty: 45 ML | Refills: 12 | Status: SHIPPED | OUTPATIENT
Start: 2020-01-01 | End: 2020-01-01 | Stop reason: SDUPTHER

## 2020-01-01 RX ORDER — SODIUM CHLORIDE 450 MG/100ML
INJECTION, SOLUTION INTRAVENOUS CONTINUOUS
Status: DISCONTINUED | OUTPATIENT
Start: 2020-01-01 | End: 2020-01-01

## 2020-01-01 RX ORDER — LANOLIN ALCOHOL/MO/W.PET/CERES
800 CREAM (GRAM) TOPICAL
Status: DISCONTINUED | OUTPATIENT
Start: 2020-01-01 | End: 2020-01-01

## 2020-01-01 RX ORDER — SODIUM BICARBONATE 1 MEQ/ML
SYRINGE (ML) INTRAVENOUS CODE/TRAUMA/SEDATION MEDICATION
Status: COMPLETED | OUTPATIENT
Start: 2020-01-01 | End: 2020-01-01

## 2020-01-01 RX ORDER — POTASSIUM CHLORIDE 20 MEQ/1
40 TABLET, EXTENDED RELEASE ORAL
Status: DISCONTINUED | OUTPATIENT
Start: 2020-01-01 | End: 2020-01-01 | Stop reason: HOSPADM

## 2020-01-01 RX ORDER — SIMETHICONE 80 MG
1 TABLET,CHEWABLE ORAL 3 TIMES DAILY PRN
Status: DISCONTINUED | OUTPATIENT
Start: 2020-01-01 | End: 2020-01-01 | Stop reason: HOSPADM

## 2020-01-01 RX ORDER — LANOLIN ALCOHOL/MO/W.PET/CERES
2000 CREAM (GRAM) TOPICAL DAILY
Status: DISCONTINUED | OUTPATIENT
Start: 2020-01-01 | End: 2020-01-01 | Stop reason: HOSPADM

## 2020-01-01 RX ORDER — CLOTRIMAZOLE 10 MG/1
10 LOZENGE ORAL; TOPICAL
Status: DISCONTINUED | OUTPATIENT
Start: 2020-01-01 | End: 2020-01-01

## 2020-01-01 RX ORDER — FERROUS SULFATE 325(65) MG
325 TABLET ORAL
Qty: 90 TABLET | Refills: 4 | Status: SHIPPED | OUTPATIENT
Start: 2020-01-01 | End: 2020-01-01 | Stop reason: SDUPTHER

## 2020-01-01 RX ORDER — POTASSIUM CHLORIDE 20 MEQ/1
20 TABLET, EXTENDED RELEASE ORAL
Status: DISCONTINUED | OUTPATIENT
Start: 2020-01-01 | End: 2020-01-01 | Stop reason: HOSPADM

## 2020-01-01 RX ORDER — INSULIN ASPART 100 [IU]/ML
6 INJECTION, SOLUTION INTRAVENOUS; SUBCUTANEOUS 2 TIMES DAILY PRN
Status: DISCONTINUED | OUTPATIENT
Start: 2020-01-01 | End: 2020-01-01 | Stop reason: HOSPADM

## 2020-01-01 RX ORDER — MEMANTINE HYDROCHLORIDE 5 MG/1
5 TABLET ORAL 2 TIMES DAILY
Status: DISCONTINUED | OUTPATIENT
Start: 2020-01-01 | End: 2020-01-01 | Stop reason: HOSPADM

## 2020-01-01 RX ORDER — MAGNESIUM SULFATE HEPTAHYDRATE 40 MG/ML
2 INJECTION, SOLUTION INTRAVENOUS
Status: DISCONTINUED | OUTPATIENT
Start: 2020-01-01 | End: 2020-01-01 | Stop reason: HOSPADM

## 2020-01-01 RX ORDER — LIDOCAINE 50 MG/G
1 PATCH TOPICAL EVERY 24 HOURS
Status: DISCONTINUED | OUTPATIENT
Start: 2020-01-01 | End: 2020-01-01 | Stop reason: HOSPADM

## 2020-01-01 RX ORDER — MORPHINE SULFATE 2 MG/ML
2 INJECTION, SOLUTION INTRAMUSCULAR; INTRAVENOUS EVERY 6 HOURS PRN
Status: DISCONTINUED | OUTPATIENT
Start: 2020-01-01 | End: 2020-01-01 | Stop reason: HOSPADM

## 2020-01-01 RX ORDER — MECLIZINE HCL 12.5 MG 12.5 MG/1
12.5 TABLET ORAL 2 TIMES DAILY PRN
Status: DISCONTINUED | OUTPATIENT
Start: 2020-01-01 | End: 2020-01-01 | Stop reason: HOSPADM

## 2020-01-01 RX ORDER — ASPIRIN 325 MG
325 TABLET ORAL
Status: DISCONTINUED | OUTPATIENT
Start: 2020-01-01 | End: 2020-01-01

## 2020-01-01 RX ORDER — ASPIRIN 81 MG/1
81 TABLET ORAL DAILY
Status: DISCONTINUED | OUTPATIENT
Start: 2020-01-01 | End: 2020-01-01 | Stop reason: HOSPADM

## 2020-01-01 RX ORDER — METOPROLOL SUCCINATE 25 MG/1
25 TABLET, EXTENDED RELEASE ORAL DAILY
Status: DISCONTINUED | OUTPATIENT
Start: 2020-01-01 | End: 2020-01-01 | Stop reason: HOSPADM

## 2020-01-01 RX ORDER — NAPROXEN SODIUM 220 MG/1
243 TABLET, FILM COATED ORAL DAILY
Status: DISCONTINUED | OUTPATIENT
Start: 2020-01-01 | End: 2020-01-01 | Stop reason: HOSPADM

## 2020-01-01 RX ORDER — MECLIZINE HCL 12.5 MG 12.5 MG/1
12.5 TABLET ORAL 2 TIMES DAILY PRN
Qty: 30 TABLET | Refills: 1 | Status: SHIPPED | OUTPATIENT
Start: 2020-01-01 | End: 2020-12-12

## 2020-01-01 RX ORDER — CEFAZOLIN SODIUM 2 G/50ML
2 SOLUTION INTRAVENOUS
Status: DISCONTINUED | OUTPATIENT
Start: 2020-01-01 | End: 2020-01-01

## 2020-01-01 RX ORDER — FUROSEMIDE 20 MG/1
20 TABLET ORAL DAILY
Qty: 90 TABLET | Refills: 3 | Status: SHIPPED | OUTPATIENT
Start: 2020-01-01 | End: 2020-01-01

## 2020-01-01 RX ORDER — POTASSIUM CHLORIDE 7.45 MG/ML
20 INJECTION INTRAVENOUS
Status: DISCONTINUED | OUTPATIENT
Start: 2020-01-01 | End: 2020-01-01 | Stop reason: HOSPADM

## 2020-01-01 RX ORDER — ASPIRIN 325 MG
325 TABLET ORAL
Status: COMPLETED | OUTPATIENT
Start: 2020-01-01 | End: 2020-01-01

## 2020-01-01 RX ORDER — NITROGLYCERIN 0.4 MG/1
0.4 TABLET SUBLINGUAL EVERY 5 MIN PRN
Status: DISCONTINUED | OUTPATIENT
Start: 2020-01-01 | End: 2020-01-01 | Stop reason: HOSPADM

## 2020-01-01 RX ORDER — METOPROLOL SUCCINATE 25 MG/1
25 TABLET, EXTENDED RELEASE ORAL DAILY
COMMUNITY

## 2020-01-01 RX ORDER — MAGNESIUM SULFATE HEPTAHYDRATE 40 MG/ML
4 INJECTION, SOLUTION INTRAVENOUS
Status: DISCONTINUED | OUTPATIENT
Start: 2020-01-01 | End: 2020-01-01 | Stop reason: HOSPADM

## 2020-01-01 RX ORDER — ACETAMINOPHEN 325 MG/1
650 TABLET ORAL
Status: COMPLETED | OUTPATIENT
Start: 2020-01-01 | End: 2020-01-01

## 2020-01-01 RX ORDER — CALCITRIOL 0.25 UG/1
0.25 CAPSULE ORAL DAILY
COMMUNITY

## 2020-01-01 RX ORDER — SODIUM CHLORIDE, SODIUM LACTATE, POTASSIUM CHLORIDE, CALCIUM CHLORIDE 600; 310; 30; 20 MG/100ML; MG/100ML; MG/100ML; MG/100ML
INJECTION, SOLUTION INTRAVENOUS CONTINUOUS
Status: DISCONTINUED | OUTPATIENT
Start: 2020-01-01 | End: 2020-01-01

## 2020-01-01 RX ORDER — PROPOFOL 10 MG/ML
VIAL (ML) INTRAVENOUS
Status: DISCONTINUED | OUTPATIENT
Start: 2020-01-01 | End: 2020-01-01

## 2020-01-01 RX ORDER — EPINEPHRINE 0.1 MG/ML
INJECTION INTRAVENOUS CODE/TRAUMA/SEDATION MEDICATION
Status: COMPLETED | OUTPATIENT
Start: 2020-01-01 | End: 2020-01-01

## 2020-01-01 RX ORDER — FERROUS SULFATE 325(65) MG
325 TABLET ORAL
Qty: 90 TABLET | Refills: 4 | Status: SHIPPED | OUTPATIENT
Start: 2020-01-01

## 2020-01-01 RX ORDER — CALCITRIOL 0.25 UG/1
0.25 CAPSULE ORAL DAILY
Status: DISCONTINUED | OUTPATIENT
Start: 2020-01-01 | End: 2020-01-01 | Stop reason: HOSPADM

## 2020-01-01 RX ORDER — METOCLOPRAMIDE HYDROCHLORIDE 5 MG/ML
5 INJECTION INTRAMUSCULAR; INTRAVENOUS
Status: COMPLETED | OUTPATIENT
Start: 2020-01-01 | End: 2020-01-01

## 2020-01-01 RX ORDER — FAMOTIDINE 20 MG/1
20 TABLET, FILM COATED ORAL NIGHTLY PRN
Status: DISCONTINUED | OUTPATIENT
Start: 2020-01-01 | End: 2020-01-01 | Stop reason: HOSPADM

## 2020-01-01 RX ORDER — POTASSIUM CHLORIDE 7.45 MG/ML
40 INJECTION INTRAVENOUS
Status: DISCONTINUED | OUTPATIENT
Start: 2020-01-01 | End: 2020-01-01 | Stop reason: HOSPADM

## 2020-01-01 RX ORDER — POTASSIUM CHLORIDE 750 MG/1
10 TABLET, EXTENDED RELEASE ORAL DAILY
Qty: 30 TABLET | Refills: 0 | Status: SHIPPED | OUTPATIENT
Start: 2020-01-01 | End: 2020-01-01 | Stop reason: ALTCHOICE

## 2020-01-01 RX ORDER — HEPARIN SODIUM,PORCINE/D5W 25000/250
12 INTRAVENOUS SOLUTION INTRAVENOUS CONTINUOUS
Status: DISCONTINUED | OUTPATIENT
Start: 2020-01-01 | End: 2020-01-01

## 2020-01-01 RX ORDER — CYPROHEPTADINE HYDROCHLORIDE 4 MG/1
4 TABLET ORAL NIGHTLY
Status: DISCONTINUED | OUTPATIENT
Start: 2020-01-01 | End: 2020-01-01

## 2020-01-01 RX ORDER — ENOXAPARIN SODIUM 100 MG/ML
100 INJECTION SUBCUTANEOUS DAILY
Qty: 2 SYRINGE | Refills: 0 | Status: SHIPPED | OUTPATIENT
Start: 2020-01-01 | End: 2020-01-01

## 2020-01-01 RX ORDER — BLOOD-GLUCOSE CONTROL, NORMAL
EACH MISCELLANEOUS
Qty: 100 EACH | Refills: 11 | Status: SHIPPED | OUTPATIENT
Start: 2020-01-01 | End: 2020-01-01 | Stop reason: SDUPTHER

## 2020-01-01 RX ORDER — MEMANTINE HYDROCHLORIDE 5 MG/1
5 TABLET ORAL 2 TIMES DAILY
COMMUNITY
End: 2020-01-01 | Stop reason: SDUPTHER

## 2020-01-01 RX ORDER — ESOMEPRAZOLE MAGNESIUM 20 MG/1
20 GRANULE, DELAYED RELEASE ORAL
Qty: 90 EACH | Refills: 2 | Status: SHIPPED | OUTPATIENT
Start: 2020-01-01 | End: 2020-01-01 | Stop reason: CLARIF

## 2020-01-01 RX ORDER — FAMOTIDINE 20 MG/1
20 TABLET, FILM COATED ORAL NIGHTLY PRN
Qty: 30 TABLET | Refills: 2
Start: 2020-01-01 | End: 2020-01-01

## 2020-01-01 RX ORDER — FERROUS SULFATE 325(65) MG
325 TABLET ORAL
Status: DISCONTINUED | OUTPATIENT
Start: 2020-01-01 | End: 2020-01-01 | Stop reason: HOSPADM

## 2020-01-01 RX ORDER — FUROSEMIDE 10 MG/ML
20 INJECTION INTRAMUSCULAR; INTRAVENOUS
Status: DISCONTINUED | OUTPATIENT
Start: 2020-01-01 | End: 2020-01-01

## 2020-01-01 RX ORDER — PANTOPRAZOLE SODIUM 40 MG/10ML
40 INJECTION, POWDER, LYOPHILIZED, FOR SOLUTION INTRAVENOUS DAILY
Status: DISCONTINUED | OUTPATIENT
Start: 2020-01-01 | End: 2020-01-01 | Stop reason: HOSPADM

## 2020-01-01 RX ORDER — SERTRALINE HYDROCHLORIDE 50 MG/1
100 TABLET, FILM COATED ORAL DAILY
Status: DISCONTINUED | OUTPATIENT
Start: 2020-01-01 | End: 2020-01-01 | Stop reason: HOSPADM

## 2020-01-01 RX ORDER — DRONABINOL 2.5 MG/1
2.5 CAPSULE ORAL 2 TIMES DAILY
Status: DISCONTINUED | OUTPATIENT
Start: 2020-01-01 | End: 2020-01-01 | Stop reason: HOSPADM

## 2020-01-01 RX ORDER — BLOOD-GLUCOSE CONTROL, NORMAL
EACH MISCELLANEOUS
Qty: 100 EACH | Refills: 11 | Status: SHIPPED | OUTPATIENT
Start: 2020-01-01 | End: 2020-01-01 | Stop reason: CLARIF

## 2020-01-01 RX ORDER — SERTRALINE HYDROCHLORIDE 50 MG/1
50 TABLET, FILM COATED ORAL DAILY
Status: DISCONTINUED | OUTPATIENT
Start: 2020-01-01 | End: 2020-01-01

## 2020-01-01 RX ORDER — PEN NEEDLE, DIABETIC 30 GX3/16"
NEEDLE, DISPOSABLE MISCELLANEOUS
Qty: 100 EACH | Refills: 11 | Status: SHIPPED | OUTPATIENT
Start: 2020-01-01 | End: 2020-01-01 | Stop reason: SDUPTHER

## 2020-01-01 RX ORDER — INSULIN ASPART 100 [IU]/ML
INJECTION, SOLUTION INTRAVENOUS; SUBCUTANEOUS
Qty: 45 ML | Refills: 12 | Status: SHIPPED | OUTPATIENT
Start: 2020-01-01

## 2020-01-01 RX ORDER — CLOTRIMAZOLE 10 MG/1
10 LOZENGE ORAL; TOPICAL 3 TIMES DAILY
Status: DISCONTINUED | OUTPATIENT
Start: 2020-01-01 | End: 2020-01-01 | Stop reason: HOSPADM

## 2020-01-01 RX ORDER — MEMANTINE HYDROCHLORIDE 5 MG/1
5 TABLET ORAL 2 TIMES DAILY
Qty: 60 TABLET | Refills: 4 | Status: SHIPPED | OUTPATIENT
Start: 2020-01-01

## 2020-01-01 RX ORDER — SERTRALINE HYDROCHLORIDE 50 MG/1
50 TABLET, FILM COATED ORAL DAILY
Qty: 30 TABLET | Refills: 11 | Status: SHIPPED | OUTPATIENT
Start: 2020-01-01 | End: 2020-01-01 | Stop reason: SDUPTHER

## 2020-01-01 RX ORDER — HYDROCODONE BITARTRATE AND ACETAMINOPHEN 5; 325 MG/1; MG/1
1 TABLET ORAL EVERY 4 HOURS PRN
Status: DISCONTINUED | OUTPATIENT
Start: 2020-01-01 | End: 2020-01-01 | Stop reason: HOSPADM

## 2020-01-01 RX ORDER — FAMOTIDINE 20 MG/1
20 TABLET, FILM COATED ORAL NIGHTLY PRN
Qty: 30 TABLET | Refills: 11 | Status: SHIPPED | OUTPATIENT
Start: 2020-01-01

## 2020-01-01 RX ORDER — SERTRALINE HYDROCHLORIDE 50 MG/1
50 TABLET, FILM COATED ORAL DAILY
Qty: 30 TABLET | Refills: 11 | Status: SHIPPED | OUTPATIENT
Start: 2020-01-01 | End: 2021-04-16

## 2020-01-01 RX ORDER — FUROSEMIDE 20 MG/1
TABLET ORAL
Qty: 45 TABLET | Refills: 0 | Status: SHIPPED | OUTPATIENT
Start: 2020-01-01

## 2020-01-01 RX ORDER — POTASSIUM CHLORIDE 20 MEQ/15ML
20 SOLUTION ORAL ONCE
Status: COMPLETED | OUTPATIENT
Start: 2020-01-01 | End: 2020-01-01

## 2020-01-01 RX ORDER — ACETAMINOPHEN 325 MG/1
650 TABLET ORAL EVERY 8 HOURS PRN
Status: DISCONTINUED | OUTPATIENT
Start: 2020-01-01 | End: 2020-01-01 | Stop reason: HOSPADM

## 2020-01-01 RX ORDER — NYSTATIN 100000 [USP'U]/ML
500000 SUSPENSION ORAL
Status: DISCONTINUED | OUTPATIENT
Start: 2020-01-01 | End: 2020-01-01

## 2020-01-01 RX ORDER — ENOXAPARIN SODIUM 100 MG/ML
1 INJECTION SUBCUTANEOUS
Status: DISCONTINUED | OUTPATIENT
Start: 2020-01-01 | End: 2020-01-01 | Stop reason: HOSPADM

## 2020-01-01 RX ORDER — AMOXICILLIN AND CLAVULANATE POTASSIUM 875; 125 MG/1; MG/1
1 TABLET, FILM COATED ORAL 2 TIMES DAILY
Qty: 20 TABLET | Refills: 0 | Status: SHIPPED | OUTPATIENT
Start: 2020-01-01 | End: 2020-01-01 | Stop reason: CLARIF

## 2020-01-01 RX ORDER — MAGNESIUM SULFATE HEPTAHYDRATE 40 MG/ML
2 INJECTION, SOLUTION INTRAVENOUS ONCE
Status: COMPLETED | OUTPATIENT
Start: 2020-01-01 | End: 2020-01-01

## 2020-01-01 RX ORDER — ATORVASTATIN CALCIUM 40 MG/1
TABLET, FILM COATED ORAL
Qty: 90 TABLET | Refills: 5 | Status: SHIPPED | OUTPATIENT
Start: 2020-01-01 | End: 2020-01-01

## 2020-01-01 RX ORDER — POLYETHYLENE GLYCOL 3350 17 G/17G
17 POWDER, FOR SOLUTION ORAL DAILY
Status: DISCONTINUED | OUTPATIENT
Start: 2020-01-01 | End: 2020-01-01 | Stop reason: HOSPADM

## 2020-01-01 RX ORDER — SODIUM CHLORIDE 9 MG/ML
INJECTION, SOLUTION INTRAVENOUS CONTINUOUS PRN
Status: DISCONTINUED | OUTPATIENT
Start: 2020-01-01 | End: 2020-01-01

## 2020-01-01 RX ORDER — MECLIZINE HCL 12.5 MG 12.5 MG/1
12.5 TABLET ORAL 2 TIMES DAILY PRN
Qty: 60 TABLET | Refills: 1 | Status: SHIPPED | OUTPATIENT
Start: 2020-01-01 | End: 2020-01-01 | Stop reason: SDUPTHER

## 2020-01-01 RX ORDER — CALCIUM CHLORIDE INJECTION 100 MG/ML
INJECTION, SOLUTION INTRAVENOUS CODE/TRAUMA/SEDATION MEDICATION
Status: COMPLETED | OUTPATIENT
Start: 2020-01-01 | End: 2020-01-01

## 2020-01-01 RX ORDER — CEFAZOLIN SODIUM 2 G/50ML
2 SOLUTION INTRAVENOUS
Status: DISCONTINUED | OUTPATIENT
Start: 2020-11-25 | End: 2020-01-01 | Stop reason: HOSPADM

## 2020-01-01 RX ORDER — MAGNESIUM SULFATE 1 G/100ML
1 INJECTION INTRAVENOUS
Status: DISCONTINUED | OUTPATIENT
Start: 2020-01-01 | End: 2020-01-01 | Stop reason: HOSPADM

## 2020-01-01 RX ORDER — PEN NEEDLE, DIABETIC 30 GX3/16"
NEEDLE, DISPOSABLE MISCELLANEOUS
Qty: 100 EACH | Refills: 11 | Status: SHIPPED | OUTPATIENT
Start: 2020-01-01 | End: 2020-01-01 | Stop reason: CLARIF

## 2020-01-01 RX ADMIN — METOPROLOL SUCCINATE 25 MG: 25 TABLET, FILM COATED, EXTENDED RELEASE ORAL at 09:11

## 2020-01-01 RX ADMIN — LACTOBACILLUS TAB 4 TABLET: TAB at 02:11

## 2020-01-01 RX ADMIN — HYDROCODONE BITARTRATE AND ACETAMINOPHEN 1 TABLET: 5; 325 TABLET ORAL at 09:11

## 2020-01-01 RX ADMIN — ASPIRIN 81 MG: 81 TABLET, DELAYED RELEASE ORAL at 09:11

## 2020-01-01 RX ADMIN — CYANOCOBALAMIN TAB 1000 MCG 2000 MCG: 1000 TAB at 10:11

## 2020-01-01 RX ADMIN — EPINEPHRINE 1 MG: 0.1 INJECTION, SOLUTION ENDOTRACHEAL; INTRACARDIAC; INTRAVENOUS at 02:11

## 2020-01-01 RX ADMIN — LIDOCAINE 1 PATCH: 50 PATCH TOPICAL at 09:11

## 2020-01-01 RX ADMIN — MEMANTINE HYDROCHLORIDE 5 MG: 5 TABLET ORAL at 09:11

## 2020-01-01 RX ADMIN — PANTOPRAZOLE SODIUM 40 MG: 40 INJECTION, POWDER, LYOPHILIZED, FOR SOLUTION INTRAVENOUS at 11:11

## 2020-01-01 RX ADMIN — SERTRALINE HYDROCHLORIDE 50 MG: 50 TABLET ORAL at 12:11

## 2020-01-01 RX ADMIN — HUMAN INSULIN 2 UNITS: 100 INJECTION, SOLUTION SUBCUTANEOUS at 12:11

## 2020-01-01 RX ADMIN — LACTOBACILLUS TAB 4 TABLET: TAB at 09:11

## 2020-01-01 RX ADMIN — CALCITRIOL CAPSULES 0.25 MCG 0.25 MCG: 0.25 CAPSULE ORAL at 11:11

## 2020-01-01 RX ADMIN — MEMANTINE HYDROCHLORIDE 5 MG: 5 TABLET ORAL at 08:11

## 2020-01-01 RX ADMIN — CYANOCOBALAMIN TAB 1000 MCG 2000 MCG: 1000 TAB at 08:11

## 2020-01-01 RX ADMIN — LACTOBACILLUS TAB 4 TABLET: TAB at 12:11

## 2020-01-01 RX ADMIN — VANCOMYCIN HYDROCHLORIDE 1500 MG: 1.5 INJECTION, POWDER, LYOPHILIZED, FOR SOLUTION INTRAVENOUS at 04:11

## 2020-01-01 RX ADMIN — HYDROCODONE BITARTRATE AND ACETAMINOPHEN 1 TABLET: 5; 325 TABLET ORAL at 02:11

## 2020-01-01 RX ADMIN — LACTOBACILLUS TAB 4 TABLET: TAB at 11:11

## 2020-01-01 RX ADMIN — SERTRALINE HYDROCHLORIDE 50 MG: 50 TABLET ORAL at 09:11

## 2020-01-01 RX ADMIN — POLYETHYLENE GLYCOL 3350 17 G: 17 POWDER, FOR SOLUTION ORAL at 09:11

## 2020-01-01 RX ADMIN — SODIUM CHLORIDE, SODIUM LACTATE, POTASSIUM CHLORIDE, AND CALCIUM CHLORIDE: .6; .31; .03; .02 INJECTION, SOLUTION INTRAVENOUS at 02:11

## 2020-01-01 RX ADMIN — HUMAN INSULIN 4 UNITS: 100 INJECTION, SOLUTION SUBCUTANEOUS at 01:11

## 2020-01-01 RX ADMIN — FERROUS SULFATE TAB 325 MG (65 MG ELEMENTAL FE) 325 MG: 325 (65 FE) TAB at 08:11

## 2020-01-01 RX ADMIN — CLOTRIMAZOLE 10 MG: 10 LOZENGE ORAL; TOPICAL at 03:11

## 2020-01-01 RX ADMIN — CEFAZOLIN SODIUM 2 G: 2 SOLUTION INTRAVENOUS at 08:11

## 2020-01-01 RX ADMIN — FERROUS SULFATE TAB 325 MG (65 MG ELEMENTAL FE) 325 MG: 325 (65 FE) TAB at 09:11

## 2020-01-01 RX ADMIN — SERTRALINE HYDROCHLORIDE 50 MG: 50 TABLET ORAL at 08:11

## 2020-01-01 RX ADMIN — HUMAN INSULIN 4 UNITS: 100 INJECTION, SOLUTION SUBCUTANEOUS at 09:11

## 2020-01-01 RX ADMIN — LACTOBACILLUS TAB 4 TABLET: TAB at 05:11

## 2020-01-01 RX ADMIN — MEMANTINE HYDROCHLORIDE 5 MG: 5 TABLET ORAL at 12:11

## 2020-01-01 RX ADMIN — ENOXAPARIN SODIUM 100 MG: 100 INJECTION SUBCUTANEOUS at 06:11

## 2020-01-01 RX ADMIN — PIPERACILLIN SODIUM AND TAZOBACTAM SODIUM 3.38 G: 3; .375 INJECTION, POWDER, LYOPHILIZED, FOR SOLUTION INTRAVENOUS at 03:11

## 2020-01-01 RX ADMIN — MAGNESIUM SULFATE 2 G: 2 INJECTION INTRAVENOUS at 06:11

## 2020-01-01 RX ADMIN — HUMAN INSULIN 3 UNITS: 100 INJECTION, SOLUTION SUBCUTANEOUS at 09:11

## 2020-01-01 RX ADMIN — METOCLOPRAMIDE 5 MG: 5 INJECTION, SOLUTION INTRAMUSCULAR; INTRAVENOUS at 06:11

## 2020-01-01 RX ADMIN — PIPERACILLIN SODIUM AND TAZOBACTAM SODIUM 3.38 G: 3; .375 INJECTION, POWDER, LYOPHILIZED, FOR SOLUTION INTRAVENOUS at 11:11

## 2020-01-01 RX ADMIN — CEFAZOLIN SODIUM 2 G: 2 SOLUTION INTRAVENOUS at 09:11

## 2020-01-01 RX ADMIN — INSULIN DETEMIR 10 UNITS: 100 INJECTION, SOLUTION SUBCUTANEOUS at 08:11

## 2020-01-01 RX ADMIN — SODIUM CHLORIDE, SODIUM LACTATE, POTASSIUM CHLORIDE, AND CALCIUM CHLORIDE: .6; .31; .03; .02 INJECTION, SOLUTION INTRAVENOUS at 05:11

## 2020-01-01 RX ADMIN — POTASSIUM CHLORIDE 40 MEQ: 20 TABLET, EXTENDED RELEASE ORAL at 05:11

## 2020-01-01 RX ADMIN — POLYETHYLENE GLYCOL 3350 17 G: 17 POWDER, FOR SOLUTION ORAL at 11:11

## 2020-01-01 RX ADMIN — CYANOCOBALAMIN TAB 1000 MCG 2000 MCG: 1000 TAB at 09:11

## 2020-01-01 RX ADMIN — DRONABINOL 2.5 MG: 2.5 CAPSULE ORAL at 09:11

## 2020-01-01 RX ADMIN — ENOXAPARIN SODIUM 100 MG: 100 INJECTION SUBCUTANEOUS at 05:11

## 2020-01-01 RX ADMIN — MEMANTINE HYDROCHLORIDE 5 MG: 5 TABLET ORAL at 10:11

## 2020-01-01 RX ADMIN — CEFAZOLIN SODIUM 2 G: 2 SOLUTION INTRAVENOUS at 05:11

## 2020-01-01 RX ADMIN — ACETAMINOPHEN 650 MG: 325 TABLET, FILM COATED ORAL at 07:11

## 2020-01-01 RX ADMIN — HYDROCODONE BITARTRATE AND ACETAMINOPHEN 1 TABLET: 5; 325 TABLET ORAL at 03:11

## 2020-01-01 RX ADMIN — METOCLOPRAMIDE 5 MG: 5 INJECTION, SOLUTION INTRAMUSCULAR; INTRAVENOUS at 05:11

## 2020-01-01 RX ADMIN — VANCOMYCIN HYDROCHLORIDE 1500 MG: 1.5 INJECTION, POWDER, LYOPHILIZED, FOR SOLUTION INTRAVENOUS at 03:11

## 2020-01-01 RX ADMIN — PIPERACILLIN SODIUM AND TAZOBACTAM SODIUM 3.38 G: 3; .375 INJECTION, POWDER, LYOPHILIZED, FOR SOLUTION INTRAVENOUS at 08:11

## 2020-01-01 RX ADMIN — CLOTRIMAZOLE 10 MG: 10 LOZENGE ORAL; TOPICAL at 08:11

## 2020-01-01 RX ADMIN — LIDOCAINE 1 PATCH: 50 PATCH TOPICAL at 11:11

## 2020-01-01 RX ADMIN — CEFAZOLIN SODIUM 2 G: 2 SOLUTION INTRAVENOUS at 06:11

## 2020-01-01 RX ADMIN — CALCITRIOL CAPSULES 0.25 MCG 0.25 MCG: 0.25 CAPSULE ORAL at 09:11

## 2020-01-01 RX ADMIN — SIMETHICONE 80 MG: 80 TABLET, CHEWABLE ORAL at 05:11

## 2020-01-01 RX ADMIN — PROPOFOL 50 MG: 10 INJECTION, EMULSION INTRAVENOUS at 09:11

## 2020-01-01 RX ADMIN — ACETAMINOPHEN 650 MG: 325 TABLET ORAL at 12:11

## 2020-01-01 RX ADMIN — POTASSIUM CHLORIDE 20 MEQ: 20 SOLUTION ORAL at 05:05

## 2020-01-01 RX ADMIN — PROPOFOL 30 MG: 10 INJECTION, EMULSION INTRAVENOUS at 09:11

## 2020-01-01 RX ADMIN — CLOTRIMAZOLE 10 MG: 10 LOZENGE ORAL; TOPICAL at 09:11

## 2020-01-01 RX ADMIN — HEPARIN SODIUM AND DEXTROSE 12 UNITS/KG/HR: 10000; 5 INJECTION INTRAVENOUS at 07:11

## 2020-01-01 RX ADMIN — METOCLOPRAMIDE 5 MG: 5 INJECTION, SOLUTION INTRAMUSCULAR; INTRAVENOUS at 11:11

## 2020-01-01 RX ADMIN — HUMAN INSULIN 4 UNITS: 100 INJECTION, SOLUTION SUBCUTANEOUS at 08:11

## 2020-01-01 RX ADMIN — NITROGLYCERIN 0.4 MG: 0.4 TABLET SUBLINGUAL at 03:11

## 2020-01-01 RX ADMIN — CYPROHEPTADINE HYDROCHLORIDE 4 MG: 4 TABLET ORAL at 09:11

## 2020-01-01 RX ADMIN — SIMETHICONE 80 MG: 80 TABLET, CHEWABLE ORAL at 08:11

## 2020-01-01 RX ADMIN — FUROSEMIDE 20 MG: 10 INJECTION, SOLUTION INTRAMUSCULAR; INTRAVENOUS at 05:11

## 2020-01-01 RX ADMIN — ASPIRIN 81 MG: 81 TABLET, DELAYED RELEASE ORAL at 08:11

## 2020-01-01 RX ADMIN — LIDOCAINE 1 PATCH: 50 PATCH TOPICAL at 02:11

## 2020-01-01 RX ADMIN — FERROUS SULFATE TAB 325 MG (65 MG ELEMENTAL FE) 325 MG: 325 (65 FE) TAB at 11:11

## 2020-01-01 RX ADMIN — SODIUM CHLORIDE, SODIUM LACTATE, POTASSIUM CHLORIDE, AND CALCIUM CHLORIDE: .6; .31; .03; .02 INJECTION, SOLUTION INTRAVENOUS at 11:11

## 2020-01-01 RX ADMIN — METOPROLOL SUCCINATE 25 MG: 25 TABLET, FILM COATED, EXTENDED RELEASE ORAL at 10:11

## 2020-01-01 RX ADMIN — ACETAMINOPHEN 650 MG: 325 TABLET, FILM COATED ORAL at 04:11

## 2020-01-01 RX ADMIN — HUMAN INSULIN 3 UNITS: 100 INJECTION, SOLUTION SUBCUTANEOUS at 05:11

## 2020-01-01 RX ADMIN — HUMAN INSULIN 4 UNITS: 100 INJECTION, SOLUTION SUBCUTANEOUS at 05:11

## 2020-01-01 RX ADMIN — METOPROLOL SUCCINATE 25 MG: 25 TABLET, FILM COATED, EXTENDED RELEASE ORAL at 08:11

## 2020-01-01 RX ADMIN — FERROUS SULFATE TAB 325 MG (65 MG ELEMENTAL FE) 325 MG: 325 (65 FE) TAB at 10:11

## 2020-01-01 RX ADMIN — CYANOCOBALAMIN TAB 1000 MCG 2000 MCG: 1000 TAB at 12:11

## 2020-01-01 RX ADMIN — CALCIUM CHLORIDE 1 G: 100 INJECTION, SOLUTION INTRAVENOUS at 02:11

## 2020-01-01 RX ADMIN — SERTRALINE HYDROCHLORIDE 50 MG: 50 TABLET ORAL at 10:11

## 2020-01-01 RX ADMIN — DRONABINOL 2.5 MG: 2.5 CAPSULE ORAL at 11:11

## 2020-01-01 RX ADMIN — CLOTRIMAZOLE 10 MG: 10 LOZENGE ORAL; TOPICAL at 05:11

## 2020-01-01 RX ADMIN — SODIUM CHLORIDE, SODIUM LACTATE, POTASSIUM CHLORIDE, AND CALCIUM CHLORIDE: .6; .31; .03; .02 INJECTION, SOLUTION INTRAVENOUS at 09:11

## 2020-01-01 RX ADMIN — PIPERACILLIN SODIUM AND TAZOBACTAM SODIUM 3.38 G: 3; .375 INJECTION, POWDER, LYOPHILIZED, FOR SOLUTION INTRAVENOUS at 09:11

## 2020-01-01 RX ADMIN — METOPROLOL SUCCINATE 25 MG: 25 TABLET, FILM COATED, EXTENDED RELEASE ORAL at 12:11

## 2020-01-01 RX ADMIN — METOCLOPRAMIDE 5 MG: 5 INJECTION, SOLUTION INTRAMUSCULAR; INTRAVENOUS at 09:11

## 2020-01-01 RX ADMIN — CYANOCOBALAMIN TAB 1000 MCG 2000 MCG: 1000 TAB at 11:11

## 2020-01-01 RX ADMIN — LACTOBACILLUS TAB 4 TABLET: TAB at 08:11

## 2020-01-01 RX ADMIN — FUROSEMIDE 40 MG: 10 INJECTION, SOLUTION INTRAMUSCULAR; INTRAVENOUS at 11:11

## 2020-01-01 RX ADMIN — METOCLOPRAMIDE 5 MG: 5 INJECTION, SOLUTION INTRAMUSCULAR; INTRAVENOUS at 08:11

## 2020-01-01 RX ADMIN — LIDOCAINE 1 PATCH: 50 PATCH TOPICAL at 08:11

## 2020-01-01 RX ADMIN — LEUCINE, PHENYLALANINE, LYSINE, METHIONINE, ISOLEUCINE, VALINE, HISTIDINE, THREONINE, TRYPTOPHAN, ALANINE, GLYCINE, ARGININE, PROLINE, SERINE, TYROSINE, DEXTROSE 1000 ML: 311; 238; 247; 170; 255; 247; 204; 179; 77; 880; 438; 489; 289; 213; 17; 5 INJECTION INTRAVENOUS at 05:11

## 2020-01-01 RX ADMIN — PIPERACILLIN SODIUM AND TAZOBACTAM SODIUM 3.38 G: 3; .375 INJECTION, POWDER, LYOPHILIZED, FOR SOLUTION INTRAVENOUS at 12:11

## 2020-01-01 RX ADMIN — APIXABAN 5 MG: 5 TABLET, FILM COATED ORAL at 10:11

## 2020-01-01 RX ADMIN — PIPERACILLIN SODIUM AND TAZOBACTAM SODIUM 3.38 G: 3; .375 INJECTION, POWDER, LYOPHILIZED, FOR SOLUTION INTRAVENOUS at 05:11

## 2020-01-01 RX ADMIN — IOHEXOL 75 ML: 350 INJECTION, SOLUTION INTRAVENOUS at 07:09

## 2020-01-01 RX ADMIN — CYPROHEPTADINE HYDROCHLORIDE 4 MG: 4 TABLET ORAL at 08:11

## 2020-01-01 RX ADMIN — SERTRALINE HYDROCHLORIDE 100 MG: 50 TABLET ORAL at 09:11

## 2020-01-01 RX ADMIN — LEUCINE, PHENYLALANINE, LYSINE, METHIONINE, ISOLEUCINE, VALINE, HISTIDINE, THREONINE, TRYPTOPHAN, ALANINE, GLYCINE, ARGININE, PROLINE, SERINE, TYROSINE, DEXTROSE 1000 ML: 311; 238; 247; 170; 255; 247; 204; 179; 77; 880; 438; 489; 289; 213; 17; 5 INJECTION INTRAVENOUS at 03:11

## 2020-01-01 RX ADMIN — INSULIN DETEMIR 10 UNITS: 100 INJECTION, SOLUTION SUBCUTANEOUS at 09:11

## 2020-01-01 RX ADMIN — ASPIRIN 81 MG: 81 TABLET, DELAYED RELEASE ORAL at 12:11

## 2020-01-01 RX ADMIN — MAGNESIUM OXIDE 800 MG: 400 TABLET ORAL at 05:11

## 2020-01-01 RX ADMIN — LACTOBACILLUS TAB 4 TABLET: TAB at 06:11

## 2020-01-01 RX ADMIN — CEFAZOLIN SODIUM 2 G: 2 SOLUTION INTRAVENOUS at 12:11

## 2020-01-01 RX ADMIN — ASPIRIN 81 MG: 81 TABLET, DELAYED RELEASE ORAL at 11:11

## 2020-01-01 RX ADMIN — ASPIRIN 81 MG: 81 TABLET, DELAYED RELEASE ORAL at 10:11

## 2020-01-01 RX ADMIN — CEFAZOLIN SODIUM 2 G: 2 SOLUTION INTRAVENOUS at 02:11

## 2020-01-01 RX ADMIN — CALCITRIOL CAPSULES 0.25 MCG 0.25 MCG: 0.25 CAPSULE ORAL at 08:11

## 2020-01-01 RX ADMIN — HYDROCODONE BITARTRATE AND ACETAMINOPHEN 1 TABLET: 5; 325 TABLET ORAL at 11:11

## 2020-01-01 RX ADMIN — INSULIN DETEMIR 10 UNITS: 100 INJECTION, SOLUTION SUBCUTANEOUS at 11:11

## 2020-01-01 RX ADMIN — SODIUM BICARBONATE 50 MEQ: 84 INJECTION, SOLUTION INTRAVENOUS at 02:11

## 2020-01-01 RX ADMIN — SODIUM CHLORIDE: 0.45 INJECTION, SOLUTION INTRAVENOUS at 04:11

## 2020-01-01 RX ADMIN — FUROSEMIDE 20 MG: 10 INJECTION, SOLUTION INTRAMUSCULAR; INTRAVENOUS at 04:05

## 2020-01-01 RX ADMIN — ASPIRIN 325 MG ORAL TABLET 325 MG: 325 PILL ORAL at 03:11

## 2020-01-01 RX ADMIN — HYDROCODONE BITARTRATE AND ACETAMINOPHEN 1 TABLET: 5; 325 TABLET ORAL at 05:11

## 2020-01-01 RX ADMIN — PANTOPRAZOLE SODIUM 40 MG: 40 INJECTION, POWDER, LYOPHILIZED, FOR SOLUTION INTRAVENOUS at 09:11

## 2020-01-01 RX ADMIN — SODIUM CHLORIDE 1000 ML: 9 INJECTION, SOLUTION INTRAVENOUS at 02:11

## 2020-01-01 RX ADMIN — FUROSEMIDE 20 MG: 10 INJECTION, SOLUTION INTRAMUSCULAR; INTRAVENOUS at 05:05

## 2020-01-01 RX ADMIN — POTASSIUM CHLORIDE 20 MEQ: 20 TABLET, EXTENDED RELEASE ORAL at 05:11

## 2020-01-01 RX ADMIN — SERTRALINE HYDROCHLORIDE 100 MG: 50 TABLET ORAL at 11:11

## 2020-01-01 RX ADMIN — CLOTRIMAZOLE 10 MG: 10 LOZENGE ORAL; TOPICAL at 11:11

## 2020-01-01 RX ADMIN — SIMETHICONE 80 MG: 80 TABLET, CHEWABLE ORAL at 03:11

## 2020-01-01 RX ADMIN — ACETAMINOPHEN 650 MG: 325 TABLET, FILM COATED ORAL at 12:11

## 2020-01-01 RX ADMIN — DRONABINOL 2.5 MG: 2.5 CAPSULE ORAL at 08:11

## 2020-01-01 RX ADMIN — ACETAMINOPHEN 650 MG: 325 TABLET, FILM COATED ORAL at 08:11

## 2020-01-01 RX ADMIN — HYDROCODONE BITARTRATE AND ACETAMINOPHEN 1 TABLET: 5; 325 TABLET ORAL at 08:11

## 2020-01-01 RX ADMIN — MEMANTINE HYDROCHLORIDE 5 MG: 5 TABLET ORAL at 11:11

## 2020-01-01 RX ADMIN — DRONABINOL 2.5 MG: 2.5 CAPSULE ORAL at 12:11

## 2020-01-01 RX ADMIN — CALCITRIOL CAPSULES 0.25 MCG 0.25 MCG: 0.25 CAPSULE ORAL at 12:11

## 2020-01-01 RX ADMIN — HYDROCODONE BITARTRATE AND ACETAMINOPHEN 1 TABLET: 5; 325 TABLET ORAL at 12:11

## 2020-01-01 RX ADMIN — ACETAMINOPHEN 650 MG: 325 TABLET, FILM COATED ORAL at 03:11

## 2020-01-01 RX ADMIN — SODIUM CHLORIDE: 0.9 INJECTION, SOLUTION INTRAVENOUS at 09:11

## 2020-01-01 RX ADMIN — LIDOCAINE 1 PATCH: 50 PATCH TOPICAL at 12:11

## 2020-01-01 RX ADMIN — HUMAN INSULIN 3 UNITS: 100 INJECTION, SOLUTION SUBCUTANEOUS at 12:11

## 2020-01-01 RX ADMIN — CALCITRIOL CAPSULES 0.25 MCG 0.25 MCG: 0.25 CAPSULE ORAL at 10:11

## 2020-01-01 RX ADMIN — CEFAZOLIN SODIUM 2 G: 2 SOLUTION INTRAVENOUS at 01:11

## 2020-01-03 PROBLEM — D69.6 THROMBOCYTOPENIA: Status: ACTIVE | Noted: 2020-01-01

## 2020-01-03 PROBLEM — Z99.81 CHRONIC RESPIRATORY FAILURE WITH HYPOXIA, ON HOME OXYGEN THERAPY: Status: ACTIVE | Noted: 2020-01-01

## 2020-01-03 PROBLEM — I50.22 CHF (CONGESTIVE HEART FAILURE), NYHA CLASS III, CHRONIC, SYSTOLIC: Status: ACTIVE | Noted: 2020-01-01

## 2020-01-03 PROBLEM — J96.11 CHRONIC RESPIRATORY FAILURE WITH HYPOXIA, ON HOME OXYGEN THERAPY: Status: ACTIVE | Noted: 2020-01-01

## 2020-03-19 NOTE — TELEPHONE ENCOUNTER
Spoke to pt to r/s apt due to covid10. Per Dr. Pérez, pt will get labs and Dr. Pérez will review. Pt confirmed he will get labs today 3/19. Informed pt once lab work is resulted I will calll him back to discuss. Pt verbalized understanding.    Virtual visit offered. Pt is not able to do virtual.

## 2020-03-24 NOTE — TELEPHONE ENCOUNTER
Informed pt wife per , pt does not need f/up appt for 3 months. Pt wife verbalized understanding.

## 2020-04-06 NOTE — TELEPHONE ENCOUNTER
----- Message from Sanju Gonzales sent at 4/6/2020  9:01 AM CDT -----  Type: Needs Medical Advice    Who Called:  Daughter/Nehal Hurtado  Symptoms (please be specific):  Left leg-swollen and warm to touch  How long has patient had these symptoms: A few days    Best Call Back Number:[phone]  Additional Information: Please call to advise

## 2020-04-14 NOTE — TELEPHONE ENCOUNTER
----- Message from Alisa Mon sent at 4/14/2020  9:05 AM CDT -----  Contact: Patient's wife, Ramila Hurtado  I called patient to sign up for [facility]. I spoke with patient's wife on the phone. She states patient is having a lot of pain in his ribs. He has been sleeping more than usual and has had some dizzy spells. Patient doesn't feel comfortable coming in for an appointment, but would like someone from the office to call. I sent the link for patient to sign up with [facility], but patient's wife states they are having trouble with their cell phone. She did ask that you call the house phone [phone].  Thank you.

## 2020-04-15 NOTE — PATIENT INSTRUCTIONS

## 2020-04-15 NOTE — PROGRESS NOTES
Subjective:       Patient ID: Chon Hurtado is a 84 y.o. male.    Chief Complaint: No chief complaint on file.  ARA Hurtado comes to virtual visit for follow up. He had blood work completed recently that revealed a steady decline in renal function and elevated PTH.   The patient's HGA1c was improved but has frequent hypoglycemia remains unstable. He has infrequent monitoring his blood sugar and poor calory intake and limited activity. The patient does have chronic MAYES, and claudication, and DVT. We do not have nephrology nor cardiology records available to review. He needs to use oxygen 24/7 hrs.He has dysthymia and mild metabolic encephalopathy.He had CAD, and chronic adrenal insuficient.  There have been some probable medication and dietary compliance issues here. I have discussed with him and wife and daughter the great importance of following the treatment plan exactly as directed in order to achieve a good medical outcome.    Depression: . He complains of anhedonia, depressed mood, difficulty concentrating, fatigue, feelings of worthlessness/guilt, hypersomnia, psychomotor retardation, recurrent thoughts of death and weight gain. Onset was approximately several months ago, unchanged since that time.  He denies current suicidal and homicidal plan or intent.   Family history significant for depression and heart disease.Possible organic causes contributing are: endocrine/metabolic, neuro, nutritional.  Risk factors: previous episode of depression Previous treatment includes none and n/a. He complains of the following side effects from the treatment: none.    Hypertension   Associated symptoms include shortness of breath. Pertinent negatives include no chest pain or headaches.   Hyperlipidemia   Associated symptoms include shortness of breath. Pertinent negatives include no chest pain or myalgias.     Rash   This is a new problem. The current episode started in the past 7 days. The problem has been gradually  improving since onset. The affected locations include the left lower leg. The rash is characterized by redness and swelling. He was exposed to nothing. Associated symptoms include fatigue, joint pain and shortness of breath. Pertinent negatives include no anorexia, congestion, cough, diarrhea, eye pain, fever or sore throat. Past treatments include antibiotics. The treatment provided significant relief. His past medical history is significant for asthma.     Review of Systems   Constitutional: Positive for activity change, appetite change, diaphoresis, fatigue and unexpected weight change. Negative for fever.   HENT: Positive for hearing loss and trouble swallowing. Negative for congestion and sore throat.    Eyes: Positive for visual disturbance. Negative for pain.   Respiratory: Positive for shortness of breath. Negative for cough and chest tightness.    Cardiovascular: Positive for palpitations and leg swelling. Negative for chest pain.   Gastrointestinal: Negative for abdominal pain, anorexia and diarrhea.   Endocrine: Positive for cold intolerance and polyuria.   Genitourinary: Positive for decreased urine volume, frequency and urgency.   Musculoskeletal: Positive for arthralgias, gait problem, joint pain, joint swelling, myalgias and neck pain.   Skin: Positive for pallor and rash.   Neurological: Positive for dizziness, tremors, syncope and numbness. Negative for light-headedness and headaches.   Psychiatric/Behavioral: Positive for confusion and decreased concentration.       Patient Active Problem List   Diagnosis    Other B-complex deficiencies    Other specified disorders of parathyroid gland    Adrenal insufficiency    CAD (coronary artery disease)    Prostate CA    Uncontrolled type 2 diabetes mellitus with hyperglycemia, with long-term current use of insulin    Hypertension associated with diabetes    Anemia    CKD (chronic kidney disease) stage 3, GFR 30-59 ml/min    Long term (current) use  of anticoagulants [Z79.01]    DVT, bilateral lower limbs    Hyperlipidemia associated with type 2 diabetes mellitus    Encounter for long-term (current) use of insulin    S/P CABG (coronary artery bypass graft)    Leg abscess    Thrush    Cognitive decline    Mild cognitive impairment with memory loss    Restrictive lung disease    Restrictive lung disease    SOB (shortness of breath) on exertion    Thrombocytopenia    Chronic respiratory failure with hypoxia, on home oxygen therapy    CHF (congestive heart failure), NYHA class III, chronic, systolic       Objective:      Physical Exam   Skin: Skin is warm. Rash noted. Rash is macular.        4 cm x 3 cm macular red, non indurated, non tender, no oozing   Psychiatric: His speech is normal and behavior is normal. Judgment and thought content normal. His mood appears not anxious. His affect is blunt. Cognition and memory are impaired. He exhibits a depressed mood. He is inattentive.       Lab Results   Component Value Date    WBC 4.55 03/19/2020    HGB 12.0 (L) 03/19/2020    HCT 39.6 (L) 03/19/2020    PLT 90 (L) 03/19/2020    CHOL 198 09/24/2019    TRIG 88 09/24/2019    HDL 43 09/24/2019    ALT 15 03/19/2020    AST 20 03/19/2020     03/19/2020    K 4.6 03/19/2020     03/19/2020    CREATININE 1.6 (H) 03/19/2020    BUN 19 03/19/2020    CO2 22 (L) 03/19/2020    TSH 1.169 08/13/2018    PSA 3.6 11/05/2015    INR 2.4 06/05/2017    HGBA1C 10.5 (H) 09/24/2019     The ASCVD Risk score (Nica DC Jr., et al., 2013) failed to calculate for the following reasons:    The 2013 ASCVD risk score is only valid for ages 40 to 79    Assessment:       1. Mild early onset dysthymic disorder, in partial remission, with melancholic features, with intermittent major depressive episodes, without current episode    2. Uncontrolled type 2 diabetes mellitus with hyperglycemia, with long-term current use of insulin    3. Controlled type 2 diabetes mellitus with diabetic  "polyneuropathy, with long-term current use of insulin    4. CKD stage 3 due to type 2 diabetes mellitus    5. Chronic respiratory failure with hypoxia    6. CHF (congestive heart failure), NYHA class III, chronic, systolic    7. Anemia, unspecified type    8. Chronic atrial fibrillation        Plan:       Mild early onset dysthymic disorder, in partial remission, with melancholic features, with intermittent major depressive episodes, without current episode    Uncontrolled type 2 diabetes mellitus with hyperglycemia, with long-term current use of insulin    Controlled type 2 diabetes mellitus with diabetic polyneuropathy, with long-term current use of insulin    CKD stage 3 due to type 2 diabetes mellitus    Chronic respiratory failure with hypoxia    CHF (congestive heart failure), NYHA class III, chronic, systolic    Anemia, unspecified type    Chronic atrial fibrillation    Cellulitis left lower leg    Venous stasis/postural edema/PAD    Diagnoses and all orders for this visit:    Mild early onset dysthymic disorder, in partial remission, with melancholic features, with intermittent major depressive episodes, without current episode  -     sertraline (ZOLOFT) 50 MG tablet; Take 1 tablet (50 mg total) by mouth once daily.  -     Ambulatory referral/consult to Outpatient Case Management  -     Ambulatory referral/consult to Home Health; Future    Uncontrolled type 2 diabetes mellitus with hyperglycemia, with long-term current use of insulin  -     pen needle, diabetic (BD ULTRA-FINE SHORT PEN NEEDLE) 31 gauge x 5/16" Ndle; USE THREE TIMES DAILY BEFORE A MEAL AND AT BEDTIME FOR INSULIN INJECTION  -     lancets (TRUEPLUS LANCETS) 30 gauge Misc; USE ONE LANCET EVERY DAY AS DIRECTED  -     insulin aspart U-100 (NOVOLOG FLEXPEN U-100 INSULIN) 100 unit/mL (3 mL) InPn pen; INJECT 6 UNITS BEFORE LUNCH AND DINNER PLUS SLIDING SCALE  -     insulin (LANTUS SOLOSTAR U-100 INSULIN) glargine 100 units/mL (3mL) SubQ pen; " "ADMINISTER 35 UNITS UNDER THE SKIN EVERY EVENING  -     Ambulatory referral/consult to Outpatient Case Management  -     Ambulatory referral/consult to Home Health; Future    Controlled type 2 diabetes mellitus with diabetic polyneuropathy, with long-term current use of insulin  -     pen needle, diabetic (BD ULTRA-FINE SHORT PEN NEEDLE) 31 gauge x 5/16" Ndle; USE THREE TIMES DAILY BEFORE A MEAL AND AT BEDTIME FOR INSULIN INJECTION  -     lancets (TRUEPLUS LANCETS) 30 gauge Misc; USE ONE LANCET EVERY DAY AS DIRECTED  -     insulin aspart U-100 (NOVOLOG FLEXPEN U-100 INSULIN) 100 unit/mL (3 mL) InPn pen; INJECT 6 UNITS BEFORE LUNCH AND DINNER PLUS SLIDING SCALE  -     insulin (LANTUS SOLOSTAR U-100 INSULIN) glargine 100 units/mL (3mL) SubQ pen; ADMINISTER 35 UNITS UNDER THE SKIN EVERY EVENING  -     Ambulatory referral/consult to Outpatient Case Management  -     Ambulatory referral/consult to Home Health; Future    CKD stage 3 due to type 2 diabetes mellitus  -     furosemide (LASIX) 20 MG tablet; Take 1 tablet (20 mg total) by mouth once daily.  -     Ambulatory referral/consult to Outpatient Case Management  -     Ambulatory referral/consult to Home Health; Future    Chronic respiratory failure with hypoxia  -     furosemide (LASIX) 20 MG tablet; Take 1 tablet (20 mg total) by mouth once daily.  -     Ambulatory referral/consult to Outpatient Case Management  -     Ambulatory referral/consult to Home Health; Future    CHF (congestive heart failure), NYHA class III, chronic, systolic  -     furosemide (LASIX) 20 MG tablet; Take 1 tablet (20 mg total) by mouth once daily.  -     Ambulatory referral/consult to Outpatient Case Management  -     Ambulatory referral/consult to Home Health; Future    Anemia, unspecified type  -     ferrous sulfate (FEOSOL) 325 mg (65 mg iron) Tab tablet; Take 1 tablet (325 mg total) by mouth daily with breakfast.  -     Ambulatory referral/consult to Outpatient Case Management  -     " Ambulatory referral/consult to Home Health; Future    Chronic atrial fibrillation  -     apixaban (ELIQUIS) 5 mg Tab; TAKE 1 TABLET(5 MG) BY MOUTH TWICE DAILY  -     Ambulatory referral/consult to Home Health; Future        Needs support stockings, and or ACE. Spoken w/ Wife and daughter  Patient readiness: nonacceptance and barriers:readiness, social stressors and household issues    During the course of the visit the patient was educated and counseled about the following:     Diabetes:  Discussed general issues about diabetes pathophysiology and management.  Encouraged aerobic exercise.  Reminded to get yearly retinal exam.  Discussed ways to avoid symptomatic hypoglycemia.  Discussed sick day management.  Labs: fasting blood sugar, hemoglobin A1C and microalbuminuria.  Reminded to bring in blood sugar diary at next visit.  Follow up in 6 weeks or as needed.  Hypertension:   Check blood pressures daily and record.    Goals: Diabetes: Maintain Hemoglobin A1C below 7, Hypertension: Reduce Blood Pressure and Underweight: Increase calorie intake and BMI      Did patient meet goals/outcomes: Yes    The following self management tools provided: blood pressure log  blood glucose log    Patient Instructions (the written plan) was given to the patient/family.     Time spent with patient: 45 minutes    Barriers to medications present (yes )    Adverse reactions to current medications (yes)    Over the counter medications reviewed (Yes)        40-minute visit. 30 minutes spent counseling patient on diet, exercise, and weight loss.  This has been fully explained to the patient, who indicates understanding.    Discussed health maintenance guidelines appropriate for age.

## 2020-04-16 NOTE — TELEPHONE ENCOUNTER
----- Message from Chapincito Schmidt sent at 4/16/2020  7:51 AM CDT -----  Contact: Jamel mcdaniel/[facility] is calling in regards to a few prescriptions that were sent to this pharmacy for this pt, this pharmacy is a local pharmacy for [facility] not a mail order  Call Back#[phone]  Thanks

## 2020-04-16 NOTE — PROGRESS NOTES
Outpatient Care Management   - Low Risk Patient Assessment    Patient: Chon Hurtado  MRN:  [id number]  Date of Service:  4/16/2020  Completed by:  Ban Milner LMSW  Referral Date: 04/15/2020  Program: OPCM Low Risk    Reason for Visit   Patient presents with    Social Work Assessment - Low/Mod Risk    PHQ-9    Case Closure       Patient Summary     OPCM Social Work Assessment (Low/Moderate Risk)    General  Level of Caregiver support:  Member independent and does not need caregiver assistance  Have you had to make a decision between paying for any of the following in the last 2 months?:  None  Transportation means:  Family  Employment status:  Retired and not working  Do you have any of the following?:  Medical power of   Current symptoms:  Confusion, Memory problems  Assessments  Was the PHQ Depression Screening completed this visit?:  Yes    This LMSW received a referral on the above patient.   Reason for referral:Mild early onset dysthymic disorder, in partial remission, with melancholic features, with intermittent major depressive episodes, without current episode  Name of the community resource that was provided: [employer] Over the Counter Benefit   Resource given to: Caregiver/Spouse via E-Mail    This LMSW completed assessment with patient /spouse caregiver. Caregiver reports she and patient resides together. Caregiver reports patient is independent with ADL'S.  Caregiver reports daughter Nheal and spouse checks on patient. Caregiver reports patient sometimes ambulate with a cane. Caregiver denied patient needs assistance with food, shelter and medical, however may need assistance with medication but uncertain. Caregiver will follow up with this LMSW if unable to afford medication. Caregiver reports she provides transportation to and from appointments. Caregiver reports she and daughter are patient MPOA, however nothing has been noted. LMSW encouraged caregiver to provide a copy of  MPOA on next office visit.LMSW completed PHQ screening with caregiver. Score was a 0. Caregiver denied any SI or HI. Caregiver denied NH Placement will be an option at this time. Caregiver denied additional support group information. LMSW provided caregiver information on [employer] Over the Counter Benefit. LMSW provided all resources via e-mail [email or web address]         Complex Care Plan     Care plan was discussed and completed today with input from patient and/or caregiver.    Goals    None         Patient Instructions     No follow-ups on file.    Todays OPCM Self-Management Care Plan was developed with the patients/caregivers input and was based on identified barriers from todays assessment.  Goals were written today with the patient/caregiver and the patient has agreed to work towards these goals to improve his/her overall well-being. Patient verbalized understanding of the care plan, goals, and all of today's instructions. Encouraged patient/caregiver to communicate with his/her physician and health care team about health conditions and the treatment plan.  Provided my contact information today and encouraged patient/caregiver to call me with any questions as needed.

## 2020-04-16 NOTE — TELEPHONE ENCOUNTER
Attached medications were sent to the wrong pharmacy. Please send go [facility].

## 2020-04-17 NOTE — TELEPHONE ENCOUNTER
----- Message from Patria Rojas sent at 4/17/2020 10:14 AM CDT -----  Alejandro mcdaniel/[facility] is calling, she needs to clarify the dosage of the Lantus.  Please call her at  [phone].  Thank you!

## 2020-04-17 NOTE — TELEPHONE ENCOUNTER
Call placed to Alejandro with [facility]. No answer received. Left detailed message with clarification of Lantus dosage as being increased to 35 units in the evening/requested return call to office to ensure clarification was received.

## 2020-05-01 NOTE — TELEPHONE ENCOUNTER
He has new DVT, w/ poor supervision.There have been some probable medication, dietary and lifestyle compliance issues here. I have discussed with him the great importance of following the treatment plan exactly as directed in order to achieve a good medical outcome.Spoken w/ daughter needs diabetic educator, endocrinologist. More testing. More frequent insulin and monitoring of compliance with medications.  Diagnoses and all orders for this visit:    Acute deep vein thrombosis (DVT) of popliteal vein of left lower extremity  -     enoxaparin (LOVENOX) 100 mg/mL Syrg; Inject 1 mL (100 mg total) into the skin once daily. for 2 days    Femoral vein thrombosis, left  -     enoxaparin (LOVENOX) 100 mg/mL Syrg; Inject 1 mL (100 mg total) into the skin once daily. for 2 days    PVD (peripheral vascular disease)  -     enoxaparin (LOVENOX) 100 mg/mL Syrg; Inject 1 mL (100 mg total) into the skin once daily. for 2 days    Controlled type 2 diabetes mellitus with diabetic polyneuropathy, with long-term current use of insulin  -     Ambulatory referral/consult to Diabetes Education; Future  -     Ambulatory referral/consult to Endocrinology; Future    CKD stage 3 due to type 2 diabetes mellitus  -     Ambulatory referral/consult to Diabetes Education; Future  -     Ambulatory referral/consult to Endocrinology; Future    Controlled type 2 diabetes mellitus with stage 3 chronic kidney disease, with long-term current use of insulin  -     flash glucose scanning reader (FREESTYLE SHANTANU 10 DAY READER) Misc; 1 each by Misc.(Non-Drug; Combo Route) route 5 (five) times daily.    Other orders  -     flash glucose sensor (FREESTYLE SHANTANU 14 DAY SENSOR) Kit; 1 each by Misc.(Non-Drug; Combo Route) route every 14 (fourteen) days. Testing AC meals and HS hyperglycemia/ CKD 3

## 2020-05-04 NOTE — TELEPHONE ENCOUNTER
----- Message from Pete Milner MD sent at 4/28/2020  6:28 PM CDT -----  He has a mild but stable anemia with normal white blood cells and a decrease in platelets.  This is not new and is stable as well, the platelets are adequate for their job of stopping bleeding.    The chemistry panel has an elevated potassium level and a very elevated blood sugar, well above the diabetic fasting range of 80 to 120.  Kidney function is impaired and the calcium levels are persistently elevated.  If he has not done so already I would recommend discussing these abnormalities with Dr. Whitaker.  His last A1c was 10.5 seven months ago

## 2020-05-04 NOTE — PROGRESS NOTES
Spoke with patient daughter regarding his results and Dr. Milner's suggestions. I informed daughter that I will send these suggestions to Dr. Whitaker and we will call her back with a response. Please see Dr. Milner results notes

## 2020-05-11 NOTE — PROGRESS NOTES
Patient's chart was reviewed.   Requested updates within Care Everywhere.   Health Maintenance was updated.

## 2020-05-12 NOTE — ED PROVIDER NOTES
Encounter Date: 5/12/2020       History     Chief Complaint   Patient presents with    Leg Swelling    WT GAIN     13# OVER 1 WEEK     84-year-old male with history of adrenal insufficiency, anemia, COPD, CHF, diabetes mellitus, hypertension, pulmonary embolus.  Patient presents to the emergency department with complaint of bilateral lower extremity edema and progressive shortness of breath and 13 lb weight gain over the last 7-10 days.  Patient denies chest pain, no fever, no nausea, no vomiting, no abdominal pain, no other constitutional symptoms.  Patient states has been compliant with his diuretics which she takes Lasix 20 mg daily.        Review of patient's allergies indicates:   Allergen Reactions    No known drug allergies      Past Medical History:   Diagnosis Date    Adrenal insufficiency     Anemia     Anticoagulant long-term use     plavix for blood clots legs and stents years    Blood transfusion     CAD (coronary artery disease)     Cancer     prostate    Cataract     CHF (congestive heart failure), NYHA class III, chronic, systolic 1/3/2020    COPD (chronic obstructive pulmonary disease) 11/6/2013    Depression     Diabetes mellitus     Diabetes mellitus type II     DVT (deep venous thrombosis)     Hemorrhoids     History of colon polyps 6/3/2015    Hypertension     PE (pulmonary embolism)     [facility]    Peripheral vascular disease     Urinary tract infection      Past Surgical History:   Procedure Laterality Date    CATARACT EXTRACTION, BILATERAL      CHOLECYSTECTOMY  8/2011    COLON SURGERY      CORONARY ARTERY BYPASS GRAFT       x 5    CORONARY STENT PLACEMENT      2007, last 2011  stent  3 vessel.    EXPLORATORY LAPAROTOMY W/ BOWEL RESECTION  1987    PROSTATE SURGERY  2001    Radical for cancer - Dr Luke Chung    RIGHT FEMUR REPAIR  1987    SMALL INTESTINE SURGERY       Family History   Problem Relation Age of Onset    Diabetes Mother     Hypertension Mother      Kidney disease Mother     Hypertension Father     Heart disease Father         MI    Diabetes Sister     Diabetes Brother     Cancer Brother         prostate    Cancer Brother         colon    Heart disease Brother     Cancer Brother         prostate    Diabetes Brother     Hyperlipidemia Brother     Hypertension Brother     Heart disease Brother     No Known Problems Daughter     No Known Problems Son     No Known Problems Daughter      Social History     Tobacco Use    Smoking status: Never Smoker    Smokeless tobacco: Never Used   Substance Use Topics    Alcohol use: No    Drug use: No     Review of Systems   Constitutional: Negative for fatigue and fever.   HENT: Negative for sore throat.    Respiratory: Positive for shortness of breath. Negative for chest tightness.    Cardiovascular: Positive for leg swelling. Negative for chest pain and palpitations.   Gastrointestinal: Negative for abdominal pain, nausea and vomiting.   Genitourinary: Negative for dysuria and frequency.   Musculoskeletal: Negative for back pain, joint swelling and myalgias.   Skin: Negative for rash.   Neurological: Negative for dizziness, syncope, weakness and light-headedness.   Hematological: Does not bruise/bleed easily.       Physical Exam     Initial Vitals [05/12/20 1409]   BP Pulse Resp Temp SpO2   135/76 (!) 59 20 98.4 °F (36.9 °C) 98 %      MAP       --         Physical Exam    Nursing note and vitals reviewed.  Constitutional: He appears well-developed and well-nourished.   HENT:   Head: Normocephalic and atraumatic.   Nose: Nose normal.   Mouth/Throat: Oropharynx is clear and moist.   Eyes: Conjunctivae and EOM are normal. Pupils are equal, round, and reactive to light. No scleral icterus.   Neck: Normal range of motion. Neck supple.   Cardiovascular: Normal rate, regular rhythm, normal heart sounds and intact distal pulses. Exam reveals no gallop and no friction rub.    No murmur heard.  Pulmonary/Chest:  No stridor. No respiratory distress.   Course bilateral breath sounds no adventitious sounds   Abdominal: Soft. Bowel sounds are normal. He exhibits no mass. There is no tenderness. There is no rebound and no guarding.   Musculoskeletal: Normal range of motion. He exhibits edema (Plus one pedal edema to bilateral lower extremities).   Lymphadenopathy:     He has no cervical adenopathy.   Neurological: He is alert and oriented to person, place, and time. He has normal strength and normal reflexes. No cranial nerve deficit or sensory deficit. GCS score is 15. GCS eye subscore is 4. GCS verbal subscore is 5. GCS motor subscore is 6.   Skin: Skin is warm and dry. Capillary refill takes less than 2 seconds. No rash noted.   Psychiatric: He has a normal mood and affect. His behavior is normal. Judgment and thought content normal.         ED Course   Procedures  Labs Reviewed   CBC W/ AUTO DIFFERENTIAL - Abnormal; Notable for the following components:       Result Value    RBC 3.78 (*)     Hemoglobin 10.8 (*)     Hematocrit 34.3 (*)     Mean Corpuscular Hemoglobin Conc 31.5 (*)     Platelets 106 (*)     All other components within normal limits   COMPREHENSIVE METABOLIC PANEL - Abnormal; Notable for the following components:    Sodium 135 (*)     Glucose 131 (*)     BUN, Bld 43 (*)     Anion Gap 5 (*)     eGFR if  58.7 (*)     eGFR if non  50.8 (*)     All other components within normal limits   APTT - Abnormal; Notable for the following components:    aPTT 37.2 (*)     All other components within normal limits   TROPONIN I   B-TYPE NATRIURETIC PEPTIDE   PROTIME-INR        ECG Results          EKG 12-lead (In process)  Result time 05/12/20 15:58:27    In process by Interface, Lab In [facility] (05/12/20 15:58:27)                 Narrative:    Test Reason : R06.02,    Vent. Rate : 055 BPM     Atrial Rate : 055 BPM     P-R Int : 162 ms          QRS Dur : 120 ms      QT Int : 422 ms       P-R-T  Axes : 051 008 088 degrees     QTc Int : 403 ms    Sinus bradycardia  Nonspecific intraventricular conduction delay  Nonspecific T wave abnormality  Abnormal ECG  When compared with ECG of 28-OCT-2019 14:23,  No significant change was found    Referred By: [email or web address]   SELF           Confirmed By:                   In process by Interface, Lab In [facility] (05/12/20 15:11:35)                 Narrative:    Test Reason : R06.02,    Vent. Rate : 055 BPM     Atrial Rate : 055 BPM     P-R Int : 162 ms          QRS Dur : 120 ms      QT Int : 422 ms       P-R-T Axes : 051 008 088 degrees     QTc Int : 403 ms    Sinus bradycardia  Nonspecific intraventricular conduction delay  Nonspecific T wave abnormality  Abnormal ECG  When compared with ECG of 28-OCT-2019 14:23,  No significant change was found    Referred By: [email or web address]   SELF           Confirmed By:                             Imaging Results          X-Ray Chest AP Portable (Final result)  Result time 05/12/20 14:20:37    Final result by Nasrin Heredia MD (05/12/20 14:20:37)                 Narrative:    CLINICAL HISTORY:  Shortness of breath    TECHNIQUE:  Portable AP radiograph the chest. Comparison is made to a prior study  dated 9/24/2019    COMPARISON:  None available.    FINDINGS:  The lungs appear clear. There is no pneumothorax. Costophrenic angles  are seen without effusion. The heart is normal in size patient has had  a prior CABG. .  The mediastinum is within normal limits. Osseous  structures and visualized upper abdomen appear within normal limits.    IMPRESSION:  No acute cardiac or pulmonary process.            Electronically Signed by Nasrin Heredia M.D. on 5/12/2020 2:22 PM                               Medical Decision Making:   Initial Assessment:   84-year-old male with history of hypertension, COPD, congestive heart failure, type 2 diabetes mellitus, PE, DVT here with complaint of 13 lb weight gain over last week and bilateral lower extremity  swelling  Differential Diagnosis:   Congestive heart failure, venous stasis, lymphedema,  Clinical Tests:   Lab Tests: Ordered and Reviewed  Radiological Study: Ordered and Reviewed  Medical Tests: Ordered and Reviewed  ED Management:  Patient seen evaluated emergency department.  Patient with bilateral lower extremity edema +1 edema to bilateral lead this with reported 13 lb weight gain.  Patient states was compliant with medications.  Patient's BNP was noted to be 59, chest x-ray clear, EKG with no significant interval changes.  Patient emergency department was given IV Lasix 40 mg total.  Case was discussed with cardiologist Dr. William Lee.  At this time patient will be discharged with close follow-up on Thursday with Cardiology.  Patient is to take Lasix 20 mg daily over the next 5 days.  As well as potassium 10 mEq daily for next 5 days.  He is to return if problems persist or worsen including fever, shortness of breath, worsening symptomatology.                                 Clinical Impression:       ICD-10-CM ICD-9-CM   1. Dyspnea, unspecified type R06.00 786.09   2. Bilateral lower extremity edema R60.0 782.3   3. Congestive heart failure, unspecified HF chronicity, unspecified heart failure type I50.9 428.0                                Pete Nelson MD  05/12/20 8571

## 2020-05-12 NOTE — DISCHARGE INSTRUCTIONS
Please take Lasix 10 mg twice daily for next 5 days.  Also take potassium 10 mEq daily for next 5 days.  Please wear your compression stockings every day.  Also check airway daily and record.  Follow-up with Dr. Lee on Thursday morning this week.  Called to verify appointment.  Return to emergency department if problems persist or worsens including shortness of breath, fever, worsening weight gain or leg swelling.

## 2020-05-12 NOTE — TELEPHONE ENCOUNTER
Spoke to Dr. Whitaker verbally regarding. Per Dr. Whitaker patient is on high diuretic therapy; needs to be seen at ER today. Call placed to Kala with [facility], no answer received. Left detailed message. Call placed to patient's wife (Ramila) for notification. Mrs. Mcgrath verbalized understanding.          ----- Message from Nehal Franco sent at 5/12/2020 11:07 AM CDT -----  Contact: Kala with [facility]  Type: Needs Medical Advice  Who Called:  Kala Ba Call Back Number: [phone]  Additional Information: patient had a 13 lb weight gain in 7 days weight today 218, 2+ penniadmia at both feet shortness of breath

## 2020-06-02 NOTE — TELEPHONE ENCOUNTER
LR--3-11-19  LOV--4-  FOV--None Noted    Please see attached notes; patient's morning blood glucose readings have been low. Patient's wife has been holding Lantus.

## 2020-06-02 NOTE — TELEPHONE ENCOUNTER
----- Message from Michelle Jain sent at 6/2/2020 10:09 AM CDT -----  Contact: [facility]states pt needs refill on his test strips for his glucometer. Having weakness last few day,blood sugar low in morning,wife have not been been giving the Lantuss for a few night....[phone]        .  [id number] - [place] - [address] AT [facility] & [address] [facility] [address]  [address] [address]  Phone: [phone] Fax: [phone]

## 2020-06-03 NOTE — TELEPHONE ENCOUNTER
CKD stable , mild Hyperkalemia, normal BS. Please stop Potassium supplement. Agree hold lantus but needs BS before meal and bedtime. Use short acting w/ meals over 180. Keep protein 20 gms per meal and CHO 15-20 per meal.

## 2020-06-11 NOTE — TELEPHONE ENCOUNTER
Prescription for True Metric Glucose Strips e-scribed to [facility] on 6-3-2020. Call placed to pharmacy; confirmed receipt of prescription. Patient's wife (Ramila) as well as home health nurse (Alejandro) notified. Verbalized understanding.               ----- Message from Karoline Reynolds sent at 6/11/2020 12:28 PM CDT -----  Contact: home health nurse  Type:  RX Refill Request    Who Called:  Nurse  Refill or New Rx:  refill  RX Name and Strength:  TRUE METRIX GLUCOSE TEST STRIP Strp  How is the patient currently taking it? (ex. 1XDay):  As directed  Preferred Pharmacy with phone number:    [id number] - [place], [place] - [address] AT [facility] [address]  [address] [address]  Phone: [phone] Fax: [phone]  Local or Mail Order:  local  Ordering Provider:  beti Ba Call Back Number:  [phone]  Additional Information:  Please advise-thank you

## 2020-06-11 NOTE — TELEPHONE ENCOUNTER
Home health nurse (Alejandro) reports patient is experiencing chronic indigestion. States Pepcid is ineffective. Requesting to know if PCP will change medication. States patient has taken Pepcid for a long time without any relief in symptoms. Please advise.             ----- Message from Karoline Reynolds sent at 6/11/2020 12:30 PM CDT -----  Contact: nurse  Type: Needs Medical Advice  Who Called:  Nurse  Symptoms (please be specific): chronic Discomfort   Best Call Back Number:   Additional Information: Please advise-thank you

## 2020-06-15 NOTE — TELEPHONE ENCOUNTER
S/W Daughter, Nehal regarding LABS--she would like to get  Nurse to draw labs prior to visit--she will contact us to give the  Nurse phone # so we can coordinate prior to appt on 7/13 with Dr. Pérez--daughter voiced understanding---no further questions

## 2020-07-14 NOTE — TELEPHONE ENCOUNTER
Spoke to pt wife to reschedule pt missed apt with . pt wife confirmed new apt date/times scheduled and verbalized understanding.

## 2020-07-23 NOTE — TELEPHONE ENCOUNTER
Spoke to [facility] nurse (Su) who states she visited with patient today. Reports patient has blood in stool. Also reports patient lost approximately 1/2 pint of blood. Writer advised patient should be seen today in ER. Mrs. Blue states she advised patient to be seen at ER/patient will be going to [facility] ER. Mrs Blue also requested to confirm if Potassium and Memantine were listed on current medication list. Upon further assessment it was noted Memantine is listed as historical medication/Potassium is not listed. Mrs Blue notified. Verbalized understanding. Will send message to Dr. Whitaker for notification.           ----- Message from Onofre Nielsen sent at 7/23/2020 12:35 PM CDT -----  Type: Needs Medical Advice    Who Called:  Su ([facility])  Best Call Back Number: [phone]  Additional Information: Caller states patient claims they had blood in stool. Patient has a BP of 146/46 with a pulse of 52. Caller would like to confirm current medications. Please call to advise. Thanks!
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
## 2020-07-24 NOTE — DISCHARGE INSTRUCTIONS
Your blood counts were stable today, your electrolytes were normal, and there was no blood seen in your stool.  If you have any pain with bowel movements, can try using hemorrhoid cream that you can buy over-the-counter.  Discuss with your primary care provider about referral to the gastroenterologist for possible repeat colonoscopy.

## 2020-08-27 NOTE — TELEPHONE ENCOUNTER
Spoke with patient--states that his wife, Ramila is at another Dr appt right now & he didn't know what she wanted to do--Chon asked if I could call back in the morning--voiced understanding--will call him back tomorrow--no further questions/concerns at this time

## 2020-09-02 PROBLEM — I20.9 ANGINA PECTORIS, UNSPECIFIED: Status: ACTIVE | Noted: 2020-01-01
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
## 2020-09-18 NOTE — TELEPHONE ENCOUNTER
Spoke with Reina from [facility] and she state that she was concerned because the pt has gained 6 pounds since his lasix was decreased. She states that he thinks he should be evaluated in the ED.      Spoke with the pts daughter and she states that he is not showing any symptoms no SOB or anything in that nature. She states that she knows that he is not going to want to go to the ED this weekend. I have made an  appointment for Monday september 21st at 1:40 with Dr. Alvarenga. I advised daughter that if things worsens they should report to the ER immediately she verbalized understanding and states that they will come in Monday.         ----- Message from Angela Vega sent at 9/18/2020  2:35 PM CDT -----  Regarding: Weight gain  Type:  Needs Medical Advice    Who Called: Reina from [facility]  Would the patient rather a call back or a response via [facility]?  Call back  Best Call Back Number: [phone]  Additional Information:  on 09/02/2020 his Lasix dosage was decreased and now he has gained 6 pounds in one week, today he was 219 pounds

## 2020-09-22 NOTE — ED NOTES
Increasing SOB or course of approx 1 week with assoc peripheral edema (worse than usual since diuretic dose halved recently). NAD

## 2020-11-06 NOTE — Clinical Note
B12 , psa, ferritin iron cbc, cmp in 5 weekis same day as wife   Xray sopne and t spine as soon as possible and phrev   Pt will con tact cardiology on his own for sob

## 2020-11-06 NOTE — PROGRESS NOTES
Subjective:       Patient ID: Chon Hurtado is a 84 y.o. male.    Chief Complaint:f/u doing well  HPI:    84year-old -American male s/p XRT  for his prostate cancer several years ago. The patient has B12 deficiency and iron   deficiency. He has been following with me for mild anemia for years     The patient completed radiation for prostate ca and f/u has been with good controlled psa, pt is here with his wife, year and a half ago had both legs painful and swollen and dx with bilateral extensive DVT and started coumadin, INR being monitored by coumadin clinic,   More recently patient has been missing appointments going to do much Did have a cardiology follow-up     REVIEW OF SYSTEMS:     CONSTITUTIONAL some  weight loss. There is no apparent    change in appetite, fever, night sweats, headaches, fatigue, dizziness, or    weakness.    Patient has been complaining of pain between the shoulder blades and neck  And complaining of shortness of breath on and off  SKIN: Denies rash, issues with nails, non-healing sores, bleeding, blotching    skin or abnormal bruising. Denies new moles or changes to existing moles.      EYES: Denies eye pain, blurred vision, swelling, redness or discharge.      ENT AND MOUTH: Denies runny nose, stuffiness, sinus trouble or sores. Denies    nosebleeds. Denies, hoarseness, change in voice or swelling in front of the    neck.  waiting on the dentures, now edentulous    CARDIOVASCULAR: Denies chest pain, discomfort or palpitations. Denies neck    swelling or episodes of passing out.      RESPIRATORY: Denies cough, sputum production, blood in sputum, and denies    shortness of breath.      GI: Denies trouble swallowing, indigestion, heartburn, abdominal pain, nausea,    vomiting, diarrhea, altered bowel habits, blood in stool, discoloration of    stools, change in nature of stool, bloating, increased abdominal girth.      GENITOURINARY: No discharge. No pelvic pain or lumps. No rash  around groin or  lesions. No urinary frequency, hesitation, painful urination or blood in    urine. Denies incontinence. No problems with intercourse.      Has bilat dvt     PHYSICAL EXAM:     Vitals:    11/06/20 1437   BP: (!) 146/62   Pulse: (!) 58   Resp: 18   Temp: 98.2 °F (36.8 °C)       GENERAL: Comfortable looking patient. Patient is in no distress.  Frail appearing in a wheelchair this  Awake, alert and oriented to time, person and place.  No anxiety, or agitation.      HEENT: Normal conjunctivae and eyelids. WNL.  PERRLA 3 to 4 mm. No icterus, no pallor, no congestion, and no discharge noted.edentulous     NECK:  Supple. Trachea is central.  No crepitus.  No JVD or masses.    RESPIRATORY:  No intercostal retractions.  No dullness to percussion.  Chest is clear to auscultation.  No rales, rhonchi or wheezes.  No crepitus.  Good air entry bilaterally.    CARDIOVASCULAR:  S1 and S2 are normally heard without murmurs or gallops.  All peripheral pulses are present.    ABDOMEN:  Normal abdomen.  No hepatosplenomegaly.  No free fluid.  Bowel sounds are present.  No hernia noted. No masses.  No rebound or tenderness.  No guarding or rigidity.  Umbilicus is midline.    LYMPHATICS:  No axillary, cervical, supraclavicular, submental, or inguinal lymphadenopathy.lymphedema+ andf has nurse wrap leg daily    SKIN/MUSCULOSKELETAL:  There is no evidence of excoriation marks or ecchmosis.  No rashes.  No cyanosis.  No clubbing.  No joint or skeletal deformities noted.  Normal range of motion.bilat leg swelling+rt leg absss+ hot and red    NEUROLOGIC:  Higher functions are appropriate.  No cranial nerve deficits.  Normal lisa.  Normal strength.  Motor and sensory functions are normal.  Deep tendon reflexes are normal.      Laboratory:     CBC:  Lab Results   Component Value Date    WBC 4.02 09/22/2020    RBC 3.83 (L) 09/22/2020    HGB 11.2 (L) 09/22/2020    HCT 35.8 (L) 09/22/2020    MCV 94 09/22/2020    MCH 29.2 09/22/2020     MCHC 31.3 (L) 09/22/2020    RDW 12.4 09/22/2020     (L) 09/22/2020    MPV 10.7 09/22/2020    GRAN 2.5 09/22/2020    GRAN 61.3 09/22/2020    LYMPH 1.0 09/22/2020    LYMPH 25.4 09/22/2020    MONO 0.5 09/22/2020    MONO 12.9 09/22/2020    EOS 0.0 09/22/2020    BASO 0.01 09/22/2020    EOSINOPHIL 0.0 09/22/2020    BASOPHIL 0.2 09/22/2020       BMP: BMP  Lab Results   Component Value Date     09/22/2020    K 4.7 09/22/2020     09/22/2020    CO2 23 09/22/2020    BUN 15 09/22/2020    CREATININE 1.4 09/22/2020    CALCIUM 9.2 09/22/2020    ANIONGAP 9 09/22/2020    ESTGFRAFRICA 54 (A) 09/22/2020    EGFRNONAA 46 (A) 09/22/2020       LFT:   Lab Results   Component Value Date    ALT 20 09/22/2020    AST 19 09/22/2020    ALKPHOS 84 09/22/2020    BILITOT 0.2 09/22/2020     Lab Results   Component Value Date    PSA 3.6 11/05/2015    PSA 2.68 01/24/2013    PSA 2.45 04/19/2012   now less than 0.01  Lab Results   Component Value Date    IRON 59 07/21/2020    TIBC 321 07/21/2020    FERRITIN 157 07/21/2020     Assessment/Plan:        0.02 12/2019  [id number]       1.  No finding to suggest an acute infarct or intracranial bleed.    2.  Mild chronic/involutional findings as above.       With regards to pain in the neck and shoulder blades it is will obtain C-spine T-spine x-rays and chest x-rays   Occasional shortness of breath; could be due to anemia patient will contact Cardiology and discussed  Anemia is stable. Cont  Observation no intervention needed.    ckd and anemia: cont observation  prostate ca : stable and better post xrt cont urology most recent PSA was July 2020 continue follow-up with Urology   dvt would recommend life long anticoag due to cancer hx and increased risk   cont with lymphedema mx  DM : needs much better control discussed at length. Pt admits to non compliance  HTN: cont meds  rtc 5 weeks with x-rays PSA CBC CMP and iron studies  Advance Care planning/ directives /living  will/patient's wishes discussed with patient.  Patient has been given guidelines and instructions on completing these directives  COVID social distancing, face mask use, hand washing techniques and personal hygiene routine discussed with patient  Good exercise, nutrition and weight management discussed with patient  Health maintenance activities and follow-up with PCPs recommendations discussed with patient

## 2020-11-10 NOTE — TELEPHONE ENCOUNTER
Spoke to pt wife to inform her of pt f/u apt's scheduled with Dr. Pérez. Also notified pt wife per Dr. Pérez that pt recent xray's show plenty of arthritis with is what is causing his discomfort. Pt wife confirmed all information stated above and verbalized understanding.      ----- Message from Ibeth Pérez MD sent at 11/6/2020  3:00 PM CST -----  B12 , psa, ferritin iron cbc, cmp in 5 weekis same day as wife Xray sopne and t spine as soon as possible and phrev Pt will con tact cardiology on his own for sob

## 2020-11-12 NOTE — TELEPHONE ENCOUNTER
[facility] of Meclizine.  Please advise.     LOV--9-2-2020   FOV-- None Noted

## 2020-11-15 PROBLEM — R19.7 DIARRHEA OF PRESUMED INFECTIOUS ORIGIN: Status: ACTIVE | Noted: 2020-01-01

## 2020-11-15 PROBLEM — I21.4 NSTEMI (NON-ST ELEVATED MYOCARDIAL INFARCTION): Status: ACTIVE | Noted: 2020-01-01

## 2020-11-15 PROBLEM — E11.9 TYPE 2 DIABETES MELLITUS WITHOUT COMPLICATION, WITH LONG-TERM CURRENT USE OF INSULIN: Status: ACTIVE | Noted: 2020-01-01

## 2020-11-15 PROBLEM — Z79.4 TYPE 2 DIABETES MELLITUS WITHOUT COMPLICATION, WITH LONG-TERM CURRENT USE OF INSULIN: Status: ACTIVE | Noted: 2020-01-01

## 2020-11-15 NOTE — HPI
"84 year old male (Hx DM2, COPD, CAD, long term use anticoagulant, Dementia, HT, CHF [unknown LV function]) presented to ED with daughter stating that the patient had been having intermittent chest discomfort, which the patient is unable to describe except that it "comes and goes". Two days ago the patient started to have fevers, followed by pleuritic type chest discomfort (according to daughter: worse with movement and breathing) and loose stools. He does not know if there was blood or black in his stool. He denies abdominal pain, nausea or vomiting. He has not had a cough or sputum. No orthopnea or PND. His left lower extremity is chronically swollen.     In ED: Troponin: 8.078 with BNP of 697; no leukocytosis, mild normocytic anemia; mild hypokalemia with stage 3b renal dysfunction; COVID and influenza markers are negative. EKG: sinus with no acute ST segments. CT Abdo/Pelvis (w/o contrast) reviewed: small pleural effusions, with  no significant abdominal indings. Patient discussed with ED physician in addition to myocardial infarction work-up, stool studies will be sent.  "

## 2020-11-15 NOTE — ED PROVIDER NOTES
Encounter Date: 11/15/2020       History     Chief Complaint   Patient presents with    Chest Pain     Dizziness, generalized, fatigue, and fever since Thursday.     Patient with multiple complaints.  Reportedly patient has been having subjective fever at home for 2 days.  He has been having intermittent chest pain.  Multiple loose stools per day associated with abdominal pain.  Patient has generalized weakness. Patient has significant past medical history including congestive heart failure, adrenal insufficiency,Venous thrombotic emboli.  Patient reports his wife head fever earlier in the week but has improved.  He denies any gastrointestinal bleeding.        Review of patient's allergies indicates:   Allergen Reactions    Statins-hmg-coa reductase inhibitors      Past Medical History:   Diagnosis Date    Adrenal insufficiency     Anemia     Anticoagulant long-term use     plavix for blood clots legs and stents years    Blood transfusion     CAD (coronary artery disease)     Cancer     prostate    Cataract     CHF (congestive heart failure), NYHA class III, chronic, systolic 1/3/2020    COPD (chronic obstructive pulmonary disease) 11/6/2013    Depression     Diabetes mellitus     Diabetes mellitus type II     DVT (deep venous thrombosis)     Hemorrhoids     History of colon polyps 6/3/2015    Hypertension     NSTEMI (non-ST elevated myocardial infarction) 11/15/2020    PE (pulmonary embolism)     [facility] 2007    Peripheral vascular disease     Urinary tract infection      Past Surgical History:   Procedure Laterality Date    CATARACT EXTRACTION, BILATERAL      CHOLECYSTECTOMY  8/2011    COLON SURGERY      CORONARY ARTERY BYPASS GRAFT       x 5    CORONARY STENT PLACEMENT      2007, last 2011  stent  3 vessel.    EXPLORATORY LAPAROTOMY W/ BOWEL RESECTION  1987    PROSTATE SURGERY  2001    Radical for cancer - Dr Luke Chung    RIGHT FEMUR REPAIR  1987    SMALL INTESTINE SURGERY        Family History   Problem Relation Age of Onset    Diabetes Mother     Hypertension Mother     Kidney disease Mother     Hypertension Father     Heart disease Father         MI    Diabetes Sister     Diabetes Brother     Cancer Brother         prostate    Cancer Brother         colon    Heart disease Brother     Cancer Brother         prostate    Diabetes Brother     Hyperlipidemia Brother     Hypertension Brother     Heart disease Brother     No Known Problems Daughter     No Known Problems Son     No Known Problems Daughter      Social History     Tobacco Use    Smoking status: Never Smoker    Smokeless tobacco: Never Used   Substance Use Topics    Alcohol use: No    Drug use: No     Review of Systems   Constitutional: Positive for chills and fever.   HENT: Negative for sore throat.    Eyes: Negative for photophobia and visual disturbance.   Respiratory: Positive for shortness of breath. Negative for cough.    Cardiovascular: Positive for chest pain.   Gastrointestinal: Positive for abdominal pain and diarrhea. Negative for vomiting.   Genitourinary: Negative for dysuria.   Musculoskeletal: Negative for joint swelling.   Skin: Negative for rash.   Neurological: Positive for weakness. Negative for seizures, syncope and headaches.   Psychiatric/Behavioral: Negative for confusion.       Physical Exam     Initial Vitals   BP Pulse Resp Temp SpO2   11/15/20 0026 11/15/20 0026 11/15/20 0027 11/15/20 0026 11/15/20 0025   (!) 120/56 82 (!) 22 100.1 °F (37.8 °C) 100 %      MAP       --                Physical Exam    Nursing note and vitals reviewed.  Constitutional: He is not diaphoretic. No distress.   HENT:   Head: Normocephalic and atraumatic.   Eyes: Conjunctivae are normal.   Neck: Normal range of motion.   Cardiovascular: Regular rhythm.   Pulmonary/Chest: Breath sounds normal.   Abdominal: Soft. Bowel sounds are normal.   Mild diffuse abdominal tenderness with no guarding or rebound    Musculoskeletal:      Comments: Full range of motion all extremities. 2+ bilateral pretibial edema   Neurological: He is alert. He has normal strength. No cranial nerve deficit or sensory deficit.   Skin: No rash noted.   No erythema or warmth.  No rashes   Psychiatric:   Weak appearing elderly male.  Poor historian.         ED Course   Procedures  Labs Reviewed   APTT - Abnormal; Notable for the following components:       Result Value    aPTT 47.1 (*)     All other components within normal limits   CBC W/ AUTO DIFFERENTIAL - Abnormal; Notable for the following components:    RBC 3.11 (*)     Hemoglobin 9.0 (*)     Hematocrit 28.1 (*)     Platelets 133 (*)     Immature Granulocytes 0.9 (*)     Gran # (ANC) 10.0 (*)     Immature Grans (Abs) 0.11 (*)     Lymph # 0.7 (*)     Mono # 1.3 (*)     Gran % 82.7 (*)     Lymph % 5.7 (*)     All other components within normal limits   COMPREHENSIVE METABOLIC PANEL - Abnormal; Notable for the following components:    Potassium 3.4 (*)     CO2 21 (*)     BUN 33 (*)     Creatinine 1.9 (*)     Albumin 2.9 (*)     AST 49 (*)     eGFR if  37.1 (*)     eGFR if non  32.1 (*)     All other components within normal limits   PROTIME-INR - Abnormal; Notable for the following components:    PT 19.8 (*)     All other components within normal limits   TROPONIN I - Abnormal; Notable for the following components:    Troponin I 8.076 (*)     All other components within normal limits    Narrative:      trop critical result(s) repeated. Called and verbal readback   obtained from melissa glover rn er  by STACY 11/15/2020 01:48   URINALYSIS, REFLEX TO URINE CULTURE - Abnormal; Notable for the following components:    Protein, UA Trace (*)     Occult Blood UA Trace (*)     Urobilinogen, UA 2.0-3.0 (*)     Leukocytes, UA 1+ (*)     All other components within normal limits    Narrative:     Specimen Source->Urine   CK-MB - Abnormal; Notable for the following components:     CPK MB 10.0 (*)     All other components within normal limits   CK - Abnormal; Notable for the following components:     (*)     All other components within normal limits   B-TYPE NATRIURETIC PEPTIDE - Abnormal; Notable for the following components:     (*)     All other components within normal limits   URINALYSIS MICROSCOPIC - Abnormal; Notable for the following components:    Hyaline Casts, UA 28 (*)     All other components within normal limits    Narrative:     Specimen Source->Urine   CULTURE, BLOOD   CULTURE, BLOOD   CLOSTRIDIUM DIFFICILE   CULTURE, STOOL   LACTIC ACID, PLASMA   AMYLASE   LIPASE   MAGNESIUM   SARS-COV-2 RNA AMPLIFICATION, QUAL   INFLUENZA A AND B ANTIGEN    Narrative:     Specimen Source->Nasopharyngeal Swab   OCCULT BLOOD X 1, STOOL   STOOL EXAM-OVA,CYSTS,PARASITES   WBC, STOOL          Imaging Results          CT Abdomen Pelvis  Without Contrast (Final result)  Result time 11/15/20 01:46:00   Procedure changed from CT Abdomen Pelvis With Contrast     Final result by Erasmo Lopze MD (11/15/20 01:46:00)                 Narrative:    EXAM DESCRIPTION: CT ABDOMEN PELVIS WITHOUT CONTRAST 11/15/2020 2:23 AM CST    CLINICAL HISTORY: 84 years, Male, Abdominal infection suspected; Abdominal pain    COMPARISON: None.    PROCEDURE:  Multiple transaxial tomograms of the abdomen and pelvis were performed from the lung bases to the symphysis pubis utilizing 2 mm slice thickness at 2 mm interval reconstruction, without administration of IV and oral contrast.  Multiplanar reformats in the sagittal and coronal plane were generated and reviewed.  An individualized dose optimization technique, Automated Exposure Control, was utilized for the performed procedure.    FINDINGS:  The lack of IV and oral contrast limits evaluation of solid organs, subtle lesions cannot be excluded.    The lung bases demonstrate small trace of bilateral pleural effusions pleural effusion. Sternotomy wires  correspond to previous CABG. There are coronary artery calcifications.    Grossly the unopacified liver, pancreas, spleen and adrenal glands demonstrate to be within normal limits, no significant focal lesions were identified.  There is a status post cholecystectomy. No evidence for biliary duct dilatation.    The kidneys demonstrate grossly unremarkable, no hydronephrosis or stones were identified.  No there is a lower pole right renal cyst measuring 4.7 cm on image 110 The ureters displays normal appearance with normal caliber, no hydroureter was seen.  The bladder was partially distended with no focal lesion.  Grossly the unopacified stomach, small bowel and large bowel demonstrate to be within normal limits. Fecal residue and underdistention of the large bowel limits the evaluation. There is suboptimal distention of the rectosigmoid colon. Questionable fluid and/or mucosal thickening of the distal rectum could be of consideration.  The urinary bladder demonstrate to be partially distended with diffuse circumferential wall thickening most likely related to underdistention. The prostate gland was not visualized. Clips within the pelvic floor corresponding to previous lymph node dissection.  The aorta demonstrate minimal atheromatous plaque formation. There is a IVC filter in good position below the renal veins (Quispe nitinol).  There is no retroperitoneal lymphadenopathy.  There is no evidence for ascites. The bone windows demonstrate mild diffuse bony osteopenia. Degenerative disc disease is identified at L4/L5.  There is a small right lateral hernia just inferior to the edge of the liver containing omentum on image  and coronal image 60.    IMPRESSION:    SMALL TRACE BILATERAL PLEURAL EFFUSIONS. STATUS POST CABG.  STATUS POST CHOLECYSTECTOMY.  RIGHT RENAL CYST.  INFERIOR VENA CAVA FILTER IN PLACE.  STATUS POST PROSTATECTOMY WITH LYMPH NODE DISSECTION.  QUESTIONABLE MUCOSAL THICKENING AND/OR FLUID OF THE  DISTAL RECTUM.    Electronically signed by:  Erasmo Lopez MD  11/15/2020 2:30 AM [employer] Workstation: [id number]                             X-Ray Chest AP Portable (In process)                  Medical Decision Making:   Initial Assessment:   Care transferred to Dr. Celeste  ED Management:  Assumed care awaiting labs and CT scan CT scan shows questionable colitis patient's troponin is elevated 8 EKG shows no ST elevation patient is resting comfortably his COVID screen is negative influenza negative I discussed case with Dr. Mota who will evaluate patient emergency department for admission              Attending Attestation:         Attending Critical Care:   Critical Care Times:   Direct Patient Care (initial evaluation, reassessments, and time considering the case)................................................................12 minutes.   Additional History from reviewing old medical records or taking additional history from the family, EMS, PCP, etc.......................10 minutes.   Documentation..................................................................................................................................................................................10 minutes.   Consultation with other Physicians. .................................................................................................................................................5 minutes.   ==============================================================  · Total Critical Care Time - exclusive of procedural time: 37 minutes.  ==============================================================                        Clinical Impression:       ICD-10-CM ICD-9-CM   1. NSTEMI (non-ST elevated myocardial infarction)  I21.4 410.70                          ED Disposition Condition    Admit                             Deepak Celeste MD  11/15/20 0343       Deepak Celeste MD  11/26/20 1040

## 2020-11-15 NOTE — SUBJECTIVE & OBJECTIVE
Past Medical History:   Diagnosis Date    Adrenal insufficiency     Anemia     Anticoagulant long-term use     plavix for blood clots legs and stents years    Blood transfusion     CAD (coronary artery disease)     Cancer     prostate    Cataract     CHF (congestive heart failure), NYHA class III, chronic, systolic 1/3/2020    COPD (chronic obstructive pulmonary disease) 11/6/2013    Depression     Diabetes mellitus     Diabetes mellitus type II     DVT (deep venous thrombosis)     Hemorrhoids     History of colon polyps 6/3/2015    Hypertension     NSTEMI (non-ST elevated myocardial infarction) 11/15/2020    PE (pulmonary embolism)     [facility] 2007    Peripheral vascular disease     Urinary tract infection        Past Surgical History:   Procedure Laterality Date    CATARACT EXTRACTION, BILATERAL      CHOLECYSTECTOMY  8/2011    COLON SURGERY      CORONARY ARTERY BYPASS GRAFT       x 5    CORONARY STENT PLACEMENT      2007, last 2011  stent  3 vessel.    EXPLORATORY LAPAROTOMY W/ BOWEL RESECTION  1987    PROSTATE SURGERY  2001    Radical for cancer - Dr Luke Chung    RIGHT FEMUR REPAIR  1987    SMALL INTESTINE SURGERY         Review of patient's allergies indicates:   Allergen Reactions    Statins-hmg-coa reductase inhibitors        No current facility-administered medications on file prior to encounter.      Current Outpatient Medications on File Prior to Encounter   Medication Sig    albuterol (VENTOLIN HFA) 90 mcg/actuation inhaler Inhale 2 puffs into the lungs every 4 (four) hours as needed for Shortness of Breath. Rescue    apixaban (ELIQUIS) 5 mg Tab TAKE 1 TABLET(5 MG) BY MOUTH TWICE DAILY (Patient taking differently: Take 5 mg by mouth 2 (two) times daily. )    aspirin (ECOTRIN) 81 MG EC tablet Take 81 mg by mouth once daily.    calcitRIOL (ROCALTROL) 0.25 MCG Cap Take 0.25 mcg by mouth once daily.    cyanocobalamin, vitamin B-12, 5,000 mcg Subl Place 5 mg under the tongue  once daily.    famotidine (PEPCID) 20 MG tablet Take 1 tablet (20 mg total) by mouth nightly as needed for Heartburn.    ferrous sulfate (FEOSOL) 325 mg (65 mg iron) Tab tablet Take 1 tablet (325 mg total) by mouth daily with breakfast.    furosemide (LASIX) 20 MG tablet Take 20 mg in the morning and 10 mg in the evening.    insulin (LANTUS SOLOSTAR U-100 INSULIN) glargine 100 units/mL (3mL) SubQ pen ADMINISTER 35 UNITS UNDER THE SKIN EVERY EVENING (Patient taking differently: Inject 30 Units into the skin every evening. )    insulin aspart U-100 (NOVOLOG FLEXPEN U-100 INSULIN) 100 unit/mL (3 mL) InPn pen INJECT 6 UNITS BEFORE LUNCH AND DINNER PLUS SLIDING SCALE (Patient taking differently: Inject 6 Units into the skin 2 (two) times daily as needed (sliding scale -100/10= units). INJECT 6 UNITS BEFORE LUNCH AND DINNER PLUS SLIDING SCALE)    meclizine (ANTIVERT) 12.5 mg tablet Take 1 tablet (12.5 mg total) by mouth 2 (two) times daily as needed.    memantine (NAMENDA) 5 MG Tab Take 1 tablet (5 mg total) by mouth 2 (two) times daily.    metoprolol succinate (TOPROL-XL) 25 MG 24 hr tablet Take 25 mg by mouth once daily.    sertraline (ZOLOFT) 50 MG tablet Take 1 tablet (50 mg total) by mouth once daily.     Family History     Problem Relation (Age of Onset)    Cancer Brother, Brother, Brother    Diabetes Mother, Sister, Brother, Brother    Heart disease Father, Brother, Brother    Hyperlipidemia Brother    Hypertension Mother, Father, Brother    Kidney disease Mother    No Known Problems Daughter, Son, Daughter        Tobacco Use    Smoking status: Never Smoker    Smokeless tobacco: Never Used   Substance and Sexual Activity    Alcohol use: No    Drug use: No    Sexual activity: Yes     Partners: Female     Review of Systems   Unable to perform ROS: Dementia     Objective:     Vital Signs (Most Recent):  Temp: 98.5 °F (36.9 °C) (11/15/20 0322)  Pulse: 64 (11/15/20 0330)  Resp: 17 (11/15/20 0330)  BP: (!)  126/58 (11/15/20 0330)  SpO2: 95 % (11/15/20 0330) Vital Signs (24h Range):  Temp:  [98.5 °F (36.9 °C)-100.1 °F (37.8 °C)] 98.5 °F (36.9 °C)  Pulse:  [64-82] 64  Resp:  [17-26] 17  SpO2:  [95 %-100 %] 95 %  BP: (120-137)/(56-61) 126/58     Weight: 94.3 kg (208 lb)  Body mass index is 26.71 kg/m².    Physical Exam  Vitals signs and nursing note reviewed.   Constitutional:       Appearance: He is well-developed.   HENT:      Head: Normocephalic and atraumatic.      Right Ear: External ear normal.      Left Ear: External ear normal.      Nose: Nose normal.   Eyes:      Conjunctiva/sclera: Conjunctivae normal.      Pupils: Pupils are equal, round, and reactive to light.   Neck:      Musculoskeletal: Normal range of motion and neck supple.   Cardiovascular:      Rate and Rhythm: Normal rate and regular rhythm.      Heart sounds: Murmur present.   Pulmonary:      Effort: Pulmonary effort is normal.      Breath sounds: Normal breath sounds.      Comments: Decreased entry bases without adventitious sounds  Abdominal:      General: Bowel sounds are normal.      Palpations: Abdomen is soft.   Musculoskeletal: Normal range of motion.   Skin:     General: Skin is warm and dry.      Capillary Refill: Capillary refill takes less than 2 seconds.   Neurological:      Mental Status: He is alert and oriented to person, place, and time.   Psychiatric:         Behavior: Behavior normal.         Thought Content: Thought content normal.         Judgment: Judgment normal.           CRANIAL NERVES     CN III, IV, VI   Pupils are equal, round, and reactive to light.       Significant Labs:   CBC:   Recent Labs   Lab 11/15/20  0105   WBC 12.12   HGB 9.0*   HCT 28.1*   *     CMP:   Recent Labs   Lab 11/15/20  0105      K 3.4*      CO2 21*   GLU 85   BUN 33*   CREATININE 1.9*   CALCIUM 9.7   PROT 6.2   ALBUMIN 2.9*   BILITOT 0.9   ALKPHOS 59   AST 49*   ALT 23   ANIONGAP 8   EGFRNONAA 32.1*     Cardiac Markers:   Recent Labs    Lab 11/15/20  0105   *     Troponin:   Recent Labs   Lab 11/15/20  0105   TROPONINI 8.076*       Significant Imaging: I have reviewed and interpreted all pertinent imaging results/findings within the past 24 hours.

## 2020-11-15 NOTE — H&P
"[facility]  History & Physical    Patient Name: Chon Hurtado  MRN: [id number]  Admission Date: 11/15/2020  Attending Physician: Liu Mota MD  Primary Care Provider: Doug Whitaker MD         Patient information was obtained from patient, relative(s) and ER records.     Subjective:     Principal Problem:NSTEMI (non-ST elevated myocardial infarction)    Chief Complaint:   Chief Complaint   Patient presents with    Chest Pain     Dizziness, generalized, fatigue, and fever since Thursday.        HPI: 84 year old male (Hx DM2, COPD, CAD, long term use anticoagulant, Dementia, HT, CHF [unknown LV function]) presented to ED with daughter stating that the patient had been having intermittent chest discomfort, which the patient is unable to describe except that it "comes and goes". Two days ago the patient started to have fevers, followed by pleuritic type chest discomfort (according to daughter: worse with movement and breathing) and loose stools. He does not know if there was blood or black in his stool. He denies abdominal pain, nausea or vomiting. He has not had a cough or sputum. No orthopnea or PND. His left lower extremity is chronically swollen.     In ED: Troponin: 8.078 with BNP of 697; no leukocytosis, mild normocytic anemia; mild hypokalemia with stage 3b renal dysfunction; COVID and influenza markers are negative. EKG: sinus with no acute ST segments. CT Abdo/Pelvis (w/o contrast) reviewed: small pleural effusions, with  no significant abdominal indings. Patient discussed with ED physician in addition to myocardial infarction work-up, stool studies will be sent.    Past Medical History:   Diagnosis Date    Adrenal insufficiency     Anemia     Anticoagulant long-term use     plavix for blood clots legs and stents years    Blood transfusion     CAD (coronary artery disease)     Cancer     prostate    Cataract     CHF (congestive heart failure), NYHA class III, " chronic, systolic 1/3/2020    COPD (chronic obstructive pulmonary disease) 11/6/2013    Depression     Diabetes mellitus     Diabetes mellitus type II     DVT (deep venous thrombosis)     Hemorrhoids     History of colon polyps 6/3/2015    Hypertension     NSTEMI (non-ST elevated myocardial infarction) 11/15/2020    PE (pulmonary embolism)     [facility] 2007    Peripheral vascular disease     Urinary tract infection        Past Surgical History:   Procedure Laterality Date    CATARACT EXTRACTION, BILATERAL      CHOLECYSTECTOMY  8/2011    COLON SURGERY      CORONARY ARTERY BYPASS GRAFT       x 5    CORONARY STENT PLACEMENT      2007, last 2011  stent  3 vessel.    EXPLORATORY LAPAROTOMY W/ BOWEL RESECTION  1987    PROSTATE SURGERY  2001    Radical for cancer - Dr Luke Chung    RIGHT FEMUR REPAIR  1987    SMALL INTESTINE SURGERY         Review of patient's allergies indicates:   Allergen Reactions    Statins-hmg-coa reductase inhibitors        No current facility-administered medications on file prior to encounter.      Current Outpatient Medications on File Prior to Encounter   Medication Sig    albuterol (VENTOLIN HFA) 90 mcg/actuation inhaler Inhale 2 puffs into the lungs every 4 (four) hours as needed for Shortness of Breath. Rescue    apixaban (ELIQUIS) 5 mg Tab TAKE 1 TABLET(5 MG) BY MOUTH TWICE DAILY (Patient taking differently: Take 5 mg by mouth 2 (two) times daily. )    aspirin (ECOTRIN) 81 MG EC tablet Take 81 mg by mouth once daily.    calcitRIOL (ROCALTROL) 0.25 MCG Cap Take 0.25 mcg by mouth once daily.    cyanocobalamin, vitamin B-12, 5,000 mcg Subl Place 5 mg under the tongue once daily.    famotidine (PEPCID) 20 MG tablet Take 1 tablet (20 mg total) by mouth nightly as needed for Heartburn.    ferrous sulfate (FEOSOL) 325 mg (65 mg iron) Tab tablet Take 1 tablet (325 mg total) by mouth daily with breakfast.    furosemide (LASIX) 20 MG tablet Take 20 mg in the morning and 10  mg in the evening.    insulin (LANTUS SOLOSTAR U-100 INSULIN) glargine 100 units/mL (3mL) SubQ pen ADMINISTER 35 UNITS UNDER THE SKIN EVERY EVENING (Patient taking differently: Inject 30 Units into the skin every evening. )    insulin aspart U-100 (NOVOLOG FLEXPEN U-100 INSULIN) 100 unit/mL (3 mL) InPn pen INJECT 6 UNITS BEFORE LUNCH AND DINNER PLUS SLIDING SCALE (Patient taking differently: Inject 6 Units into the skin 2 (two) times daily as needed (sliding scale -100/10= units). INJECT 6 UNITS BEFORE LUNCH AND DINNER PLUS SLIDING SCALE)    meclizine (ANTIVERT) 12.5 mg tablet Take 1 tablet (12.5 mg total) by mouth 2 (two) times daily as needed.    memantine (NAMENDA) 5 MG Tab Take 1 tablet (5 mg total) by mouth 2 (two) times daily.    metoprolol succinate (TOPROL-XL) 25 MG 24 hr tablet Take 25 mg by mouth once daily.    sertraline (ZOLOFT) 50 MG tablet Take 1 tablet (50 mg total) by mouth once daily.     Family History     Problem Relation (Age of Onset)    Cancer Brother, Brother, Brother    Diabetes Mother, Sister, Brother, Brother    Heart disease Father, Brother, Brother    Hyperlipidemia Brother    Hypertension Mother, Father, Brother    Kidney disease Mother    No Known Problems Daughter, Son, Daughter        Tobacco Use    Smoking status: Never Smoker    Smokeless tobacco: Never Used   Substance and Sexual Activity    Alcohol use: No    Drug use: No    Sexual activity: Yes     Partners: Female     Review of Systems   Unable to perform ROS: Dementia     Objective:     Vital Signs (Most Recent):  Temp: 98.5 °F (36.9 °C) (11/15/20 0322)  Pulse: 64 (11/15/20 0330)  Resp: 17 (11/15/20 0330)  BP: (!) 126/58 (11/15/20 0330)  SpO2: 95 % (11/15/20 0330) Vital Signs (24h Range):  Temp:  [98.5 °F (36.9 °C)-100.1 °F (37.8 °C)] 98.5 °F (36.9 °C)  Pulse:  [64-82] 64  Resp:  [17-26] 17  SpO2:  [95 %-100 %] 95 %  BP: (120-137)/(56-61) 126/58     Weight: 94.3 kg (208 lb)  Body mass index is 26.71  kg/m².    Physical Exam  Vitals signs and nursing note reviewed.   Constitutional:       Appearance: He is well-developed.   HENT:      Head: Normocephalic and atraumatic.      Right Ear: External ear normal.      Left Ear: External ear normal.      Nose: Nose normal.   Eyes:      Conjunctiva/sclera: Conjunctivae normal.      Pupils: Pupils are equal, round, and reactive to light.   Neck:      Musculoskeletal: Normal range of motion and neck supple.   Cardiovascular:      Rate and Rhythm: Normal rate and regular rhythm.      Heart sounds: Murmur present.   Pulmonary:      Effort: Pulmonary effort is normal.      Breath sounds: Normal breath sounds.      Comments: Decreased entry bases without adventitious sounds  Abdominal:      General: Bowel sounds are normal.      Palpations: Abdomen is soft.   Musculoskeletal: Normal range of motion.   Skin:     General: Skin is warm and dry.      Capillary Refill: Capillary refill takes less than 2 seconds.   Neurological:      Mental Status: He is alert and oriented to person, place, and time.   Psychiatric:         Behavior: Behavior normal.         Thought Content: Thought content normal.         Judgment: Judgment normal.           CRANIAL NERVES     CN III, IV, VI   Pupils are equal, round, and reactive to light.       Significant Labs:   CBC:   Recent Labs   Lab 11/15/20  0105   WBC 12.12   HGB 9.0*   HCT 28.1*   *     CMP:   Recent Labs   Lab 11/15/20  0105      K 3.4*      CO2 21*   GLU 85   BUN 33*   CREATININE 1.9*   CALCIUM 9.7   PROT 6.2   ALBUMIN 2.9*   BILITOT 0.9   ALKPHOS 59   AST 49*   ALT 23   ANIONGAP 8   EGFRNONAA 32.1*     Cardiac Markers:   Recent Labs   Lab 11/15/20  0105   *     Troponin:   Recent Labs   Lab 11/15/20  0105   TROPONINI 8.076*       Significant Imaging: I have reviewed and interpreted all pertinent imaging results/findings within the past 24 hours.    Assessment/Plan:     * NSTEMI (non-ST elevated myocardial  infarction)  ECHO, serial biomarkers, Cardiology consultation      Type 2 diabetes mellitus without complication, with long-term current use of insulin  Continue pre-admit regimen with low dose sliding scale       Diarrhea of presumed infectious origin  Stool studies including C dif        VTE Risk Mitigation (From admission, onward)    None             Liu Mota MD  Department of Hospital Medicine   [facility]

## 2020-11-15 NOTE — CONSULTS
[facility]  Cardiology  Consult Note    Patient Name: Chon Hurtado  MRN: [id number]  Admission Date: 11/15/2020  Hospital Length of Stay: 0 days  Code Status: Full Code   Attending Provider: Pete Eden MD   Consulting Provider: Clarence Jovel MD  Primary Care Physician: Doug Whitaker MD  Principal Problem:NSTEMI (non-ST elevated myocardial infarction)    Patient information was obtained from patient, past medical records and ER records.     Consults  Subjective:     REASON FOR CONSULT:  NSTEMI     HPI:  84-year-old male with a past medical history significant for coronary artery status post CABG, hypertension, congestive heart failure, DVT status post IVC filter placement presented to the hospital with complaints of feeling extremely weak, shortness of breath, chest pain.  Patient reportedly has been feeling short of breath for the past week or so.  He has also been feeling extremely weak for the past week or so.  He had pain along the left side of the jaw that was radiating down into the chest as well as into the left upper arm.  He reportedly refused to come to the hospital.  Symptoms have been worsening and last night he decided to come to the hospital.  He currently denies any chest pain.  He reports shortness of breath which is at his baseline.  He also reports feeling extremely weak.  He denies any palpitations.  He reports lightheadedness and dizziness.  He denies any syncopal episodes.  He reportedly has been having some low-grade fever.  Over the past week patient reportedly has also been confused and is hallucinating.  Has been very unsteady on his gait.    Past Medical History:   Diagnosis Date    Adrenal insufficiency     Anemia     Anticoagulant long-term use     plavix for blood clots legs and stents years    Blood transfusion     CAD (coronary artery disease)     Cancer     prostate    Cataract     CHF (congestive heart failure), NYHA class III, chronic, systolic  1/3/2020    COPD (chronic obstructive pulmonary disease) 11/6/2013    Depression     Diabetes mellitus     Diabetes mellitus type II     DVT (deep venous thrombosis)     Hemorrhoids     History of colon polyps 6/3/2015    Hypertension     NSTEMI (non-ST elevated myocardial infarction) 11/15/2020    PE (pulmonary embolism)     [facility] 2007    Peripheral vascular disease     Urinary tract infection        Past Surgical History:   Procedure Laterality Date    CATARACT EXTRACTION, BILATERAL      CHOLECYSTECTOMY  8/2011    COLON SURGERY      CORONARY ARTERY BYPASS GRAFT       x 5    CORONARY STENT PLACEMENT      2007, last 2011  stent  3 vessel.    EXPLORATORY LAPAROTOMY W/ BOWEL RESECTION  1987    PROSTATE SURGERY  2001    Radical for cancer - Dr Luke Chung    RIGHT FEMUR REPAIR  1987    SMALL INTESTINE SURGERY         Review of patient's allergies indicates:   Allergen Reactions    Statins-hmg-coa reductase inhibitors        No current facility-administered medications on file prior to encounter.      Current Outpatient Medications on File Prior to Encounter   Medication Sig    apixaban (ELIQUIS) 5 mg Tab TAKE 1 TABLET(5 MG) BY MOUTH TWICE DAILY (Patient taking differently: Take 5 mg by mouth 2 (two) times daily. )    furosemide (LASIX) 20 MG tablet Take 20 mg in the morning and 10 mg in the evening.    albuterol (VENTOLIN HFA) 90 mcg/actuation inhaler Inhale 2 puffs into the lungs every 4 (four) hours as needed for Shortness of Breath. Rescue    aspirin (ECOTRIN) 81 MG EC tablet Take 81 mg by mouth once daily.    calcitRIOL (ROCALTROL) 0.25 MCG Cap Take 0.25 mcg by mouth once daily.    cyanocobalamin, vitamin B-12, 5,000 mcg Subl Place 5 mg under the tongue once daily.    famotidine (PEPCID) 20 MG tablet Take 1 tablet (20 mg total) by mouth nightly as needed for Heartburn.    ferrous sulfate (FEOSOL) 325 mg (65 mg iron) Tab tablet Take 1 tablet (325 mg total) by mouth daily with  breakfast.    insulin (LANTUS SOLOSTAR U-100 INSULIN) glargine 100 units/mL (3mL) SubQ pen ADMINISTER 35 UNITS UNDER THE SKIN EVERY EVENING (Patient taking differently: Inject 30 Units into the skin every evening. )    insulin aspart U-100 (NOVOLOG FLEXPEN U-100 INSULIN) 100 unit/mL (3 mL) InPn pen INJECT 6 UNITS BEFORE LUNCH AND DINNER PLUS SLIDING SCALE (Patient taking differently: Inject 6 Units into the skin 2 (two) times daily as needed (sliding scale -100/10= units). INJECT 6 UNITS BEFORE LUNCH AND DINNER PLUS SLIDING SCALE)    meclizine (ANTIVERT) 12.5 mg tablet Take 1 tablet (12.5 mg total) by mouth 2 (two) times daily as needed.    memantine (NAMENDA) 5 MG Tab Take 1 tablet (5 mg total) by mouth 2 (two) times daily.    metoprolol succinate (TOPROL-XL) 25 MG 24 hr tablet Take 25 mg by mouth once daily.    sertraline (ZOLOFT) 50 MG tablet Take 1 tablet (50 mg total) by mouth once daily.       Scheduled Meds:   apixaban  5 mg Oral BID    aspirin  81 mg Oral Daily    calcitRIOL  0.25 mcg Oral Daily    cyanocobalamin  2,000 mcg Oral Daily    ferrous sulfate  325 mg Oral Daily with breakfast    furosemide (LASIX) IV  20 mg Intravenous Q12H    insulin detemir U-100  35 Units Subcutaneous QHS    memantine  5 mg Oral BID    metoprolol succinate  25 mg Oral Daily    sertraline  50 mg Oral Daily     Continuous Infusions:  PRN Meds:.acetaminophen, calcium chloride IVPB, calcium chloride IVPB, calcium chloride IVPB, dextrose 50%, dextrose 50%, famotidine, HYDROcodone-acetaminophen, insulin aspart U-100, insulin regular, magnesium oxide, magnesium sulfate IVPB, magnesium sulfate IVPB, magnesium sulfate IVPB, magnesium sulfate IVPB, meclizine, potassium chloride in water, potassium chloride in water, potassium chloride in water, potassium chloride in water, potassium chloride, potassium chloride, potassium chloride, potassium chloride, promethazine (PHENERGAN) IVPB, promethazine (PHENERGAN) IVPB, sodium  phosphate IVPB, sodium phosphate IVPB, sodium phosphate IVPB, sodium phosphate IVPB, sodium phosphate IVPB    Family History     Problem Relation (Age of Onset)    Cancer Brother, Brother, Brother    Diabetes Mother, Sister, Brother, Brother    Heart disease Father, Brother, Brother    Hyperlipidemia Brother    Hypertension Mother, Father, Brother    Kidney disease Mother    No Known Problems Daughter, Son, Daughter          Tobacco Use    Smoking status: Never Smoker    Smokeless tobacco: Never Used   Substance and Sexual Activity    Alcohol use: No    Drug use: No    Sexual activity: Yes     Partners: Female       ROS   No significant headaches or sore throat or runny nose.   No recent changes in vision.   No recent changes in hearing.  No dysphagia or odynophagia.  Reports chest pain and shortness of breath.   Denies any cough or hemoptysis.   Denies any abdominal pain, nausea, vomiting, diarrhea or constipation.   Denies any dysuria or polyuria.   Denies any fevers or chills.   Denies any recent significant weight changes.   Denies bleeding diathesis    Objective:     Vital Signs (Most Recent):  Temp: 97.5 °F (36.4 °C) (11/15/20 1045)  Pulse: 65 (11/15/20 1045)  Resp: 14 (11/15/20 1045)  BP: (!) 128/56 (11/15/20 1045)  SpO2: 98 % (11/15/20 1045) Vital Signs (24h Range):  Temp:  [97.5 °F (36.4 °C)-100.1 °F (37.8 °C)] 97.5 °F (36.4 °C)  Pulse:  [62-82] 65  Resp:  [14-26] 14  SpO2:  [94 %-100 %] 98 %  BP: (105-137)/(52-61) 128/56     Weight: 97.8 kg (215 lb 9.8 oz)  Body mass index is 27.68 kg/m².    SpO2: 98 %  O2 Device (Oxygen Therapy): room air      Intake/Output Summary (Last 24 hours) at 11/15/2020 1446  Last data filed at 11/15/2020 0800  Gross per 24 hour   Intake --   Output 400 ml   Net -400 ml       Lines/Drains/Airways     Peripheral Intravenous Line                 Peripheral IV - Single Lumen 09/22/20 1453 18 G Anterior;Left Forearm 54 days                Physical Exam  HEENT: Normocephalic,  atraumatic, PERRL, Conjunctiva pink, no scleral icterus.   CVS: S1S2+, RRR, SM+, no rubs or gallops, JVP: Normal.  LUNGS:Crackles+  ABDOMEN: Soft, NT, BS+  EXTREMITIES: No cyanosis, clubbing or edema  NEURO: Alert and answers questions appropriately       Significant Labs:   BMP:   Recent Labs   Lab 11/15/20  0105 11/15/20  1109   GLU 85  --      --    K 3.4* 3.6     --    CO2 21*  --    BUN 33*  --    CREATININE 1.9*  --    CALCIUM 9.7  --    MG 1.6  --    , CMP   Recent Labs   Lab 11/15/20  0105 11/15/20  1109     --    K 3.4* 3.6     --    CO2 21*  --    GLU 85  --    BUN 33*  --    CREATININE 1.9*  --    CALCIUM 9.7  --    PROT 6.2  --    ALBUMIN 2.9*  --    BILITOT 0.9  --    ALKPHOS 59  --    AST 49*  --    ALT 23  --    ANIONGAP 8  --    ESTGFRAFRICA 37.1*  --    EGFRNONAA 32.1*  --    , CBC   Recent Labs   Lab 11/15/20  0105   WBC 12.12   HGB 9.0*   HCT 28.1*   *   , INR   Recent Labs   Lab 11/15/20  0105   INR 1.8   , Lipid Panel No results for input(s): CHOL, HDL, LDLCALC, TRIG, CHOLHDL in the last 48 hours. and Troponin   Recent Labs   Lab 11/15/20  0105 11/15/20  0511 11/15/20  1109   TROPONINI 8.076* 7.381* 4.935*       Significant Imaging: Reviewed  Assessment and Plan:     IMPRESSION:  Non-ST-elevation myocardial infarction.  Troponin trending down.  Currently chest pain-free.  Acute on chronic kidney disease.  Fever.  Low-grade.  SARS COVID-19 negative.  Coronary artery disease status post CABG.  History of DVTs status post IVC filter placement.  Diabetes mellitus.  Dyslipidemia.    RECOMMENDATIONS:  1.  Check CT of the head without contrast to evaluate for any CVA.  2.  If the CT of the head is negative patient would need to be on therapeutic anticoagulation with Lovenox or heparin GTT.  3.  Hold Eliquis.  4.  Follow-up on the echocardiogram.  5.  Would gently hydrate the patient.  6.  Dr. Lee will resume care for this patient in the morning.  Thank you for your  consult. I will follow-up with patient. Please contact us if you have any additional questions.    Clarence Jovel MD  Cardiology   [facility]

## 2020-11-16 PROBLEM — J84.10 LUNG GRANULOMA: Status: ACTIVE | Noted: 2020-01-01

## 2020-11-16 PROBLEM — R78.81 GRAM-POSITIVE BACTEREMIA: Status: ACTIVE | Noted: 2020-01-01

## 2020-11-16 NOTE — NURSING
Patient with low grade temp overnight. Dr. Mota notified overnight of positive blood cultures x 2 in anaerobic bottles and x 1 in aerobic. New orders noted for zosyn and vanc. Will continue to monitor.

## 2020-11-16 NOTE — NURSING
Dr. Mota notified of patient blood glucose level of 115 with 35 units scheduled levemir ordered. MD states to hold and use PRN sliding scale at this time.

## 2020-11-16 NOTE — PROGRESS NOTES
[facility] Medicine Progress Note    Subjective:     :  C/o generalized weakness and fatigue. Still having diarrhea. Febrile overnight. Blood cx positive with GPC in multiple bottles. Cdiff negative.   Denies abd pain, n/v/d, sob.   Prior to coming in describes intense L jaw pain radiating to the L shoulder and arm.   Discussed case with pt's wife at bedside and his daughter, Nehal, over the phone.      Objective:   Last 24 Hour Vital Signs:  BP  Min: 103/55  Max: 139/63  Temp  Av.4 °F (37.4 °C)  Min: 98 °F (36.7 °C)  Max: 100.8 °F (38.2 °C)  Pulse  Av.3  Min: 62  Max: 79  Resp  Av.7  Min: 18  Max: 20  SpO2  Av.3 %  Min: 93 %  Max: 99 %  Weight  Av.7 kg (222 lb 0.1 oz)  Min: 100.7 kg (222 lb 0.1 oz)  Max: 100.7 kg (222 lb 0.1 oz)  I/O last 3 completed shifts:  In: 1635.6 [P.O.:380; I.V.:655.6; IV Piggyback:600]  Out: 400 [Urine:400]    Physical Examination:  Vitals signs and nursing note reviewed.   Constitutional:       Appearance: He is well-developed.   HENT:      Head: Normocephalic and atraumatic.      Right Ear: External ear normal.      Left Ear: External ear normal.      Nose: Nose normal.   Eyes:      Conjunctiva/sclera: Conjunctivae normal.      Pupils: Pupils are equal, round, and reactive to light.   Neck:      Musculoskeletal: Normal range of motion and neck supple.   Cardiovascular:      Rate and Rhythm: Normal rate and regular rhythm.      Heart sounds: Murmur present.   Pulmonary:      Effort: Pulmonary effort is normal.      Breath sounds: Normal breath sounds.      Comments: Decreased entry bases without adventitious sounds  Abdominal:      General: Bowel sounds are normal.      Palpations: Abdomen is soft.   Musculoskeletal: Normal range of motion.   Skin:     General: Skin is warm and dry.      Capillary Refill: Capillary refill takes less than 2 seconds.   Neurological:      Mental Status: He is alert and oriented to person, place, and time.   Psychiatric:          Behavior: Behavior normal.         Thought Content: Thought content normal.         Judgment: Judgment normal.        Laboratory:  Laboratory Data Reviewed: yes    Most Recent Data:  CBC:   Lab Results   Component Value Date    WBC 14.77 (H) 11/16/2020    HGB 9.0 (L) 11/16/2020    HCT 28.4 (L) 11/16/2020     (L) 11/16/2020    MCV 88 11/16/2020    RDW 12.6 11/16/2020     BMP:   Lab Results   Component Value Date     11/16/2020    K 3.6 11/16/2020     11/16/2020    CO2 20 (L) 11/16/2020    BUN 37 (H) 11/16/2020    GLU 98 11/16/2020    CALCIUM 9.9 11/16/2020    MG 2.3 11/16/2020    PHOS 2.9 07/21/2020     LFTs:   Lab Results   Component Value Date    PROT 6.2 11/15/2020    ALBUMIN 2.9 (L) 11/15/2020    BILITOT 0.9 11/15/2020    AST 49 (H) 11/15/2020    ALKPHOS 59 11/15/2020    ALT 23 11/15/2020     Coags:   Lab Results   Component Value Date    INR 1.7 11/15/2020     FLP:   Lab Results   Component Value Date    CHOL 198 09/24/2019    HDL 43 09/24/2019    LDLCALC 137.4 09/24/2019    TRIG 88 09/24/2019    CHOLHDL 21.7 09/24/2019     DM:   Lab Results   Component Value Date    HGBA1C 8.3 (H) 11/15/2020    HGBA1C 10.5 (H) 09/24/2019    HGBA1C 10.9 (H) 02/25/2019    LDLCALC 137.4 09/24/2019    CREATININE 1.8 (H) 11/16/2020     Thyroid:   Lab Results   Component Value Date    TSH 1.169 08/13/2018     Anemia:   Lab Results   Component Value Date    IRON 59 07/21/2020    TIBC 321 07/21/2020    FERRITIN 157 07/21/2020    MRRBCONX68 1113 (H) 03/07/2016    FOLATE 15.9 07/25/2015     Cardiac:   Lab Results   Component Value Date    TROPONINI 4.935 (HH) 11/15/2020     (H) 11/15/2020     Urinalysis:   Lab Results   Component Value Date    LABURIN No growth 09/04/2020    COLORU Yellow 11/15/2020    CLARITYU Clear 09/02/2020    SPECGRAV 1.015 11/15/2020    NITRITE Negative 11/15/2020    PROTEINUR 30 09/02/2020    KETONESU Negative 11/15/2020    UROBILINOGEN 2.0-3.0 (A) 11/15/2020    BILIRUBINUR + 09/02/2020     WBCUA 1 11/15/2020        Radiology:  Data Reviewed: yes  XR CHEST AP PORTABLE  CT ABDOMEN PELVIS WITHOUT CONTRAST  CT HEAD WITHOUT CONTRAST      Current Medications:     Infusions:   sodium chloride 0.45% 75 mL/hr at 11/15/20 1630        Scheduled:   aspirin  81 mg Oral Daily    calcitRIOL  0.25 mcg Oral Daily    cyanocobalamin  2,000 mcg Oral Daily    enoxparin  1 mg/kg Subcutaneous Q24H    ferrous sulfate  325 mg Oral Daily with breakfast    insulin detemir U-100  10 Units Subcutaneous QHS    memantine  5 mg Oral BID    metoprolol succinate  25 mg Oral Daily    piperacillin-tazobactam (ZOSYN) IVPB  3.375 g Intravenous Q8H    sertraline  50 mg Oral Daily    vancomycin (VANCOCIN) IVPB  1,500 mg Intravenous Q24H        PRN:  acetaminophen, calcium chloride IVPB, calcium chloride IVPB, calcium chloride IVPB, dextrose 50%, dextrose 50%, famotidine, HYDROcodone-acetaminophen, insulin aspart U-100, insulin regular, magnesium oxide, magnesium sulfate IVPB, magnesium sulfate IVPB, magnesium sulfate IVPB, magnesium sulfate IVPB, meclizine, potassium chloride in water, potassium chloride in water, potassium chloride in water, potassium chloride in water, potassium chloride, potassium chloride, potassium chloride, potassium chloride, promethazine (PHENERGAN) IVPB, promethazine (PHENERGAN) IVPB, sodium phosphate IVPB, sodium phosphate IVPB, sodium phosphate IVPB, sodium phosphate IVPB, sodium phosphate IVPB, Pharmacy to dose Vancomycin consult **AND** vancomycin - pharmacy to dose    Lines and Day Number of Therapy:      Microbiology Data:  Reviewed: yes  Microbiology Results (last 7 days)     Procedure Component Value Units Date/Time    Stool culture [[id number]] Collected: 11/15/20 1129    Order Status: Completed Specimen: Stool Updated: 11/16/20 0719     Stool Culture Nothing significant to date    Blood culture [[id number]] Collected: 11/15/20 0115    Order Status: Completed Specimen: Blood from Peripheral,  "Forearm, Left Updated: 11/16/20 0622     Blood Culture, Routine Gram stain talia bottle: Gram positive cocci      Results called to and read back by:Roderick Murray RN-B-CARD;  11/15/2020       22:34 CJD      Gram stain aer bottle: Gram positive cocci      Positive results previously called    Blood culture [[id number]] Collected: 11/15/20 0100    Order Status: Completed Specimen: Blood from Peripheral, Wrist, Left Updated: 11/16/20 0201     Blood Culture, Routine Gram stain talia bottle: Gram positive cocci      Results called to and read back by:Roderick Murray RN-BCARD;  11/15/2020        22:33 CJD      Gram stain aer bottle: Gram positive cocci      Results called to and read back by:Alix Aragon RN-B-CARD;        11/16/2020  02:01 CJD    Clostridium difficile EIA [[id number]] Collected: 11/15/20 1129    Order Status: Completed Specimen: Stool Updated: 11/15/20 1646     C. diff Antigen Negative     C difficile Toxins A+B, EIA Negative     Comment: Testing not recommended for children <24 months old.              Antibiotics and Day Number of Therapy:  Antibiotics (From admission, onward)    Start     Stop Route Frequency Ordered    11/16/20 0400  vancomycin 1,500 mg in dextrose 5 % 500 mL IVPB     Note to Pharmacy: Ht: 6' 2" (1.88 m)  Wt: 97.8 kg (215 lb 9.8 oz)  Estimated Creatinine Clearance: 34.9 mL/min (A) (based on SCr of 1.9 mg/dL (H)).     -- IV Every 24 hours (non-standard times) 11/16/20 0224    11/16/20 0319  vancomycin - pharmacy to dose  (vancomycin IVPB)      -- IV pharmacy to manage frequency 11/16/20 0219    11/16/20 0000  piperacillin-tazobactam 3.375 g in dextrose 5 % 50 mL IVPB (ready to mix system)     Note to Pharmacy: Ht: 6' 2" (1.88 m)  Wt: 97.8 kg (215 lb 9.8 oz)  Estimated Creatinine Clearance: 34.9 mL/min (A) (based on SCr of 1.9 mg/dL (H)).     -- IV Every 8 hours (non-standard times) 11/15/20 2256         Antivirals (From admission, onward)    None             Assessment/Plan:     Chon WADDELL " Bryant is a 84 y.o.male with    Active Hospital Problems    Diagnosis  POA    *NSTEMI (non-ST elevated myocardial infarction) [I21.4]  Yes    Gram-positive bacteremia [R78.81]  Yes    Diarrhea of presumed infectious origin [R19.7]  Yes    Type 2 diabetes mellitus without complication, with long-term current use of insulin [E11.9, Z79.4]  Not Applicable    CHF (congestive heart failure), NYHA class III, chronic, systolic [I50.22]  Yes    Chronic respiratory failure with hypoxia, on home oxygen therapy [J96.11, Z99.81]  Not Applicable    S/P CABG (coronary artery bypass graft) [Z95.1]  Not Applicable    CKD (chronic kidney disease) stage 3, GFR 30-59 ml/min [N18.30]  Yes     Chronic    CAD (coronary artery disease) [I25.10]  Yes    Hx of Adrenal insufficiency [E27.40]  Yes      Resolved Hospital Problems   No resolved problems to display.       - f/u echo   - continue asa  - continue anticoagulation   - cont BSA for now, f/u culture data   - f/u stool studies - cdiff negative   - continue supplemental o2 as needed  - continue accuchecks/ssi            Pete RIVERA Meek  [facility]

## 2020-11-16 NOTE — PATIENT INSTRUCTIONS
Counseling and Referral of Other Preventative  (Italic type indicates deductible and co-insurance are waived)    Patient Name: Chon Hurtado  Today's Date: 11/16/2020    Health Maintenance       Date Due Completion Date    TETANUS VACCINE 12/25/1953 ---    Shingles Vaccine (1 of 2) 12/25/1987 ---    Eye Exam 03/04/2020 3/4/2019    Override on 4/16/2013: Done (Dr Epperson)    Foot Exam 06/14/2020 6/14/2019 (Done)    Override on 6/14/2019: Done    Influenza Vaccine (1) 08/01/2020 10/28/2019    Lipid Panel 09/24/2020 9/24/2019    Urine Microalbumin 09/24/2020 9/24/2019    Hemoglobin A1c 02/15/2021 11/15/2020        No orders of the defined types were placed in this encounter.    The following information is provided to all patients.  This information is to help you find resources for any of the problems found today that may be affecting your health:                Living healthy guide: www.[facility].[place].[facility]      Understanding Diabetes: www.diabetes.org      Eating healthy: www.cdc.gov/healthyweight      [facility] home safety checklist: www.cdc.gov/steadi/patient.html      Agency on Aging: www.goea.[place].gov      Alcoholics anonymous (AA): www.aa.org      Physical Activity: www.madi.nih.gov/xm1frky      Tobacco use: www.quitwithusla.org

## 2020-11-16 NOTE — PROGRESS NOTES
Pharmacokinetic Initial Assessment: IV Vancomycin    Assessment/Plan:    Initiate intravenous vancomycin with loading dose of 1500 mg once followed by a maintenance dose of vancomycin 1500 mg IV every 24 hours  Desired empiric serum trough concentration is 15 to 20 mcg/mL  Draw vancomycin trough level 30 min prior to next dose on 11/17 at approximately 0300  Pharmacy will continue to follow and monitor vancomycin.      Please contact pharmacy at extension [phone] with any questions regarding this assessment.     Thank you for the consult,   Hector Gray       Patient brief summary:  Chon Hurtado is a 84 y.o. male initiated on antimicrobial therapy with IV Vancomycin for treatment of suspected bacteremia    Drug Allergies:   Review of patient's allergies indicates:   Allergen Reactions    Statins-hmg-coa reductase inhibitors        Actual Body Weight:   97.5 kg    Renal Function:   Estimated Creatinine Clearance: 34.9 mL/min (A) (based on SCr of 1.9 mg/dL (H)).,     CBC (last 72 hours):  Recent Labs   Lab Result Units 11/15/20  0105 11/15/20  0511   WBC K/uL 12.12  --    Hemoglobin g/dL 9.0*  --    Hemoglobin A1C %  --  8.3*   Hematocrit % 28.1*  --    Platelets K/uL 133*  --    Gran % % 82.7*  --    Lymph % % 5.7*  --    Mono % % 10.6  --    Eosinophil % % 0.0  --    Basophil % % 0.1  --    Differential Method  Automated  --        Metabolic Panel (last 72 hours):  Recent Labs   Lab Result Units 11/15/20  0105 11/15/20  0133 11/15/20  1109   Sodium mmol/L 136  --   --    Potassium mmol/L 3.4*  --  3.6   Chloride mmol/L 107  --   --    CO2 mmol/L 21*  --   --    Glucose mg/dL 85  --   --    Glucose, UA   --  Negative  --    BUN mg/dL 33*  --   --    Creatinine mg/dL 1.9*  --   --    Albumin g/dL 2.9*  --   --    Total Bilirubin mg/dL 0.9  --   --    Alkaline Phosphatase U/L 59  --   --    AST U/L 49*  --   --    ALT U/L 23  --   --    Magnesium mg/dL 1.6  --   --        Drug levels (last 3 results):  No results for  input(s): VANCOMYCINRA, VANCOMYCINPE, VANCOMYCINTR in the last 72 hours.    Microbiologic Results:  Microbiology Results (last 7 days)       Procedure Component Value Units Date/Time    Blood culture [[id number]] Collected: 11/15/20 0100    Order Status: Completed Specimen: Blood from Peripheral, Wrist, Left Updated: 11/16/20 0201     Blood Culture, Routine Gram stain talia bottle: Gram positive cocci      Results called to and read back by:Roderick Murray RN-BCARD;  11/15/2020        22:33 CJD      Gram stain aer bottle: Gram positive cocci      Results called to and read back by:JN CansecoB-CARD;        11/16/2020  02:01 CJD    Blood culture [[id number]] Collected: 11/15/20 0115    Order Status: Completed Specimen: Blood from Peripheral, Forearm, Left Updated: 11/15/20 2234     Blood Culture, Routine Gram stain talia bottle: Gram positive cocci      Results called to and read back by:JN MccrayB-CARD;  11/15/2020       22:34 CJD    Clostridium difficile EIA [[id number]] Collected: 11/15/20 1129    Order Status: Completed Specimen: Stool Updated: 11/15/20 1646     C. diff Antigen Negative     C difficile Toxins A+B, EIA Negative     Comment: Testing not recommended for children <24 months old.       Stool culture [[id number]] Collected: 11/15/20 1129    Order Status: Sent Specimen: Stool Updated: 11/15/20 1155

## 2020-11-16 NOTE — PLAN OF CARE
Problem: Fall Injury Risk  Goal: Absence of Fall and Fall-Related Injury  Outcome: Ongoing, Progressing     Problem: Infection  Goal: Infection Symptom Resolution  Outcome: Ongoing, Progressing     Problem: Adult Inpatient Plan of Care  Goal: Plan of Care Review  Outcome: Ongoing, Progressing  Goal: Patient-Specific Goal (Individualization)  Outcome: Ongoing, Progressing  Goal: Absence of Hospital-Acquired Illness or Injury  Outcome: Ongoing, Progressing  Goal: Optimal Comfort and Wellbeing  Outcome: Ongoing, Progressing  Goal: Readiness for Transition of Care  Outcome: Ongoing, Progressing  Goal: Rounds/Family Conference  Outcome: Ongoing, Progressing     Problem: Diabetes Comorbidity  Goal: Blood Glucose Level Within Desired Range  Outcome: Ongoing, Progressing     Problem: Skin Injury Risk Increased  Goal: Skin Health and Integrity  Outcome: Ongoing, Progressing

## 2020-11-16 NOTE — PLAN OF CARE
Important Message from Medicare was sign, explained and given to patient/caregiver on 11/16/2020 at 11:14am     answered all questions.

## 2020-11-16 NOTE — PLAN OF CARE
Assessment completed at bedside with Pt.  He is , has 3 adult children, and has not addressed POA / AD.  His PCP is Dr. Doug Whitaker, and he uses [facility].  Wife confirmed insurance, and has [employer] Medicare HMO.  Plan for discharge at this time is home with HH vs. SNF.  Case Management to continue to follow.    Martha Jon [facility]  Case Management  Ext. [phone]       11/16/20 1537   Discharge Assessment   Assessment Type Discharge Planning Assessment   Confirmed/corrected address and phone number on facesheet? Yes   Assessment information obtained from? Patient;Caregiver;Medical Record   Communicated expected length of stay with patient/caregiver no   Prior to hospitilization cognitive status: Not Oriented to Place;Not Oriented to Time   Prior to hospitalization functional status: Assistive Equipment;Needs Assistance   Current cognitive status: Not Oriented to Place;Not Oriented to Time   Current Functional Status: Assistive Equipment;Needs Assistance   Facility Arrived From: Home   Lives With spouse   Able to Return to Prior Arrangements yes   Is patient able to care for self after discharge? No   Who are your caregiver(s) and their phone number(s)? Ramila Hurtado, wife ([phone] / [phone])  Nehal Estes, daughter ([phone])   Patient's perception of discharge disposition home health   Readmission Within the Last 30 Days no previous admission in last 30 days   Patient currently being followed by outpatient case management? No   Patient currently receives any other outside agency services? Yes   Name and contact number of agency or person providing outside services [facility]   Is it the patient/care giver preference to resume care with the current outside agency? Yes   Equipment Currently Used at Home wheelchair;walker, rolling;cane, straight   Do you have any problems affording any of your prescribed medications? No   Is the patient taking  medications as prescribed? yes   Does the patient have transportation home? Yes   Transportation Anticipated family or friend will provide   Dialysis Name and Scheduled days N/A   Does the patient receive services at the [facility]? No   Discharge Plan A Skilled Nursing Facility   Discharge Plan B Home Health   DME Needed Upon Discharge  none   Patient/Family in Agreement with Plan yes

## 2020-11-16 NOTE — PROGRESS NOTES
[facility]  Department of Cardiology  Progress Note    PATIENT NAME: Chon Hurtado  MRN: [id number]  TODAY'S DATE: 11/16/2020  ADMIT DATE: 11/15/2020    SUBJECTIVE     PRINCIPLE PROBLEM: NSTEMI (non-ST elevated myocardial infarction)    INTERVAL HISTORY:    11/16/2020        Mr. Hurtado is lying in bed is still very weak. He has a low grade fever. After talking with his wife he has felt bad since last Thursday. He has progressively gotten worse and more lethargic. He has no appetite and is barely drinking. He is currently chest pain free and is lying flat on one pillow comfortably.     Review of patient's allergies indicates:   Allergen Reactions    Statins-hmg-coa reductase inhibitors        REVIEW OF SYSTEMS  CARDIOVASCULAR: No recent chest pain, palpitations, arm, neck, or jaw pain  RESPIRATORY: No recent fever, cough chills, SOB or congestion  : No blood in the urine  GI: No Nausea, vomiting, constipation, +diarrhea, blood, or reflux.  MUSCULOSKELETAL: Generalized Weakness  NEURO: No lightheadedness or dizziness  EYES: No Double vision, blurry, vision or headache     OBJECTIVE     VITAL SIGNS (Most Recent)  Temp: 98.9 °F (37.2 °C) (11/16/20 0933)  Pulse: 75 (11/16/20 0729)  Resp: 20 (11/16/20 0729)  BP: (!) 136/58 (11/16/20 0729)  SpO2: 98 % (11/16/20 0729)    VENTILATION STATUS  Resp: 20 (11/16/20 0729)  SpO2: 98 % (11/16/20 0729)       I & O (Last 24H):    Intake/Output Summary (Last 24 hours) at 11/16/2020 1057  Last data filed at 11/16/2020 0600  Gross per 24 hour   Intake 1635.61 ml   Output --   Net 1635.61 ml       WEIGHTS  Wt Readings from Last 1 Encounters:   11/16/20 0308 100.7 kg (222 lb 0.1 oz)   11/15/20 0450 97.8 kg (215 lb 9.8 oz)   11/15/20 0026 94.3 kg (208 lb)       PHYSICAL EXAM  CONSTITUTIONAL: Well built, well nourished in no apparent distress  NECK: no carotid bruit, no JVD  LUNGS:  Markedly diminished breath sounds bilaterally.    CHEST WALL: no tenderness  HEART: regular  rate and rhythm, diminished breath sounds and grade 2-3 systolic ejection murmur noted  ABDOMEN: soft, non-tender; bowel sounds normal; no masses,  no organomegaly  EXTREMITIES: Extremities normal, no edema  NEURO: AAO X 3    SCHEDULED MEDS:   aspirin  81 mg Oral Daily    calcitRIOL  0.25 mcg Oral Daily    cyanocobalamin  2,000 mcg Oral Daily    ferrous sulfate  325 mg Oral Daily with breakfast    insulin detemir U-100  10 Units Subcutaneous QHS    memantine  5 mg Oral BID    metoprolol succinate  25 mg Oral Daily    piperacillin-tazobactam (ZOSYN) IVPB  3.375 g Intravenous Q8H    sertraline  50 mg Oral Daily    vancomycin (VANCOCIN) IVPB  1,500 mg Intravenous Q24H       CONTINUOUS INFUSIONS:   sodium chloride 0.45% 75 mL/hr at 11/15/20 1630    heparin (porcine) in D5W 9.977 Units/kg/hr (11/16/20 0300)       PRN MEDS:acetaminophen, calcium chloride IVPB, calcium chloride IVPB, calcium chloride IVPB, dextrose 50%, dextrose 50%, famotidine, heparin (PORCINE), heparin (PORCINE), HYDROcodone-acetaminophen, insulin aspart U-100, insulin regular, magnesium oxide, magnesium sulfate IVPB, magnesium sulfate IVPB, magnesium sulfate IVPB, magnesium sulfate IVPB, meclizine, potassium chloride in water, potassium chloride in water, potassium chloride in water, potassium chloride in water, potassium chloride, potassium chloride, potassium chloride, potassium chloride, promethazine (PHENERGAN) IVPB, promethazine (PHENERGAN) IVPB, sodium phosphate IVPB, sodium phosphate IVPB, sodium phosphate IVPB, sodium phosphate IVPB, sodium phosphate IVPB, Pharmacy to dose Vancomycin consult **AND** vancomycin - pharmacy to dose    LABS AND DIAGNOSTICS     CBC LAST 3 DAYS  Recent Labs   Lab 11/15/20  0105 11/16/20  0954   WBC 12.12 14.77*   RBC 3.11* 3.23*   HGB 9.0* 9.0*   HCT 28.1* 28.4*   MCV 90 88   MCH 28.9 27.9   MCHC 32.0 31.7*   RDW 12.5 12.6   * 144*   MPV 10.6 10.9   GRAN 82.7*  10.0* 85.4*  12.6*   LYMPH 5.7*   0.7* 4.3*  0.6*   MONO 10.6  1.3* 9.3  1.4*   BASO 0.01 0.01   NRBC 0 0       COAGULATION LAST 3 DAYS  Recent Labs   Lab 11/15/20  0105 11/15/20  1737 11/16/20  0214 11/16/20  0936   LABPT 19.8* 18.8*  --   --    INR 1.8 1.7  --   --    APTT 47.1* 49.5*  49.5* 108.5* 103.5*       CHEMISTRY LAST 3 DAYS  Recent Labs   Lab 11/15/20  0105 11/15/20  1109 11/16/20  0214     --  138   K 3.4* 3.6 3.6     --  108   CO2 21*  --  20*   ANIONGAP 8  --  10   BUN 33*  --  37*   CREATININE 1.9*  --  1.8*   GLU 85  --  98   CALCIUM 9.7  --  9.9   MG 1.6  --  2.3   ALBUMIN 2.9*  --   --    PROT 6.2  --   --    ALKPHOS 59  --   --    ALT 23  --   --    AST 49*  --   --    BILITOT 0.9  --   --        CARDIAC PROFILE LAST 3 DAYS  Recent Labs   Lab 11/15/20  0105 11/15/20  0511 11/15/20  1109   *  --   --    *  --   --    CPKMB 10.0*  --   --    TROPONINI 8.076* 7.381* 4.935*       ENDOCRINE LAST 3 DAYS  No results for input(s): TSH, PROCAL in the last 168 hours.    LAST ARTERIAL BLOOD GAS  ABG  No results for input(s): PH, PO2, PCO2, HCO3, BE in the last 168 hours.    LAST 7 DAYS MICROBIOLOGY   Microbiology Results (last 7 days)     Procedure Component Value Units Date/Time    Stool culture [[id number]] Collected: 11/15/20 1129    Order Status: Completed Specimen: Stool Updated: 11/16/20 0719     Stool Culture Nothing significant to date    Blood culture [[id number]] Collected: 11/15/20 0115    Order Status: Completed Specimen: Blood from Peripheral, Forearm, Left Updated: 11/16/20 0622     Blood Culture, Routine Gram stain talia bottle: Gram positive cocci      Results called to and read back by:Roderick Murray RN-B-CARD;  11/15/2020       22:34 CJD      Gram stain aer bottle: Gram positive cocci      Positive results previously called    Blood culture [[id number]] Collected: 11/15/20 0100    Order Status: Completed Specimen: Blood from Peripheral, Wrist, Left Updated: 11/16/20 0201     Blood Culture, Routine  Gram stain talia bottle: Gram positive cocci      Results called to and read back by:Roderick Murray RN-BCARD;  11/15/2020        22:33 CJD      Gram stain aer bottle: Gram positive cocci      Results called to and read back by:Alix Aragon RN-B-CARD;        11/16/2020  02:01 CJD    Clostridium difficile EIA [[id number]] Collected: 11/15/20 1129    Order Status: Completed Specimen: Stool Updated: 11/15/20 1646     C. diff Antigen Negative     C difficile Toxins A+B, EIA Negative     Comment: Testing not recommended for children <24 months old.             MOST RECENT IMAGING  CT Head Without Contrast  Narrative: CMS MANDATED QUALITY DATA - CT RADIATION - 436    All CT scans at this facility utilize dose modulation, iterative reconstruction, and/or weight based dosing when appropriate to reduce radiation dose to as low as reasonably achievable.    EXAMINATION:  CT HEAD WITHOUT CONTRAST    CLINICAL HISTORY:  Altered mental status;    TECHNIQUE:  Head CT without IV contrast.    COMPARISON:  MRI 12/03/2019    FINDINGS:  Gray-white differentiation is maintained without hemorrhage, midline shift, or mass effect. Diffuse cerebral and cerebellar atrophy is evident.  Minor periventricular white matter low attenuation suggest unchanged chronic small vessel ischemic changes.    The ventricles and cisterns are maintained.    Calvarium is intact. Visualized sinuses are clear.  Impression: 1. No acute intracranial abnormality.  2. Minor chronic small vessel ischemic changes.    Electronically signed by: Jalen Deras MD  Date:    11/15/2020  Time:    16:37  X-Ray Chest AP Portable  Narrative: EXAMINATION:  XR CHEST AP PORTABLE    CLINICAL HISTORY:  Fever;    FINDINGS:  Portable chest at 051 compared with 11/06/2020 shows normal cardiomediastinal silhouette with postsurgical changes of CABG.    Lungs are clear. Pulmonary vasculature is normal. No acute osseous abnormality.  Impression: No acute cardiopulmonary  abnormality.    Electronically signed by: Jalen Deras MD  Date:    11/15/2020  Time:    06:22  CT Abdomen Pelvis  Without Contrast  EXAM DESCRIPTION: CT ABDOMEN PELVIS WITHOUT CONTRAST 11/15/2020 2:23 AM CST    CLINICAL HISTORY: 84 years, Male, Abdominal infection suspected; Abdominal pain    COMPARISON: None.    PROCEDURE:  Multiple transaxial tomograms of the abdomen and pelvis were performed from the lung bases to the symphysis pubis utilizing 2 mm slice thickness at 2 mm interval reconstruction, without administration of IV and oral contrast.  Multiplanar reformats in the sagittal and coronal plane were generated and reviewed.  An individualized dose optimization technique, Automated Exposure Control, was utilized for the performed procedure.    FINDINGS:  The lack of IV and oral contrast limits evaluation of solid organs, subtle lesions cannot be excluded.    The lung bases demonstrate small trace of bilateral pleural effusions pleural effusion. Sternotomy wires correspond to previous CABG. There are coronary artery calcifications.    Grossly the unopacified liver, pancreas, spleen and adrenal glands demonstrate to be within normal limits, no significant focal lesions were identified.  There is a status post cholecystectomy. No evidence for biliary duct dilatation.    The kidneys demonstrate grossly unremarkable, no hydronephrosis or stones were identified.  No there is a lower pole right renal cyst measuring 4.7 cm on image 110 The ureters displays normal appearance with normal caliber, no hydroureter was seen.  The bladder was partially distended with no focal lesion.  Grossly the unopacified stomach, small bowel and large bowel demonstrate to be within normal limits. Fecal residue and underdistention of the large bowel limits the evaluation. There is suboptimal distention of the rectosigmoid colon. Questionable fluid and/or mucosal thickening of the distal rectum could be of consideration.  The urinary  bladder demonstrate to be partially distended with diffuse circumferential wall thickening most likely related to underdistention. The prostate gland was not visualized. Clips within the pelvic floor corresponding to previous lymph node dissection.  The aorta demonstrate minimal atheromatous plaque formation. There is a IVC filter in good position below the renal veins (Quispe nitinol).  There is no retroperitoneal lymphadenopathy.  There is no evidence for ascites. The bone windows demonstrate mild diffuse bony osteopenia. Degenerative disc disease is identified at L4/L5.  There is a small right lateral hernia just inferior to the edge of the liver containing omentum on image  and coronal image 60.    IMPRESSION:    SMALL TRACE BILATERAL PLEURAL EFFUSIONS. STATUS POST CABG.  STATUS POST CHOLECYSTECTOMY.  RIGHT RENAL CYST.  INFERIOR VENA CAVA FILTER IN PLACE.  STATUS POST PROSTATECTOMY WITH LYMPH NODE DISSECTION.  QUESTIONABLE MUCOSAL THICKENING AND/OR FLUID OF THE DISTAL RECTUM.    Electronically signed by:  Erasmo Lopez MD  11/15/2020 2:30 AM [employer] Workstation: [id number]      [employer]  No results found for this or any previous visit.    CURRENT/PREVIOUS VISIT EKG  Results for orders placed or performed during the hospital encounter of 11/15/20   EKG 12-lead    Collection Time: 11/15/20 12:57 AM    Narrative    Test Reason : R07.9,    Vent. Rate : 076 BPM     Atrial Rate : 076 BPM     P-R Int : 146 ms          QRS Dur : 114 ms      QT Int : 378 ms       P-R-T Axes : 039 -04 046 degrees     QTc Int : 425 ms    Normal sinus rhythm  LVH with repolarization abnormality  Abnormal ECG  When compared with ECG of 22-SEP-2020 15:03,  ST now depressed in Anterior-lateral leads  Nonspecific T wave abnormality, improved in Lateral leads    Referred By: [email or web address]   SELF           Confirmed By:        ASSESSMENT/PLAN:     Active Hospital Problems    Diagnosis    *NSTEMI (non-ST elevated myocardial infarction)    Diarrhea  of presumed infectious origin    Type 2 diabetes mellitus without complication, with long-term current use of insulin       ASSESSMENT & PLAN:     84-year-old gentleman admitted with profound generalized weakness and last Thursday with some changes in mental status as well.  Kathy low-grade fevers diarrhea is noted he has tested negative for COVID-19    Elevated Troponin - nonspecific probably secondary demand ischemia multifactorial  Acute on chronic kidney disease.  Fever.  Low-grade.  SARS COVID-19 negative  Progressive Weakness and Fatigue  Coronary artery disease status post CABG.  History of DVTs status post IVC filter placement.  Diabetes mellitus.  Dyslipidemia.       RECOMMENDATIONS:    Patient has an infection.   Recommend the following  DC Heparin and 6 of later start on  Start Lovenox 1mg/kg q 24 hours  Obtain UA  Continue IV broad-spectrum Antibiotics  Currently on piperacillin and vancomycin  Continue to gently hydrate patient  No further aggressive cardiac work up at this time.   Will follow          Saba Bower NP  [facility]  Department of Cardiology  Date of Service: 11/16/2020      I have personally interviewed and examined the patient.  I have reviewed all the Nurse Practitioner's documentation, and agree with the plan.       Esvin Lee M.D.  [facility]  Department of Cardiology  Date of Service: 11/16/2020  10:57 AM

## 2020-11-16 NOTE — PROGRESS NOTES
"  Chon Hurtado presented for a  Medicare AWV and comprehensive Health Risk Assessment today. The following components were reviewed and updated:    · Medical history  · Family History  · Social history  · Allergies and Current Medications  · Health Risk Assessment  · Health Maintenance  · Care Team     ** See Completed Assessments for Annual Wellness Visit within the encounter summary.**         The following assessments were completed:  · Living Situation  · CAGE  · Depression Screening  · Timed Get Up and Go  · Whisper Test  · Cognitive Function Screening - not performed  · Nutrition Screening  · ADL Screening  · PAQ Screening        Vitals:    11/12/20 1209   BP: (!) 149/67   Pulse: (!) 56   Temp: 97.9 °F (36.6 °C)   SpO2: 99%   Weight: 93.4 kg (206 lb)   Height: 6' 2" (1.88 m)     Body mass index is 26.45 kg/m².  Physical Exam  Vitals signs reviewed.   Constitutional:       Appearance: He is well-developed.   HENT:      Head: Normocephalic.   Eyes:      Pupils: Pupils are equal, round, and reactive to light.   Neck:      Musculoskeletal: Normal range of motion.   Cardiovascular:      Rate and Rhythm: Normal rate and regular rhythm.      Heart sounds: Normal heart sounds.   Pulmonary:      Effort: Pulmonary effort is normal.      Breath sounds: Normal breath sounds.   Abdominal:      General: Bowel sounds are normal. There is no distension.      Palpations: Abdomen is soft.      Tenderness: There is no abdominal tenderness.   Musculoskeletal: Normal range of motion.      Right lower leg: Edema present.      Left lower leg: Edema present.      Comments: Slow gait, walker present   Feet:      Right foot:      Protective Sensation: 10 sites tested. 10 sites sensed.      Skin integrity: Skin integrity normal.      Left foot:      Protective Sensation: 10 sites tested. 10 sites sensed.      Skin integrity: Skin integrity normal.   Skin:     General: Skin is warm and dry.   Neurological:      Mental Status: He is " alert and oriented to person, place, and time.   Psychiatric:         Behavior: Behavior normal.         Cognition and Memory: Cognition is impaired. Memory is impaired.               Diagnoses and health risks identified today and associated recommendations/orders:    1. Encounter for preventive health examination  AWV Completed  -Foot exam done today, wife to make an appointment with podiatry to trim nails and make an appt for his dilated eye exam.    2. 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 Prostate CA  -Followed by Hem/Onc    4. Coronary artery disease involving native coronary artery of native heart without angina pectoris  Stable, no acute issues, followed by Cardiology.    5. Angina pectoris, unspecified  Stable, no acute issues, followed by Cardiology.    6. CHF (congestive heart failure), NYHA class III, chronic, systolic  Stable and chronic, no acute issues, followed by Cardiology.    7. Restrictive lung disease  Stable, followed by Pulmonology.    8. Lung granuloma  Stable, followed by Pulmonology.    9. Adrenal insufficiency  Stable, followed by PCP.    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    11. Thrombocytopenia  Stable, followed by Hem/Onc    12. Uncontrolled type 2 diabetes mellitus with hyperglycemia, with long-term current use of insulin  -A1C 10.5, followed by PCP  - Encouraged following strict ADA dietary guidelines  - Wife to make appt with PCP to get labwork    13. Encounter for long-term (current) use of insulin    14. Stage 3 chronic kidney disease, unspecified whether stage 3a or 3b CKD  Stable, followed by Nephrology     15. Hypertension associated with diabetes  Stable, followed by PCP.    16. Hyperlipidemia associated with type 2 diabetes mellitus  Stable, followed by PCP.    17. Mild cognitive impairment with memory loss  Stable, on Namenda, followed  by PCP.      Provided Chon with a 5-10 year written screening schedule and personal prevention plan. Recommendations were developed using the USPSTF age appropriate recommendations. Education, counseling, and referrals were provided as needed. After Visit Summary printed and given to patient which includes a list of additional screenings\tests needed.    Follow up in about 1 year (around 11/12/2021) for your next annual wellness visit.    Anahy Harmon NP

## 2020-11-16 NOTE — CARE UPDATE
Blood cultures gm positive cocci anaerobic, thus far. Most like contaminant. However will start ABx, until identified, as he has spiked temp

## 2020-11-17 NOTE — CARE UPDATE
11/16/20 2040   Patient Assessment/Suction   Level of Consciousness (AVPU) alert   Respiratory Effort Unlabored   Expansion/Accessory Muscles/Retractions no use of accessory muscles   All Lung Fields Breath Sounds clear;coarse   Rhythm/Pattern, Respiratory no shortness of breath reported   Cough Frequency no cough   PRE-TX-O2   O2 Device (Oxygen Therapy) room air   SpO2 95 %   Pulse Oximetry Type Continuous   $ Pulse Oximetry - Multiple Charge Pulse Oximetry - Multiple   Pulse 66   Resp 20   Positioning   Head of Bed (HOB) HOB at 30 degrees   Respiratory Evaluation   $ Care Plan Tech Time 15 min

## 2020-11-17 NOTE — CONSULTS
Consult Note  Infectious Disease    Reason for Consult:      HPI: Chon Hurtado is an  84 y.o. male  Known to me from prior consultations, who Presented to the emergency room 11/15 with complaints of subjective fever x2 days, intermittent chest pain (pleuritic in nature as well as radiating to the jaw), multiple loose stools, abdominal discomfort, generalized weakness.  In the emergency room his temperature was 100.1, mild generalized discomfort creatinine 1.9, BUN 33 BNP 6 7 , troponin 8.0. CT scan of the abdomen and pelvis showed small trace bilateral pleural effusions, IVC filter, sequela of prostatectomy, questionable mucosal thickening and air-fluid in the distal rectum.  He was felt to have a ST elevation myocardial infarction and was admitted to the for cardiology evaluation and stool studies.  Was seen by Cardiology noted the patient reportedly had also been confused and hallucinating prior to admission as well as being unsteady his feet.  Late in the evening of the 15th 1 of his blood cultures became positive for Gram-positive cocci and vancomycin and Zosyn were started.  His fever did escalated to 02.4 this morning at 0325. Blood cultures have all grown coagulase-negative Staph. stool for Cdiff, OCP, white blood and culture are negative. UA was negative for pyuria.    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  In the past he required cephalexin suppression for recurrent cellulitis and persistent tinea pedis.  ED visit in June for lower extremity edema  ED visit in late July for rectal bleeding felt   ED visit 09/22 for facial swelling and shortness of breath having run out of his diuretics.  CTA with no evidence of pulmonary embolism.  Seen by Hematology/Oncology on 11/06 for prostate cancer with complaints of left neck and rib pain prompting xrays (?angina then)      Review of patient's allergies indicates:   Allergen  Reactions    Statins-hmg-coa reductase inhibitors      Past Medical History:   Diagnosis Date    Adrenal insufficiency     Anemia     Anticoagulant long-term use     plavix for blood clots legs and stents years    Blood transfusion     CAD (coronary artery disease)     Cancer     prostate    Cataract     CHF (congestive heart failure), NYHA class III, chronic, systolic 1/3/2020    COPD (chronic obstructive pulmonary disease) 11/6/2013    Depression     Diabetes mellitus     Diabetes mellitus type II     DVT (deep venous thrombosis)     Hemorrhoids     History of colon polyps 6/3/2015    Hypertension     NSTEMI (non-ST elevated myocardial infarction) 11/15/2020    PE (pulmonary embolism)     [facility] 2007    Peripheral vascular disease     Urinary tract infection    history of recurrent streptococcal cellulitis of edematous legs  History of urosepsis 9/2018  Hyperlipidemia    Past Surgical History:   Procedure Laterality Date    CATARACT EXTRACTION, BILATERAL      CHOLECYSTECTOMY  8/2011    COLON SURGERY      CORONARY ARTERY BYPASS GRAFT       x 5    CORONARY STENT PLACEMENT      2007, last 2011  stent  3 vessel.    EXPLORATORY LAPAROTOMY W/ BOWEL RESECTION  1987    PROSTATE SURGERY  2001    Radical for cancer - Dr Luke Chung    RIGHT FEMUR REPAIR  1987    SMALL INTESTINE SURGERY       Social History     Socioeconomic History    Marital status:      Spouse name: Not on file    Number of children: Not on file    Years of education: Not on file    Highest education level: Not on file   Occupational History    Not on file   Social Needs    Financial resource strain: Not very hard    Food insecurity     Worry: Never true     Inability: Never true    Transportation needs     Medical: No     Non-medical: No   Tobacco Use    Smoking status: Never Smoker    Smokeless tobacco: Never Used   Substance and Sexual Activity    Alcohol use: No    Drug use: No    Sexual activity: Not  Currently     Partners: Female   Lifestyle    Physical activity     Days per week: 0 days     Minutes per session: 0 min    Stress: Only a little   Relationships    Social connections     Talks on phone: More than three times a week     Gets together: More than three times a week     Attends Bahai service: Not on file     Active member of club or organization: Not on file     Attends meetings of clubs or organizations: Not on file     Relationship status: Not on file   Other Topics Concern    Not on file   Social History Narrative    Not on file     Family History   Problem Relation Age of Onset    Diabetes Mother     Hypertension Mother     Kidney disease Mother     Hypertension Father     Heart disease Father         MI    Diabetes Sister     Diabetes Brother     Cancer Brother         prostate    Cancer Brother         colon    Heart disease Brother     Cancer Brother         prostate    Diabetes Brother     Hyperlipidemia Brother     Hypertension Brother     Heart disease Brother     No Known Problems Daughter     No Known Problems Son     No Known Problems Daughter        Pertinent medications noted:     Review of Systems:   Weightwas stable prior to this week. tmax <100 for the 2 days at home prior to admission  No change in vision, loss of vision or diplopia  No sinus congestion, purulent nasal discharge, post nasal drip or facial pain  No pain in mouth or throat. No problems with teeth, gums.  No chest pain, palpitations, syncope  No cough, sputum production, shortness of breath,  pleurisy, hemoptysis  No nausea, vomiting,  , or focal abd pain,  No dysphagia, odynophagia. Wife reports he spends a lot of time on the commode, either with constipation or other  No dysuria, hematuria,   retention,   No swelling of joints, redness of joints, injuries, or new focal pain. Hurts everywhere, very stiff  No unusual headaches, dizziness, vertigo,  But did suffer a fall a week ago at home  No  anxiety, depression, substance abuse, sleep disturbance  Diabetes with A1c >8%  No bleeding, lymphadenopathy,  unusual bruising. No history of proctitis or cystitis after prostate radiation  No new rashes, lesions, or wounds     No recent/prior steroids and no history of adrenal insufficiency  Outdoor activities: none  Travel: none  Implants: IVC filter  Antibiotic History: non e recently    EXAM & DIAGNOSTICS REVIEWED:   Vitals:     Temp:  [97.6 °F (36.4 °C)-102.4 °F (39.1 °C)]   Temp: 97.6 °F (36.4 °C) (11/17/20 0700)  Pulse: 70 (11/17/20 0700)  Resp: 17 (11/17/20 0700)  BP: (!) 121/55 (11/17/20 0700)  SpO2: 99 % (11/17/20 0700)    Intake/Output Summary (Last 24 hours) at 11/17/2020 1122  Last data filed at 11/16/2020 1622  Gross per 24 hour   Intake 106.45 ml   Output 150 ml   Net -43.55 ml       General:  In NAD. Awake and attentive, cooperative, uncomfortable  Eyes:  Anicteric, PERRL, EOMI, no scleral or conjunctival lesions  ENT:  No ulcers, exudates, thrush, nares patent, dentition is good. Mild right temporal tenderness  Neck:  supple, no masses or adenopathy appreciated  Lungs: Clear, no consolidation, rales, wheezes, rub  Heart:  RRR, no gallop/murmur/rub noted  Abd:  Soft, mild generalized tenderness, ND, normal BS, no masses or organomegaly appreciated. External hemorrhoids, no perirectal abscess, internal exam not performed.  :  Voids/ incontinent, urine clear, no flank tenderness, no scrotal swelling  Musc:  Joints without effusion, swelling, erythema, synovitis,  But he is extremely stiff all over.    Skin:  No rashes. No palmar or plantar lesions. No subungual petechiae. Tinea pedis left foot  Wound:   Neuro:              Alert, attentive, speech fluent, face symmetric, moves all extremities, generalizedweakness. Not Ambulatory  Psych:  Calm, cooperative  Lymphatic:     No cervical, supraclavicular, axillary, or inguinal nodes  Extrem: Chronic BLE L>R edema, no erythema, phlebitis, cellulitis, warm  and well perfused  VAD:  peripheral     Isolation:  none    Lines/Tubes/Drains:    General Labs reviewed:  Recent Labs   Lab 11/15/20  0105 11/16/20  0954 11/17/20  0333   WBC 12.12 14.77* 12.75*   HGB 9.0* 9.0* 9.3*   HCT 28.1* 28.4* 29.4*   * 144* 134*       Recent Labs   Lab 11/15/20  0105 11/15/20  1109 11/16/20  0214 11/17/20  0333     --  138 137   K 3.4* 3.6 3.6 4.0     --  108 110   CO2 21*  --  20* 19*   BUN 33*  --  37* 34*   CREATININE 1.9*  --  1.8* 1.8*   CALCIUM 9.7  --  9.9 9.5   PROT 6.2  --   --  5.6*   BILITOT 0.9  --   --  1.3*   ALKPHOS 59  --   --  59   ALT 23  --   --  19   AST 49*  --   --  23           Micro:  Microbiology Results (last 7 days)     Procedure Component Value Units Date/Time    Stool culture [[id number]] Collected: 11/15/20 1129    Order Status: Completed Specimen: Stool Updated: 11/17/20 0737     Stool Culture No Salmonella,Shigella,Vibrio,Campylobacter.      No E coli 0157:H7 isolated.    Blood culture [[id number]]  (Abnormal) Collected: 11/15/20 0115    Order Status: Completed Specimen: Blood from Peripheral, Forearm, Left Updated: 11/17/20 0648     Blood Culture, Routine Gram stain talia bottle: Gram positive cocci      Results called to and read back by:Roderick Murray RN-B-CARD;  11/15/2020       22:34 CJD      Gram stain aer bottle: Gram positive cocci      Positive results previously called      COAGULASE-NEGATIVE STAPHYLOCOCCUS SPECIES  For susceptibility see order #[id number]      Blood culture [[id number]]  (Abnormal) Collected: 11/15/20 0100    Order Status: Completed Specimen: Blood from Peripheral, Wrist, Left Updated: 11/17/20 0646     Blood Culture, Routine Gram stain talia bottle: Gram positive cocci      Results called to and read back by:Roderick Murray RN-BCARD;  11/15/2020        22:33 CJD      Gram stain aer bottle: Gram positive cocci      Results called to and read back by:Alix Aragon RN-B-CARD;        11/16/2020  02:01 LEANDER       COAGULASE-NEGATIVE STAPHYLOCOCCUS SPECIES  Identification and susceptibility pending      Clostridium difficile EIA [[id number]] Collected: 11/15/20 1129    Order Status: Completed Specimen: Stool Updated: 11/15/20 3643     C. diff Antigen Negative     C difficile Toxins A+B, EIA Negative     Comment: Testing not recommended for children <24 months old.           Imaging Reviewed:   CXR   CT head negative  CT abd/pelvis  SMALL TRACE BILATERAL PLEURAL EFFUSIONS. STATUS POST CABG.  STATUS POST CHOLECYSTECTOMY.  RIGHT RENAL CYST.  INFERIOR VENA CAVA FILTER IN PLACE.  STATUS POST PROSTATECTOMY WITH LYMPH NODE DISSECTION.  QUESTIONABLE MUCOSAL THICKENING AND/OR FLUID OF THE DISTAL RECTUM    Cardiology:  TTE 1115  · There is left ventricular concentric remodeling.  · The left ventricle is normal in size with normal systolic function. The estimated ejection fraction is 60%.  · Grade III diastolic dysfunction.  · Normal right ventricular size with normal right ventricular systolic function.  · Mild left atrial enlargement.  · Moderate aortic regurgitation.  · Mild mitral regurgitation.  · Intermediate central venous pressure (8 mmHg).  · The estimated PA systolic pressure is 37 mmHg  IMPRESSION & PLAN   1. Fever  2. Coag neg staph in blood of unclear significance   Only indwelling foreign body is IVC filter, coronary stents    3. NSTEMI, h/o CABG   Long term use of anticoagulatns  4. ?history of adrenal insufficiency, ?on no replacement(wife denies this diagnosis)  5. ?proctitis  6. Rheumatic diagnosis? Very stiff, some jaw pain(on left?) and temporal discomfort on right? No acute arthritis on exam      Recommendations:  Testing both sets of coag neg staph to see if they are identical  Continue vanc  Repeat blood cultures   cortisol level  Ultrasound both legs(though it will likely show only old clot)  GI pcr panel  TAHIR RA trend CRP        Medical Decision Making during this encounter was  [_] Low Complexity  [_] Moderate  Complexity  [  ] High Complexity

## 2020-11-17 NOTE — PROGRESS NOTES
[facility]  Department of Cardiology  Progress Note    PATIENT NAME: Chon Hurtado  MRN: [id number]  TODAY'S DATE: 11/17/2020  ADMIT DATE: 11/15/2020    SUBJECTIVE     PRINCIPLE PROBLEM: NSTEMI (non-ST elevated myocardial infarction)    INTERVAL HISTORY:    11/17/2020  Mr. Hurtado is lying in the bed he has some pain with palpation to the abdomen.  His fever is to 102.4.   Patient has some nonspecific lower abdominal pains.  Loose stools are still persisting per his wife.  He had profound sweating early this morning now improved.      11/16/2020      Mr. Hurtado is lying in bed is still very weak. He has a low grade fever. After talking with his wife he has felt bad since last Thursday. He has progressively gotten worse and more lethargic. He has no appetite and is barely drinking. He is currently chest pain free and is lying flat on one pillow comfortably.     Review of patient's allergies indicates:   Allergen Reactions    Statins-hmg-coa reductase inhibitors        REVIEW OF SYSTEMS  CARDIOVASCULAR: No recent chest pain, palpitations, arm, neck, or jaw pain  RESPIRATORY: No recent fever, cough chills, SOB or congestion  : No blood in the urine  GI: No Nausea, vomiting, constipation, +diarrhea, blood, or reflux. + abdominal pain  MUSCULOSKELETAL: Generalized Weakness  NEURO: No lightheadedness or dizziness  EYES: No Double vision, blurry, vision or headache     OBJECTIVE     VITAL SIGNS (Most Recent)  Temp: 97.6 °F (36.4 °C) (11/17/20 0700)  Pulse: 70 (11/17/20 0700)  Resp: 17 (11/17/20 0700)  BP: (!) 121/55 (11/17/20 0700)  SpO2: 99 % (11/17/20 0700)    VENTILATION STATUS  Resp: 17 (11/17/20 0700)  SpO2: 99 % (11/17/20 0700)       I & O (Last 24H):    Intake/Output Summary (Last 24 hours) at 11/17/2020 1213  Last data filed at 11/16/2020 1622  Gross per 24 hour   Intake 106.45 ml   Output 150 ml   Net -43.55 ml       WEIGHTS  Wt Readings from Last 1 Encounters:   11/17/20 0325 100.7 kg (222 lb 0.1  oz)   11/16/20 0308 100.7 kg (222 lb 0.1 oz)   11/15/20 0450 97.8 kg (215 lb 9.8 oz)   11/15/20 0026 94.3 kg (208 lb)       PHYSICAL EXAM  CONSTITUTIONAL: Well built, well nourished in no apparent distress  NECK: no carotid bruit, no JVD  LUNGS:  Markedly diminished breath sounds bilaterally.    CHEST WALL: no tenderness  HEART: regular rate and rhythm, diminished breath sounds and grade 2-3 systolic ejection murmur noted  ABDOMEN: soft, non-tender; bowel sounds normal; no masses,  no organomegaly  EXTREMITIES: Extremities normal, no edema  NEURO: AAO X 3    SCHEDULED MEDS:   aspirin  81 mg Oral Daily    calcitRIOL  0.25 mcg Oral Daily    cyanocobalamin  2,000 mcg Oral Daily    enoxparin  1 mg/kg Subcutaneous Q24H    ferrous sulfate  325 mg Oral Daily with breakfast    insulin detemir U-100  10 Units Subcutaneous QHS    Lactobacillus acidoph-L.bulgar  4 tablet Oral TID WM    memantine  5 mg Oral BID    metoprolol succinate  25 mg Oral Daily    piperacillin-tazobactam (ZOSYN) IVPB  3.375 g Intravenous Q8H    sertraline  50 mg Oral Daily    vancomycin (VANCOCIN) IVPB  1,500 mg Intravenous Q24H       CONTINUOUS INFUSIONS:   lactated ringers Stopped (11/16/20 1719)       PRN MEDS:acetaminophen, calcium chloride IVPB, calcium chloride IVPB, calcium chloride IVPB, dextrose 50%, dextrose 50%, famotidine, HYDROcodone-acetaminophen, insulin aspart U-100, insulin regular, magnesium oxide, magnesium sulfate IVPB, magnesium sulfate IVPB, magnesium sulfate IVPB, magnesium sulfate IVPB, meclizine, potassium chloride in water, potassium chloride in water, potassium chloride in water, potassium chloride in water, potassium chloride, potassium chloride, potassium chloride, potassium chloride, promethazine (PHENERGAN) IVPB, promethazine (PHENERGAN) IVPB, sodium phosphate IVPB, sodium phosphate IVPB, sodium phosphate IVPB, sodium phosphate IVPB, sodium phosphate IVPB, Pharmacy to dose Vancomycin consult **AND** vancomycin  - pharmacy to dose    LABS AND DIAGNOSTICS     CBC LAST 3 DAYS  Recent Labs   Lab 11/15/20  0105 11/16/20  0954 11/17/20  0333   WBC 12.12 14.77* 12.75*   RBC 3.11* 3.23* 3.30*   HGB 9.0* 9.0* 9.3*   HCT 28.1* 28.4* 29.4*   MCV 90 88 89   MCH 28.9 27.9 28.2   MCHC 32.0 31.7* 31.6*   RDW 12.5 12.6 12.5   * 144* 134*   MPV 10.6 10.9 10.8   GRAN 82.7*  10.0* 85.4*  12.6* 86.0*  11.0*   LYMPH 5.7*  0.7* 4.3*  0.6* 4.3*  0.6*   MONO 10.6  1.3* 9.3  1.4* 8.9  1.1*   BASO 0.01 0.01 0.01   NRBC 0 0 0       COAGULATION LAST 3 DAYS  Recent Labs   Lab 11/15/20  0105 11/15/20  1737 11/16/20  0214 11/16/20  0936   LABPT 19.8* 18.8*  --   --    INR 1.8 1.7  --   --    APTT 47.1* 49.5*  49.5* 108.5* 103.5*       CHEMISTRY LAST 3 DAYS  Recent Labs   Lab 11/15/20  0105 11/15/20  1109 11/16/20  0214 11/17/20  0333     --  138 137   K 3.4* 3.6 3.6 4.0     --  108 110   CO2 21*  --  20* 19*   ANIONGAP 8  --  10 8   BUN 33*  --  37* 34*   CREATININE 1.9*  --  1.8* 1.8*   GLU 85  --  98 158*   CALCIUM 9.7  --  9.9 9.5   MG 1.6  --  2.3 2.0   ALBUMIN 2.9*  --   --  2.5*   PROT 6.2  --   --  5.6*   ALKPHOS 59  --   --  59   ALT 23  --   --  19   AST 49*  --   --  23   BILITOT 0.9  --   --  1.3*       CARDIAC PROFILE LAST 3 DAYS  Recent Labs   Lab 11/15/20  0105 11/15/20  0511 11/15/20  1109   *  --   --    *  --   --    CPKMB 10.0*  --   --    TROPONINI 8.076* 7.381* 4.935*       ENDOCRINE LAST 3 DAYS  No results for input(s): TSH, PROCAL in the last 168 hours.    LAST ARTERIAL BLOOD GAS  ABG  No results for input(s): PH, PO2, PCO2, HCO3, BE in the last 168 hours.    LAST 7 DAYS MICROBIOLOGY   Microbiology Results (last 7 days)     Procedure Component Value Units Date/Time    Blood culture [[id number]]  (Abnormal) Collected: 11/15/20 0115    Order Status: Completed Specimen: Blood from Peripheral, Forearm, Left Updated: 11/17/20 1127     Blood Culture, Routine Gram stain talia bottle: Gram positive  cocci      Results called to and read back by:Roderick Murray RN-B-CARD;  11/15/2020       22:34 CJD      Gram stain aer bottle: Gram positive cocci      Positive results previously called      COAGULASE-NEGATIVE STAPHYLOCOCCUS SPECIES  Identification and susceptibility pending      Stool culture [[id number]] Collected: 11/15/20 1129    Order Status: Completed Specimen: Stool Updated: 11/17/20 0737     Stool Culture No Salmonella,Shigella,Vibrio,Campylobacter.      No E coli 0157:H7 isolated.    Blood culture [[id number]]  (Abnormal) Collected: 11/15/20 0100    Order Status: Completed Specimen: Blood from Peripheral, Wrist, Left Updated: 11/17/20 0646     Blood Culture, Routine Gram stain talia bottle: Gram positive cocci      Results called to and read back by:Roderick Murray RN-BCARD;  11/15/2020        22:33 CJD      Gram stain aer bottle: Gram positive cocci      Results called to and read back by:JN CansecoB-CARD;        11/16/2020  02:01 CJD      COAGULASE-NEGATIVE STAPHYLOCOCCUS SPECIES  Identification and susceptibility pending      Clostridium difficile EIA [[id number]] Collected: 11/15/20 1129    Order Status: Completed Specimen: Stool Updated: 11/15/20 1646     C. diff Antigen Negative     C difficile Toxins A+B, EIA Negative     Comment: Testing not recommended for children <24 months old.             MOST RECENT IMAGING  Echo Color Flow Doppler? Yes; Bubble Contrast? No  · There is left ventricular concentric remodeling.  · The left ventricle is normal in size with normal systolic function. The   estimated ejection fraction is 60%.  · Grade III diastolic dysfunction.  · Normal right ventricular size with normal right ventricular systolic   function.  · Mild left atrial enlargement.  · Moderate aortic regurgitation.  · Mild mitral regurgitation.  · Intermediate central venous pressure (8 mmHg).  · The estimated PA systolic pressure is 37 mmHg.         LASTECHO  No results found for this or any previous  visit.    CURRENT/PREVIOUS VISIT EKG  Results for orders placed or performed during the hospital encounter of 11/15/20   EKG 12-lead    Collection Time: 11/15/20 12:57 AM    Narrative    Test Reason : R07.9,    Vent. Rate : 076 BPM     Atrial Rate : 076 BPM     P-R Int : 146 ms          QRS Dur : 114 ms      QT Int : 378 ms       P-R-T Axes : 039 -04 046 degrees     QTc Int : 425 ms    Normal sinus rhythm  LVH with repolarization abnormality  Abnormal ECG  When compared with ECG of 22-SEP-2020 15:03,  ST now depressed in Anterior-lateral leads  Nonspecific T wave abnormality, improved in Lateral leads    Referred By: [email or web address]   SELF           Confirmed By:        ASSESSMENT/PLAN:     Active Hospital Problems    Diagnosis    *NSTEMI (non-ST elevated myocardial infarction)    Gram-positive bacteremia    Diarrhea of presumed infectious origin    Type 2 diabetes mellitus without complication, with long-term current use of insulin    CHF (congestive heart failure), NYHA class III, chronic, systolic    Chronic respiratory failure with hypoxia, on home oxygen therapy    S/P CABG (coronary artery bypass graft)    CKD (chronic kidney disease) stage 3, GFR 30-59 ml/min    CAD (coronary artery disease)    Adrenal insufficiency       ASSESSMENT & PLAN:     84-year-old gentleman admitted with profound generalized weakness and last Thursday with some changes in mental status as well.  Kathy low-grade fevers diarrhea is noted he has tested negative for COVID-19    Elevated Troponin - nonspecific probably secondary demand ischemia multifactorial  Acute on chronic kidney disease.  Fever.  Low-grade.  SARS COVID-19 negative  Positive Blood [id number] STAPHYLOCOCCUS SPECIES  Progressive Weakness and Fatigue  Coronary artery disease status post CABG.  History of DVTs status post IVC filter placement.  Diabetes mellitus.  Dyslipidemia.       RECOMMENDATIONS:    Troponin is trending down.   This is secondary to  demand ischemia  He currently has positive blood cultures and evidence of sepsis upon admission, he also has not been eating or drinking and is volume depleted.   ECHO is free of evidence of any vegetation   Grade 3 systolic dysfunction noted and Moderate AR.   Continue to gently hydrate patient.  Consider nutritional supplements .  No further cardiac recommendations at this time.   Thank you  Dr. Navarro  to evaluate the patient        Saba Bower NP  [facility]  Department of Cardiology  Date of Service: 11/17/2020      I have personally interviewed and examined the patient.  I have reviewed all the Nurse Practitioner's documentation, and agree with the plan.       Esvin Lee M.D.  [facility]  Department of Cardiology  Date of Service: 11/17/2020  10:57 AM

## 2020-11-17 NOTE — PROGRESS NOTES
[facility] Medicine  Progress Note    Patient Name: Chon Hurtado  MRN: [id number]  Patient Class: IP- Inpatient   Admission Date: 11/15/2020  Length of Stay: 2 days  Attending Physician: Dre Farmer MD  Primary Care Provider: Doug Whitaker MD        Subjective:     Principal Problem:NSTEMI (non-ST elevated myocardial infarction)    Interval History:     Patient was seen and examined  Wife is at bedside and supplemented the story  He had multiple clots DVT in both the left right legs and got Divya in the past and she was told also Graford was blocked  Patient had severe dementia and gradually declining more more over previous months.  Wife said he had previous history of some bleeding from the anus but spontaneously resolved  Low-grade fevers noted.  All  blood cultures are positive with coagulase-negative Staph.  On examination patient was able to participate in the conversation but not coherent.    Review of Systems  Objective:     Vital Signs (Most Recent):  Temp: 97.6 °F (36.4 °C) (11/17/20 0700)  Pulse: 70 (11/17/20 0700)  Resp: 17 (11/17/20 0700)  BP: (!) 121/55 (11/17/20 0700)  SpO2: 99 % (11/17/20 0700) Vital Signs (24h Range):  Temp:  [97.6 °F (36.4 °C)-102.4 °F (39.1 °C)] 97.6 °F (36.4 °C)  Pulse:  [62-86] 70  Resp:  [17-20] 17  SpO2:  [93 %-99 %] 99 %  BP: (103-136)/(55-60) 121/55     Weight: 100.7 kg (222 lb 0.1 oz)  Body mass index is 28.5 kg/m².    Intake/Output Summary (Last 24 hours) at 11/17/2020 1440  Last data filed at 11/16/2020 1622  Gross per 24 hour   Intake --   Output 150 ml   Net -150 ml      Physical Exam    Physical Exam:  General-alert   HEENT- PERRLA, EOMI,  Neck- No JVD, Lymphadenopathy, no neck pain   CV- Regular rate and rhythm, No Murmur/slava/rubs  Resp- Lungs CTA Bilaterally, No increased WOB  GI- Non tender/non-distended, BS normoactive x4 quads, no HSM  Extrem- No cyanosis, clubbing, edema. Pulses 2+ and symmetric  Neuro- unable to exam    Skin-  No masses, rashes or lesions noted on cursory skin exam.  Overview/Hospital Course:    Significant Labs:   CBC:   Recent Labs   Lab 11/16/20  0954 11/17/20  0333   WBC 14.77* 12.75*   HGB 9.0* 9.3*   HCT 28.4* 29.4*   * 134*     CMP:   Recent Labs   Lab 11/16/20  0214 11/17/20  0333    137   K 3.6 4.0    110   CO2 20* 19*   GLU 98 158*   BUN 37* 34*   CREATININE 1.8* 1.8*   CALCIUM 9.9 9.5   PROT  --  5.6*   ALBUMIN  --  2.5*   BILITOT  --  1.3*   ALKPHOS  --  59   AST  --  23   ALT  --  19   ANIONGAP 10 8   EGFRNONAA 34.3* 34.3*     Cardiac Markers: No results for input(s): CKMB, MYOGLOBIN, BNP, TROPISTAT in the last 48 hours.    Significant Imaging: I have reviewed all pertinent imaging results/findings within the past 24 hours.    Assessment/Plan:      Active Diagnoses:    Diagnosis Date Noted POA    PRINCIPAL PROBLEM:  NSTEMI (non-ST elevated myocardial infarction) [I21.4] 11/15/2020 Yes    FUO (fever of unknown origin) [R50.9]  Unknown    Gram-positive bacteremia [R78.81] 11/16/2020 Yes    Diarrhea of presumed infectious origin [R19.7] 11/15/2020 Yes    Type 2 diabetes mellitus without complication, with long-term current use of insulin [E11.9, Z79.4] 11/15/2020 Not Applicable    CHF (congestive heart failure), NYHA class III, chronic, systolic [I50.22] 01/03/2020 Yes    Chronic respiratory failure with hypoxia, on home oxygen therapy [J96.11, Z99.81] 01/03/2020 Not Applicable    S/P CABG (coronary artery bypass graft) [Z95.1] 03/21/2018 Not Applicable    CKD (chronic kidney disease) stage 3, GFR 30-59 ml/min [N18.30] 12/21/2016 Yes     Chronic    CAD (coronary artery disease) [I25.10]  Yes    Adrenal insufficiency [E27.40]  Yes      Problems Resolved During this Admission:     ASSESSMENT AND PLAN   Unsure source of fevers . Possible from GI tract , prostate , contamination or from chronic DVTs with ashley   Echo with no vegetations, repeat blood cultures  Consulted  Dr. Lopez   Continue  Asa and lovenox    f/u stool studies   Continue antibiotics for now   Follow final cultures , TAHIR , RA panel   Most likely may need placement and consult case Mx.       VTE Risk Mitigation (From admission, onward)         Ordered     enoxaparin injection 100 mg  Every 24 hours (non-standard times)      11/16/20 1201     IP VTE HIGH RISK PATIENT  Once      11/15/20 0453     Place sequential compression device  Until discontinued      11/15/20 0453     Reason for No Pharmacological VTE Prophylaxis  Once     Question:  Reasons:  Answer:  Already adequately anticoagulated on oral Anticoagulants    11/15/20 0453                   Dre Farmer MD  Department of Hospital Medicine   [facility]

## 2020-11-18 NOTE — PLAN OF CARE
11/18/20 1402   Discharge Reassessment   Assessment Type Discharge Planning Reassessment   Anticipated Discharge Disposition SNF   Provided patient/caregiver education on the expected discharge date and the discharge plan No   Do you have any problems affording any of your prescribed medications? No   Discharge Plan A Skilled Nursing Facility   Discharge Plan B Skilled Nursing Facility   Post-Acute Status   Post-Acute Authorization Placement   Post-Acute Placement Status Referrals Sent   Discharge Delays None known at this time

## 2020-11-18 NOTE — PHYSICIAN QUERY
PT Name: Chon Hurtado  MR #: [id number]     Diagnosis Clarification      CDS/: Rachel Jiménez RN, CCDS               Contact information:  [phone]    This form is a permanent document in the medical record.     Query Date: November 18, 2020    Dear Provider,  By submitting this query, we are merely seeking further clarification of documentation.  Please utilize your independent clinical judgment when addressing the question(s) below.     The medical record contains the following:    Supporting Clinical Information Location in Medical Record   EKG: sinus with no acute ST segments.  NSTEMI (non-ST elevated myocardial infarction)  ECHO, serial biomarkers, Cardiology consultation    Lab 11/15/20  0105 11/15/20  0511 11/15/20  1109   *  --   --    *  --   --    CPKMB 10.0*  --   --    TROPONINI 8.076* 7.381* 4.935*   NSTEMI (non-ST elevated myocardial infarction)  Elevated Troponin - nonspecific probably secondary demand ischemia multifactorial  Coronary artery disease status post CABG.  Recommend the following  DC Heparin and 6 of later start on  Start Lovenox 1mg/kg q 24 hours    Principal problem:  NSTEMI (non-ST elevated myocardial infarction)  History and Physical        11/16/2020 Cardiology                          11/17/2020 Hospital Medicine     Please clarify if the NSTEMI (non-ST elevated myocardial infarction) diagnosis has been:    [ x ] Ruled In   [  ] Ruled In, Now Resolved   [  ] Ruled Out   [  ] Other/Clarification of findings (please specify)_______________    [   ] Clinically undetermined           Please document in your progress notes daily for the duration of treatment, until resolved, and include in your discharge summary.

## 2020-11-18 NOTE — PLAN OF CARE
Goals to be met by:      Patient will increase functional independence with mobility by performin. Supine to/from sit with MInimal Assistance  2. Sit to stand transfer with Minimal Assistance  3. Bed to chair transfer with Minimal Assistance using Rolling Walker  4. Gait  x 50 feet with Minimal Assistance using Rolling Walker.

## 2020-11-18 NOTE — PLAN OF CARE
MOY spoke w/ Ericka from [facility] who indicated she will take a look at pt. And follow up.

## 2020-11-18 NOTE — PROGRESS NOTES
[facility]  Department of Cardiology  Progress Note    PATIENT NAME: Chon Hurtado  MRN: [id number]  TODAY'S DATE: 11/18/2020  ADMIT DATE: 11/15/2020    SUBJECTIVE     PRINCIPLE PROBLEM: NSTEMI (non-ST elevated myocardial infarction)    INTERVAL HISTORY:    11/18/2020   Mr. Hurtado is much more alert today. He is still not eating much. He is still confused as to where he is, but is communicating much more today.       11/17/2020  Mr. Hurtado is lying in the bed he has some pain with palpation to the abdomen.  His fever is to 102.4.   Patient has some nonspecific lower abdominal pains.  Loose stools are still persisting per his wife.  He had profound sweating early this morning now improved.      11/16/2020      Mr. Hurtado is lying in bed is still very weak. He has a low grade fever. After talking with his wife he has felt bad since last Thursday. He has progressively gotten worse and more lethargic. He has no appetite and is barely drinking. He is currently chest pain free and is lying flat on one pillow comfortably.     Review of patient's allergies indicates:   Allergen Reactions    Statins-hmg-coa reductase inhibitors        REVIEW OF SYSTEMS  CARDIOVASCULAR: No recent chest pain, palpitations, arm, neck, or jaw pain  RESPIRATORY: No recent fever, cough chills, SOB or congestion  : No blood in the urine  GI: No Nausea, vomiting, constipation, +diarrhea, blood, or reflux. + abdominal pain  MUSCULOSKELETAL: Generalized Weakness  NEURO: No lightheadedness or dizziness  EYES: No Double vision, blurry, vision or headache     OBJECTIVE     VITAL SIGNS (Most Recent)  Temp: 99.2 °F (37.3 °C) (11/18/20 0732)  Pulse: 72 (11/18/20 0732)  Resp: 20 (11/18/20 0732)  BP: 134/63 (11/18/20 0732)  SpO2: 95 % (11/18/20 0732)    VENTILATION STATUS  Resp: 20 (11/18/20 0732)  SpO2: 95 % (11/18/20 0732)       I & O (Last 24H):  No intake or output data in the 24 hours ending 11/18/20 1108    WEIGHTS  Wt Readings from  Last 1 Encounters:   11/18/20 0500 100.1 kg (220 lb 10.9 oz)   11/17/20 0325 100.7 kg (222 lb 0.1 oz)   11/16/20 0308 100.7 kg (222 lb 0.1 oz)   11/15/20 0450 97.8 kg (215 lb 9.8 oz)   11/15/20 0026 94.3 kg (208 lb)       PHYSICAL EXAM  CONSTITUTIONAL: Well built, well nourished in no apparent distress  NECK: no carotid bruit, no JVD  LUNGS:  Markedly diminished breath sounds bilaterally.    CHEST WALL: no tenderness  HEART: regular rate and rhythm, diminished breath sounds and grade 2-3 systolic ejection murmur noted  ABDOMEN: soft, non-tender; bowel sounds normal; no masses,  no organomegaly  EXTREMITIES: Extremities normal, no edema  NEURO: AAO X 3    SCHEDULED MEDS:   aspirin  81 mg Oral Daily    calcitRIOL  0.25 mcg Oral Daily    cyanocobalamin  2,000 mcg Oral Daily    enoxparin  1 mg/kg Subcutaneous Q24H    ferrous sulfate  325 mg Oral Daily with breakfast    insulin detemir U-100  10 Units Subcutaneous QHS    Lactobacillus acidoph-L.bulgar  4 tablet Oral TID WM    memantine  5 mg Oral BID    metoprolol succinate  25 mg Oral Daily    piperacillin-tazobactam (ZOSYN) IVPB  3.375 g Intravenous Q8H    sertraline  50 mg Oral Daily    vancomycin (VANCOCIN) IVPB  1,500 mg Intravenous Q24H       CONTINUOUS INFUSIONS:   lactated ringers Stopped (11/16/20 1719)       PRN MEDS:acetaminophen, calcium chloride IVPB, calcium chloride IVPB, calcium chloride IVPB, dextrose 50%, dextrose 50%, famotidine, HYDROcodone-acetaminophen, insulin aspart U-100, insulin regular, magnesium oxide, magnesium sulfate IVPB, magnesium sulfate IVPB, magnesium sulfate IVPB, magnesium sulfate IVPB, meclizine, potassium chloride in water, potassium chloride in water, potassium chloride in water, potassium chloride in water, potassium chloride, potassium chloride, potassium chloride, potassium chloride, promethazine (PHENERGAN) IVPB, promethazine (PHENERGAN) IVPB, sodium phosphate IVPB, sodium phosphate IVPB, sodium phosphate IVPB,  sodium phosphate IVPB, sodium phosphate IVPB, Pharmacy to dose Vancomycin consult **AND** vancomycin - pharmacy to dose    LABS AND DIAGNOSTICS     CBC LAST 3 DAYS  Recent Labs   Lab 11/15/20  0105 11/16/20  0954 11/17/20  0333 11/18/20  0816   WBC 12.12 14.77* 12.75* 13.10*   RBC 3.11* 3.23* 3.30* 3.35*   HGB 9.0* 9.0* 9.3* 9.4*   HCT 28.1* 28.4* 29.4* 29.8*   MCV 90 88 89 89   MCH 28.9 27.9 28.2 28.1   MCHC 32.0 31.7* 31.6* 31.5*   RDW 12.5 12.6 12.5 12.8   * 144* 134* 141*   MPV 10.6 10.9 10.8 10.7   GRAN 82.7*  10.0* 85.4*  12.6* 86.0*  11.0*  --    LYMPH 5.7*  0.7* 4.3*  0.6* 4.3*  0.6*  --    MONO 10.6  1.3* 9.3  1.4* 8.9  1.1*  --    BASO 0.01 0.01 0.01  --    NRBC 0 0 0  --        COAGULATION LAST 3 DAYS  Recent Labs   Lab 11/15/20  0105 11/15/20  1737 11/16/20  0214 11/16/20  0936   LABPT 19.8* 18.8*  --   --    INR 1.8 1.7  --   --    APTT 47.1* 49.5*  49.5* 108.5* 103.5*       CHEMISTRY LAST 3 DAYS  Recent Labs   Lab 11/15/20  0105  11/16/20  0214 11/17/20  0333 11/18/20  0815     --  138 137 134*   K 3.4*   < > 3.6 4.0 3.9     --  108 110 105   CO2 21*  --  20* 19* 19*   ANIONGAP 8  --  10 8 10   BUN 33*  --  37* 34* 36*   CREATININE 1.9*  --  1.8* 1.8* 1.8*   GLU 85  --  98 158* 189*   CALCIUM 9.7  --  9.9 9.5 9.9   MG 1.6  --  2.3 2.0  --    ALBUMIN 2.9*  --   --  2.5*  --    PROT 6.2  --   --  5.6*  --    ALKPHOS 59  --   --  59  --    ALT 23  --   --  19  --    AST 49*  --   --  23  --    BILITOT 0.9  --   --  1.3*  --     < > = values in this interval not displayed.       CARDIAC PROFILE LAST 3 DAYS  Recent Labs   Lab 11/15/20  0105 11/15/20  0511 11/15/20  1109   *  --   --    *  --   --    CPKMB 10.0*  --   --    TROPONINI 8.076* 7.381* 4.935*       ENDOCRINE LAST 3 DAYS  No results for input(s): TSH, PROCAL in the last 168 hours.    LAST ARTERIAL BLOOD GAS  ABG  No results for input(s): PH, PO2, PCO2, HCO3, BE in the last 168 hours.    LAST 7 DAYS  MICROBIOLOGY   Microbiology Results (last 7 days)     Procedure Component Value Units Date/Time    Blood culture [[id number]]  (Abnormal)  (Susceptibility) Collected: 11/15/20 0115    Order Status: Completed Specimen: Blood from Peripheral, Forearm, Left Updated: 11/18/20 0636     Blood Culture, Routine Gram stain talia bottle: Gram positive cocci      Results called to and read back by:Roderick Murray RN-B-CARD;  11/15/2020       22:34 CJD      Gram stain aer bottle: Gram positive cocci      Positive results previously called      STAPHYLOCOCCUS LUGDUNENSIS    Blood culture [[id number]]  (Abnormal)  (Susceptibility) Collected: 11/15/20 0100    Order Status: Completed Specimen: Blood from Peripheral, Wrist, Left Updated: 11/18/20 0600     Blood Culture, Routine Gram stain talia bottle: Gram positive cocci      Results called to and read back by:Roderick Murray RN-BCARD;  11/15/2020        22:33 CJD      Gram stain aer bottle: Gram positive cocci      Results called to and read back by:JN CansecoB-CARD;        11/16/2020  02:01 CJD      STAPHYLOCOCCUS LUGDUNENSIS    Blood culture [[id number]] Collected: 11/18/20 0338    Order Status: Sent Specimen: Blood Updated: 11/18/20 0504    Stool culture [[id number]] Collected: 11/15/20 1129    Order Status: Completed Specimen: Stool Updated: 11/17/20 0737     Stool Culture No Salmonella,Shigella,Vibrio,Campylobacter.      No E coli 0157:H7 isolated.    Clostridium difficile EIA [[id number]] Collected: 11/15/20 1129    Order Status: Completed Specimen: Stool Updated: 11/15/20 1646     C. diff Antigen Negative     C difficile Toxins A+B, EIA Negative     Comment: Testing not recommended for children <24 months old.             MOST RECENT IMAGING  US Lower Extremity Veins Bilateral  CLINICAL HISTORY:  84 years (1935) Male fever, history of DVT    TECHNIQUE:  US LOWER EXTREMITY VEINS BILATERAL.  53 images obtained. Ultrasound  examination of both lower extremity deep venous  systems was performed  using grayscale, color and spectral Doppler    COMPARISON:  Most recent ultrasound from May 1, 2020.    FINDINGS:  RIGHT: There appears to be a duplicated superficial femoral vein with  heterogeneous low level echoes and incomplete compressibility in the  right mid superficial femoral vein (in keeping with partially  occlusive thrombus). The remainder of the right common femoral,  saphenofemoral junction, femoral, and popliteal veins demonstrate a  color flow without finding of thrombus. The visualized portions of the  right proximal calf veins are normal.    LEFT: Heterogeneous low level echoes with incomplete compressibility  in the left common femoral vein to the level of the saphenofemoral  junction, with nonocclusive thrombus extending into the superficial  femoral vein and popliteal vein. The visualized portions of the left  proximal calf veins are normal.    IMPRESSION:  1. Partially occlusive deep venous thrombus in the left common femoral  vein, superficial femoral vein and popliteal vein (slightly worse when  compared to the most recent exam).  2. Partially occlusive thrombus in the right mid superficial femoral  vein, which appears to be duplicated.    RESULT NOTIFICATION: These observations were discussed by the  dictating physician by phone with, and acknowledged by Jayleen Villanueva RN at 11/17/2020 3:47 PM.  .    Electronically Signed by SLIME Davenport on 11/17/2020 3:53 PM  Echo Color Flow Doppler? Yes; Bubble Contrast? No  · There is left ventricular concentric remodeling.  · The left ventricle is normal in size with normal systolic function. The   estimated ejection fraction is 60%.  · Grade III diastolic dysfunction.  · Normal right ventricular size with normal right ventricular systolic   function.  · Mild left atrial enlargement.  · Moderate aortic regurgitation.  · Mild mitral regurgitation.  · Intermediate central venous pressure (8 mmHg).  · The estimated PA  systolic pressure is 37 mmHg.         LASTECHO  No results found for this or any previous visit.    CURRENT/PREVIOUS VISIT EKG  Results for orders placed or performed during the hospital encounter of 11/15/20   EKG 12-lead    Collection Time: 11/15/20 12:57 AM    Narrative    Test Reason : R07.9,    Vent. Rate : 076 BPM     Atrial Rate : 076 BPM     P-R Int : 146 ms          QRS Dur : 114 ms      QT Int : 378 ms       P-R-T Axes : 039 -04 046 degrees     QTc Int : 425 ms    Normal sinus rhythm  LVH with repolarization abnormality  Abnormal ECG  When compared with ECG of 22-SEP-2020 15:03,  ST now depressed in Anterior-lateral leads  Nonspecific T wave abnormality, improved in Lateral leads    Referred By: [email or web address]   SELF           Confirmed By:        ASSESSMENT/PLAN:     Active Hospital Problems    Diagnosis    *NSTEMI (non-ST elevated myocardial infarction)    FUO (fever of unknown origin)    Gram-positive bacteremia    Diarrhea of presumed infectious origin    Type 2 diabetes mellitus without complication, with long-term current use of insulin    CHF (congestive heart failure), NYHA class III, chronic, systolic    Chronic respiratory failure with hypoxia, on home oxygen therapy    S/P CABG (coronary artery bypass graft)    CKD (chronic kidney disease) stage 3, GFR 30-59 ml/min    CAD (coronary artery disease)    Adrenal insufficiency       ASSESSMENT & PLAN:     84-year-old gentleman admitted with profound generalized weakness and last Thursday with some changes in mental status as well.  Kathy low-grade fevers diarrhea is noted he has tested negative for COVID-19    Elevated Troponin - nonspecific probably secondary demand ischemia multifactorial  Acute on chronic kidney disease.  Fever.  Low-grade.  SARS COVID-19 negative  Positive Blood [id number]-[id number] STAPHYLOCOCCUS SPECIES  Progressive Weakness and Fatigue  Coronary artery disease status post CABG.  History of DVTs status post IVC  filter placement.  Diabetes mellitus.  Dyslipidemia.       RECOMMENDATION    Continue current regimen for now  He is doing much better currently besides his appetite  Start Periactin 4 mg QHS      Saba Bower NP  [facility]  Department of Cardiology  Date of Service: 11/18/2020      I have personally interviewed and examined the patient.  I have reviewed all the Nurse Practitioner's documentation, and agree with the plan.       Esvin Lee M.D.  [facility]  Department of Cardiology  Date of Service: 11/18/2020  10:57 AM

## 2020-11-18 NOTE — PROGRESS NOTES
[facility] Medicine  Progress Note    Patient Name: Chon Hurtado  MRN: [id number]  Patient Class: IP- Inpatient   Admission Date: 11/15/2020  Length of Stay: 3 days  Attending Physician: Dre Farmer MD  Primary Care Provider: Doug Whitaker MD        Subjective:     Principal Problem:NSTEMI (non-ST elevated myocardial infarction)    Interval History:  Patient is afebrile and much more reactive than yesterday  Blood culture findings noted most likely from skin organism but there is potential for true infection       Review of Systems  Objective:     Vital Signs (Most Recent):  Temp: 98.6 °F (37 °C) (11/18/20 1520)  Pulse: 71 (11/18/20 1520)  Resp: 20 (11/18/20 1520)  BP: 123/69 (11/18/20 1520)  SpO2: (!) 94 % (11/18/20 1520) Vital Signs (24h Range):  Temp:  [98 °F (36.7 °C)-100 °F (37.8 °C)] 98.6 °F (37 °C)  Pulse:  [65-75] 71  Resp:  [18-25] 20  SpO2:  [94 %-98 %] 94 %  BP: (107-142)/(49-69) 123/69     Weight: 100.1 kg (220 lb 10.9 oz)  Body mass index is 28.33 kg/m².    Intake/Output Summary (Last 24 hours) at 11/18/2020 1710  Last data filed at 11/18/2020 1520  Gross per 24 hour   Intake 120 ml   Output 500 ml   Net -380 ml      Physical Exam    Physical Exam:  General-alert   HEENT- PERRLA, EOMI,  Neck- No JVD, Lymphadenopathy, no neck pain   CV- Regular rate and rhythm, No Murmur/slava/rubs  Resp- Lungs CTA Bilaterally, No increased WOB  GI- Non tender/non-distended, BS normoactive x4 quads, no HSM  Extrem- No cyanosis, clubbing, edema.   Neuro- non focal   Skin-  No masses, rashes or lesions noted on cursory skin exam.  Overview/Hospital Course:    Significant Labs:   CBC:   Recent Labs   Lab 11/17/20  0333 11/18/20  0816   WBC 12.75* 13.10*   HGB 9.3* 9.4*   HCT 29.4* 29.8*   * 141*     CMP:   Recent Labs   Lab 11/17/20  0333 11/18/20  0815    134*   K 4.0 3.9    105   CO2 19* 19*   * 189*   BUN 34* 36*   CREATININE 1.8* 1.8*   CALCIUM 9.5 9.9   PROT  5.6*  --    ALBUMIN 2.5*  --    BILITOT 1.3*  --    ALKPHOS 59  --    AST 23  --    ALT 19  --    ANIONGAP 8 10   EGFRNONAA 34.3* 34.3*     Cardiac Markers: No results for input(s): CKMB, MYOGLOBIN, BNP, TROPISTAT in the last 48 hours.    Significant Imaging: I have reviewed all pertinent imaging results/findings within the past 24 hours.    Assessment/Plan:      Active Diagnoses:    Diagnosis Date Noted POA    PRINCIPAL PROBLEM:  NSTEMI (non-ST elevated myocardial infarction) [I21.4] 11/15/2020 Yes    FUO (fever of unknown origin) [R50.9]  Yes    Gram-positive bacteremia [R78.81] 11/16/2020 Yes    Diarrhea of presumed infectious origin [R19.7] 11/15/2020 Yes    Type 2 diabetes mellitus without complication, with long-term current use of insulin [E11.9, Z79.4] 11/15/2020 Not Applicable    CHF (congestive heart failure), NYHA class III, chronic, systolic [I50.22] 01/03/2020 Yes    Chronic respiratory failure with hypoxia, on home oxygen therapy [J96.11, Z99.81] 01/03/2020 Not Applicable    S/P CABG (coronary artery bypass graft) [Z95.1] 03/21/2018 Not Applicable    CKD (chronic kidney disease) stage 3, GFR 30-59 ml/min [N18.30] 12/21/2016 Yes     Chronic    CAD (coronary artery disease) [I25.10]  Yes    Adrenal insufficiency [E27.40]  Yes      Problems Resolved During this Admission:     ASSESSMENT AND PLAN   Unsure source of fevers . Possible from GI tract , prostate , contamination or from chronic DVTs with ashley   Echo with no vegetations, repeat blood cultures so far neg  Possible change to rocephin   DC vanco  Continue  Asa and lovenox    f/u stool studies PCR   FollowANA , RA panel   Most likely may need placement and consult case Mx.       VTE Risk Mitigation (From admission, onward)         Ordered     enoxaparin injection 100 mg  Every 24 hours (non-standard times)      11/16/20 1201     IP VTE HIGH RISK PATIENT  Once      11/15/20 0453     Place sequential compression device  Until  discontinued      11/15/20 0453     Reason for No Pharmacological VTE Prophylaxis  Once     Question:  Reasons:  Answer:  Already adequately anticoagulated on oral Anticoagulants    11/15/20 0453                   Dre Farmer MD  Department of Hospital Medicine   [facility]

## 2020-11-18 NOTE — PT/OT/SLP EVAL
Physical Therapy Evaluation    Patient Name:  Chon Hurtado   MRN:  [id number]    Recommendations:     Discharge Recommendations:  nursing facility, skilled   Discharge Equipment Recommendations: bath bench, hospital bed(Pt's wife stated pt cannot sleep flat in bed because of difficulty breathing so he sleeps in a recliner.)   Barriers to discharge: Decreased caregiver support    Assessment:     Chon Hurtado is a 84 y.o. male admitted with a medical diagnosis of NSTEMI (non-ST elevated myocardial infarction).  He presents with the following impairments/functional limitations:  weakness, impaired endurance, impaired self care skills, impaired functional mobilty, gait instability, impaired balance, impaired cognition, decreased safety awareness, edema, impaired cardiopulmonary response to activity. Pt agreeable with mobility assessment and getting up to bedside chair. Pt's wife present and stated pt's mobility has been declining for several months, but pt has not been able to walk much at all in the last 2 weeks. She also stated pt sleeps in a recliner lately due to difficulty breathing when he is lying flat in bed. PT discussed discharge options and pt agreeable with having some level of inpatient rehab prior to discharge home.     Rehab Prognosis: Fair; patient would benefit from acute skilled PT services to address these deficits and reach maximum level of function.    Recent Surgery: * No surgery found *      Plan:     During this hospitalization, patient to be seen 6 x/week to address the identified rehab impairments via gait training, therapeutic activities, therapeutic exercises and progress toward the following goals:    · Plan of Care Expires:  12/02/20    Subjective     Chief Complaint: Low back pack which increased with movement.  Patient/Family Comments/goals: To get stronger prior to discharge home.  Pain/Comfort:  · Pain Rating 1: 10/10(with movement)  · Location - Orientation 1: lower  · Location 1:  back  · Pain Addressed 1: Reposition, Distraction, Nurse notified  · Pain Rating Post-Intervention 1: 7/10    Patients cultural, spiritual, Sikhism conflicts given the current situation:      Living Environment:  Pt lives with his wife in a single story house with threshold step entry.  Prior to admission, patients level of function was modified independent with a cane until 2 weeks ago..  Equipment used at home: cane, straight, walker, rolling, wheelchair, 3-in-1 commode.  DME owned (not currently used): none.  Upon discharge, patient will have assistance from facility staff and his wife.    Objective:     Communicated with ASUNCION Urias prior to session.  Patient found HOB elevated with bed alarm, peripheral IV, telemetry  upon PT entry to room.    General Precautions: Standard, fall, diabetic   Orthopedic Precautions:N/A   Braces: N/A     Exams:  · Cognitive Exam:  Patient is oriented to Person and Place  · RLE ROM: Pt limited at knees and ankles due to edema  · RLE Strength: grossly 3-/5  · LLE ROM: Pt limited at knees and ankles due to edema  · LLE Strength: grossly 3-/5    Functional Mobility:  · Bed Mobility:     · Scooting: maximal assistance and of 2 persons  · Supine to Sit: maximal assistance and of 2 persons  · Transfers:     · Sit to Stand:  maximal assistance and of 2 persons with rolling walker  · Bed to Chair: moderate assistance and of 2 persons with  rolling walker  using  Step Transfer      AM-PAC 6 CLICK MOBILITY  Total Score:11     Patient left up in chair with all lines intact, call button in reach and RN & pt's wife present.    GOALS:   Multidisciplinary Problems     Physical Therapy Goals        Problem: Physical Therapy Goal    Goal Priority Disciplines Outcome Goal Variances Interventions   Physical Therapy Goal     PT, PT/OT      Description: Goals to be met by:      Patient will increase functional independence with mobility by performin. Supine to/from sit with MInimal  Assistance  2. Sit to stand transfer with Minimal Assistance  3. Bed to chair transfer with Minimal Assistance using Rolling Walker  4. Gait  x 50 feet with Minimal Assistance using Rolling Walker.                      History:     Past Medical History:   Diagnosis Date    Adrenal insufficiency     Anemia     Anticoagulant long-term use     plavix for blood clots legs and stents years    Blood transfusion     CAD (coronary artery disease)     Cancer     prostate    Cataract     CHF (congestive heart failure), NYHA class III, chronic, systolic 1/3/2020    COPD (chronic obstructive pulmonary disease) 11/6/2013    Depression     Diabetes mellitus     Diabetes mellitus type II     DVT (deep venous thrombosis)     Hemorrhoids     History of colon polyps 6/3/2015    Hypertension     NSTEMI (non-ST elevated myocardial infarction) 11/15/2020    PE (pulmonary embolism)     [facility]    Peripheral vascular disease     Urinary tract infection        Past Surgical History:   Procedure Laterality Date    CATARACT EXTRACTION, BILATERAL      CHOLECYSTECTOMY  8/2011    COLON SURGERY      CORONARY ARTERY BYPASS GRAFT       x 5    CORONARY STENT PLACEMENT      2007, last 2011  stent  3 vessel.    EXPLORATORY LAPAROTOMY W/ BOWEL RESECTION  1987    PROSTATE SURGERY  2001    Radical for cancer - Dr Luke Chung    RIGHT FEMUR REPAIR  1987    SMALL INTESTINE SURGERY         Time Tracking:     PT Received On: 11/18/20  PT Start Time: 1045     PT Stop Time: 1110  PT Total Time (min): 25 min     Billable Minutes: Evaluation 15 and Therapeutic Activity 10      Kenzie Lipscomb, PT  11/18/2020

## 2020-11-18 NOTE — PLAN OF CARE
SW spoke w/ pt's wife who reports they spoke w/ therapy this morning, and they recommended for pt to go to rehab facility. Pt's wife reports she [facility]. SW got verbal consent via phone form Pt's wife on Pt choice form. MOY sent message to Leela to begin the referral process.

## 2020-11-18 NOTE — PROGRESS NOTES
Consult Note  Infectious Disease    Reason for Consult:      HPI: Chon Hurtado is an  84 y.o. male  Known to me from prior consultations, who Presented to the emergency room 11/15 with complaints of subjective fever x2 days, intermittent chest pain (pleuritic in nature as well as radiating to the jaw), multiple loose stools, abdominal discomfort, generalized weakness.  In the emergency room his temperature was 100.1, mild generalized discomfort creatinine 1.9, BUN 33 BNP 6 7 , troponin 8.0. CT scan of the abdomen and pelvis showed small trace bilateral pleural effusions, IVC filter, sequela of prostatectomy, questionable mucosal thickening and air-fluid in the distal rectum.  He was felt to have a ST elevation myocardial infarction and was admitted to the for cardiology evaluation and stool studies.  Was seen by Cardiology noted the patient reportedly had also been confused and hallucinating prior to admission as well as being unsteady his feet.  Late in the evening of the 15th 1 of his blood cultures became positive for Gram-positive cocci and vancomycin and Zosyn were started.  His fever did escalated to 02.4 this morning at 0325. Blood cultures have all grown coagulase-negative Staph. stool for Cdiff, OCP, white blood and culture are negative. UA was negative for pyuria.    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  In the past he required cephalexin suppression for recurrent cellulitis and persistent tinea pedis.  ED visit in June for lower extremity edema  ED visit in late July for rectal bleeding felt   ED visit 09/22 for facial swelling and shortness of breath having run out of his diuretics.  CTA with no evidence of pulmonary embolism.  Seen by Hematology/Oncology on 11/06 for prostate cancer with complaints of left neck and rib pain prompting xrays (?angina then)      11/18: fever curve is improved. Blood cultures with STaph  lugdenensis which is likely a pathogen. Sensitive to oxacillin. Cortisol WNL. He is more alert, has been up to the chair. Not eating a thing per wife.     EXAM & DIAGNOSTICS REVIEWED:   Vitals:     Temp:  [98 °F (36.7 °C)-100 °F (37.8 °C)]   Temp: 98.6 °F (37 °C) (11/18/20 1520)  Pulse: 71 (11/18/20 1520)  Resp: 20 (11/18/20 1520)  BP: 123/69 (11/18/20 1520)  SpO2: (!) 94 % (11/18/20 1520)    Intake/Output Summary (Last 24 hours) at 11/18/2020 1630  Last data filed at 11/18/2020 1520  Gross per 24 hour   Intake 120 ml   Output 500 ml   Net -380 ml       General:  In NAD. Awake and attentive, cooperative,  comfortable  Eyes:  Anicteric, , EOMI, no scleral or conjunctival lesions  ENT:  No ulcers, exudates, thrush, nares patent, dentition is good.    Neck:  Supple,   Lungs: Clear, no consolidation, rales, wheezes, rub  Heart:  RRR, no gallop/murmur/rub noted  Abd:  Soft, mild generalized tenderness, ND, normal BS, no masses or organomegaly appreciated. 11/17: External hemorrhoids, no perirectal abscess, internal exam not performed.  :  Voids/ incontinent, urine clear, no flank tenderness, no scrotal swelling  Musc:  Joints without effusion, swelling, erythema, synovitis,  But he is less stiff all over.    Skin:  No rashes. No palmar or plantar lesions. No subungual petechiae. Tinea pedis left foot  Wound:   Neuro:              Alert, attentive, speech fluent, face symmetric, moves all extremities, generalizedweakness. Not Ambulatory  Psych:  Calm, cooperative  Lymphatic:        Extrem: Chronic BLE L>R edema, no erythema, phlebitis, cellulitis, warm and well perfused  VAD:  peripheral     Isolation:  none    Lines/Tubes/Drains:    General Labs reviewed:  Recent Labs   Lab 11/16/20  0954 11/17/20  0333 11/18/20  0816   WBC 14.77* 12.75* 13.10*   HGB 9.0* 9.3* 9.4*   HCT 28.4* 29.4* 29.8*   * 134* 141*       Recent Labs   Lab 11/15/20  0105  11/16/20  0214 11/17/20  0333 11/18/20  0815     --  138 137 134*    K 3.4*   < > 3.6 4.0 3.9     --  108 110 105   CO2 21*  --  20* 19* 19*   BUN 33*  --  37* 34* 36*   CREATININE 1.9*  --  1.8* 1.8* 1.8*   CALCIUM 9.7  --  9.9 9.5 9.9   PROT 6.2  --   --  5.6*  --    BILITOT 0.9  --   --  1.3*  --    ALKPHOS 59  --   --  59  --    ALT 23  --   --  19  --    AST 49*  --   --  23  --     < > = values in this interval not displayed.           Micro:  Microbiology Results (last 7 days)     Procedure Component Value Units Date/Time    Blood culture [[id number]] Collected: 11/18/20 0338    Order Status: Completed Specimen: Blood Updated: 11/18/20 1158     Blood Culture, Routine No Growth to date    Blood culture [[id number]]  (Abnormal)  (Susceptibility) Collected: 11/15/20 0115    Order Status: Completed Specimen: Blood from Peripheral, Forearm, Left Updated: 11/18/20 0636     Blood Culture, Routine Gram stain talia bottle: Gram positive cocci      Results called to and read back by:Roderick Murray RN-B-CARD;  11/15/2020       22:34 LEANDER      Gram stain aer bottle: Gram positive cocci      Positive results previously called      STAPHYLOCOCCUS LUGDUNENSIS    Blood culture [[id number]]  (Abnormal)  (Susceptibility) Collected: 11/15/20 0100    Order Status: Completed Specimen: Blood from Peripheral, Wrist, Left Updated: 11/18/20 0600     Blood Culture, Routine Gram stain talia bottle: Gram positive cocci      Results called to and read back by:Roderick Murray RN-BCARD;  11/15/2020        22:33 CJD      Gram stain aer bottle: Gram positive cocci      Results called to and read back by:Alix Aragon RN-B-CARD;        11/16/2020  02:01 CJNADIRA      STAPHYLOCOCCUS LUGDUNENSIS    Stool culture [[id number]] Collected: 11/15/20 1129    Order Status: Completed Specimen: Stool Updated: 11/17/20 0737     Stool Culture No Salmonella,Shigella,Vibrio,Campylobacter.      No E coli 0157:H7 isolated.    Clostridium difficile EIA [[id number]] Collected: 11/15/20 1129    Order Status: Completed Specimen: Stool  Updated: 11/15/20 0828     C. diff Antigen Negative     C difficile Toxins A+B, EIA Negative     Comment: Testing not recommended for children <24 months old.           Imaging Reviewed:   CXR   CT head negative  CT abd/pelvis  SMALL TRACE BILATERAL PLEURAL EFFUSIONS. STATUS POST CABG.  STATUS POST CHOLECYSTECTOMY.  RIGHT RENAL CYST.  INFERIOR VENA CAVA FILTER IN PLACE.  STATUS POST PROSTATECTOMY WITH LYMPH NODE DISSECTION.  QUESTIONABLE MUCOSAL THICKENING AND/OR FLUID OF THE DISTAL RECTUM    US BLE  IMPRESSION:  1. Partially occlusive deep venous thrombus in the left common femoral  vein, superficial femoral vein and popliteal vein (slightly worse when  compared to the most recent exam).  2. Partially occlusive thrombus in the right mid superficial femoral  vein, which appears to be duplicated    Cardiology:  TTE 1115  · There is left ventricular concentric remodeling.  · The left ventricle is normal in size with normal systolic function. The estimated ejection fraction is 60%.  · Grade III diastolic dysfunction.  · Normal right ventricular size with normal right ventricular systolic function.  · Mild left atrial enlargement.  · Moderate aortic regurgitation.  · Mild mitral regurgitation.  · Intermediate central venous pressure (8 mmHg).  · The estimated PA systolic pressure is 37 mmHg  ·   IMPRESSION & PLAN   1. Fever  2. Coag neg staph in blood is Staph lugdenensis and is usually a pathogen, similar to STaph aureus   Only indwelling foreign bodies are IVC filter, coronary stents   No focus of infection on CT abd/pelvis, or physical exam    3. 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 ?history of adrenal insufficiency, ?on no replacement(wife denies this diagnosis)  5. ?proctitis  6. Rheumatic diagnosis? Very stiff, some jaw pain(on left?) and temporal discomfort on right? No acute arthritis on exam      Recommendations:  Changing vanc/zosyn to cefazolin  F/u  Repeat blood cultures  Recommend SHAYY     GI pcr panel pending  TAHIR RA trend CRP     D/w wife    Medical Decision Making during this encounter was  [_] Low Complexity  [_] Moderate Complexity  [ x ] High Complexity

## 2020-11-19 NOTE — PROGRESS NOTES
Consult Note  Infectious Disease    Reason for Consult:      HPI: Chon Hurtado is an  84 y.o. male  Known to me from prior consultations, who Presented to the emergency room 11/15 with complaints of subjective fever x2 days, intermittent chest pain (pleuritic in nature as well as radiating to the jaw), multiple loose stools, abdominal discomfort, generalized weakness.  In the emergency room his temperature was 100.1, mild generalized discomfort creatinine 1.9, BUN 33 BNP 6 7 , troponin 8.0. CT scan of the abdomen and pelvis showed small trace bilateral pleural effusions, IVC filter, sequela of prostatectomy, questionable mucosal thickening and air-fluid in the distal rectum.  He was felt to have a ST elevation myocardial infarction and was admitted to the for cardiology evaluation and stool studies.  Was seen by Cardiology noted the patient reportedly had also been confused and hallucinating prior to admission as well as being unsteady his feet.  Late in the evening of the 15th 1 of his blood cultures became positive for Gram-positive cocci and vancomycin and Zosyn were started.  His fever did escalated to 02.4 this morning at 0325. Blood cultures have all grown coagulase-negative Staph. stool for Cdiff, OCP, white blood and culture are negative. UA was negative for pyuria.    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  In the past he required cephalexin suppression for recurrent cellulitis and persistent tinea pedis.  ED visit in June for lower extremity edema  ED visit in late July for rectal bleeding felt   ED visit 09/22 for facial swelling and shortness of breath having run out of his diuretics.  CTA with no evidence of pulmonary embolism.  Seen by Hematology/Oncology on 11/06 for prostate cancer with complaints of left neck and rib pain prompting xrays (?angina then)      11/18: fever curve is improved. Blood cultures with STaph  lugdenensis which is likely a pathogen. Sensitive to oxacillin. Cortisol WNL. He is more alert, has been up to the chair. Not eating a thing per wife.   11/19: afebrile. With max assistance he was able to walk with rolling walking to chair. Yesterday's blood culture became positive this am. CRP a little lower. Plans for SHAYY tomorrow noted and appreciated. RF mildly elevated.  He is still not eating, reports that the protein shakes are too sweet.  He has no complaints    EXAM & DIAGNOSTICS REVIEWED:   Vitals:     Temp:  [97.5 °F (36.4 °C)-99.2 °F (37.3 °C)]   Temp: 97.5 °F (36.4 °C) (11/19/20 1157)  Pulse: 74 (11/19/20 1157)  Resp: 20 (11/19/20 1427)  BP: (!) 146/58 (11/19/20 1157)  SpO2: 98 % (11/19/20 1157)    Intake/Output Summary (Last 24 hours) at 11/19/2020 1450  Last data filed at 11/18/2020 1830  Gross per 24 hour   Intake 970 ml   Output 300 ml   Net 670 ml       General:  In NAD. Awake and attentive, cooperative,  comfortable  Eyes:  Anicteric, , EOMI, no scleral or conjunctival lesions  ENT:  No ulcers, exudates, thrush, nares patent, dentition is good.    Neck:  Supple,   Lungs: Right basilar crackles  Heart:  RRR, no gallop/murmur/rub noted  Abd:  Soft, mild generalized tenderness, ND, normal BS, no masses or organomegaly appreciated. 11/17: External hemorrhoids, no perirectal abscess, internal exam not performed.  :  Voids/ incontinent, urine clear, no flank tenderness, no scrotal swelling  Musc:  Joints without effusion, swelling, erythema, synovitis,  But he is less stiff all over.    Skin:  No rashes. No palmar or plantar lesions. No subungual petechiae. Tinea pedis left foot  Wound:   Neuro:              Alert, attentive, speech fluent, face symmetric, moves all extremities, generalizedweakness.  He cannot raise his thighs off of the bed but does have good plantar flexion Not Ambulatory but max assist x2 for transfer from bed to chair  Psych:  Calm, cooperative  Lymphatic:        Extrem: Chronic  BLE L>R edema, no erythema, phlebitis, cellulitis, warm and well perfused  VAD:  peripheral     Isolation:  none    Lines/Tubes/Drains:    General Labs reviewed:  Recent Labs   Lab 11/17/20 0333 11/18/20  0816 11/19/20  0420   WBC 12.75* 13.10* 12.22   HGB 9.3* 9.4* 9.6*   HCT 29.4* 29.8* 30.4*   * 141* 143*       Recent Labs   Lab 11/15/20  0105  11/17/20 0333 11/18/20  0815 11/19/20  0420      < > 137 134* 137   K 3.4*   < > 4.0 3.9 4.2      < > 110 105 109   CO2 21*   < > 19* 19* 20*   BUN 33*   < > 34* 36* 39*   CREATININE 1.9*   < > 1.8* 1.8* 1.8*   CALCIUM 9.7   < > 9.5 9.9 9.6   PROT 6.2  --  5.6*  --   --    BILITOT 0.9  --  1.3*  --   --    ALKPHOS 59  --  59  --   --    ALT 23  --  19  --   --    AST 49*  --  23  --   --     < > = values in this interval not displayed.     Recent Labs   Lab 11/17/20 0333 11/18/20 0338 11/19/20  0420   CRP 18.02* 19.40* 16.80*   \        Micro:  Microbiology Results (last 7 days)     Procedure Component Value Units Date/Time    Blood culture [[id number]] Collected: 11/18/20 0338    Order Status: Completed Specimen: Blood Updated: 11/19/20 1125     Blood Culture, Routine Gram stain aer bottle: Gram positive cocci      Results called to and read back by: Bridgett Perez RN on Cardio B,       11/19/2020 11:15 vcb    Blood culture [[id number]]  (Abnormal)  (Susceptibility) Collected: 11/15/20 0115    Order Status: Completed Specimen: Blood from Peripheral, Forearm, Left Updated: 11/18/20 0636     Blood Culture, Routine Gram stain talia bottle: Gram positive cocci      Results called to and read back by:Roderick Murray RN-B-CARD;  11/15/2020       22:34 CJD      Gram stain aer bottle: Gram positive cocci      Positive results previously called      STAPHYLOCOCCUS LUGDUNENSIS    Blood culture [[id number]]  (Abnormal)  (Susceptibility) Collected: 11/15/20 0100    Order Status: Completed Specimen: Blood from Peripheral, Wrist, Left Updated: 11/18/20 0600      Blood Culture, Routine Gram stain talia bottle: Gram positive cocci      Results called to and read back by:Roderick Murray RN-BCARD;  11/15/2020        22:33 CJD      Gram stain aer bottle: Gram positive cocci      Results called to and read back by:Alix Aragon RN-B-CARD;        11/16/2020  02:01 CJD      STAPHYLOCOCCUS LUGDUNENSIS    Stool culture [[id number]] Collected: 11/15/20 1129    Order Status: Completed Specimen: Stool Updated: 11/17/20 0737     Stool Culture No Salmonella,Shigella,Vibrio,Campylobacter.      No E coli 0157:H7 isolated.    Clostridium difficile EIA [[id number]] Collected: 11/15/20 1129    Order Status: Completed Specimen: Stool Updated: 11/15/20 1646     C. diff Antigen Negative     C difficile Toxins A+B, EIA Negative     Comment: Testing not recommended for children <24 months old.           Imaging Reviewed:   CXR   CT head negative  CT abd/pelvis  SMALL TRACE BILATERAL PLEURAL EFFUSIONS. STATUS POST CABG.  STATUS POST CHOLECYSTECTOMY.  RIGHT RENAL CYST.  INFERIOR VENA CAVA FILTER IN PLACE.  STATUS POST PROSTATECTOMY WITH LYMPH NODE DISSECTION.  QUESTIONABLE MUCOSAL THICKENING AND/OR FLUID OF THE DISTAL RECTUM    US BLE  IMPRESSION:  1. Partially occlusive deep venous thrombus in the left common femoral  vein, superficial femoral vein and popliteal vein (slightly worse when  compared to the most recent exam).  2. Partially occlusive thrombus in the right mid superficial femoral  vein, which appears to be duplicated    Cardiology:  TTE 1115  · There is left ventricular concentric remodeling.  · The left ventricle is normal in size with normal systolic function. The estimated ejection fraction is 60%.  · Grade III diastolic dysfunction.  · Normal right ventricular size with normal right ventricular systolic function.  · Mild left atrial enlargement.  · Moderate aortic regurgitation.  · Mild mitral regurgitation.  · Intermediate central venous pressure (8 mmHg).  · The estimated PA systolic  pressure is 37 mmHg  ·   IMPRESSION & PLAN   1. Fever  2. Coag neg staph in blood is Staph lugdenensis and is usually a pathogen, similar to STaph aureus   Only indwelling foreign bodies are IVC filter, coronary stents   No focus of infection on CT abd/pelvis, or physical exam    3. 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 ?history of adrenal insufficiency, ?on no replacement(wife denies this diagnosis)  5. ?proctitis  6. Rheumatic diagnosis? Very stiff, some jaw pain(on left?) and temporal discomfort on right? No acute arthritis on exam      Recommendations:  Continue cefazolin  Serial blood cultures  Recommend SHAYY. If this is negative, will pursue bone scan or WBC scan     GI pcr panel pending  TAHIR  trend CRP     D/w wife 11/18    Medical Decision Making during this encounter was  [_] Low Complexity  [_] Moderate Complexity  [ x ] High Complexity

## 2020-11-19 NOTE — PLAN OF CARE
Important Message from Medicare was sign, explained and given to patient/caregiver on 11/19/2020 at 11:02am     answered all questions.

## 2020-11-19 NOTE — PT/OT/SLP EVAL
Occupational Therapy   Evaluation    Name: Chon Hurtado  MRN: [id number]  Admitting Diagnosis:  NSTEMI (non-ST elevated myocardial infarction)      Recommendations:     Discharge Recommendations: nursing facility, skilled  Discharge Equipment Recommendations:  none  Barriers to discharge:  Decreased caregiver support    Assessment:     Chon Hurtado is a 84 y.o. male with a medical diagnosis of NSTEMI (non-ST elevated myocardial infarction).  Performance deficits affecting function: weakness, impaired endurance, impaired self care skills, impaired functional mobilty, gait instability, impaired balance, impaired cognition, decreased safety awareness, pain, edema, impaired cardiopulmonary response to activity.      Pt presents with pain/deconditioning limiting occupational performance.  He required assist of two persons for bed mobility and transfer to chair today.  He was unable to tolerate further activity once in the chair 2/2 pain.  Spouse present on eval and confirmed that she is unable to care for him in his current state.  SNF recommended at d/c.     Rehab Prognosis: Good; patient would benefit from acute skilled OT services to address these deficits and reach maximum level of function.       Plan:     Patient to be seen 5 x/week to address the above listed problems via self-care/home management, therapeutic activities, therapeutic exercises  · Plan of Care Expires: 12/19/20  · Plan of Care Reviewed with: patient, spouse    Subjective     Chief Complaint: right lower back pain  Patient/Family Comments/goals: regain functional independence     Occupational Profile:  Living Environment: Lives with spouse in single story house and threshold step to enter.  He has a toilet with elevated seat and handles on either side to assist with transfers.    Previous level of function: Pt was Mod (I) with ADLs and functional mobility using a straight cane until ~2 weeks ago.  Over the past few weeks, he has required two  "person assist for transfers to the wheelchair and toilet; he has required assist with all ADLs and has had very poor intake ~2 weeks per his wife; he sleeps in a recliner due to SOB when lying flat  Equipment Used at Home:  cane, straight, walker, rolling, bedside commode, wheelchair  Assistance upon Discharge: family    Pain/Comfort:  · Pain Rating 1: 8/10  · Location - Side 1: Right  · Location - Orientation 1: lower  · Location 1: back  · Pain Addressed 1: Reposition, Distraction, Nurse notified, Cessation of Activity  · Pain Rating Post-Intervention 1: 8/10    Objective:     Communicated with: RN prior to session.  Patient found supine with telemetry, peripheral IV upon OT entry to room.    General Precautions: Standard, fall   Orthopedic Precautions:N/A   Braces: N/A     Occupational Performance:    Bed Mobility:    · Patient completed Rolling/Turning to Left with  maximal assistance  · Patient completed Scooting/Bridging with maximal assistance  · Patient completed Supine to Sit with maximal assistance and 2 persons    Functional Mobility/Transfers:  · Sit to stand from EOB Mod A x 2 persons with RW; stand to sit in bedside chair Mod A x 2 persons with RW for controlled descent   · Pivot to bedside chair completed with Min A x 2 persons using RW    Activities of Daily Living:  · Toileting: set-up/SPV with urinal use at bed level    · Pt declined ADLs once seated in bedside chair 2/2 pain    Cognitive/Visual Perceptual:  Cognitive/Psychosocial Skills:     -       Oriented to: Person and "hospital"; once re-oriented to [facility], pt was able to recall correct location after 5 minute delay   -       Follows Commands/attention:Follows two-step commands  -       Communication: clear/fluent  -       Memory: Impaired STM  -       Safety awareness/insight to disability: impaired   -       Mood/Affect/Coping skills/emotional control: Appropriate to situation    Physical Exam:  Upper Extremity Range of Motion:     -       " Right Upper Extremity: WFL  -       Left Upper Extremity: WFL  Upper Extremity Strength:    -       Right Upper Extremity: NT 2/2 high level of back pain  -       Left Upper Extremity: NT 2/2 high level of back pain   Strength:    -       Right Upper Extremity: fair  -       Left Upper Extremity: fair  Fine Motor Coordination:    -       Intact  Gross motor coordination:   WFL    AMPAC 6 Click ADL:  AMPAC Total Score: 15    Treatment & Education:  OT role/POC  Education:    Patient left up in chair with all lines intact, call button in reach and spouse present    GOALS:   Multidisciplinary Problems     Occupational Therapy Goals        Problem: Occupational Therapy Goal    Goal Priority Disciplines Outcome Interventions   Occupational Therapy Goal     OT, PT/OT Ongoing, Progressing    Description: Goals to be met by: D/C     Patient will increase functional independence with ADLs by performing:    Feeding with Set-up Assistance.  UE Dressing with Stand-by Assistance.  LE Dressing with Minimal Assistance.  Grooming while seated at sink with Stand-by Assistance.  Toileting from bedside commode with Minimal Assistance for hygiene and clothing management.   Toilet transfer to bedside commode with Minimal Assistance.                     History:     Past Medical History:   Diagnosis Date    Adrenal insufficiency     Anemia     Anticoagulant long-term use     plavix for blood clots legs and stents years    Blood transfusion     CAD (coronary artery disease)     Cancer     prostate    Cataract     CHF (congestive heart failure), NYHA class III, chronic, systolic 1/3/2020    COPD (chronic obstructive pulmonary disease) 11/6/2013    Depression     Diabetes mellitus     Diabetes mellitus type II     DVT (deep venous thrombosis)     Hemorrhoids     History of colon polyps 6/3/2015    Hypertension     NSTEMI (non-ST elevated myocardial infarction) 11/15/2020    PE (pulmonary embolism)     [facility] 2007     Peripheral vascular disease     Urinary tract infection        Past Surgical History:   Procedure Laterality Date    CATARACT EXTRACTION, BILATERAL      CHOLECYSTECTOMY  8/2011    COLON SURGERY      CORONARY ARTERY BYPASS GRAFT       x 5    CORONARY STENT PLACEMENT      2007, last 2011  stent  3 vessel.    EXPLORATORY LAPAROTOMY W/ BOWEL RESECTION  1987    PROSTATE SURGERY  2001    Radical for cancer - Dr Luke Chung    RIGHT FEMUR REPAIR  1987    SMALL INTESTINE SURGERY         Time Tracking:     OT Date of Treatment: 11/19/20  OT Start Time: 1012  OT Stop Time: 1045  OT Total Time (min): 33 min   Eval completed with PTA present for session    Billable Minutes:Evaluation 33    Alejandro Oconnor, OT  11/19/2020

## 2020-11-19 NOTE — PLAN OF CARE
Problem: Skin Injury Risk Increased  Goal: Skin Health and Integrity  Intervention: Promote and Optimize Oral Intake  Flowsheets (Taken 11/19/2020 0907)  Oral Nutrition Promotion: calorie dense liquids provided  Problem: Oral Intake Inadequate  Goal: Improved Oral Intake  11/19/2020 0907 by Jaqueline Gallagher RD

## 2020-11-19 NOTE — PROGRESS NOTES
[facility]  Department of Cardiology  Progress Note    PATIENT NAME: Chon Hurtado  MRN: [id number]  TODAY'S DATE: 11/19/2020  ADMIT DATE: 11/15/2020    SUBJECTIVE     PRINCIPLE PROBLEM: NSTEMI (non-ST elevated myocardial infarction)    INTERVAL HISTORY:    11/19/2020     Mr. Hurtado is working with PT. He did complain of rib pain yesterday after moving from the chair to the bed per his wife. She tells me he had a fall not too long ago. He was also complaining of Pain currently with sitting up on the side of bed. Dr. Lopez has recommended a SHAYY. We will plan for this tomorrow        11/18/2020  Mr. Hurtado is much more alert today. He is still not eating much. He is still confused as to where he is, but is communicating much more today.       11/17/2020  Mr. Hurtado is lying in the bed he has some pain with palpation to the abdomen.  His fever is to 102.4.   Patient has some nonspecific lower abdominal pains.  Loose stools are still persisting per his wife.  He had profound sweating early this morning now improved.      11/16/2020      Mr. Hurtado is lying in bed is still very weak. He has a low grade fever. After talking with his wife he has felt bad since last Thursday. He has progressively gotten worse and more lethargic. He has no appetite and is barely drinking. He is currently chest pain free and is lying flat on one pillow comfortably.     Review of patient's allergies indicates:   Allergen Reactions    Statins-hmg-coa reductase inhibitors        REVIEW OF SYSTEMS  CARDIOVASCULAR: No recent chest pain, palpitations, arm, neck, or jaw pain  RESPIRATORY: No recent fever, cough chills, SOB or congestion  : No blood in the urine  GI: No Nausea, vomiting, constipation, +diarrhea, blood, or reflux. + abdominal pain  MUSCULOSKELETAL: Generalized Weakness- + Rib pain  NEURO: No lightheadedness or dizziness  EYES: No Double vision, blurry, vision or headache     OBJECTIVE     VITAL SIGNS (Most  Recent)  Temp: 99.2 °F (37.3 °C) (11/19/20 0300)  Pulse: 75 (11/19/20 0736)  Resp: 19 (11/19/20 0736)  BP: 137/65 (11/19/20 0736)  SpO2: 98 % (11/19/20 0736)    VENTILATION STATUS  Resp: 19 (11/19/20 0736)  SpO2: 98 % (11/19/20 0736)       I & O (Last 24H):    Intake/Output Summary (Last 24 hours) at 11/19/2020 1010  Last data filed at 11/18/2020 1830  Gross per 24 hour   Intake 1090 ml   Output 500 ml   Net 590 ml       WEIGHTS  Wt Readings from Last 1 Encounters:   11/19/20 0300 102.6 kg (226 lb 3.1 oz)   11/18/20 0500 100.1 kg (220 lb 10.9 oz)   11/17/20 0325 100.7 kg (222 lb 0.1 oz)   11/16/20 0308 100.7 kg (222 lb 0.1 oz)   11/15/20 0450 97.8 kg (215 lb 9.8 oz)   11/15/20 0026 94.3 kg (208 lb)       PHYSICAL EXAM  CONSTITUTIONAL: Well built, well nourished in no apparent distress  NECK: no carotid bruit, no JVD  LUNGS:  Markedly diminished breath sounds bilaterally.    CHEST WALL: no tenderness  HEART: regular rate and rhythm, diminished breath sounds and grade 2-3 systolic ejection murmur noted  ABDOMEN: soft, non-tender; bowel sounds normal; no masses,  no organomegaly  EXTREMITIES: Extremities normal, no edema  NEURO: AAO X 3    SCHEDULED MEDS:   aspirin  81 mg Oral Daily    calcitRIOL  0.25 mcg Oral Daily    ceFAZolin (ANCEF) IVPB  2 g Intravenous Q8H    cyanocobalamin  2,000 mcg Oral Daily    cyproheptadine  4 mg Oral QHS    enoxparin  1 mg/kg Subcutaneous Q24H    ferrous sulfate  325 mg Oral Daily with breakfast    insulin detemir U-100  10 Units Subcutaneous QHS    Lactobacillus acidoph-L.bulgar  4 tablet Oral TID WM    memantine  5 mg Oral BID    metoprolol succinate  25 mg Oral Daily    sertraline  50 mg Oral Daily       CONTINUOUS INFUSIONS:   lactated ringers Stopped (11/16/20 1719)       PRN MEDS:acetaminophen, calcium chloride IVPB, calcium chloride IVPB, calcium chloride IVPB, dextrose 50%, dextrose 50%, famotidine, HYDROcodone-acetaminophen, insulin aspart U-100, insulin regular,  magnesium oxide, magnesium sulfate IVPB, magnesium sulfate IVPB, magnesium sulfate IVPB, magnesium sulfate IVPB, meclizine, potassium chloride in water, potassium chloride in water, potassium chloride in water, potassium chloride in water, potassium chloride, potassium chloride, potassium chloride, potassium chloride, promethazine (PHENERGAN) IVPB, promethazine (PHENERGAN) IVPB, sodium phosphate IVPB, sodium phosphate IVPB, sodium phosphate IVPB, sodium phosphate IVPB, sodium phosphate IVPB    LABS AND DIAGNOSTICS     CBC LAST 3 DAYS  Recent Labs   Lab 11/15/20  0105 11/16/20  0954 11/17/20  0333 11/18/20  0816 11/19/20  0420   WBC 12.12 14.77* 12.75* 13.10* 12.22   RBC 3.11* 3.23* 3.30* 3.35* 3.42*   HGB 9.0* 9.0* 9.3* 9.4* 9.6*   HCT 28.1* 28.4* 29.4* 29.8* 30.4*   MCV 90 88 89 89 89   MCH 28.9 27.9 28.2 28.1 28.1   MCHC 32.0 31.7* 31.6* 31.5* 31.6*   RDW 12.5 12.6 12.5 12.8 12.8   * 144* 134* 141* 143*   MPV 10.6 10.9 10.8 10.7 11.0   GRAN 82.7*  10.0* 85.4*  12.6* 86.0*  11.0*  --   --    LYMPH 5.7*  0.7* 4.3*  0.6* 4.3*  0.6*  --   --    MONO 10.6  1.3* 9.3  1.4* 8.9  1.1*  --   --    BASO 0.01 0.01 0.01  --   --    NRBC 0 0 0  --   --        COAGULATION LAST 3 DAYS  Recent Labs   Lab 11/15/20  0105 11/15/20  1737 11/16/20  0214 11/16/20  0936   LABPT 19.8* 18.8*  --   --    INR 1.8 1.7  --   --    APTT 47.1* 49.5*  49.5* 108.5* 103.5*       CHEMISTRY LAST 3 DAYS  Recent Labs   Lab 11/15/20  0105  11/16/20  0214 11/17/20  0333 11/18/20  0815 11/19/20  0420     --  138 137 134* 137   K 3.4*   < > 3.6 4.0 3.9 4.2     --  108 110 105 109   CO2 21*  --  20* 19* 19* 20*   ANIONGAP 8  --  10 8 10 8   BUN 33*  --  37* 34* 36* 39*   CREATININE 1.9*  --  1.8* 1.8* 1.8* 1.8*   GLU 85  --  98 158* 189* 190*   CALCIUM 9.7  --  9.9 9.5 9.9 9.6   MG 1.6  --  2.3 2.0  --   --    ALBUMIN 2.9*  --   --  2.5*  --   --    PROT 6.2  --   --  5.6*  --   --    ALKPHOS 59  --   --  59  --   --    ALT 23   --   --  19  --   --    AST 49*  --   --  23  --   --    BILITOT 0.9  --   --  1.3*  --   --     < > = values in this interval not displayed.       CARDIAC PROFILE LAST 3 DAYS  Recent Labs   Lab 11/15/20  0105 11/15/20  0511 11/15/20  1109   *  --   --    *  --   --    CPKMB 10.0*  --   --    TROPONINI 8.076* 7.381* 4.935*       ENDOCRINE LAST 3 DAYS  No results for input(s): TSH, PROCAL in the last 168 hours.    LAST ARTERIAL BLOOD GAS  ABG  No results for input(s): PH, PO2, PCO2, HCO3, BE in the last 168 hours.    LAST 7 DAYS MICROBIOLOGY   Microbiology Results (last 7 days)     Procedure Component Value Units Date/Time    Blood culture [[id number]] Collected: 11/18/20 0338    Order Status: Completed Specimen: Blood Updated: 11/19/20 0632     Blood Culture, Routine No Growth to date      No Growth to date    Blood culture [[id number]]  (Abnormal)  (Susceptibility) Collected: 11/15/20 0115    Order Status: Completed Specimen: Blood from Peripheral, Forearm, Left Updated: 11/18/20 0636     Blood Culture, Routine Gram stain talia bottle: Gram positive cocci      Results called to and read back by:Roderick Murray RN-B-CARD;  11/15/2020       22:34 CJD      Gram stain aer bottle: Gram positive cocci      Positive results previously called      STAPHYLOCOCCUS LUGDUNENSIS    Blood culture [[id number]]  (Abnormal)  (Susceptibility) Collected: 11/15/20 0100    Order Status: Completed Specimen: Blood from Peripheral, Wrist, Left Updated: 11/18/20 0600     Blood Culture, Routine Gram stain talia bottle: Gram positive cocci      Results called to and read back by:Roderick Murray RN-BCARD;  11/15/2020        22:33 CJD      Gram stain aer bottle: Gram positive cocci      Results called to and read back by:Alix Aragon RN-B-CARD;        11/16/2020  02:01 CJD      STAPHYLOCOCCUS LUGDUNENSIS    Stool culture [[id number]] Collected: 11/15/20 1129    Order Status: Completed Specimen: Stool Updated: 11/17/20 0737     Stool  Culture No Salmonella,Shigella,Vibrio,Campylobacter.      No E coli 0157:H7 isolated.    Clostridium difficile EIA [[id number]] Collected: 11/15/20 1129    Order Status: Completed Specimen: Stool Updated: 11/15/20 1646     C. diff Antigen Negative     C difficile Toxins A+B, EIA Negative     Comment: Testing not recommended for children <24 months old.             MOST RECENT IMAGING  US Lower Extremity Veins Bilateral  CLINICAL HISTORY:  84 years (1935) Male fever, history of DVT    TECHNIQUE:  US LOWER EXTREMITY VEINS BILATERAL.  53 images obtained. Ultrasound  examination of both lower extremity deep venous systems was performed  using grayscale, color and spectral Doppler    COMPARISON:  Most recent ultrasound from May 1, 2020.    FINDINGS:  RIGHT: There appears to be a duplicated superficial femoral vein with  heterogeneous low level echoes and incomplete compressibility in the  right mid superficial femoral vein (in keeping with partially  occlusive thrombus). The remainder of the right common femoral,  saphenofemoral junction, femoral, and popliteal veins demonstrate a  color flow without finding of thrombus. The visualized portions of the  right proximal calf veins are normal.    LEFT: Heterogeneous low level echoes with incomplete compressibility  in the left common femoral vein to the level of the saphenofemoral  junction, with nonocclusive thrombus extending into the superficial  femoral vein and popliteal vein. The visualized portions of the left  proximal calf veins are normal.    IMPRESSION:  1. Partially occlusive deep venous thrombus in the left common femoral  vein, superficial femoral vein and popliteal vein (slightly worse when  compared to the most recent exam).  2. Partially occlusive thrombus in the right mid superficial femoral  vein, which appears to be duplicated.    RESULT NOTIFICATION: These observations were discussed by the  dictating physician by phone with, and acknowledged by  Jayleen Villanueva  RN at 11/17/2020 3:47 PM.  .    Electronically Signed by SLIME Davenport on 11/17/2020 3:53 PM  Echo Color Flow Doppler? Yes; Bubble Contrast? No  · There is left ventricular concentric remodeling.  · The left ventricle is normal in size with normal systolic function. The   estimated ejection fraction is 60%.  · Grade III diastolic dysfunction.  · Normal right ventricular size with normal right ventricular systolic   function.  · Mild left atrial enlargement.  · Moderate aortic regurgitation.  · Mild mitral regurgitation.  · Intermediate central venous pressure (8 mmHg).  · The estimated PA systolic pressure is 37 mmHg.         LASTECHO  No results found for this or any previous visit.    CURRENT/PREVIOUS VISIT EKG  Results for orders placed or performed during the hospital encounter of 11/15/20   EKG 12-lead    Collection Time: 11/15/20 12:57 AM    Narrative    Test Reason : R07.9,    Vent. Rate : 076 BPM     Atrial Rate : 076 BPM     P-R Int : 146 ms          QRS Dur : 114 ms      QT Int : 378 ms       P-R-T Axes : 039 -04 046 degrees     QTc Int : 425 ms    Normal sinus rhythm  LVH with repolarization abnormality  Abnormal ECG  When compared with ECG of 22-SEP-2020 15:03,  ST now depressed in Anterior-lateral leads  Nonspecific T wave abnormality, improved in Lateral leads  Confirmed by Esvin Lee MD ([id number]) on 11/19/2020 6:14:40 AM    Referred By: ROHAN   SELF           Confirmed By:Esvin Lee MD       ASSESSMENT/PLAN:     Active Hospital Problems    Diagnosis    *NSTEMI (non-ST elevated myocardial infarction)    FUO (fever of unknown origin)    Gram-positive bacteremia    Diarrhea of presumed infectious origin    Type 2 diabetes mellitus without complication, with long-term current use of insulin    CHF (congestive heart failure), NYHA class III, chronic, systolic    Chronic respiratory failure with hypoxia, on home oxygen therapy    S/P CABG (coronary artery bypass  graft)    CKD (chronic kidney disease) stage 3, GFR 30-59 ml/min    CAD (coronary artery disease)    Adrenal insufficiency       ASSESSMENT & PLAN:     84-year-old gentleman admitted with profound generalized weakness and last Thursday with some changes in mental status as well.  Kathy low-grade fevers diarrhea is noted he has tested negative for COVID-19    Elevated Troponin - nonspecific probably secondary demand ischemia multifactorial  Acute on chronic kidney disease.  Fever.  Low-grade.  SARS COVID-19 negative  Positive Blood Wuocxjbk-[id number]-MUTORVWY STAPHYLOCOCCUS SPECIES  Progressive Weakness and Fatigue  Coronary artery disease status post CABG.  History of DVTs status post IVC filter placement.  Diabetes mellitus.  Dyslipidemia.       RECOMMENDATION    Place Lidoderma patch to his rib area that is hurting him  SHAYY in am  Risks and Benefits of the procedure have been explained to the patient and his wife  All questions have been answered.   Informed consent obtained.  See orders for further details      Saba Bower NP  [facility]  Department of Cardiology  Date of Service: 11/19/2020    Discussed with the patient's wife in detail regarding this plans informed consent obtained from  I have personally interviewed and examined the patient.  I have reviewed all the Nurse Practitioner's documentation, and agree with the plan.       Esvin Lee M.D.  [facility]  Department of Cardiology  Date of Service: 11/19/2020  10:57 AM

## 2020-11-19 NOTE — PT/OT/SLP PROGRESS
Physical Therapy Treatment    Patient Name:  Chon Hurtado   MRN:  [id number]    Recommendations:     Discharge Recommendations:  nursing facility, skilled   Discharge Equipment Recommendations: (TBD at next level of care)   Barriers to discharge: None    Assessment:     Chon Hurtado is a 84 y.o. male admitted with a medical diagnosis of NSTEMI (non-ST elevated myocardial infarction).  He presents with the following impairments/functional limitations:  weakness, impaired endurance, impaired self care skills, impaired functional mobilty, gait instability, impaired balance, impaired cognition, decreased safety awareness, impaired cardiopulmonary response to activity.    Patient presents resting in bed with HOB elevated; agreeable to PT treatment/OT eval. PTA co-treated for OT eval to maximize patient's functional potential. Patient endorses R lower back pain 8/10 and continues to require significant assistance for bed mobility and sit > stands today. Patient able to step around to chair with RW and Min Assist x 2 with slow, small steps. No LOB or SOB noted. Continue with PT and POC.    Rehab Prognosis: Good; patient would benefit from acute skilled PT services to address these deficits and reach maximum level of function.    Recent Surgery: * No surgery found *      Plan:     During this hospitalization, patient to be seen 6 x/week to address the identified rehab impairments via gait training, therapeutic activities, therapeutic exercises and progress toward the following goals:    · Plan of Care Expires:  12/02/20    Subjective     Chief Complaint: R lower back pain  Patient/Family Comments/goals:   Pain/Comfort:  · Pain Rating 1: 8/10  · Location - Side 1: Right  · Location - Orientation 1: lower  · Location 1: back  · Pain Addressed 1: Cessation of Activity, Distraction, Reposition, Nurse notified      Objective:     Communicated with RN prior to session.  Patient found HOB elevated with bed alarm, peripheral IV,  telemetry upon PT entry to room.     General Precautions: Standard, fall   Orthopedic Precautions:N/A   Braces:       Functional Mobility:  · Bed Mobility:     · Supine to Sit: maximal assistance and of 2 persons  · Transfers:     · Sit to Stand:  moderate assistance and of 2 persons with rolling walker  · Bed to Chair: minimum assistance and of 2 persons with  rolling walker  using  Step Transfer  · Gait: few steps to chair with RW and Min Assist x 2 for safety; slow, small steps      AM-PAC 6 CLICK MOBILITY          Therapeutic Activities and Exercises:   bed mobility; sitting EOB for trunk control and midline orientation; sit <> stands; transfer training    Patient left up in chair with all lines intact, call button in reach and pt's wife present..    GOALS:   Multidisciplinary Problems     Physical Therapy Goals        Problem: Physical Therapy Goal    Goal Priority Disciplines Outcome Goal Variances Interventions   Physical Therapy Goal     PT, PT/OT      Description: Goals to be met by:      Patient will increase functional independence with mobility by performin. Supine to/from sit with MInimal Assistance  2. Sit to stand transfer with Minimal Assistance  3. Bed to chair transfer with Minimal Assistance using Rolling Walker  4. Gait  x 50 feet with Minimal Assistance using Rolling Walker.                      Time Tracking:     PT Received On: 20  PT Start Time: 1012     PT Stop Time: 1046  PT Total Time (min): 34 min     Billable Minutes: Therapeutic Activity 34    Treatment Type: Treatment  PT/PTA: PTA     PTA Visit Number: Aide     Ashley Cheema PTA  2020

## 2020-11-19 NOTE — PROGRESS NOTES
* Note: Assessment completed remotely by review of EMR and with assistance by registered dietitians and other healthcare team members on premises as needed.*    [facility]  Adult Nutrition   Progress Note (Initial Assessment)     SUMMARY     Recommendations/Interventions:  1. Recommend liberalizing patients diet as medically able to increase variety of foods to promote better intake.  2. Added GLucerna MIlkshake + SF ice cream TID to aid in meeting needs when meal intake is insufficient. Will add benecalorie if CRP improves.  3. If poor PO intake continues, recommend adding an appetite stimulant due to patient with poor PO intake prior and during admit.  4. Recommend documentation of accurate I/O's to better assess patients nutritional intake.    Goals: 1. Patient to meet at least 75% of estimated needs via PO intake of meals and supplements. 2. Appetite stimulant considered if PO intake remains poor. 3. Liberalization of diet as medically able.  Nutrition Goal Status: new    Dietitian Rounds Brief:  · Seen 2' LOS. Patient presented with fever x 2 days, chest pain, diarrhea, abdominal pain, weakness. Admitted with NSTEMI. Patient has had poor PO intake since admit. Per wife, he is not eating anything. Glucerna was added by RN. Changed shake to Glucerna + sugar free ice cream to provide additional kcals. Recommend documentation of meal and supplement intake. Encourage intake of meals and supplements. Recommend liberalizing diet as able. Patient may benefit from an appetite stimulant if his poor PO intake continues. Poor PO intake prior and during admit. Will continue to monitor intake, labs, and plan of care.  Reason for Assessment  Reason For Assessment: length of stay  Diagnosis: (NSTEMI)  Relevant Medical History: CAD, CHF, COPD, T2DM, NSTEMI, PE  Interdisciplinary Rounds: did not attend    Nutrition Risk Screen  Nutrition Risk Screen: no indicators present     MST Score: 1  Have you recently lost  "weight without trying?: No  Weight loss score: 0  Have you been eating poorly because of a decreased appetite?: Yes  Appetite score: 1     Nutrition/Diet History  Food Allergies: NKFA  Factors Affecting Nutritional Intake: decreased appetite    Anthropometrics  Temp: 99.2 °F (37.3 °C)  Height Method: Stated  Height: 6' 2" (188 cm)  Height (inches): 74 in  Weight Method: Bed Scale  Weight: 102.6 kg (226 lb 3.1 oz)  Weight (lb): 226.19 lb  Ideal Body Weight (IBW), Male: 190 lb  % Ideal Body Weight, Male (lb): 113.48 %  BMI (Calculated): 29  BMI Grade: 25 - 29.9 - overweight     Weight History:  Wt Readings from Last 10 Encounters:   11/19/20 102.6 kg (226 lb 3.1 oz)   11/15/20 97.5 kg (215 lb)   11/12/20 93.4 kg (206 lb)   11/06/20 94.4 kg (208 lb 1.8 oz)   09/02/20 96.4 kg (212 lb 8.4 oz)   07/23/20 94.8 kg (209 lb)   05/12/20 99.8 kg (220 lb)   12/13/19 94.7 kg (208 lb 12.4 oz)   11/07/19 91 kg (200 lb 11.7 oz)   10/28/19 92.6 kg (204 lb 2.3 oz)     Lab/Procedures/Meds: Pertinent Labs Reviewed  Clinical Chemistry:  Recent Labs   Lab 11/15/20  0105 11/17/20  0333 11/19/20  0420    137 137   K 3.4* 4.0 4.2    110 109   CO2 21* 19* 20*   GLU 85 158* 190*   BUN 33* 34* 39*   CREATININE 1.9* 1.8* 1.8*   CALCIUM 9.7 9.5 9.6   PROT 6.2 5.6*  --    ALBUMIN 2.9* 2.5*  --    BILITOT 0.9 1.3*  --    ALKPHOS 59 59  --    AST 49* 23  --    ALT 23 19  --    ANIONGAP 8 8 8   ESTGFRAFRICA 37.1* 39.6* 39.6*   EGFRNONAA 32.1* 34.3* 34.3*   MG 1.6 2.0  --    AMYLASE 60  --   --    LIPASE 26  --   --     < > = values in this interval not displayed.     CBC:   Recent Labs   Lab 11/19/20  0420   WBC 12.22   RBC 3.42*   HGB 9.6*   HCT 30.4*   *   MCV 89   MCH 28.1   MCHC 31.6*     Cardiac Profile:  Recent Labs   Lab 11/15/20  0105 11/15/20  0511 11/15/20  1109   *  --   --    *  --   --    CPKMB 10.0*  --   --    TROPONINI 8.076* 7.381* 4.935*     Inflammatory Labs:  Recent Labs   Lab 11/17/20  0333 " 11/18/20  0338 11/19/20  0420   CRP 18.02* 19.40* 16.80*     Diabetes:  Recent Labs   Lab 11/15/20  0511   HGBA1C 8.3*     Medications: Pertinent Medications reviewed  Scheduled Meds:   aspirin  81 mg Oral Daily    calcitRIOL  0.25 mcg Oral Daily    ceFAZolin (ANCEF) IVPB  2 g Intravenous Q8H    cyanocobalamin  2,000 mcg Oral Daily    cyproheptadine  4 mg Oral QHS    enoxparin  1 mg/kg Subcutaneous Q24H    ferrous sulfate  325 mg Oral Daily with breakfast    insulin detemir U-100  10 Units Subcutaneous QHS    Lactobacillus acidoph-L.bulgar  4 tablet Oral TID WM    memantine  5 mg Oral BID    metoprolol succinate  25 mg Oral Daily    sertraline  50 mg Oral Daily     Continuous Infusions:   lactated ringers Stopped (11/16/20 1719)     PRN Meds:.acetaminophen, calcium chloride IVPB, calcium chloride IVPB, calcium chloride IVPB, dextrose 50%, dextrose 50%, famotidine, HYDROcodone-acetaminophen, insulin aspart U-100, insulin regular, magnesium oxide, magnesium sulfate IVPB, magnesium sulfate IVPB, magnesium sulfate IVPB, magnesium sulfate IVPB, meclizine, potassium chloride in water, potassium chloride in water, potassium chloride in water, potassium chloride in water, potassium chloride, potassium chloride, potassium chloride, potassium chloride, promethazine (PHENERGAN) IVPB, promethazine (PHENERGAN) IVPB, sodium phosphate IVPB, sodium phosphate IVPB, sodium phosphate IVPB, sodium phosphate IVPB, sodium phosphate IVPB    Antibiotics (From admission, onward)    Start     Stop Route Frequency Ordered    11/18/20 2130  cefazolin (ANCEF) 2 gram in dextrose 5% 50 mL IVPB (premix)      -- IV Every 8 hours (non-standard times) 11/18/20 2020        Estimated/Assessed Needs  Weight Used For Calorie Calculations: 102.6 kg (226 lb 3.1 oz)  Energy Calorie Requirements (kcal): 0221-4815 kcals/day (20-25 kcals/kg)  Energy Need Method: Kcal/kg  Protein Requirements: 62-82 g/day (0.6-0.8 g/kg 2' GFR)  Weight Used For  Protein Calculations: 102.6 kg (226 lb 3.1 oz)     Estimated Fluid Requirement Method: RDA Method  RDA Method (mL): 2052     Nutrition Prescription Ordered    Current Diet Order: Cardiac Diet  Oral Nutrition Supplement: Glucerna TID    Evaluation of Received Nutrient/Fluid Intake    Energy Calories Required: not meeting needs  Protein Required: not meeting needs  Fluid Required: meeting needs  Tolerance: tolerating  % Intake of Estimated Energy Needs: 0 - 25 %  % Meal Intake: 0 - 25 %    Intake/Output Summary (Last 24 hours) at 11/19/2020 0859  Last data filed at 11/18/2020 1830  Gross per 24 hour   Intake 1090 ml   Output 500 ml   Net 590 ml      Nutrition Risk    Level of Risk/Frequency of Follow-up: high   Monitor and Evaluation    Food and Nutrient Intake: food and beverage intake, energy intake  Food and Nutrient Adminstration: diet order  Physical Activity and Function: nutrition-related ADLs and IADLs, factors affecting access to physical activity  Anthropometric Measurements: weight, weight change, body mass index  Biochemical Data, Medical Tests and Procedures: electrolyte and renal panel, inflammatory profile, gastrointestinal profile, lipid profile, glucose/endocrine profile  Nutrition-Focused Physical Findings: overall appearance     Nutrition Follow-Up    RD Follow-up?: Yes  Jaqueline Gallagher RD 11/19/2020 9:05 AM

## 2020-11-19 NOTE — PROGRESS NOTES
[facility] Medicine  Progress Note    Patient Name: Chon Hurtado  MRN: [id number]  Patient Class: IP- Inpatient   Admission Date: 11/15/2020  Length of Stay: 4 days  Attending Physician: Dre Farmer MD  Primary Care Provider: Doug Whitaker MD        Subjective:     Principal Problem:NSTEMI (non-ST elevated myocardial infarction)    Interval History:    Patient is seen and examined  Is more active and alert and sitting on the chair  Afebrile (99) since yesterday  Repeat blood cultures still positive came back with Gram-positive cocci and patient was planned for SHAYY tomorrow  + RA test noted.     Review of Systems  Objective:     Vital Signs (Most Recent):  Temp: 97.5 °F (36.4 °C) (11/19/20 1157)  Pulse: 74 (11/19/20 1157)  Resp: 20 (11/19/20 1427)  BP: (!) 146/58 (11/19/20 1157)  SpO2: 98 % (11/19/20 1157) Vital Signs (24h Range):  Temp:  [97.5 °F (36.4 °C)-99.2 °F (37.3 °C)] 97.5 °F (36.4 °C)  Pulse:  [71-88] 74  Resp:  [16-20] 20  SpO2:  [94 %-98 %] 98 %  BP: (105-146)/(52-82) 146/58     Weight: 102.6 kg (226 lb 3.1 oz)  Body mass index is 29.04 kg/m².    Intake/Output Summary (Last 24 hours) at 11/19/2020 1501  Last data filed at 11/18/2020 1830  Gross per 24 hour   Intake 970 ml   Output 300 ml   Net 670 ml      Physical Exam    Physical Exam:  General-alert   HEENT- PERRLA, EOMI,  Neck- No JVD, Lymphadenopathy, no neck pain   CV- Regular rate and rhythm, No Murmur/slava/rubs  Resp- Lungs CTA Bilaterally, No increased WOB  GI- Non tender/non-distended  Extrem- No cyanosis, clubbing, edema.   Neuro- non focal   Skin-  No masses, rashes or lesions noted on cursory skin exam.  Overview/Hospital Course:    Significant Labs:   CBC:   Recent Labs   Lab 11/18/20  0816 11/19/20  0420   WBC 13.10* 12.22   HGB 9.4* 9.6*   HCT 29.8* 30.4*   * 143*     CMP:   Recent Labs   Lab 11/18/20  0815 11/19/20  0420   * 137   K 3.9 4.2    109   CO2 19* 20*   * 190*   BUN 36*  39*   CREATININE 1.8* 1.8*   CALCIUM 9.9 9.6   ANIONGAP 10 8   EGFRNONAA 34.3* 34.3*     Cardiac Markers: No results for input(s): CKMB, MYOGLOBIN, BNP, TROPISTAT in the last 48 hours.    Significant Imaging: I have reviewed all pertinent imaging results/findings within the past 24 hours.    Assessment/Plan:      Active Diagnoses:    Diagnosis Date Noted POA    PRINCIPAL PROBLEM:  NSTEMI (non-ST elevated myocardial infarction) [I21.4] 11/15/2020 Yes    FUO (fever of unknown origin) [R50.9]  Yes    Gram-positive bacteremia [R78.81] 11/16/2020 Yes    Diarrhea of presumed infectious origin [R19.7] 11/15/2020 Yes    Type 2 diabetes mellitus without complication, with long-term current use of insulin [E11.9, Z79.4] 11/15/2020 Not Applicable    CHF (congestive heart failure), NYHA class III, chronic, systolic [I50.22] 01/03/2020 Yes    Chronic respiratory failure with hypoxia, on home oxygen therapy [J96.11, Z99.81] 01/03/2020 Not Applicable    S/P CABG (coronary artery bypass graft) [Z95.1] 03/21/2018 Not Applicable    CKD (chronic kidney disease) stage 3, GFR 30-59 ml/min [N18.30] 12/21/2016 Yes     Chronic    CAD (coronary artery disease) [I25.10]  Yes    Adrenal insufficiency [E27.40]  Yes      Problems Resolved During this Admission:     ASSESSMENT AND PLAN   Unsure source of fevers . Possible from GI tract , prostate , contamination or from chronic DVTs with ashley   Echo with no vegetations, repeat blood cultures so far neg  cefazolin  SHAYY tomorrow   Continue  Asa and lovenox    f/u stool studies PCR   SNF  placement        VTE Risk Mitigation (From admission, onward)         Ordered     enoxaparin injection 100 mg  Every 24 hours (non-standard times)      11/16/20 1201     IP VTE HIGH RISK PATIENT  Once      11/15/20 0453     Place sequential compression device  Until discontinued      11/15/20 0453     Reason for No Pharmacological VTE Prophylaxis  Once     Question:  Reasons:  Answer:  Already  adequately anticoagulated on oral Anticoagulants    11/15/20 0453                   Dre Farmer MD  Department of Hospital Medicine   [facility]

## 2020-11-19 NOTE — PLAN OF CARE
Problem: Occupational Therapy Goal  Goal: Occupational Therapy Goal  Description: Goals to be met by: D/C     Patient will increase functional independence with ADLs by performing:    Feeding with Set-up Assistance.  UE Dressing with Stand-by Assistance.  LE Dressing with Minimal Assistance.  Grooming while seated at sink with Stand-by Assistance.  Toileting from bedside commode with Minimal Assistance for hygiene and clothing management.   Toilet transfer to bedside commode with Minimal Assistance.    Outcome: Ongoing, Progressing

## 2020-11-20 NOTE — PLAN OF CARE
Problem: Oral Intake Inadequate  Goal: Improved Oral Intake  Outcome: Ongoing, Not Progressing  Intervention: Promote and Optimize Oral Intake  Flowsheets (Taken 11/20/2020 114)  Oral Nutrition Promotion: calorie dense liquids provided

## 2020-11-20 NOTE — ANESTHESIA POSTPROCEDURE EVALUATION
Anesthesia Post Evaluation    Patient: Chon D Hurtado    Procedure(s) Performed: Procedure(s) (LRB):  Transesophageal echo (SHAYY) intra-procedure log documentation (N/A)    Final Anesthesia Type: MAC    Patient location during evaluation: OPS  Patient participation: Yes- Able to Participate  Level of consciousness: awake and alert  Post-procedure vital signs: reviewed and stable  Pain management: adequate  Airway patency: patent    PONV status at discharge: No PONV  Anesthetic complications: no      Cardiovascular status: hemodynamically stable  Respiratory status: unassisted, spontaneous ventilation and nasal cannula  Hydration status: euvolemic  Follow-up not needed.          Vitals Value Taken Time   /53 11/20/20 0723   Temp 37.1 °C (98.7 °F) 11/20/20 0723   Pulse 82 11/20/20 0723   Resp 19 11/20/20 0723   SpO2 96 % 11/20/20 0723         No case tracking events are documented in the log.      Pain/Kevin Score: Pain Rating Prior to Med Admin: 10 (11/19/2020  2:27 PM)

## 2020-11-20 NOTE — PROGRESS NOTES
[facility] Medicine  Progress Note    Patient Name: Chon Hurtado  MRN: [id number]  Patient Class: IP- Inpatient   Admission Date: 11/15/2020  Length of Stay: 5 days  Attending Physician: Dre Farmer MD  Primary Care Provider: Doug Whitaker MD        Subjective:     Principal Problem:NSTEMI (non-ST elevated myocardial infarction)    Interval History:    Patient was seen and examined  Yon was done with no clots are no signs or infection   Review of Systems  Objective:     Vital Signs (Most Recent):  Temp: 98.6 °F (37 °C) (11/20/20 1157)  Pulse: 80 (11/20/20 1157)  Resp: 18 (11/20/20 1218)  BP: 130/62 (11/20/20 1157)  SpO2: 98 % (11/20/20 1157) Vital Signs (24h Range):  Temp:  [97.3 °F (36.3 °C)-98.7 °F (37.1 °C)] 98.6 °F (37 °C)  Pulse:  [66-82] 80  Resp:  [14-20] 18  SpO2:  [96 %-100 %] 98 %  BP: (100-150)/(53-68) 130/62     Weight: 95.6 kg (210 lb 12.2 oz)  Body mass index is 27.06 kg/m².    Intake/Output Summary (Last 24 hours) at 11/20/2020 1544  Last data filed at 11/20/2020 1330  Gross per 24 hour   Intake 797.5 ml   Output 500 ml   Net 297.5 ml      Physical Exam    Physical Exam:  General-alert   HEENT- PERRLA, EOMI,  Neck- No JVD, Lymphadenopathy, no neck pain   CV- Regular rate and rhythm, No Murmur/slava/rubs  Resp- Lungs CTA Bilaterally, No increased WOB  GI- Non tender/non-distended  Extrem- No cyanosis, clubbing, edema.   Neuro- non focal   Skin-  No masses, rashes or lesions noted on cursory skin exam.  Overview/Hospital Course:    Significant Labs:   CBC:   Recent Labs   Lab 11/19/20  0420   WBC 12.22   HGB 9.6*   HCT 30.4*   *     CMP:   Recent Labs   Lab 11/19/20  0420      K 4.2      CO2 20*   *   BUN 39*   CREATININE 1.8*   CALCIUM 9.6   ANIONGAP 8   EGFRNONAA 34.3*     Cardiac Markers: No results for input(s): CKMB, MYOGLOBIN, BNP, TROPISTAT in the last 48 hours.    Significant Imaging: I have reviewed all pertinent imaging  results/findings within the past 24 hours.    Assessment/Plan:      Active Diagnoses:    Diagnosis Date Noted POA    PRINCIPAL PROBLEM:  NSTEMI (non-ST elevated myocardial infarction) [I21.4] 11/15/2020 Yes    FUO (fever of unknown origin) [R50.9]  Yes    Gram-positive bacteremia [R78.81] 11/16/2020 Yes    Diarrhea of presumed infectious origin [R19.7] 11/15/2020 Yes    Type 2 diabetes mellitus without complication, with long-term current use of insulin [E11.9, Z79.4] 11/15/2020 Not Applicable    CHF (congestive heart failure), NYHA class III, chronic, systolic [I50.22] 01/03/2020 Yes    Chronic respiratory failure with hypoxia, on home oxygen therapy [J96.11, Z99.81] 01/03/2020 Not Applicable    S/P CABG (coronary artery bypass graft) [Z95.1] 03/21/2018 Not Applicable    CKD (chronic kidney disease) stage 3, GFR 30-59 ml/min [N18.30] 12/21/2016 Yes     Chronic    CAD (coronary artery disease) [I25.10]  Yes    Adrenal insufficiency [E27.40]  Yes      Problems Resolved During this Admission:     ASSESSMENT AND PLAN   Unsure source of fevers . Possible from GI tract , prostate , contamination or from chronic DVTs with ashley , repeat blood cultures so far neg  cefazolin  SHAYY negative   Continue  Asa and lovenox    f/u stool studies PCR   For nuclear medicine infection localization scan /bone scan .  Serial blood cultures        VTE Risk Mitigation (From admission, onward)         Ordered     enoxaparin injection 100 mg  Every 24 hours (non-standard times)      11/16/20 1201     IP VTE HIGH RISK PATIENT  Once      11/15/20 0453     Place sequential compression device  Until discontinued      11/15/20 0453     Reason for No Pharmacological VTE Prophylaxis  Once     Question:  Reasons:  Answer:  Already adequately anticoagulated on oral Anticoagulants    11/15/20 0453                   Dre Farmer MD  Department of Hospital Medicine   [facility]

## 2020-11-20 NOTE — PLAN OF CARE
Problem: Fall Injury Risk  Goal: Absence of Fall and Fall-Related Injury  Outcome: Ongoing, Progressing     Problem: Infection  Goal: Infection Symptom Resolution  Outcome: Ongoing, Progressing     Problem: Adult Inpatient Plan of Care  Goal: Plan of Care Review  Outcome: Ongoing, Progressing  Goal: Patient-Specific Goal (Individualization)  Outcome: Ongoing, Progressing  Goal: Absence of Hospital-Acquired Illness or Injury  Outcome: Ongoing, Progressing  Goal: Optimal Comfort and Wellbeing  Outcome: Ongoing, Progressing  Goal: Readiness for Transition of Care  Outcome: Ongoing, Progressing  Goal: Rounds/Family Conference  Outcome: Ongoing, Progressing     Problem: Diabetes Comorbidity  Goal: Blood Glucose Level Within Desired Range  Outcome: Ongoing, Progressing     Problem: Skin Injury Risk Increased  Goal: Skin Health and Integrity  Outcome: Ongoing, Progressing     Problem: Oral Intake Inadequate  Goal: Improved Oral Intake  Outcome: Ongoing, Progressing

## 2020-11-20 NOTE — PT/OT/SLP PROGRESS
Physical Therapy      Patient Name:  Chon Hurtado   MRN:  [id number]    Patient not seen today secondary to Other (Comment)(first attempt- patient in SHAYY; second attempt- patient and wife report he just got pain medication and is too tired for mobility at current time). Will follow-up 11/21/20.    Ashley Cheema PTA

## 2020-11-20 NOTE — PT/OT/SLP PROGRESS
Occupational Therapy      Patient Name:  Chon Hurtado   MRN:  [id number]    Patient not seen today secondary to off the floor for SHAYY.     Alejandro Oconnor, OT  11/20/2020

## 2020-11-20 NOTE — PROGRESS NOTES
Consult Note  Infectious Disease    Reason for Consult:      HPI: Chon Hurtado is an  84 y.o. male  Known to me from prior consultations, who Presented to the emergency room 11/15 with complaints of subjective fever x2 days, intermittent chest pain (pleuritic in nature as well as radiating to the jaw), multiple loose stools, abdominal discomfort, generalized weakness.  In the emergency room his temperature was 100.1, mild generalized discomfort creatinine 1.9, BUN 33 BNP 6 7 , troponin 8.0. CT scan of the abdomen and pelvis showed small trace bilateral pleural effusions, IVC filter, sequela of prostatectomy, questionable mucosal thickening and air-fluid in the distal rectum.  He was felt to have a ST elevation myocardial infarction and was admitted to the for cardiology evaluation and stool studies.  Was seen by Cardiology noted the patient reportedly had also been confused and hallucinating prior to admission as well as being unsteady his feet.  Late in the evening of the 15th 1 of his blood cultures became positive for Gram-positive cocci and vancomycin and Zosyn were started.  His fever did escalated to 02.4 this morning at 0325. Blood cultures have all grown coagulase-negative Staph. stool for Cdiff, OCP, white blood and culture are negative. UA was negative for pyuria.    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  In the past he required cephalexin suppression for recurrent cellulitis and persistent tinea pedis.  ED visit in June for lower extremity edema  ED visit in late July for rectal bleeding felt   ED visit 09/22 for facial swelling and shortness of breath having run out of his diuretics.  CTA with no evidence of pulmonary embolism.  Seen by Hematology/Oncology on 11/06 for prostate cancer with complaints of left neck and rib pain prompting xrays (?angina then)      11/18: fever curve is improved. Blood cultures with STaph  lugdenensis which is likely a pathogen. Sensitive to oxacillin. Cortisol WNL. He is more alert, has been up to the chair. Not eating a thing per wife.   11/19: afebrile. With max assistance he was able to walk with rolling walking to chair. Yesterday's blood culture became positive this am. CRP a little lower. Plans for SHAYY tomorrow noted and appreciated. RF mildly elevated.  He is still not eating, reports that the protein shakes are too sweet.  He has no complaints  11/20: in good spirits. Still not eating. SHAYY negative for endocarditis. He looks and feels better. Blood cultures from 11/18 with coag neg staph, has not been identified yet.     EXAM & DIAGNOSTICS REVIEWED:   Vitals:     Temp:  [97.3 °F (36.3 °C)-98.7 °F (37.1 °C)]   Temp: 98.1 °F (36.7 °C) (11/20/20 1643)  Pulse: 74 (11/20/20 1643)  Resp: 19 (11/20/20 1643)  BP: (!) 163/66 (11/20/20 1643)  SpO2: 97 % (11/20/20 1643)    Intake/Output Summary (Last 24 hours) at 11/20/2020 1654  Last data filed at 11/20/2020 1330  Gross per 24 hour   Intake 797.5 ml   Output 500 ml   Net 297.5 ml       General:  In NAD. Awake and attentive, cooperative,  comfortable  Eyes:  Anicteric, , EOMI, no scleral or conjunctival lesions  ENT:  No ulcers, exudates, thrush, nares patent, dentition is good.    Neck:  Supple,   Lungs: clear  Heart:  RRR, no gallop/murmur/rub noted  Abd:  Soft, mild generalized tenderness, ND, normal BS, no masses or organomegaly appreciated. 11/17: External hemorrhoids, no perirectal abscess, internal exam not performed.  :  Voids/ incontinent, urine clear, no flank tenderness, no scrotal swelling  Musc:  Joints without effusion, swelling, erythema, synovitis,  But he is less stiff all over.    Skin:  No rashes. No palmar or plantar lesions. No subungual petechiae. Tinea pedis left foot  Wound:   Neuro:              Alert, attentive, speech fluent, face symmetric, moves all extremities, generalizedweakness.  He cannot raise his thighs off of the  bed but does have good plantar flexion Not Ambulatory but max assist x2 for transfer from bed to chair  Psych:  Calm, cooperative  Lymphatic:        Extrem: Chronic BLE L>R edema, no erythema, phlebitis, cellulitis, warm and well perfused  VAD:  peripheral     Isolation:  none    Lines/Tubes/Drains:    General Labs reviewed:  Recent Labs   Lab 11/17/20 0333 11/18/20  0816 11/19/20 0420   WBC 12.75* 13.10* 12.22   HGB 9.3* 9.4* 9.6*   HCT 29.4* 29.8* 30.4*   * 141* 143*       Recent Labs   Lab 11/15/20  0105  11/17/20 0333 11/18/20  0815 11/19/20 0420      < > 137 134* 137   K 3.4*   < > 4.0 3.9 4.2      < > 110 105 109   CO2 21*   < > 19* 19* 20*   BUN 33*   < > 34* 36* 39*   CREATININE 1.9*   < > 1.8* 1.8* 1.8*   CALCIUM 9.7   < > 9.5 9.9 9.6   PROT 6.2  --  5.6*  --   --    BILITOT 0.9  --  1.3*  --   --    ALKPHOS 59  --  59  --   --    ALT 23  --  19  --   --    AST 49*  --  23  --   --     < > = values in this interval not displayed.     Recent Labs   Lab 11/18/20 0338 11/19/20 0420 11/20/20 0433   CRP 19.40* 16.80* 15.42*   \        Micro:  Microbiology Results (last 7 days)     Procedure Component Value Units Date/Time    Blood culture [[id number]] Collected: 11/20/20 0433    Order Status: Completed Specimen: Blood from Antecubital, Left Updated: 11/20/20 1117     Blood Culture, Routine No Growth to date    Blood culture [[id number]]  (Abnormal) Collected: 11/18/20 0338    Order Status: Completed Specimen: Blood Updated: 11/20/20 0727     Blood Culture, Routine Gram stain aer bottle: Gram positive cocci      Results called to and read back by: Bridgett Perez RN on Cardio B,       11/19/2020 11:15 vcb      COAGULASE-NEGATIVE STAPHYLOCOCCUS SPECIES  Organism is a probable contaminant      Blood culture [[id number]]  (Abnormal)  (Susceptibility) Collected: 11/15/20 0115    Order Status: Completed Specimen: Blood from Peripheral, Forearm, Left Updated: 11/18/20 0636     Blood Culture,  Routine Gram stain talia bottle: Gram positive cocci      Results called to and read back by:Roderick Murray RN-B-CARD;  11/15/2020       22:34 CJD      Gram stain aer bottle: Gram positive cocci      Positive results previously called      STAPHYLOCOCCUS LUGDUNENSIS    Blood culture [[id number]]  (Abnormal)  (Susceptibility) Collected: 11/15/20 0100    Order Status: Completed Specimen: Blood from Peripheral, Wrist, Left Updated: 11/18/20 0600     Blood Culture, Routine Gram stain talia bottle: Gram positive cocci      Results called to and read back by:Roderick Murray RN-BCARD;  11/15/2020        22:33 CJD      Gram stain aer bottle: Gram positive cocci      Results called to and read back by:Alix Aragon RN-B-CARD;        11/16/2020  02:01 CJD      STAPHYLOCOCCUS LUGDUNENSIS    Stool culture [[id number]] Collected: 11/15/20 1129    Order Status: Completed Specimen: Stool Updated: 11/17/20 0737     Stool Culture No Salmonella,Shigella,Vibrio,Campylobacter.      No E coli 0157:H7 isolated.    Clostridium difficile EIA [[id number]] Collected: 11/15/20 1129    Order Status: Completed Specimen: Stool Updated: 11/15/20 1646     C. diff Antigen Negative     C difficile Toxins A+B, EIA Negative     Comment: Testing not recommended for children <24 months old.           Imaging Reviewed:   CXR   CT head negative  CT abd/pelvis  SMALL TRACE BILATERAL PLEURAL EFFUSIONS. STATUS POST CABG.  STATUS POST CHOLECYSTECTOMY.  RIGHT RENAL CYST.  INFERIOR VENA CAVA FILTER IN PLACE.  STATUS POST PROSTATECTOMY WITH LYMPH NODE DISSECTION.  QUESTIONABLE MUCOSAL THICKENING AND/OR FLUID OF THE DISTAL RECTUM    US BLE  IMPRESSION:  1. Partially occlusive deep venous thrombus in the left common femoral  vein, superficial femoral vein and popliteal vein (slightly worse when  compared to the most recent exam).  2. Partially occlusive thrombus in the right mid superficial femoral  vein, which appears to be duplicated    Cardiology:  TTE 1115  · There  is left ventricular concentric remodeling.  · The left ventricle is normal in size with normal systolic function. The estimated ejection fraction is 60%.  · Grade III diastolic dysfunction.  · Normal right ventricular size with normal right ventricular systolic function.  · Mild left atrial enlargement.  · Moderate aortic regurgitation.  · Mild mitral regurgitation.  · Intermediate central venous pressure (8 mmHg).  · The estimated PA systolic pressure is 37 mmHg    SHAYY 11/20  · The left ventricle is normal in size with normal systolic function. The estimated ejection fraction is 60%.  · Normal left ventricular diastolic function.  · There is mild left ventricular concentric hypertrophy.  · Normal right ventricular size with normal right ventricular systolic function.  · Moderate left atrial enlargement.  · Mild right atrial enlargement.  · Mild-to-moderate aortic regurgitation.  · Mild-to-moderate mitral regurgitation.  · Mild tricuspid regurgitation.  · No valvular vegetation seen.  ·   IMPRESSION & PLAN   1. Fever  2. Coag neg staph in blood is Staph lugdenensis and is usually a pathogen, similar to STaph aureus   11/18 with coag neg staph also, ID pending   Only indwelling foreign bodies are IVC filter, coronary stents   No focus of infection on CT abd/pelvis, or physical exam, SHAYY negative    3. 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 ?history of adrenal insufficiency, ?on no replacement(wife denies this diagnosis)  5. ?proctitis  6. Rheumatic diagnosis? Very stiff, some jaw pain(on left?) and temporal discomfort on right? No acute arthritis on exam      Recommendations:  Continue cefazolin  Serial blood cultures   , will pursue  WBC scan     GI pcr panel pending     trend CRP     D/w wife 11/18    Medical Decision Making during this encounter was  [_] Low Complexity  [_] Moderate Complexity  [ x ] High Complexity

## 2020-11-20 NOTE — PROGRESS NOTES
"[facility]  Adult Nutrition  Progress Note    SUMMARY     Recommendations/Interventions:  1. Recommend liberalizing patients diet as medically able to increase variety of foods to promote better intake.  2. Add Unjury + benecalorie to aid in meeting needs when meal intake is insufficient.  3. Continue appetite stimulant due to patient with poor PO intake prior and during admit.  4. Recommend documentation of accurate I/O's to better assess patients nutritional intake.    Goals: 1. Patient to meet at least 75% of estimated needs via PO intake of meals and supplements. 2. Liberalization of diet as medically able  Nutrition Goal Status: progressing towards goal     Dietitian Rounds Brief:  Seen for follow up. Patient with poor PO intake prior and during admit. Per MD progress note, he is still not eating, reports that the protein shakes are too sweet.     Patient in procedure at time of visit, wife at bedside. Wife states patient continues to not have an appetite. Reports he was able to consume a few bites of eggs for breakfast, and a few sips of vegetable soup she brought him for lunch. Added Unjury + Benecalorie TID for him to try. Wife reports he has had some mild chewing difficulties d/t dry mouth. Significant wt loss (2.3%) <1 week per EMR. Will continue to monitor intake, labs, and plan of care.    Reason for Assessment  Reason For Assessment: RD follow-up  Diagnosis: other (see comments)(NSTEMI)  Relevant Medical History: CAD; CHF; COPD; T2DM; NSTEMI; PE  Interdisciplinary Rounds: did not attend    Nutrition Risk Screen  Nutrition Risk Screen: no indicators present    Nutrition/Diet History  Spiritual, Cultural Beliefs, Gnosticism Practices, Values that Affect Care: no  Food Allergies: NKFA  Factors Affecting Nutritional Intake: decreased appetite    Anthropometrics  Temp: 98.7 °F (37.1 °C)  Height Method: Stated  Height: 6' 2" (188 cm)  Height (inches): 74 in  Weight Method: Bed Scale  Weight: " 95.6 kg (210 lb 12.2 oz)  Weight (lb): 210.76 lb  Ideal Body Weight (IBW), Male: 190 lb  % Ideal Body Weight, Male (lb): 110.93 %  BMI (Calculated): 27  BMI Grade: 25 - 29.9 - overweight       Weight History:  Wt Readings from Last 10 Encounters:   11/20/20 95.6 kg (210 lb 12.2 oz)   11/15/20 97.5 kg (215 lb)   11/12/20 93.4 kg (206 lb)   11/06/20 94.4 kg (208 lb 1.8 oz)   09/02/20 96.4 kg (212 lb 8.4 oz)   07/23/20 94.8 kg (209 lb)   05/12/20 99.8 kg (220 lb)   12/13/19 94.7 kg (208 lb 12.4 oz)   11/07/19 91 kg (200 lb 11.7 oz)   10/28/19 92.6 kg (204 lb 2.3 oz)     Lab/Procedures/Meds: Pertinent Labs Reviewed  Clinical Chemistry:  Recent Labs   Lab 11/15/20  0105  11/17/20  0333  11/19/20  0420      < > 137   < > 137   K 3.4*   < > 4.0   < > 4.2      < > 110   < > 109   CO2 21*   < > 19*   < > 20*   GLU 85   < > 158*   < > 190*   BUN 33*   < > 34*   < > 39*   CREATININE 1.9*   < > 1.8*   < > 1.8*   CALCIUM 9.7   < > 9.5   < > 9.6   PROT 6.2  --  5.6*  --   --    ALBUMIN 2.9*  --  2.5*  --   --    BILITOT 0.9  --  1.3*  --   --    ALKPHOS 59  --  59  --   --    AST 49*  --  23  --   --    ALT 23  --  19  --   --    ANIONGAP 8   < > 8   < > 8   ESTGFRAFRICA 37.1*   < > 39.6*   < > 39.6*   EGFRNONAA 32.1*   < > 34.3*   < > 34.3*   MG 1.6   < > 2.0  --   --    AMYLASE 60  --   --   --   --    LIPASE 26  --   --   --   --     < > = values in this interval not displayed.     CBC:   Recent Labs   Lab 11/19/20  0420   WBC 12.22   RBC 3.42*   HGB 9.6*   HCT 30.4*   *   MCV 89   MCH 28.1   MCHC 31.6*     Cardiac Profile:  Recent Labs   Lab 11/15/20  0105 11/15/20  0511 11/15/20  1109   *  --   --    *  --   --    CPKMB 10.0*  --   --    TROPONINI 8.076* 7.381* 4.935*     Inflammatory Labs:  Recent Labs   Lab 11/18/20  0338 11/19/20  0420 11/20/20  0433   CRP 19.40* 16.80* 15.42*     Diabetes:  Recent Labs   Lab 11/15/20  0511   HGBA1C 8.3*     Medications:Pertinent Medications  Reviewed  Scheduled Meds:   aspirin  81 mg Oral Daily    calcitRIOL  0.25 mcg Oral Daily    ceFAZolin (ANCEF) IVPB  2 g Intravenous Q8H    cyanocobalamin  2,000 mcg Oral Daily    cyproheptadine  4 mg Oral QHS    enoxparin  1 mg/kg Subcutaneous Q24H    ferrous sulfate  325 mg Oral Daily with breakfast    insulin detemir U-100  10 Units Subcutaneous QHS    Lactobacillus acidoph-L.bulgar  4 tablet Oral TID WM    lidocaine  1 patch Transdermal Q24H    memantine  5 mg Oral BID    metoprolol succinate  25 mg Oral Daily    sertraline  50 mg Oral Daily     Continuous Infusions:   lactated ringers 75 mL/hr at 11/19/20 1142     PRN Meds:.acetaminophen, calcium chloride IVPB, calcium chloride IVPB, calcium chloride IVPB, dextrose 50%, dextrose 50%, famotidine, HYDROcodone-acetaminophen, insulin aspart U-100, insulin regular, magnesium oxide, magnesium sulfate IVPB, magnesium sulfate IVPB, magnesium sulfate IVPB, magnesium sulfate IVPB, meclizine, potassium chloride in water, potassium chloride in water, potassium chloride in water, potassium chloride in water, potassium chloride, potassium chloride, potassium chloride, potassium chloride, promethazine (PHENERGAN) IVPB, promethazine (PHENERGAN) IVPB, sodium phosphate IVPB, sodium phosphate IVPB, sodium phosphate IVPB, sodium phosphate IVPB, sodium phosphate IVPB    Estimated/Assessed Needs  Weight Used For Calorie Calculations: 95.6 kg (210 lb 12.2 oz)  Energy Calorie Requirements (kcal): 7542-7988 kcal/day (20-25 kcal/kg)  Energy Need Method: Kcal/kg  Protein Requirements: 57-77 g/day (0.6-0.8 g/kg 2' GFR)  Weight Used For Protein Calculations: 95.6 kg (210 lb 12.2 oz)  Estimated Fluid Requirement Method: RDA Method    Nutrition Prescription Ordered  Current Diet Order: Cardiac Diet  Oral Nutrition Supplement: Glucerna TID    Evaluation of Received Nutrient/Fluid Intake  Energy Calories Required: not meeting needs  Protein Required: not meeting needs  Fluid  Required: meeting needs  Tolerance: tolerating  % Meal Intake: 0 - 25 % per flow sheet  Intake/Output Summary (Last 24 hours) at 11/20/2020 0826  Last data filed at 11/20/2020 0715  Gross per 24 hour   Intake 597.5 ml   Output 500 ml   Net 97.5 ml      Nutrition Risk  Level of Risk/Frequency of Follow-up: high     Monitor and Evaluation  Food and Nutrient Intake: food and beverage intake  Food and Nutrient Adminstration: diet order  Physical Activity and Function: nutrition-related ADLs and IADLs  Anthropometric Measurements: weight, weight change, body mass index  Biochemical Data, Medical Tests and Procedures: electrolyte and renal panel, gastrointestinal profile, glucose/endocrine profile, inflammatory profile, lipid profile  Nutrition-Focused Physical Findings: overall appearance     Nutrition Follow-Up    RD Follow-up?: Yes    Nevin Dougherty  11/20/2020  8:26 AM

## 2020-11-20 NOTE — ANESTHESIA PREPROCEDURE EVALUATION
11/19/2020  Chon Hurtado is a 84 y.o., male.    Anesthesia Evaluation    I have reviewed the Patient Summary Reports.    I have reviewed the Nursing Notes. I have reviewed the NPO Status.   I have reviewed the Medications.     Review of Systems  Anesthesia Hx:  No problems with previous Anesthesia  Neg history of prior surgery. Denies Family Hx of Anesthesia complications.   Denies Personal Hx of Anesthesia complications.   Social:  Non-Smoker, No Alcohol Use    Hematology/Oncology:         -- Anemia: Hematology Comments: Blood transfusion Oncology Comments: Hx Prostate Ca     Cardiovascular:   Hypertension Past MI CAD  CABG/stent  Angina CHF PVD    Pulmonary:   COPD, severe Shortness of breath Hx PE   Renal/:   Chronic Renal Disease (CKD III), ESRD    Endocrine:   Diabetes, poorly controlled, type 2    Psych:   depression          Patient Active Problem List   Diagnosis    Other B-complex deficiencies    Other specified disorders of parathyroid gland    Adrenal insufficiency    CAD (coronary artery disease)    Prostate CA    Uncontrolled type 2 diabetes mellitus with hyperglycemia, with long-term current use of insulin    Hypertension associated with diabetes    Anemia    CKD (chronic kidney disease) stage 3, GFR 30-59 ml/min    Long term (current) use of anticoagulants [Z79.01]    DVT, bilateral lower limbs    Hyperlipidemia associated with type 2 diabetes mellitus    Encounter for long-term (current) use of insulin    S/P CABG (coronary artery bypass graft)    Leg abscess    Thrush    Cognitive decline    Mild cognitive impairment with memory loss    Restrictive lung disease    Restrictive lung disease    SOB (shortness of breath) on exertion    Thrombocytopenia    Chronic respiratory failure with hypoxia, on home oxygen therapy    CHF (congestive heart failure), NYHA class  III, chronic, systolic    Angina pectoris, unspecified    NSTEMI (non-ST elevated myocardial infarction)    Diarrhea of presumed infectious origin    Type 2 diabetes mellitus without complication, with long-term current use of insulin    Gram-positive bacteremia    Lung granuloma    FUO (fever of unknown origin)       Past Surgical History:   Procedure Laterality Date    CATARACT EXTRACTION, BILATERAL      CHOLECYSTECTOMY  8/2011    COLON SURGERY      CORONARY ARTERY BYPASS GRAFT       x 5    CORONARY STENT PLACEMENT      2007, last 2011  stent  3 vessel.    EXPLORATORY LAPAROTOMY W/ BOWEL RESECTION  1987    PROSTATE SURGERY  2001    Radical for cancer - Dr Luke Chung    RIGHT FEMUR REPAIR  1987    SMALL INTESTINE SURGERY          Tobacco Use:  The patient  reports that he has never smoked. He has never used smokeless tobacco.     Results for orders placed or performed during the hospital encounter of 11/15/20   EKG 12-lead    Collection Time: 11/15/20 12:57 AM    Narrative    Test Reason : R07.9,    Vent. Rate : 076 BPM     Atrial Rate : 076 BPM     P-R Int : 146 ms          QRS Dur : 114 ms      QT Int : 378 ms       P-R-T Axes : 039 -04 046 degrees     QTc Int : 425 ms    Normal sinus rhythm  LVH with repolarization abnormality  Abnormal ECG  When compared with ECG of 22-SEP-2020 15:03,  ST now depressed in Anterior-lateral leads  Nonspecific T wave abnormality, improved in Lateral leads  Confirmed by Esvin Lee MD ([id number]) on 11/19/2020 6:14:40 AM    Referred By: [email or web address]   SELF           Confirmed By:Esvin Lee MD        Imaging Results          CT Abdomen Pelvis  Without Contrast (Final result)  Result time 11/15/20 01:46:00   Procedure changed from CT Abdomen Pelvis With Contrast     Final result by Erasmo Lopez MD (11/15/20 01:46:00)                 Narrative:    EXAM DESCRIPTION: CT ABDOMEN PELVIS WITHOUT CONTRAST 11/15/2020 2:23 AM CST    CLINICAL HISTORY: 84 years, Male,  Abdominal infection suspected; Abdominal pain    COMPARISON: None.    PROCEDURE:  Multiple transaxial tomograms of the abdomen and pelvis were performed from the lung bases to the symphysis pubis utilizing 2 mm slice thickness at 2 mm interval reconstruction, without administration of IV and oral contrast.  Multiplanar reformats in the sagittal and coronal plane were generated and reviewed.  An individualized dose optimization technique, Automated Exposure Control, was utilized for the performed procedure.    FINDINGS:  The lack of IV and oral contrast limits evaluation of solid organs, subtle lesions cannot be excluded.    The lung bases demonstrate small trace of bilateral pleural effusions pleural effusion. Sternotomy wires correspond to previous CABG. There are coronary artery calcifications.    Grossly the unopacified liver, pancreas, spleen and adrenal glands demonstrate to be within normal limits, no significant focal lesions were identified.  There is a status post cholecystectomy. No evidence for biliary duct dilatation.    The kidneys demonstrate grossly unremarkable, no hydronephrosis or stones were identified.  No there is a lower pole right renal cyst measuring 4.7 cm on image 110 The ureters displays normal appearance with normal caliber, no hydroureter was seen.  The bladder was partially distended with no focal lesion.  Grossly the unopacified stomach, small bowel and large bowel demonstrate to be within normal limits. Fecal residue and underdistention of the large bowel limits the evaluation. There is suboptimal distention of the rectosigmoid colon. Questionable fluid and/or mucosal thickening of the distal rectum could be of consideration.  The urinary bladder demonstrate to be partially distended with diffuse circumferential wall thickening most likely related to underdistention. The prostate gland was not visualized. Clips within the pelvic floor corresponding to previous lymph node dissection.   The aorta demonstrate minimal atheromatous plaque formation. There is a IVC filter in good position below the renal veins (Quispe nitinol).  There is no retroperitoneal lymphadenopathy.  There is no evidence for ascites. The bone windows demonstrate mild diffuse bony osteopenia. Degenerative disc disease is identified at L4/L5.  There is a small right lateral hernia just inferior to the edge of the liver containing omentum on image  and coronal image 60.    IMPRESSION:    SMALL TRACE BILATERAL PLEURAL EFFUSIONS. STATUS POST CABG.  STATUS POST CHOLECYSTECTOMY.  RIGHT RENAL CYST.  INFERIOR VENA CAVA FILTER IN PLACE.  STATUS POST PROSTATECTOMY WITH LYMPH NODE DISSECTION.  QUESTIONABLE MUCOSAL THICKENING AND/OR FLUID OF THE DISTAL RECTUM.    Electronically signed by:  Erasmo Lopez MD  11/15/2020 2:30 AM CST Workstation: [id number]                             X-Ray Chest AP Portable (Final result)  Result time 11/15/20 06:22:09    Final result by Jalen Deras MD (11/15/20 06:22:09)                 Impression:      No acute cardiopulmonary abnormality.      Electronically signed by: Jalen Deras MD  Date:    11/15/2020  Time:    06:22             Narrative:    EXAMINATION:  XR CHEST AP PORTABLE    CLINICAL HISTORY:  Fever;    FINDINGS:  Portable chest at 051 compared with 11/06/2020 shows normal cardiomediastinal silhouette with postsurgical changes of CABG.    Lungs are clear. Pulmonary vasculature is normal. No acute osseous abnormality.                                 Lab Results   Component Value Date    WBC 12.22 11/19/2020    HGB 9.6 (L) 11/19/2020    HCT 30.4 (L) 11/19/2020    MCV 89 11/19/2020     (L) 11/19/2020     BMP  Lab Results   Component Value Date     11/19/2020    K 4.2 11/19/2020     11/19/2020    CO2 20 (L) 11/19/2020    BUN 39 (H) 11/19/2020    CREATININE 1.8 (H) 11/19/2020    CALCIUM 9.6 11/19/2020    ANIONGAP 8 11/19/2020     (H) 11/19/2020     (H) 11/18/2020      (H) 11/17/2020       Results for orders placed during the hospital encounter of 11/15/20   Echo Color Flow Doppler? Yes; Bubble Contrast? No    Narrative · There is left ventricular concentric remodeling.  · The left ventricle is normal in size with normal systolic function. The   estimated ejection fraction is 60%.  · Grade III diastolic dysfunction.  · Normal right ventricular size with normal right ventricular systolic   function.  · Mild left atrial enlargement.  · Moderate aortic regurgitation.  · Mild mitral regurgitation.  · Intermediate central venous pressure (8 mmHg).  · The estimated PA systolic pressure is 37 mmHg.              Physical Exam  General:  Well nourished    Airway/Jaw/Neck:  Airway Findings: Mouth Opening: Normal Tongue: Normal  General Airway Assessment: Adult  Mallampati: II  Improves to II with phonation.  TM Distance: Normal, at least 6 cm  Jaw/Neck Findings:  Neck ROM: Normal ROM      Dental:  Dental Findings:   Chest/Lungs:  Chest/Lungs Findings: Clear to auscultation, Normal Respiratory Rate     Heart/Vascular:  Heart Findings: Rate: Normal  Rhythm: Regular Rhythm  Sounds: Normal        Mental Status:  Mental Status Findings:  Alert and Oriented         Patient Active Problem List   Diagnosis    Other B-complex deficiencies    Other specified disorders of parathyroid gland    Adrenal insufficiency    CAD (coronary artery disease)    Prostate CA    Uncontrolled type 2 diabetes mellitus with hyperglycemia, with long-term current use of insulin    Hypertension associated with diabetes    Anemia    CKD (chronic kidney disease) stage 3, GFR 30-59 ml/min    Long term (current) use of anticoagulants [Z79.01]    DVT, bilateral lower limbs    Hyperlipidemia associated with type 2 diabetes mellitus    Encounter for long-term (current) use of insulin    S/P CABG (coronary artery bypass graft)    Leg abscess    Thrush    Cognitive decline    Mild cognitive impairment  with memory loss    Restrictive lung disease    Restrictive lung disease    SOB (shortness of breath) on exertion    Thrombocytopenia    Chronic respiratory failure with hypoxia, on home oxygen therapy    CHF (congestive heart failure), NYHA class III, chronic, systolic    Angina pectoris, unspecified    NSTEMI (non-ST elevated myocardial infarction)    Diarrhea of presumed infectious origin    Type 2 diabetes mellitus without complication, with long-term current use of insulin    Gram-positive bacteremia    Lung granuloma    FUO (fever of unknown origin)       Past Surgical History:   Procedure Laterality Date    CATARACT EXTRACTION, BILATERAL      CHOLECYSTECTOMY  8/2011    COLON SURGERY      CORONARY ARTERY BYPASS GRAFT       x 5    CORONARY STENT PLACEMENT      2007, last 2011  stent  3 vessel.    EXPLORATORY LAPAROTOMY W/ BOWEL RESECTION  1987    PROSTATE SURGERY  2001    Radical for cancer - Dr Luke Chung    RIGHT FEMUR REPAIR  1987    SMALL INTESTINE SURGERY          Tobacco Use:  The patient  reports that he has never smoked. He has never used smokeless tobacco.     Results for orders placed or performed during the hospital encounter of 11/15/20   EKG 12-lead    Collection Time: 11/15/20 12:57 AM    Narrative    Test Reason : R07.9,    Vent. Rate : 076 BPM     Atrial Rate : 076 BPM     P-R Int : 146 ms          QRS Dur : 114 ms      QT Int : 378 ms       P-R-T Axes : 039 -04 046 degrees     QTc Int : 425 ms    Normal sinus rhythm  LVH with repolarization abnormality  Abnormal ECG  When compared with ECG of 22-SEP-2020 15:03,  ST now depressed in Anterior-lateral leads  Nonspecific T wave abnormality, improved in Lateral leads  Confirmed by Esvin Lee MD ([id number]) on 11/19/2020 6:14:40 AM    Referred By: [email or web address]   SELF           Confirmed By:Esvin Lee MD        Imaging Results          CT Abdomen Pelvis  Without Contrast (Final result)  Result time 11/15/20 01:46:00    Procedure changed from CT Abdomen Pelvis With Contrast     Final result by Erasmo Lopez MD (11/15/20 01:46:00)                 Narrative:    EXAM DESCRIPTION: CT ABDOMEN PELVIS WITHOUT CONTRAST 11/15/2020 2:23 AM CST    CLINICAL HISTORY: 84 years, Male, Abdominal infection suspected; Abdominal pain    COMPARISON: None.    PROCEDURE:  Multiple transaxial tomograms of the abdomen and pelvis were performed from the lung bases to the symphysis pubis utilizing 2 mm slice thickness at 2 mm interval reconstruction, without administration of IV and oral contrast.  Multiplanar reformats in the sagittal and coronal plane were generated and reviewed.  An individualized dose optimization technique, Automated Exposure Control, was utilized for the performed procedure.    FINDINGS:  The lack of IV and oral contrast limits evaluation of solid organs, subtle lesions cannot be excluded.    The lung bases demonstrate small trace of bilateral pleural effusions pleural effusion. Sternotomy wires correspond to previous CABG. There are coronary artery calcifications.    Grossly the unopacified liver, pancreas, spleen and adrenal glands demonstrate to be within normal limits, no significant focal lesions were identified.  There is a status post cholecystectomy. No evidence for biliary duct dilatation.    The kidneys demonstrate grossly unremarkable, no hydronephrosis or stones were identified.  No there is a lower pole right renal cyst measuring 4.7 cm on image 110 The ureters displays normal appearance with normal caliber, no hydroureter was seen.  The bladder was partially distended with no focal lesion.  Grossly the unopacified stomach, small bowel and large bowel demonstrate to be within normal limits. Fecal residue and underdistention of the large bowel limits the evaluation. There is suboptimal distention of the rectosigmoid colon. Questionable fluid and/or mucosal thickening of the distal rectum could be of consideration.  The  urinary bladder demonstrate to be partially distended with diffuse circumferential wall thickening most likely related to underdistention. The prostate gland was not visualized. Clips within the pelvic floor corresponding to previous lymph node dissection.  The aorta demonstrate minimal atheromatous plaque formation. There is a IVC filter in good position below the renal veins (Quispe nitinol).  There is no retroperitoneal lymphadenopathy.  There is no evidence for ascites. The bone windows demonstrate mild diffuse bony osteopenia. Degenerative disc disease is identified at L4/L5.  There is a small right lateral hernia just inferior to the edge of the liver containing omentum on image  and coronal image 60.    IMPRESSION:    SMALL TRACE BILATERAL PLEURAL EFFUSIONS. STATUS POST CABG.  STATUS POST CHOLECYSTECTOMY.  RIGHT RENAL CYST.  INFERIOR VENA CAVA FILTER IN PLACE.  STATUS POST PROSTATECTOMY WITH LYMPH NODE DISSECTION.  QUESTIONABLE MUCOSAL THICKENING AND/OR FLUID OF THE DISTAL RECTUM.    Electronically signed by:  Erasmo Lopez MD  11/15/2020 2:30 AM CST Workstation: [id number]                             X-Ray Chest AP Portable (Final result)  Result time 11/15/20 06:22:09    Final result by Jalen Deras MD (11/15/20 06:22:09)                 Impression:      No acute cardiopulmonary abnormality.      Electronically signed by: Jalen Deras MD  Date:    11/15/2020  Time:    06:22             Narrative:    EXAMINATION:  XR CHEST AP PORTABLE    CLINICAL HISTORY:  Fever;    FINDINGS:  Portable chest at 051 compared with 11/06/2020 shows normal cardiomediastinal silhouette with postsurgical changes of CABG.    Lungs are clear. Pulmonary vasculature is normal. No acute osseous abnormality.                                 Lab Results   Component Value Date    WBC 12.22 11/19/2020    HGB 9.6 (L) 11/19/2020    HCT 30.4 (L) 11/19/2020    MCV 89 11/19/2020     (L) 11/19/2020     BMP  Lab Results   Component  Value Date     11/19/2020    K 4.2 11/19/2020     11/19/2020    CO2 20 (L) 11/19/2020    BUN 39 (H) 11/19/2020    CREATININE 1.8 (H) 11/19/2020    CALCIUM 9.6 11/19/2020    ANIONGAP 8 11/19/2020     (H) 11/19/2020     (H) 11/18/2020     (H) 11/17/2020       Results for orders placed during the hospital encounter of 11/15/20   Echo Color Flow Doppler? Yes; Bubble Contrast? No    Narrative · There is left ventricular concentric remodeling.  · The left ventricle is normal in size with normal systolic function. The   estimated ejection fraction is 60%.  · Grade III diastolic dysfunction.  · Normal right ventricular size with normal right ventricular systolic   function.  · Mild left atrial enlargement.  · Moderate aortic regurgitation.  · Mild mitral regurgitation.  · Intermediate central venous pressure (8 mmHg).  · The estimated PA systolic pressure is 37 mmHg.              Anesthesia Plan  Type of Anesthesia, risks & benefits discussed:  Anesthesia Type:  MAC  Patient's Preference:   Intra-op Monitoring Plan: standard ASA monitors  Intra-op Monitoring Plan Comments:   Post Op Pain Control Plan:   Post Op Pain Control Plan Comments:   Induction:   IV  Beta Blocker:  Patient is not currently on a Beta-Blocker (No further documentation required).       Informed Consent: Patient understands risks and agrees with Anesthesia plan.  Questions answered. Anesthesia consent signed with patient.  ASA Score: 3     Day of Surgery Review of History & Physical:    H&P update referred to the provider.     Anesthesia Plan Notes: Propofol        Ready For Surgery From Anesthesia Perspective.

## 2020-11-21 NOTE — PROGRESS NOTES
Consult Note  Infectious Disease    Reason for Consult:      HPI: Chon Hurtado is an  84 y.o. male  Known to me from prior consultations, who Presented to the emergency room 11/15 with complaints of subjective fever x2 days, intermittent chest pain (pleuritic in nature as well as radiating to the jaw), multiple loose stools, abdominal discomfort, generalized weakness.  In the emergency room his temperature was 100.1, mild generalized discomfort creatinine 1.9, BUN 33 BNP 6 7 , troponin 8.0. CT scan of the abdomen and pelvis showed small trace bilateral pleural effusions, IVC filter, sequela of prostatectomy, questionable mucosal thickening and air-fluid in the distal rectum.  He was felt to have a ST elevation myocardial infarction and was admitted to the for cardiology evaluation and stool studies.  Was seen by Cardiology noted the patient reportedly had also been confused and hallucinating prior to admission as well as being unsteady his feet.  Late in the evening of the 15th 1 of his blood cultures became positive for Gram-positive cocci and vancomycin and Zosyn were started.  His fever did escalated to 02.4 this morning at 0325. Blood cultures have all grown coagulase-negative Staph. stool for Cdiff, OCP, white blood and culture are negative. UA was negative for pyuria.    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  In the past he required cephalexin suppression for recurrent cellulitis and persistent tinea pedis.  ED visit in June for lower extremity edema  ED visit in late July for rectal bleeding felt   ED visit 09/22 for facial swelling and shortness of breath having run out of his diuretics.  CTA with no evidence of pulmonary embolism.  Seen by Hematology/Oncology on 11/06 for prostate cancer with complaints of left neck and rib pain prompting xrays (?angina then)      11/18: fever curve is improved. Blood cultures with STaph  lugdenensis which is likely a pathogen. Sensitive to oxacillin. Cortisol WNL. He is more alert, has been up to the chair. Not eating a thing per wife.   11/19: afebrile. With max assistance he was able to walk with rolling walking to chair. Yesterday's blood culture became positive this am. CRP a little lower. Plans for SHAYY tomorrow noted and appreciated. RF mildly elevated.  He is still not eating, reports that the protein shakes are too sweet.  He has no complaints  11/20: in good spirits. Still not eating. SHAYY negative for endocarditis. He looks and feels better. Blood cultures from 11/18 with coag neg staph, has not been identified yet.   11/21:  Afebrile, waiting on ID of blood cultures from 11/18, awaiting nuclear white blood cells cell scan, CRP is stable at 15.  PIcked at breakfast, did not eat any lunch.  Wife at bedside, willing to give him Ensure/glucerna for each meal not consumed    EXAM & DIAGNOSTICS REVIEWED:   Vitals:     Temp:  [97.7 °F (36.5 °C)-98.8 °F (37.1 °C)]   Temp: 98.7 °F (37.1 °C) (11/21/20 1145)  Pulse: 87 (11/21/20 1145)  Resp: 18 (11/21/20 1145)  BP: 130/77 (11/21/20 1145)  SpO2: 96 % (11/21/20 1145)    Intake/Output Summary (Last 24 hours) at 11/21/2020 1406  Last data filed at 11/21/2020 0000  Gross per 24 hour   Intake --   Output 350 ml   Net -350 ml       General:  In NAD sleepy but arousable cooperative,  comfortable  Eyes:  Anicteric, , EOMI,  for  ENT:  No ulcers, exudates, thrush, nares patent, dentition is good.    Neck:  Supple,   Lungs: clear  Heart:  RRR, no gallop/murmur/rub noted  Abd:  Soft, mild generalized tenderness, ND, normal BS, no masses or organomegaly appreciated. 11/17: External hemorrhoids, no perirectal abscess, internal exam not performed.  :  Voids/ incontinent, urine clear, no flank tenderness, no scrotal swelling  Musc:  Joints without effusion, swelling, erythema, synovitis,    Skin:  No rashes.     Wound:   Neuro: Alert, attentive, speech fluent, face  symmetric, moves all extremities, generalizedweakness.  He cannot raise his thighs off of the bed but does have good plantar flexion Not Ambulatory but max assist x2 for transfer from bed to chair  Psych:  Calm, cooperative  Lymphatic:        Extrem: Chronic BLE L>R edema, no erythema, phlebitis, cellulitis, warm and well perfused  VAD:  peripheral     Isolation:  none    Lines/Tubes/Drains:    General Labs reviewed:  Recent Labs   Lab 11/18/20  0816 11/19/20 0420 11/21/20 0538   WBC 13.10* 12.22 12.25   HGB 9.4* 9.6* 9.4*   HCT 29.8* 30.4* 30.6*   * 143* 175       Recent Labs   Lab 11/15/20  0105  11/17/20  0333 11/18/20 0815 11/19/20 0420 11/21/20 0538      < > 137 134* 137 138   K 3.4*   < > 4.0 3.9 4.2 4.8      < > 110 105 109 108   CO2 21*   < > 19* 19* 20* 19*   BUN 33*   < > 34* 36* 39* 47*   CREATININE 1.9*   < > 1.8* 1.8* 1.8* 1.7*   CALCIUM 9.7   < > 9.5 9.9 9.6 9.9   PROT 6.2  --  5.6*  --   --   --    BILITOT 0.9  --  1.3*  --   --   --    ALKPHOS 59  --  59  --   --   --    ALT 23  --  19  --   --   --    AST 49*  --  23  --   --   --     < > = values in this interval not displayed.     Recent Labs   Lab 11/19/20 0420 11/20/20 0433 11/21/20 0538   CRP 16.80* 15.42* 15.59*   \        Micro:  Microbiology Results (last 7 days)     Procedure Component Value Units Date/Time    Blood culture [[id number]] Collected: 11/21/20 0538    Order Status: Completed Specimen: Blood from Peripheral, Right Hand Updated: 11/21/20 1317     Blood Culture, Routine No Growth to date    Blood culture [[id number]] Collected: 11/20/20 0433    Order Status: Completed Specimen: Blood from Antecubital, Left Updated: 11/21/20 0632     Blood Culture, Routine No Growth to date      No Growth to date    Blood culture [[id number]]  (Abnormal) Collected: 11/18/20 0338    Order Status: Completed Specimen: Blood Updated: 11/20/20 2143     Blood Culture, Routine Gram stain aer bottle: Gram positive cocci      Results  called to and read back by: Bridgett Perez RN on Cardio B,       11/19/2020 11:15 vcb      COAGULASE-NEGATIVE STAPHYLOCOCCUS SPECIES  Organism is a probable contaminant      Blood culture [[id number]]  (Abnormal)  (Susceptibility) Collected: 11/15/20 0115    Order Status: Completed Specimen: Blood from Peripheral, Forearm, Left Updated: 11/18/20 0636     Blood Culture, Routine Gram stain talia bottle: Gram positive cocci      Results called to and read back by:Roderick Murray RN-B-CARD;  11/15/2020       22:34 CJD      Gram stain aer bottle: Gram positive cocci      Positive results previously called      STAPHYLOCOCCUS LUGDUNENSIS    Blood culture [[id number]]  (Abnormal)  (Susceptibility) Collected: 11/15/20 0100    Order Status: Completed Specimen: Blood from Peripheral, Wrist, Left Updated: 11/18/20 0600     Blood Culture, Routine Gram stain talia bottle: Gram positive cocci      Results called to and read back by:Roderick Murray RN-BCARD;  11/15/2020        22:33 CJD      Gram stain aer bottle: Gram positive cocci      Results called to and read back by:Alix Aragon RN-B-CARD;        11/16/2020  02:01 CJD      STAPHYLOCOCCUS LUGDUNENSIS    Stool culture [[id number]] Collected: 11/15/20 1129    Order Status: Completed Specimen: Stool Updated: 11/17/20 0737     Stool Culture No Salmonella,Shigella,Vibrio,Campylobacter.      No E coli 0157:H7 isolated.    Clostridium difficile EIA [[id number]] Collected: 11/15/20 1129    Order Status: Completed Specimen: Stool Updated: 11/15/20 1646     C. diff Antigen Negative     C difficile Toxins A+B, EIA Negative     Comment: Testing not recommended for children <24 months old.           Imaging Reviewed:   CXR   CT head negative  CT abd/pelvis  SMALL TRACE BILATERAL PLEURAL EFFUSIONS. STATUS POST CABG.  STATUS POST CHOLECYSTECTOMY.  RIGHT RENAL CYST.  INFERIOR VENA CAVA FILTER IN PLACE.  STATUS POST PROSTATECTOMY WITH LYMPH NODE DISSECTION.  QUESTIONABLE MUCOSAL THICKENING  AND/OR FLUID OF THE DISTAL RECTUM    US BLE  IMPRESSION:  1. Partially occlusive deep venous thrombus in the left common femoral  vein, superficial femoral vein and popliteal vein (slightly worse when  compared to the most recent exam).  2. Partially occlusive thrombus in the right mid superficial femoral  vein, which appears to be duplicated    Cardiology:  TTE 1115  · There is left ventricular concentric remodeling.  · The left ventricle is normal in size with normal systolic function. The estimated ejection fraction is 60%.  · Grade III diastolic dysfunction.  · Normal right ventricular size with normal right ventricular systolic function.  · Mild left atrial enlargement.  · Moderate aortic regurgitation.  · Mild mitral regurgitation.  · Intermediate central venous pressure (8 mmHg).  · The estimated PA systolic pressure is 37 mmHg    SHAYY 11/20  · The left ventricle is normal in size with normal systolic function. The estimated ejection fraction is 60%.  · Normal left ventricular diastolic function.  · There is mild left ventricular concentric hypertrophy.  · Normal right ventricular size with normal right ventricular systolic function.  · Moderate left atrial enlargement.  · Mild right atrial enlargement.  · Mild-to-moderate aortic regurgitation.  · Mild-to-moderate mitral regurgitation.  · Mild tricuspid regurgitation.  · No valvular vegetation seen.  ·   IMPRESSION & PLAN   1. Fever, resolved  2.  Staph lugdenensis sepsis, and this species is usually a pathogen, similar to STaph aureus   11/18 with coag neg staph also, ID pending   Only indwelling foreign bodies are IVC filter, coronary stents   No focus of infection on CT abd/pelvis, or physical exam, SHAYY negative    3. 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 ?history of adrenal insufficiency, ?on no replacement(wife denies this diagnosis)  5. ?proctitis  6. Rheumatic diagnosis? Very stiff, some jaw  pain(on left?) and temporal discomfort on right? No acute arthritis on exam  7. Anorexia, weakness, malnutrition      Recommendations:  Continue cefazolin, waiting on completion of work up to advise on length of therapy but would suggest SNF vs LTAC  Serial blood cultures  awaiting WBC scan   clinimix?     trend CRP     D/w wife 11/18, daughter 11/20,wife 11/21    Medical Decision Making during this encounter was  [_] Low Complexity  [_] Moderate Complexity  [ x ] High Complexity

## 2020-11-21 NOTE — PT/OT/SLP PROGRESS
Physical Therapy Treatment    Patient Name:  Chon Hurtado   MRN:  [id number]    Recommendations:     Discharge Recommendations:  nursing facility, skilled   Discharge Equipment Recommendations: (TBD at next level of care)   Barriers to discharge: Decreased caregiver support    Assessment:     Chon Hurtado is a 84 y.o. male admitted with a medical diagnosis of NSTEMI (non-ST elevated myocardial infarction).  He presents with the following impairments/functional limitations:  weakness, impaired endurance, impaired self care skills, impaired functional mobilty, gait instability, impaired balance, decreased coordination, decreased lower extremity function, decreased safety awareness, impaired cardiopulmonary response to activity. Patient agreeable to PT with daughter at bedside. Patient presents with increased weakness and impaired mobility. Patient required Max A x 2 for bed mobility to sit EOB for ~ 10 minutes with Min A. Rocking while sitting EOB was performed to initiate mechanics for sit to stand transfers. Patient tends to have posterior lean, standing was deferred this treatment due to poor safety awareness and sitting balance. Continue to progress patient as tolerated.     Rehab Prognosis: Fair; patient would benefit from acute skilled PT services to address these deficits and reach maximum level of function.    Recent Surgery: Procedure(s) (LRB):  Transesophageal echo (SHAYY) intra-procedure log documentation (N/A) 1 Day Post-Op    Plan:     During this hospitalization, patient to be seen 6 x/week to address the identified rehab impairments via gait training, therapeutic activities, therapeutic exercises and progress toward the following goals:    · Plan of Care Expires:  12/02/20    Subjective     Chief Complaint: Low back pain  Patient/Family Comments/goals: to go somewhere to get stronger  Pain/Comfort:  · Pain Rating 1: (Not rated.)  · Location 1: back  · Pain Addressed 1: Reposition,  Distraction      Objective:     Communicated with nurse Maloney prior to session.  Patient found supine with peripheral IV, telemetry, pressure relief boots upon PT entry to room.     General Precautions: Standard, fall   Orthopedic Precautions:N/A   Braces:       Functional Mobility:  · Bed Mobility:     · Rolling Left:  moderate assistance  · Rolling Right: moderate assistance  · Supine to Sit: maximal assistance and of 2 persons  · Sit to Supine: maximal assistance and of 2 persons  · Balance: Sitting: Fair- CGA/Min A       AM-PAC 6 CLICK MOBILITY          Therapeutic Activities and Exercises:   bed mobility, sitting EOB tolerance, APs to decrease swelling in LLE and improve circulation    Patient left left sidelying with all lines intact, call button in reach and patient daughter present..    GOALS:   Multidisciplinary Problems     Physical Therapy Goals        Problem: Physical Therapy Goal    Goal Priority Disciplines Outcome Goal Variances Interventions   Physical Therapy Goal     PT, PT/OT Ongoing, Progressing     Description: Goals to be met by:      Patient will increase functional independence with mobility by performin. Supine to/from sit with MInimal Assistance  2. Sit to stand transfer with Minimal Assistance  3. Bed to chair transfer with Minimal Assistance using Rolling Walker  4. Gait  x 50 feet with Minimal Assistance using Rolling Walker.                      Time Tracking:     PT Received On: 20  PT Start Time: 0855     PT Stop Time: 0918  PT Total Time (min): 23 min     Billable Minutes: Therapeutic Activity 23 minutes    Treatment Type: Treatment  PT/PTA: PTA     PTA Visit Number: 2     Bruna Riddle, PTA  2020

## 2020-11-21 NOTE — PLAN OF CARE
Problem: Physical Therapy Goal  Goal: Physical Therapy Goal  Description: Goals to be met by:      Patient will increase functional independence with mobility by performin. Supine to/from sit with MInimal Assistance  2. Sit to stand transfer with Minimal Assistance  3. Bed to chair transfer with Minimal Assistance using Rolling Walker  4. Gait  x 50 feet with Minimal Assistance using Rolling Walker.     Outcome: Ongoing, Progressing   Bed mobility, sitting EOB tolerance, and mechanics for sit to stand.

## 2020-11-21 NOTE — PLAN OF CARE
11/20/20 1830   Discharge Reassessment   Assessment Type Discharge Planning Reassessment   Anticipated Discharge Disposition SNF   Post-Acute Status   Post-Acute Authorization Placement   Post-Acute Placement Status Referrals Sent   Discharge Delays (!) Diet Not Ready for Discharge     MOY following up with Pt and daughter at bedside re: discharge planning options.  Pt was evaluated by NS IPR and appears to not be able to tolerate 3 hours of therapy at this time.  He is not a candidate for NS SNF due to facility not accepting Pt's insurance at this time.  Tentative plan is to request LTAC after the weekend, as Pt is requiring IV ABX, PT/OT and is reported to have a decline in nutritional status.  Pt's daughter provided with SNF list, and SW discussed need to have an alternate plan if Pt is not a candidate for LTAC or insurance authorization is unable to be approved.  MOY also contacted Pt's daughter Nehal Genao ([phone]) as requested, and advised her of the above.  SW to follow up after the weekend.    Martha Jon, HARINDER  Case Management  Ext. [phone]

## 2020-11-22 NOTE — PROGRESS NOTES
[facility] Medicine  Progress Note    Patient Name: Chon Hurtado  MRN: [id number]  Patient Class: IP- Inpatient   Admission Date: 11/15/2020  Length of Stay: 7 days  Attending Physician: Dre Farmer MD  Primary Care Provider: Doug Whitaker MD        Subjective:     Principal Problem:NSTEMI (non-ST elevated myocardial infarction)    Interval History:    Patient was seen and examined.  Daughter at bedside  Is slightly drowsy.  Complained of burning  Sensation in stomach   For WBC  scan today   Review of Systems  Objective:     Vital Signs (Most Recent):  Temp: 98.4 °F (36.9 °C) (11/22/20 1108)  Pulse: 95 (11/22/20 1108)  Resp: 17 (11/22/20 1108)  BP: 114/69 (11/22/20 1108)  SpO2: 98 % (11/22/20 1108) Vital Signs (24h Range):  Temp:  [98.3 °F (36.8 °C)-98.4 °F (36.9 °C)] 98.4 °F (36.9 °C)  Pulse:  [83-95] 95  Resp:  [17-18] 17  SpO2:  [96 %-98 %] 98 %  BP: (114-122)/(43-69) 114/69     Weight: 95.6 kg (210 lb 12.2 oz)  Body mass index is 27.06 kg/m².  No intake or output data in the 24 hours ending 11/22/20 1200   Physical Exam    Physical Exam:  General-alert   HEENT- PERRLA, EOMI,  Neck- No JVD, Lymphadenopathy, no neck pain   CV- Regular rate and rhythm, No Murmur/slava/rubs  Resp- Lungs CTA Bilaterally, No increased WOB  GI-mild distention , mild tender   Extrem- No cyanosis, clubbing, edema.   Neuro- non focal   Skin-  No masses, rashes or lesions noted on cursory skin exam.  Overview/Hospital Course:    Significant Labs:   CBC:   Recent Labs   Lab 11/21/20  0538   WBC 12.25   HGB 9.4*   HCT 30.6*        CMP:   Recent Labs   Lab 11/21/20  0538      K 4.8      CO2 19*   *   BUN 47*   CREATININE 1.7*   CALCIUM 9.9   ANIONGAP 11   EGFRNONAA 36.7*     Cardiac Markers: No results for input(s): CKMB, MYOGLOBIN, BNP, TROPISTAT in the last 48 hours.    Significant Imaging: I have reviewed all pertinent imaging results/findings within the past 24  hours.    Assessment/Plan:      Active Diagnoses:    Diagnosis Date Noted POA    PRINCIPAL PROBLEM:  NSTEMI (non-ST elevated myocardial infarction) [I21.4] 11/15/2020 Yes    FUO (fever of unknown origin) [R50.9]  Yes    Gram-positive bacteremia [R78.81] 11/16/2020 Yes    Diarrhea of presumed infectious origin [R19.7] 11/15/2020 Yes    Type 2 diabetes mellitus without complication, with long-term current use of insulin [E11.9, Z79.4] 11/15/2020 Not Applicable    CHF (congestive heart failure), NYHA class III, chronic, systolic [I50.22] 01/03/2020 Yes    Chronic respiratory failure with hypoxia, on home oxygen therapy [J96.11, Z99.81] 01/03/2020 Not Applicable    S/P CABG (coronary artery bypass graft) [Z95.1] 03/21/2018 Not Applicable    CKD (chronic kidney disease) stage 3, GFR 30-59 ml/min [N18.30] 12/21/2016 Yes     Chronic    CAD (coronary artery disease) [I25.10]  Yes    Adrenal insufficiency [E27.40]  Yes      Problems Resolved During this Admission:     ASSESSMENT AND PLAN   Unsure source of fevers . Possible from GI tract , prostate , contamination or from chronic DVTs with ashley   Staphylococcus  LUGDUNENSIS in previous BC but repeat BC so far neg. SHAYY negative     cefazolin  Continue  Asa and lovenox    f/u stool studies PCR   For WBC scan   Add reglan   Add nystatin for oral thrush   Add dronabinol   PPI IV  Serial blood cultures        VTE Risk Mitigation (From admission, onward)         Ordered     enoxaparin injection 100 mg  Every 24 hours (non-standard times)      11/16/20 1201     IP VTE HIGH RISK PATIENT  Once      11/15/20 0453     Place sequential compression device  Until discontinued      11/15/20 0453     Reason for No Pharmacological VTE Prophylaxis  Once     Question:  Reasons:  Answer:  Already adequately anticoagulated on oral Anticoagulants    11/15/20 0453                   Dre Farmer MD  Department of Hospital Medicine   [facility]

## 2020-11-22 NOTE — PROGRESS NOTES
Consult Note  Infectious Disease    Reason for Consult:      HPI: Chon Hurtado is an  84 y.o. male  Known to me from prior consultations, who Presented to the emergency room 11/15 with complaints of subjective fever x2 days, intermittent chest pain (pleuritic in nature as well as radiating to the jaw), multiple loose stools, abdominal discomfort, generalized weakness.  In the emergency room his temperature was 100.1, mild generalized discomfort creatinine 1.9, BUN 33 BNP 6 7 , troponin 8.0. CT scan of the abdomen and pelvis showed small trace bilateral pleural effusions, IVC filter, sequela of prostatectomy, questionable mucosal thickening and air-fluid in the distal rectum.  He was felt to have a ST elevation myocardial infarction and was admitted to the for cardiology evaluation and stool studies.  Was seen by Cardiology noted the patient reportedly had also been confused and hallucinating prior to admission as well as being unsteady his feet.  Late in the evening of the 15th 1 of his blood cultures became positive for Gram-positive cocci and vancomycin and Zosyn were started.  His fever did escalated to 02.4 this morning at 0325. Blood cultures have all grown coagulase-negative Staph. stool for Cdiff, OCP, white blood and culture are negative. UA was negative for pyuria.    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  In the past he required cephalexin suppression for recurrent cellulitis and persistent tinea pedis.  ED visit in June for lower extremity edema  ED visit in late July for rectal bleeding felt   ED visit 09/22 for facial swelling and shortness of breath having run out of his diuretics.  CTA with no evidence of pulmonary embolism.  Seen by Hematology/Oncology on 11/06 for prostate cancer with complaints of left neck and rib pain prompting xrays (?angina then)      11/18: fever curve is improved. Blood cultures with STaph  lugdenensis which is likely a pathogen. Sensitive to oxacillin. Cortisol WNL. He is more alert, has been up to the chair. Not eating a thing per wife.   11/19: afebrile. With max assistance he was able to walk with rolling walking to chair. Yesterday's blood culture became positive this am. CRP a little lower. Plans for SHAYY tomorrow noted and appreciated. RF mildly elevated.  He is still not eating, reports that the protein shakes are too sweet.  He has no complaints  11/20: in good spirits. Still not eating. SHAYY negative for endocarditis. He looks and feels better. Blood cultures from 11/18 with coag neg staph, has not been identified yet.   11/21:  Afebrile, waiting on ID of blood cultures from 11/18, awaiting nuclear white blood cells cell scan, CRP is stable at 15.  PIcked at breakfast, did not eat any lunch.  Wife at bedside, willing to give him Ensure/glucerna for each meal not consumed  11/22: blood culture from 11/18 does have Staph lugdenensis as well. Nuclear scan in progress. BUN increasing, likely due to poor oral intake). He complains of low back pain and has a lidocaine patch in place now. He has thrush    EXAM & DIAGNOSTICS REVIEWED:   Vitals:     Temp:  [98.3 °F (36.8 °C)-98.7 °F (37.1 °C)]   Temp: 98.3 °F (36.8 °C) (11/22/20 0723)  Pulse: 91 (11/22/20 0723)  Resp: 17 (11/22/20 0723)  BP: 121/66 (11/22/20 0723)  SpO2: 98 % (11/22/20 0723)  No intake or output data in the 24 hours ending 11/22/20 1002    General:  In NAD sleepy but arousable cooperative,  comfortable  Eyes:  Anicteric, , EOMI,  for  ENT:  No ulcers,but does have thrush.    Neck:  Supple,   Lungs: clear  Heart:  RRR, no gallop/murmur/rub noted  Abd:  Soft, mild generalized tenderness, ND, normal BS, no masses or organomegaly appreciated. 11/17: External hemorrhoids, no perirectal abscess, internal exam not performed.  :  Voids/ incontinent, urine clear, no flank tenderness, no scrotal swelling  Musc:  Joints without effusion,  swelling, erythema, synovitis,    Skin:  No rashes.     Wound:   Neuro: Sleepy but cooperative today/. face symmetric, moves all extremities, generalizedweakness.  He cannot raise his thighs off of the bed but does have good plantar flexion Not Ambulatory but max assist x2 for transfer from bed to chair  Psych:  Calm, cooperative  Lymphatic:        Extrem: Chronic BLE L>R edema, no erythema, phlebitis, cellulitis, warm and well perfused  VAD:  peripheral     Isolation:  none    Lines/Tubes/Drains:    General Labs reviewed:  Recent Labs   Lab 11/18/20 0816 11/19/20 0420 11/21/20 0538   WBC 13.10* 12.22 12.25   HGB 9.4* 9.6* 9.4*   HCT 29.8* 30.4* 30.6*   * 143* 175       Recent Labs   Lab 11/17/20  0333 11/18/20 0815 11/19/20 0420 11/21/20 0538    134* 137 138   K 4.0 3.9 4.2 4.8    105 109 108   CO2 19* 19* 20* 19*   BUN 34* 36* 39* 47*   CREATININE 1.8* 1.8* 1.8* 1.7*   CALCIUM 9.5 9.9 9.6 9.9   PROT 5.6*  --   --   --    BILITOT 1.3*  --   --   --    ALKPHOS 59  --   --   --    ALT 19  --   --   --    AST 23  --   --   --      Recent Labs   Lab 11/20/20 0433 11/21/20 0538 11/22/20 0702   CRP 15.42* 15.59* 17.45*   \        Micro:  Microbiology Results (last 7 days)     Procedure Component Value Units Date/Time    Blood culture [[id number]] Collected: 11/22/20 0702    Order Status: Sent Specimen: Blood from Peripheral, Right Hand Updated: 11/22/20 0709    Blood culture [[id number]] Collected: 11/21/20 0538    Order Status: Completed Specimen: Blood from Peripheral, Right Hand Updated: 11/22/20 0632     Blood Culture, Routine No Growth to date      No Growth to date    Blood culture [[id number]] Collected: 11/20/20 0433    Order Status: Completed Specimen: Blood from Antecubital, Left Updated: 11/22/20 0632     Blood Culture, Routine No Growth to date      No Growth to date      No Growth to date    Blood culture [[id number]]  (Abnormal) Collected: 11/18/20 0338    Order Status: Completed  Specimen: Blood Updated: 11/22/20 0631     Blood Culture, Routine Gram stain aer bottle: Gram positive cocci      Results called to and read back by: Bridgett Perez RN on Cardio B,       11/19/2020 11:15 vcb      STAPHYLOCOCCUS LUGDUNENSIS  For susceptibility see order # [id number]      Blood culture [[id number]]  (Abnormal)  (Susceptibility) Collected: 11/15/20 0115    Order Status: Completed Specimen: Blood from Peripheral, Forearm, Left Updated: 11/18/20 0636     Blood Culture, Routine Gram stain talia bottle: Gram positive cocci      Results called to and read back by:Roderick Murray RN-B-CARD;  11/15/2020       22:34 CJD      Gram stain aer bottle: Gram positive cocci      Positive results previously called      STAPHYLOCOCCUS LUGDUNENSIS    Blood culture [[id number]]  (Abnormal)  (Susceptibility) Collected: 11/15/20 0100    Order Status: Completed Specimen: Blood from Peripheral, Wrist, Left Updated: 11/18/20 0600     Blood Culture, Routine Gram stain talia bottle: Gram positive cocci      Results called to and read back by:Roderick Murray RN-BCARD;  11/15/2020        22:33 CJD      Gram stain aer bottle: Gram positive cocci      Results called to and read back by:Alix Aragon RN-B-CARD;        11/16/2020  02:01 CJD      STAPHYLOCOCCUS LUGDUNENSIS    Stool culture [[id number]] Collected: 11/15/20 1129    Order Status: Completed Specimen: Stool Updated: 11/17/20 0737     Stool Culture No Salmonella,Shigella,Vibrio,Campylobacter.      No E coli 0157:H7 isolated.    Clostridium difficile EIA [[id number]] Collected: 11/15/20 1129    Order Status: Completed Specimen: Stool Updated: 11/15/20 1646     C. diff Antigen Negative     C difficile Toxins A+B, EIA Negative     Comment: Testing not recommended for children <24 months old.           Imaging Reviewed:   CXR   CT head negative  CT abd/pelvis  SMALL TRACE BILATERAL PLEURAL EFFUSIONS. STATUS POST CABG.  STATUS POST CHOLECYSTECTOMY.  RIGHT RENAL CYST.  INFERIOR VENA  CAVA FILTER IN PLACE.  STATUS POST PROSTATECTOMY WITH LYMPH NODE DISSECTION.  QUESTIONABLE MUCOSAL THICKENING AND/OR FLUID OF THE DISTAL RECTUM    US BLE  IMPRESSION:  1. Partially occlusive deep venous thrombus in the left common femoral  vein, superficial femoral vein and popliteal vein (slightly worse when  compared to the most recent exam).  2. Partially occlusive thrombus in the right mid superficial femoral  vein, which appears to be duplicated    Cardiology:  TTE 1115  · There is left ventricular concentric remodeling.  · The left ventricle is normal in size with normal systolic function. The estimated ejection fraction is 60%.  · Grade III diastolic dysfunction.  · Normal right ventricular size with normal right ventricular systolic function.  · Mild left atrial enlargement.  · Moderate aortic regurgitation.  · Mild mitral regurgitation.  · Intermediate central venous pressure (8 mmHg).  · The estimated PA systolic pressure is 37 mmHg    SHAYY 11/20  · The left ventricle is normal in size with normal systolic function. The estimated ejection fraction is 60%.  · Normal left ventricular diastolic function.  · There is mild left ventricular concentric hypertrophy.  · Normal right ventricular size with normal right ventricular systolic function.  · Moderate left atrial enlargement.  · Mild right atrial enlargement.  · Mild-to-moderate aortic regurgitation.  · Mild-to-moderate mitral regurgitation.  · Mild tricuspid regurgitation.  · No valvular vegetation seen.  ·   IMPRESSION & PLAN   1. Fever, resolved  2.  Staph lugdenensis sepsis, and this species is usually a pathogen, similar to STaph aureus, 11/5, 11/8 positive, 11/20, 21, negative so far. Prolonged bacteremia suggests focus (heart or skeleton or ?IVC filter)   11/18    Only indwelling foreign bodies are IVC filter, coronary stents   No focus of infection on CT abd/pelvis, or physical exam, SHAYY negative    3. NSTEMI, h/o CABG   Long term use of  anticoagulatns for LE DVT, with current US slightly worse  4. ?history of adrenal insufficiency, ?on no replacement(wife denies this diagnosis)  5. ?proctitis  6. Rheumatic diagnosis? Very stiff, some jaw pain(on left?) and temporal discomfort on right? No acute arthritis on exam  7. Anorexia, weakness, malnutrition  8. Thrush 11/22      Recommendations:  Continue cefazolin, waiting on completion of work up to advise on length of therapy but would suggest  LTAC, for 6 weeks     awaiting WBC scan. If it lights up over spine, would MRI.    clinimix,  For nutrition and hydration  As cyproheptadine does not seem effective, consider low dose hs remeron or low dose marinol     trend CRP     D/w wife 11/18, daughter 11/20,wife 11/21, both 11/22    Medical Decision Making during this encounter was  [_] Low Complexity  [_] Moderate Complexity  [ x ] High Complexity

## 2020-11-22 NOTE — PLAN OF CARE
Problem: Fall Injury Risk  Goal: Absence of Fall and Fall-Related Injury  Outcome: Ongoing, Not Progressing     Problem: Infection  Goal: Infection Symptom Resolution  Outcome: Ongoing, Not Progressing     Problem: Adult Inpatient Plan of Care  Goal: Plan of Care Review  Outcome: Ongoing, Not Progressing  Goal: Patient-Specific Goal (Individualization)  Outcome: Ongoing, Not Progressing  Goal: Absence of Hospital-Acquired Illness or Injury  Outcome: Ongoing, Not Progressing  Goal: Optimal Comfort and Wellbeing  Outcome: Ongoing, Not Progressing  Goal: Readiness for Transition of Care  Outcome: Ongoing, Not Progressing  Goal: Rounds/Family Conference  Outcome: Ongoing, Not Progressing     Problem: Diabetes Comorbidity  Goal: Blood Glucose Level Within Desired Range  Outcome: Ongoing, Not Progressing     Problem: Skin Injury Risk Increased  Goal: Skin Health and Integrity  Outcome: Ongoing, Not Progressing     Problem: Oral Intake Inadequate  Goal: Improved Oral Intake  Outcome: Ongoing, Not Progressing

## 2020-11-23 PROBLEM — J90 CHRONIC BILATERAL PLEURAL EFFUSIONS: Status: ACTIVE | Noted: 2020-01-01

## 2020-11-23 NOTE — PT/OT/SLP PROGRESS
Physical Therapy Treatment    Patient Name:  Chon Hurtado   MRN:  [id number]    Recommendations:     Discharge Recommendations:  nursing facility, skilled   Discharge Equipment Recommendations: none   Barriers to discharge: Decreased caregiver support    Assessment:     Chon Hurtado is a 84 y.o. male admitted with a medical diagnosis of NSTEMI (non-ST elevated myocardial infarction).  He presents with the following impairments/functional limitations:  weakness, impaired endurance, impaired self care skills, impaired functional mobilty, gait instability, impaired balance, decreased lower extremity function, decreased upper extremity function, edema .    Pt agrees to only therex in bed. Wife and son present, pt lethargic and sat EOB with OT taking medications. Unable to stand. Pt answers questions appropriately.     Rehab Prognosis: Fair; patient would benefit from acute skilled PT services to address these deficits and reach maximum level of function.    Recent Surgery: Procedure(s) (LRB):  Transesophageal echo (SHAYY) intra-procedure log documentation (N/A) 3 Days Post-Op    Plan:     During this hospitalization, patient to be seen 5 x/week to address the identified rehab impairments via gait training, therapeutic activities, therapeutic exercises and progress toward the following goals:    · Plan of Care Expires:  12/02/20    Subjective     Chief Complaint: fatigue  Patient/Family Comments/goals: to get better  Pain/Comfort:  Pain Rating 1: 0/10      Objective:     Communicated with rn prior to session.  Patient found HOB elevated with pressure relief boots, bed alarm, peripheral IV, telemetry upon PT entry to room.     General Precautions: Standard, fall   Orthopedic Precautions:N/A   Braces:       Functional Mobility:  · deferred      AM-PAC 6 CLICK MOBILITY  Turning over in bed (including adjusting bedclothes, sheets and blankets)?: 2  Sitting down on and standing up from a chair with arms (e.g., wheelchair,  bedside commode, etc.): 2  Moving from lying on back to sitting on the side of the bed?: 2  Moving to and from a bed to a chair (including a wheelchair)?: 1  Need to walk in hospital room?: 1  Climbing 3-5 steps with a railing?: 1  Basic Mobility Total Score: 9       Therapeutic Activities and Exercises:   education, fall prevention, poc,dc planning; performed UE flexion 1x18 left, 1x15 right; LE therex 1x5 reps ea heelslides then 1 x10 reps same alternating legs; abduction 1x5 reps ea then 1x15 reps ea. Instructed wife and son to encourage some movement every hour including and performed ankle pumps 1x5 reps and qs and gs 1x5 reps ea. Family encouraged by pt participating in therex even though fatigued. Pt positioned with heel supports and pillows under all extremities.     Patient left HOB elevated with all lines intact, call button in reach, bed alarm on, rn notified and wife and son present..    GOALS:   Multidisciplinary Problems     Physical Therapy Goals        Problem: Physical Therapy Goal    Goal Priority Disciplines Outcome Goal Variances Interventions   Physical Therapy Goal     PT, PT/OT Ongoing, Progressing     Description: Goals to be met by:      Patient will increase functional independence with mobility by performin. Supine to/from sit with MInimal Assistance  2. Sit to stand transfer with Minimal Assistance  3. Bed to chair transfer with Minimal Assistance using Rolling Walker  4. Gait  x 50 feet with Minimal Assistance using Rolling Walker.                      Time Tracking:     PT Received On: 20  PT Start Time: 1307     PT Stop Time: 1334  PT Total Time (min): 27 min     Billable Minutes: Therapeutic Activity 15 and Therapeutic Exercise 12    Treatment Type: Treatment  PT/PTA: PT     PTA Visit Number: 0     Mone Olivares, PT  2020

## 2020-11-23 NOTE — PLAN OF CARE
Cognitive based dysphagia with bolus holding, delayed AP transit and no attempts to masticate soft solid. Pt with poor intake prior to evaluation. Recommend transition to full liquid tray w/ allowance of additional puree or mechanical soft solid items     Problem: SLP Goal  Goal: SLP Goal  Description: 1. Maintain alertness for 10 second increment ind'ly   2. Promptly transfer tsp amounts of puree given liquid wash as necessary   Outcome: Ongoing, Progressing

## 2020-11-23 NOTE — PT/OT/SLP PROGRESS
Occupational Therapy   Treatment    Name: Chon Hurtado  MRN: [id number]  Admitting Diagnosis:  NSTEMI (non-ST elevated myocardial infarction)  3 Days Post-Op    Recommendations:     Discharge Recommendations: nursing facility, skilled  Discharge Equipment Recommendations:  none  Barriers to discharge:  Decreased caregiver support    Assessment:     Chon Hurtado is a 84 y.o. male with a medical diagnosis of NSTEMI (non-ST elevated myocardial infarction).  He presents with general weakness. Patient participated in feeding (swallowing medications) while sitting upright this session. Performance deficits affecting function are gait instability, weakness, impaired endurance, impaired self care skills, impaired functional mobilty, impaired balance.     Rehab Prognosis:  Fair; patient would benefit from acute skilled OT services to address these deficits and reach maximum level of function.       Plan:     Patient to be seen 5 x/week to address the above listed problems via self-care/home management, therapeutic activities, therapeutic exercises  · Plan of Care Expires: 12/19/20  · Plan of Care Reviewed with: patient, spouse    Subjective     Pain/Comfort:  · Pain Rating 1: 4/10  · Location 1: back  · Pain Addressed 1: Reposition, Distraction  · Pain Rating Post-Intervention 1: 4/10    Objective:     Communicated with: nurse prior to session.  Patient found HOB elevated with telemetry, peripheral IV upon OT entry to room.    General Precautions: Standard, fall   Orthopedic Precautions:N/A   Braces: N/A     Occupational Performance:     Bed Mobility:    · Patient completed Scooting/Bridging with maximal assistance x2  · Patient completed Supine to Sit with maximal assistance x2  · Patient completed Sit to Supine with maximal assistance x2  · Performed unsupported sitting EOB with moderate assistance    Activities of Daily Living:  · Feeding:  maximal assistance to hold spoon to bring to mouth.       [facility] 6 Click ADL:       Treatment & Education:  Patient demonstrated swallowing difficulty while swallowing medications from nurse upon entering room. Assisted nurse with better positioning to swallow. Patient continued to have difficulty but able to swallow all medications when positioned in an upright sitting position. Secured chatted Dr. Farmer to request Speech Consult to address swallowing difficulty.    Patient left HOB elevated with all lines intact, call button in reach, bed alarm on and Spouse presentEducation:      GOALS:   Multidisciplinary Problems     Occupational Therapy Goals        Problem: Occupational Therapy Goal    Goal Priority Disciplines Outcome Interventions   Occupational Therapy Goal     OT, PT/OT Ongoing, Progressing    Description: Goals to be met by: D/C     Patient will increase functional independence with ADLs by performing:    Feeding with Set-up Assistance.  UE Dressing with Stand-by Assistance.  LE Dressing with Minimal Assistance.  Grooming while seated at sink with Stand-by Assistance.  Toileting from bedside commode with Minimal Assistance for hygiene and clothing management.   Toilet transfer to bedside commode with Minimal Assistance.                     Time Tracking:     OT Date of Treatment: 11/23/20  OT Start Time: 1137  OT Stop Time: 1203  OT Total Time (min): 26 min    Billable Minutes:Self Care/Home Management 13  Therapeutic Activity 13    Deepak Douglass OT  11/23/2020

## 2020-11-23 NOTE — PT/OT/SLP EVAL
"Speech Language Pathology Evaluation  Bedside Swallow    Patient Name:  Chon Hurtado   MRN:  [id number]  Admitting Diagnosis: NSTEMI (non-ST elevated myocardial infarction)    Recommendations:                 General Recommendations:    · 3x/week for ongoing assessment of swallow function     Diet recommendations:   , Full liquids   *allow additional puree or mechanical soft items per request*     Aspiration Precautions:   · Assistance with meals   · Only feed when awake/alert  · meds in puree followed with sips of liquid   · Oral hygiene 2-3x/day      General Precautions: Standard,    Communication strategies:  none    History:       HPI: 84 year old male (Hx DM2, COPD, CAD, long term use anticoagulant, Dementia, HT, CHF [unknown LV function]) presented to ED with daughter stating that the patient had been having intermittent chest discomfort, which the patient is unable to describe except that it "comes and goes". Two days ago the patient started to have fevers, followed by pleuritic type chest discomfort (according to daughter: worse with movement and breathing) and loose stools. He does not know if there was blood or black in his stool. He denies abdominal pain, nausea or vomiting. He has not had a cough or sputum. No orthopnea or PND. His left lower extremity is chronically swollen.      In ED: Troponin: 8.078 with BNP of 697; no leukocytosis, mild normocytic anemia; mild hypokalemia with stage 3b renal dysfunction; COVID and influenza markers are negative. EKG: sinus with no acute ST segments. CT Abdo/Pelvis (w/o contrast) reviewed: small pleural effusions, with  no significant abdominal indings. Patient discussed with ED physician in addition to myocardial infarction work-up, stool studies will be sent.    Past Medical History:   Diagnosis Date    Adrenal insufficiency     Anemia     Anticoagulant long-term use     plavix for blood clots legs and stents years    Blood transfusion     CAD (coronary artery " disease)     Cancer     prostate    Cataract     CHF (congestive heart failure), NYHA class III, chronic, systolic 1/3/2020    COPD (chronic obstructive pulmonary disease) 11/6/2013    Depression     Diabetes mellitus     Diabetes mellitus type II     DVT (deep venous thrombosis)     Hemorrhoids     History of colon polyps 6/3/2015    Hypertension     NSTEMI (non-ST elevated myocardial infarction) 11/15/2020    PE (pulmonary embolism)     [facility]    Peripheral vascular disease     Urinary tract infection        Past Surgical History:   Procedure Laterality Date    CATARACT EXTRACTION, BILATERAL      CHOLECYSTECTOMY  8/2011    COLON SURGERY      CORONARY ARTERY BYPASS GRAFT       x 5    CORONARY STENT PLACEMENT      2007, last 2011  stent  3 vessel.    EXPLORATORY LAPAROTOMY W/ BOWEL RESECTION  1987    PROSTATE SURGERY  2001    Radical for cancer - Dr Luke Chung    RIGHT FEMUR REPAIR  1987    SMALL INTESTINE SURGERY         Prior Intubation HX:  none    Modified Barium Swallow: none    Imaging Results          CT Abdomen Pelvis  Without Contrast (Final result)  Result time 11/15/20 01:46:00   Procedure changed from CT Abdomen Pelvis With Contrast     Final result by Erasmo Lopez MD (11/15/20 01:46:00)                 Narrative:    EXAM DESCRIPTION: CT ABDOMEN PELVIS WITHOUT CONTRAST 11/15/2020 2:23 AM CST    CLINICAL HISTORY: 84 years, Male, Abdominal infection suspected; Abdominal pain    COMPARISON: None.    PROCEDURE:  Multiple transaxial tomograms of the abdomen and pelvis were performed from the lung bases to the symphysis pubis utilizing 2 mm slice thickness at 2 mm interval reconstruction, without administration of IV and oral contrast.  Multiplanar reformats in the sagittal and coronal plane were generated and reviewed.  An individualized dose optimization technique, Automated Exposure Control, was utilized for the performed procedure.    FINDINGS:  The lack of IV and oral  contrast limits evaluation of solid organs, subtle lesions cannot be excluded.    The lung bases demonstrate small trace of bilateral pleural effusions pleural effusion. Sternotomy wires correspond to previous CABG. There are coronary artery calcifications.    Grossly the unopacified liver, pancreas, spleen and adrenal glands demonstrate to be within normal limits, no significant focal lesions were identified.  There is a status post cholecystectomy. No evidence for biliary duct dilatation.    The kidneys demonstrate grossly unremarkable, no hydronephrosis or stones were identified.  No there is a lower pole right renal cyst measuring 4.7 cm on image 110 The ureters displays normal appearance with normal caliber, no hydroureter was seen.  The bladder was partially distended with no focal lesion.  Grossly the unopacified stomach, small bowel and large bowel demonstrate to be within normal limits. Fecal residue and underdistention of the large bowel limits the evaluation. There is suboptimal distention of the rectosigmoid colon. Questionable fluid and/or mucosal thickening of the distal rectum could be of consideration.  The urinary bladder demonstrate to be partially distended with diffuse circumferential wall thickening most likely related to underdistention. The prostate gland was not visualized. Clips within the pelvic floor corresponding to previous lymph node dissection.  The aorta demonstrate minimal atheromatous plaque formation. There is a IVC filter in good position below the renal veins (Quispe nitinol).  There is no retroperitoneal lymphadenopathy.  There is no evidence for ascites. The bone windows demonstrate mild diffuse bony osteopenia. Degenerative disc disease is identified at L4/L5.  There is a small right lateral hernia just inferior to the edge of the liver containing omentum on image  and coronal image 60.    IMPRESSION:    SMALL TRACE BILATERAL PLEURAL EFFUSIONS. STATUS POST CABG.  STATUS  POST CHOLECYSTECTOMY.  RIGHT RENAL CYST.  INFERIOR VENA CAVA FILTER IN PLACE.  STATUS POST PROSTATECTOMY WITH LYMPH NODE DISSECTION.  QUESTIONABLE MUCOSAL THICKENING AND/OR FLUID OF THE DISTAL RECTUM.    Electronically signed by:  Erasmo Lopez MD  11/15/2020 2:30 AM CST Workstation: [id number]                             X-Ray Chest AP Portable (Final result)  Result time 11/15/20 06:22:09    Final result by Jalen Deras MD (11/15/20 06:22:09)                 Impression:      No acute cardiopulmonary abnormality.      Electronically signed by: Jalen Deras MD  Date:    11/15/2020  Time:    06:22             Narrative:    EXAMINATION:  XR CHEST AP PORTABLE    CLINICAL HISTORY:  Fever;    FINDINGS:  Portable chest at 051 compared with 11/06/2020 shows normal cardiomediastinal silhouette with postsurgical changes of CABG.    Lungs are clear. Pulmonary vasculature is normal. No acute osseous abnormality.                                  Prior diet: unrestricted.    Subjective     · Pt with varying levels of alertness  · Spouse and son present at bedside  · Son promotes Pt mentation consistent w/ baseline   Patient goals: none stated      Pain/Comfort:  · Pain Rating 1: 0/10    Objective:     COGNITIVE COMMUNICATION STATUS: Varying alertness with frequent transition to light resting state, easily realerted. Able to follow simple commands with 60% accuracy increasing with redirection and repetition. Increased confusion with unfamiliar task expectations, discussion in POC, etc. Pt unable to accurately describe symptoms, current medical situation. Spontaneous verbalizations fluent however, irrelevant or unrelated.     RN reporting signs of oral transit delay with medications & bolus holding with eventual expulsion from oral cavity with solids     Oral Musculature Evaluation  · Oral Musculature: WFL  · Dentition: edentulous  · Secretion Management: adequate  · Mucosal Quality: good  · Mandibular Strength and Mobility:  WFL  · Oral Labial Strength and Mobility: WFL  · Lingual Strength and Mobility: WFL  · Velar Elevation: WFL(bilateral and equal elevation)  · Volitional Cough: Present; weak effort  · Volitional Swallow: Hyolaryngeal movement present  · Voice Prior to PO Intake: clear; reduced vocal intensity     *oral thrush previously diagnosed and currently being treated*    Bedside Swallow Eval:   Consistencies Assessed:  · Thin liquids ice chip x2, tsp x3, cup edge x3, and self regulated straw x5  · Puree tsp x3  · Soft solids x1     Oral Phase:   · Intact spoon stripping  · Intact labial seal to cup edge and for siphoning from straw  · Good bolus containment w/out anterior spillage across consistencies   · With initial functional bolus (cup edge), Pt swishing liquid and holding for extended period requiring presentation of cup and request to expel from oral cavity   · Instances of bolus holding with liquid & puree solid   · Delayed AP transit across consistencies  · Patient without attempt to masticate solid & without bolus awareness. Verbal cue required for Pt to open mouth and expel from oral cavity     Pharyngeal Phase:   · no overt clinical signs/symptoms of aspiration  · no overt clinical signs/symptoms of pharyngeal dysphagia      Assessment:     Chon Hurtado is a 84 y.o. male with an SLP diagnosis of cognitive based dysphagia resulting in oral preparation & transfer deficits.     Goals:   Multidisciplinary Problems     SLP Goals        Problem: SLP Goal    Goal Priority Disciplines Outcome   SLP Goal     SLP Ongoing, Progressing   Description: 1. Maintain alertness for 10 second increment ind'ly   2. Promptly transfer tsp amounts of puree given liquid wash as necessary                    Plan:     · Plan of Care reviewed with:  patient, son   · SLP Follow-Up:  Yes        Time Tracking:     SLP Treatment Date:   11/23/20  Speech Start Time:  1420  Speech Stop Time:  1443     Speech Total Time (min):  23  min    Billable Minutes: Eval Swallow and Oral Function 23    Gaurav Pelayo CCC-SLP  11/23/2020

## 2020-11-23 NOTE — PROGRESS NOTES
Consult Note  Infectious Disease    Reason for Consult:      HPI: Chon Hurtado is an  84 y.o. male  Known to me from prior consultations, who Presented to the emergency room 11/15 with complaints of subjective fever x2 days, intermittent chest pain (pleuritic in nature as well as radiating to the jaw), multiple loose stools, abdominal discomfort, generalized weakness.  In the emergency room his temperature was 100.1, mild generalized discomfort creatinine 1.9, BUN 33 BNP 6 7 , troponin 8.0. CT scan of the abdomen and pelvis showed small trace bilateral pleural effusions, IVC filter, sequela of prostatectomy, questionable mucosal thickening and air-fluid in the distal rectum.  He was felt to have a ST elevation myocardial infarction and was admitted to the for cardiology evaluation and stool studies.  Was seen by Cardiology noted the patient reportedly had also been confused and hallucinating prior to admission as well as being unsteady his feet.  Late in the evening of the 15th 1 of his blood cultures became positive for Gram-positive cocci and vancomycin and Zosyn were started.  His fever did escalated to 02.4 this morning at 0325. Blood cultures have all grown coagulase-negative Staph. stool for Cdiff, OCP, white blood and culture are negative. UA was negative for pyuria.    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  In the past he required cephalexin suppression for recurrent cellulitis and persistent tinea pedis.  ED visit in June for lower extremity edema  ED visit in late July for rectal bleeding felt   ED visit 09/22 for facial swelling and shortness of breath having run out of his diuretics.  CTA with no evidence of pulmonary embolism.  Seen by Hematology/Oncology on 11/06 for prostate cancer with complaints of left neck and rib pain prompting xrays (?angina then)      11/18: fever curve is improved. Blood cultures with STaph  lugdenensis which is likely a pathogen. Sensitive to oxacillin. Cortisol WNL. He is more alert, has been up to the chair. Not eating a thing per wife.   11/19: afebrile. With max assistance he was able to walk with rolling walking to chair. Yesterday's blood culture became positive this am. CRP a little lower. Plans for SHAYY tomorrow noted and appreciated. RF mildly elevated.  He is still not eating, reports that the protein shakes are too sweet.  He has no complaints  11/20: in good spirits. Still not eating. SHAYY negative for endocarditis. He looks and feels better. Blood cultures from 11/18 with coag neg staph, has not been identified yet.   11/21:  Afebrile, waiting on ID of blood cultures from 11/18, awaiting nuclear white blood cells cell scan, CRP is stable at 15.  PIcked at breakfast, did not eat any lunch.  Wife at bedside, willing to give him Ensure/glucerna for each meal not consumed  11/22: blood culture from 11/18 does have Staph lugdenensis as well. Nuclear scan in progress. BUN increasing, likely due to poor oral intake). He complains of low back pain and has a lidocaine patch in place now. He has thrush  11/23:  Reviewed nuclear white blood scan this morning with subtle findings in the upper sternum.  CT scan of the chest was ordered to examine this area and abdominal CT was added by Hospital Medicine for persistent abdominal discomfort, both of which did not reveal any ominous findings. Bilateral pleural effusions, enlarged nodes in aorto-pulmonary window, ?urinary retention. PVR was only 3 cc.He is eating only a few bites. Did not get out of bed today.    EXAM & DIAGNOSTICS REVIEWED:   Vitals:     Temp:  [97 °F (36.1 °C)-98.5 °F (36.9 °C)]   Temp: 97.8 °F (36.6 °C) (11/23/20 1114)  Pulse: 87 (11/23/20 1114)  Resp: 17 (11/23/20 0701)  BP: (!) 104/58 (11/23/20 1114)  SpO2: 99 % (11/23/20 1114)  No intake or output data in the 24 hours ending 11/23/20 1601    General:  In NAD   arousable cooperative,   comfortable  Eyes:  Anicteric, , EOMI,  for  ENT:  No ulcers,thrush largely resolved.    Neck:  Supple,   Lungs: clear  Heart:  RRR, no gallop  Abd:  Soft, non tender, ND, normal BS, no masses or organomegaly appreciated. 11/17: External hemorrhoids, no perirectal abscess, internal exam not performed.  :  Voids/ incontinent   Musc:  Joints without effusion, swelling, erythema, synovitis,    Skin:  No rashes.     Wound:   Neuro: Alert, . face symmetric, moves all extremities, generalizedweakness.  Speech not as clear.    Psych:  Calm, cooperative  Lymphatic:        Extrem: Chronic BLE L>R edema, no erythema, phlebitis, cellulitis, warm and well perfused  VAD:  peripheral     Isolation:  none    Lines/Tubes/Drains:    General Labs reviewed:  Recent Labs   Lab 11/19/20 0420 11/21/20 0538 11/23/20  0634   WBC 12.22 12.25 16.26*   HGB 9.6* 9.4* 8.7*   HCT 30.4* 30.6* 29.2*   * 175 232       Recent Labs   Lab 11/17/20  0333  11/19/20  0420 11/21/20  0538 11/23/20  1034      < > 137 138 136   K 4.0   < > 4.2 4.8 5.2*      < > 109 108 105   CO2 19*   < > 20* 19* 18*   BUN 34*   < > 39* 47* 74*   CREATININE 1.8*   < > 1.8* 1.7* 2.4*   CALCIUM 9.5   < > 9.6 9.9 10.1   PROT 5.6*  --   --   --   --    BILITOT 1.3*  --   --   --   --    ALKPHOS 59  --   --   --   --    ALT 19  --   --   --   --    AST 23  --   --   --   --     < > = values in this interval not displayed.     Recent Labs   Lab 11/21/20 0538 11/22/20  0702 11/23/20  0634   CRP 15.59* 17.45* 14.76*   \        Micro:  Microbiology Results (last 7 days)     Procedure Component Value Units Date/Time    Blood culture [[id number]] Collected: 11/22/20 0702    Order Status: Completed Specimen: Blood from Peripheral, Right Hand Updated: 11/23/20 1032     Blood Culture, Routine No Growth to date      No Growth to date    Blood culture [[id number]]  (Abnormal)  (Susceptibility) Collected: 11/18/20 0338    Order Status: Completed Specimen: Blood Updated:  11/23/20 0652     Blood Culture, Routine Gram stain aer bottle: Gram positive cocci      Results called to and read back by: Bridgett Perez RN on Cardio B,       11/19/2020 11:15 vcb      STAPHYLOCOCCUS LUGDUNENSIS    Blood culture [[id number]] Collected: 11/20/20 0433    Order Status: Completed Specimen: Blood from Antecubital, Left Updated: 11/23/20 0632     Blood Culture, Routine No Growth to date      No Growth to date      No Growth to date      No Growth to date    Blood culture [[id number]] Collected: 11/21/20 0538    Order Status: Completed Specimen: Blood from Peripheral, Right Hand Updated: 11/23/20 0632     Blood Culture, Routine No Growth to date      No Growth to date      No Growth to date    Blood culture [[id number]]  (Abnormal)  (Susceptibility) Collected: 11/15/20 0115    Order Status: Completed Specimen: Blood from Peripheral, Forearm, Left Updated: 11/18/20 0636     Blood Culture, Routine Gram stain talia bottle: Gram positive cocci      Results called to and read back by:Roderick Murray RN-B-CARD;  11/15/2020       22:34 [id number]      Gram stain aer bottle: Gram positive cocci      Positive results previously called      STAPHYLOCOCCUS LUGDUNENSIS    Blood culture [[id number]]  (Abnormal)  (Susceptibility) Collected: 11/15/20 0100    Order Status: Completed Specimen: Blood from Peripheral, Wrist, Left Updated: 11/18/20 0600     Blood Culture, Routine Gram stain talia bottle: Gram positive cocci      Results called to and read back by:Roderick Murray RN-BCARD;  11/15/2020        22:33 CJD      Gram stain aer bottle: Gram positive cocci      Results called to and read back by:Alix Aragon RN-B-CARD;        11/16/2020  02:01 CJD      STAPHYLOCOCCUS LUGDUNENSIS    Stool culture [[id number]] Collected: 11/15/20 1129    Order Status: Completed Specimen: Stool Updated: 11/17/20 0737     Stool Culture No Salmonella,Shigella,Vibrio,Campylobacter.      No E coli 0157:H7 isolated.        Imaging  Reviewed:   CXR   CT head negative    CT abd/pelvis 11/15  SMALL TRACE BILATERAL PLEURAL EFFUSIONS. STATUS POST CABG.  STATUS POST CHOLECYSTECTOMY.  RIGHT RENAL CYST.  INFERIOR VENA CAVA FILTER IN PLACE.  STATUS POST PROSTATECTOMY WITH LYMPH NODE DISSECTION.  QUESTIONABLE MUCOSAL THICKENING AND/OR FLUID OF THE DISTAL RECTUM    US BLE  IMPRESSION:  1. Partially occlusive deep venous thrombus in the left common femoral  vein, superficial femoral vein and popliteal vein (slightly worse when  compared to the most recent exam).  2. Partially occlusive thrombus in the right mid superficial femoral  vein, which appears to be duplicated    CT chest  11/23  1.  Small to moderate-sized bilateral pleural effusions with  overlying compressive atelectasis, largest on the left.  2.  A few enlarged aortic window and paratracheal mediastinal lymph  nodes are likely reactive.  3.  Heart is borderline enlarged.    CT abd/pelvis 11/23  1. Moderate volume of gas in the colon with a nonobstructive bowel gas  pattern.  2. Small bilateral pleural effusions and adjacent atelectasis.  3. Full/filled bladder, consider correlation with urine output and  with urinalysis.  4. Additional and incidental findings as above.    Cardiology:  TTE 1115  · There is left ventricular concentric remodeling.  · The left ventricle is normal in size with normal systolic function. The estimated ejection fraction is 60%.  · Grade III diastolic dysfunction.  · Normal right ventricular size with normal right ventricular systolic function.  · Mild left atrial enlargement.  · Moderate aortic regurgitation.  · Mild mitral regurgitation.  · Intermediate central venous pressure (8 mmHg).  · The estimated PA systolic pressure is 37 mmHg    SHAYY 11/20  · The left ventricle is normal in size with normal systolic function. The estimated ejection fraction is 60%.  · Normal left ventricular diastolic function.  · There is mild left ventricular concentric  hypertrophy.  · Normal right ventricular size with normal right ventricular systolic function.  · Moderate left atrial enlargement.  · Mild right atrial enlargement.  · Mild-to-moderate aortic regurgitation.  · Mild-to-moderate mitral regurgitation.  · Mild tricuspid regurgitation.  · No valvular vegetation seen.  ·   IMPRESSION & PLAN   1. Fever, resolved  2.  Staph lugdenensis sepsis, and this species is usually a pathogen, similar to STaph aureus, 11/5, 11/8 positive, 11/20, 21, negative so far. Prolonged bacteremia suggests focus (heart or skeleton or ?IVC filter).      Only indwelling foreign bodies are IVC filter, coronary stents   No focus of infection on CT abd/pelvis, or physical exam, SHAYY negative, WBC scan minimally abnormal over nodes in aorto-pulmonary window    3. 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 ?history of adrenal insufficiency, ?on no replacement(wife denies this diagnosis)  5.    6. Rheumatic diagnosis? Very stiff, some jaw pain(on left?) and temporal discomfort on right? No acute arthritis on exam  7. Anorexia, weakness, malnutrition  8. Thrush 11/22, resolved      Recommendations:  Continue cefazolin,  suggest  LTAC, for 6 weeks starting on 11/20   aggressive PT     trend CRP     D/w wife 11/18, daughter 11/20,wife 11/21, both 11/22, son 11/23    Medical Decision Making during this encounter was  [_] Low Complexity  [_] Moderate Complexity  [ x ] High Complexity

## 2020-11-23 NOTE — PLAN OF CARE
Pt in clinical review with NSES cm to follow   11/23/20 1003   Post-Acute Status   Post-Acute Placement Status Pending Post-Acute Clinical Review

## 2020-11-23 NOTE — PLAN OF CARE
Problem: Physical Therapy Goal  Goal: Physical Therapy Goal  Description: Goals to be met by:      Patient will increase functional independence with mobility by performin. Supine to/from sit with MInimal Assistance  2. Sit to stand transfer with Minimal Assistance  3. Bed to chair transfer with Minimal Assistance using Rolling Walker  4. Gait  x 50 feet with Minimal Assistance using Rolling Walker.     Outcome: Ongoing, Progressing

## 2020-11-23 NOTE — PROGRESS NOTES
"      [facility] Medicine  Progress Note    Patient Name: Chon Hurtado  MRN: [id number]  Patient Class: IP- Inpatient   Admission Date: 11/15/2020  Length of Stay: 8 days  Attending Physician: Dre Farmer MD  Primary Care Provider: Doug Whitaker MD        Subjective:     Principal Problem:NSTEMI (non-ST elevated myocardial infarction)  Admission note    84 year old male (Hx DM2, COPD, CAD, long term use anticoagulant, Dementia, HT, CHF [unknown LV function]) presented to ED with daughter stating that the patient had been having intermittent chest discomfort, which the patient is unable to describe except that it "comes and goes". Two days ago the patient started to have fevers, followed by pleuritic type chest discomfort (according to daughter: worse with movement and breathing) and loose stools. He does not know if there was blood or black in his stool. He denies abdominal pain, nausea or vomiting. He has not had a cough or sputum. No orthopnea or PND. His left lower extremity is chronically swollen.      In ED: Troponin: 8.078 with BNP of 697; no leukocytosis, mild normocytic anemia; mild hypokalemia with stage 3b renal dysfunction; COVID and influenza markers are negative. EKG: sinus with no acute ST segments. CT Abdo/Pelvis (w/o contrast) reviewed: small pleural effusions, with  no significant abdominal indings. Patient discussed with ED physician in addition to myocardial infarction work-up, stool studies will be sent.    Interval History:    Patient was admitted with non STEMI with significant elevation of troponins.  But later he was found to have bacteremia and cardiac evaluation was held a continue medical management.  But blood  culture was persistently positive and SHAYY was done which was negative  Patient  gradually declined over previous few months  Not eating not well and added appetite stimulants.    Seen and examined  Patient was still having abdominal pain which did not " resolved from yesterday.  CT scan of the abdomen was done and not significant.  Infection scan with faint increased uptake in the mid sternum possibly from infection and CT scan of the chest was done not significant except small to moderate-size pleural effusions  On exam abdomen is tender but no rigidity       Review of Systems  Objective:     Vital Signs (Most Recent):  Temp: 97.8 °F (36.6 °C) (11/23/20 1114)  Pulse: 87 (11/23/20 1114)  Resp: 17 (11/23/20 0701)  BP: (!) 104/58 (11/23/20 1114)  SpO2: 99 % (11/23/20 1114) Vital Signs (24h Range):  Temp:  [97 °F (36.1 °C)-98.5 °F (36.9 °C)] 97.8 °F (36.6 °C)  Pulse:  [] 87  Resp:  [17-20] 17  SpO2:  [94 %-100 %] 99 %  BP: (103-133)/() 104/58     Weight: 95.6 kg (210 lb 12.2 oz)  Body mass index is 27.06 kg/m².  No intake or output data in the 24 hours ending 11/23/20 1343   Physical Exam    Physical Exam:  General-alert   HEENT- PERRLA, EOMI,  Neck- No JVD, Lymphadenopathy, no neck pain   CV- Regular rate and rhythm, No Murmur/slava/rubs  Resp- Lungs CTA Bilaterally, No increased WOB  GI-mild distention , mild tender ++  Extrem- No cyanosis, clubbing, edema.   Neuro- non focal   Skin-  No masses, rashes or lesions noted on cursory skin exam.  Overview/Hospital Course:    Significant Labs:   CBC:   Recent Labs   Lab 11/23/20  0634   WBC 16.26*   HGB 8.7*   HCT 29.2*        CMP:   Recent Labs   Lab 11/23/20  1034      K 5.2*      CO2 18*   *   BUN 74*   CREATININE 2.4*   CALCIUM 10.1   ANIONGAP 13   EGFRNONAA 24.2*     Cardiac Markers: No results for input(s): CKMB, MYOGLOBIN, BNP, TROPISTAT in the last 48 hours.    Significant Imaging: I have reviewed all pertinent imaging results/findings within the past 24 hours.    Assessment/Plan:      Active Diagnoses:    Diagnosis Date Noted POA    PRINCIPAL PROBLEM:  NSTEMI (non-ST elevated myocardial infarction) [I21.4] 11/15/2020 Yes    bilateral pleural effusions,small but new [J90]  11/23/2020 Unknown    FUO (fever of unknown origin) [R50.9]  Yes    Gram-positive bacteremia [R78.81] 11/16/2020 Yes    Diarrhea of presumed infectious origin [R19.7] 11/15/2020 Yes    Type 2 diabetes mellitus without complication, with long-term current use of insulin [E11.9, Z79.4] 11/15/2020 Not Applicable    CHF (congestive heart failure), NYHA class III, chronic, systolic [I50.22] 01/03/2020 Yes    Chronic respiratory failure with hypoxia, on home oxygen therapy [J96.11, Z99.81] 01/03/2020 Not Applicable    S/P CABG (coronary artery bypass graft) [Z95.1] 03/21/2018 Not Applicable    CKD (chronic kidney disease) stage 3, GFR 30-59 ml/min [N18.30] 12/21/2016 Yes     Chronic    CAD (coronary artery disease) [I25.10]  Yes    Adrenal insufficiency [E27.40]  Yes      Problems Resolved During this Admission:     ASSESSMENT AND PLAN   Unsure source of fevers . Possible from GI tract , prostate , contamination or from chronic DVTs with ashley   Staphylococcus  LUGDUNENSIS in previous BC  SHAYY negative      Continue cefazolin  Continue  Asa and lovenox and beta blocker  for NSTEMI    f/u stool studies PCR    reglan for possible gastroparesis and PPI IV for possible gastritis   nystatin for oral thrush   Inner catheter for possible urinary retention seen a CT scan  dronabinol for  Poor appetite and restart clinimix   Serial blood cultures   Overall patient is deconditioning and and declining with dementia  over months and cannot fix that much   Increased sertraline dose   CXR am. Monitor renal function , getting worse .    VTE Risk Mitigation (From admission, onward)         Ordered     enoxaparin injection 100 mg  Every 24 hours (non-standard times)      11/16/20 1201     IP VTE HIGH RISK PATIENT  Once      11/15/20 0453     Place sequential compression device  Until discontinued      11/15/20 0453     Reason for No Pharmacological VTE Prophylaxis  Once     Question:  Reasons:  Answer:  Already adequately  anticoagulated on oral Anticoagulants    11/15/20 0453                   Dre Farmer MD  Department of Hospital Medicine   [facility]

## 2020-11-23 NOTE — NURSING
"Pt still noticeably grimacing. States, "My stomach still hurts." Notified MD Cosme; received order for simethicone. Pt education provided regarding indication for drug. Medication administered.    Also sent stool sample for ordered lab work.  "

## 2020-11-23 NOTE — PLAN OF CARE
Important Message from Medicare was sign, explained and given to patient/caregiver on 11/23/2020 at 2:30pm     answered all questions.

## 2020-11-23 NOTE — CONSULTS
"[facility]  Adult Nutrition   Progress Note (Follow-Up)    SUMMARY     Recommendations  Recommendation/Intervention: 1. Recommend liberalizing diet to promote better intake 2. RD to discontinue shakes (not consuming) 3. Encourage oral intake 4. Clinimix as per MD order  Goals: 1. Pt to meet at least 75% of estimated needs via oral diet/PPN  Nutrition Goal Status: progressing towards goal  Communication of RD Recs: reviewed with physician    Dietitian Rounds Brief    Pt seen for f/u. Intake remains poor. Noted Marinol added to regimen over weekend. No N/V. Pt with oral thrush per notes. No c/o difficulty swallowing. SLP consult noted. Clinimix @ 50 ml/hr was ordered over weekend. RD discussed continuing with MD via secure chat. MD agreed then proceeded to order Clinimix @ 30 ml/hr (to provide: 245 kcals, 31 g protein). Will discontinue ONS--not consuming at all per wife.     Reason for Assessment  Reason For Assessment: RD follow-up, consult  Diagnosis: other (see comments)(NSTEMI)  Relevant Medical History: CAD; CHF; COPD; T2DM; NSTEMI; PE  Interdisciplinary Rounds: did not attend    Nutrition Risk Screen  Nutrition Risk Screen: no indicators present     MST Score: 1  Have you recently lost weight without trying?: No  Weight loss score: 0  Have you been eating poorly because of a decreased appetite?: Yes  Appetite score: 1       Nutrition/Diet History  Spiritual, Cultural Beliefs, Amish Practices, Values that Affect Care: no  Food Allergies: NKFA  Factors Affecting Nutritional Intake: decreased appetite, other (see comments)(oral thrush)    Anthropometrics  Temp: 97.8 °F (36.6 °C)  Height Method: Stated  Height: 6' 2" (188 cm)  Height (inches): 74 in  Weight Method: Bed Scale  Weight: 95.6 kg (210 lb 12.2 oz)  Weight (lb): 210.76 lb  Ideal Body Weight (IBW), Male: 190 lb  % Ideal Body Weight, Male (lb): 110.93 %  BMI (Calculated): 27  BMI Grade: 25 - 29.9 - overweight       Weight History:  Wt " Readings from Last 10 Encounters:   11/20/20 95.6 kg (210 lb 12.2 oz)   11/20/20 95.3 kg (210 lb)   11/15/20 97.5 kg (215 lb)   11/12/20 93.4 kg (206 lb)   11/06/20 94.4 kg (208 lb 1.8 oz)   09/02/20 96.4 kg (212 lb 8.4 oz)   07/23/20 94.8 kg (209 lb)   05/12/20 99.8 kg (220 lb)   12/13/19 94.7 kg (208 lb 12.4 oz)   11/07/19 91 kg (200 lb 11.7 oz)       Lab/Procedures/Meds: Pertinent Labs Reviewed    Clinical Chemistry:  Recent Labs   Lab 11/17/20  0333  11/23/20  1034      < > 136   K 4.0   < > 5.2*      < > 105   CO2 19*   < > 18*   *   < > 237*   BUN 34*   < > 74*   CREATININE 1.8*   < > 2.4*   CALCIUM 9.5   < > 10.1   PROT 5.6*  --   --    ALBUMIN 2.5*  --   --    BILITOT 1.3*  --   --    ALKPHOS 59  --   --    AST 23  --   --    ALT 19  --   --    ANIONGAP 8   < > 13   ESTGFRAFRICA 39.6*   < > 28.0*   EGFRNONAA 34.3*   < > 24.2*   MG 2.0  --   --     < > = values in this interval not displayed.       CBC:   Recent Labs   Lab 11/23/20  0634   WBC 16.26*   RBC 3.07*   HGB 8.7*   HCT 29.2*      MCV 95   MCH 28.3   MCHC 29.8*         Inflammatory Labs:  Recent Labs   Lab 11/21/20  0538 11/22/20  0702 11/23/20  0634   CRP 15.59* 17.45* 14.76*         Medications: Pertinent Medications reviewed    Scheduled Meds:   aspirin  81 mg Oral Daily    calcitRIOL  0.25 mcg Oral Daily    ceFAZolin (ANCEF) IVPB  2 g Intravenous Q8H    clotrimazole  10 mg Oral TID    cyanocobalamin  2,000 mcg Oral Daily    dronabinoL  2.5 mg Oral BID    enoxparin  1 mg/kg Subcutaneous Q24H    ferrous sulfate  325 mg Oral Daily with breakfast    insulin detemir U-100  10 Units Subcutaneous QHS    Lactobacillus acidoph-L.bulgar  4 tablet Oral TID WM    lidocaine  1 patch Transdermal Q24H    memantine  5 mg Oral BID    metoclopramide HCl  5 mg Intravenous QID (AC & HS)    metoprolol succinate  25 mg Oral Daily    pantoprazole  40 mg Intravenous Daily    polyethylene glycol  17 g Oral Daily    sertraline   100 mg Oral Daily       Continuous Infusions:   amino acid 4.25 % in D5W         PRN Meds:.acetaminophen, calcium chloride IVPB, calcium chloride IVPB, calcium chloride IVPB, dextrose 50%, dextrose 50%, famotidine, HYDROcodone-acetaminophen, insulin aspart U-100, insulin regular, magnesium oxide, magnesium sulfate IVPB, magnesium sulfate IVPB, magnesium sulfate IVPB, magnesium sulfate IVPB, meclizine, potassium chloride in water, potassium chloride in water, potassium chloride in water, potassium chloride in water, potassium chloride, potassium chloride, potassium chloride, potassium chloride, promethazine (PHENERGAN) IVPB, promethazine (PHENERGAN) IVPB, simethicone, sodium phosphate IVPB, sodium phosphate IVPB, sodium phosphate IVPB, sodium phosphate IVPB, sodium phosphate IVPB    Estimated/Assessed Needs  Weight Used For Calorie Calculations: 95.6 kg (210 lb 12.2 oz)  Energy Calorie Requirements (kcal): 5846-5973 kcal/day (20-25 kcal/kg)  Energy Need Method: Kcal/kg  Protein Requirements: 57-77 g/day (0.6-0.8 g/kg 2' GFR)  Weight Used For Protein Calculations: 95.6 kg (210 lb 12.2 oz)     Estimated Fluid Requirement Method: RDA Method  RDA Method (mL): 1900       Nutrition Prescription Ordered  Current Diet Order: cardiac  Oral Nutrition Supplement: Unjury + benecal TID    Evaluation of Received Nutrient/Fluid Intake  Energy Calories Required: not meeting needs  Protein Required: not meeting needs   No intake or output data in the 24 hours ending 11/23/20 1347     % Meal Intake: 0 - 25 %    Nutrition Risk  Level of Risk/Frequency of Follow-up: high     Monitor and Evaluation  Food and Nutrient Intake: energy intake, parenteral nutrition intake, food and beverage intake  Food and Nutrient Adminstration: enteral and parenteral nutrition administration, diet order  Physical Activity and Function: nutrition-related ADLs and IADLs  Anthropometric Measurements: weight, weight change  Biochemical Data, Medical Tests and  Procedures: electrolyte and renal panel, gastrointestinal profile, glucose/endocrine profile  Nutrition-Focused Physical Findings: overall appearance     Nutrition Follow-Up  RD Follow-up?: Yes      Alexia Amador RD 11/23/2020 1:47 PM

## 2020-11-24 NOTE — PLAN OF CARE
20 1652   Final Note   Assessment Type Final Discharge Note   Anticipated Discharge Disposition      SW notified of Pt expiring.  Met with Pt's wife, daughter and other family members and provided support.    Martha Jon LCSW  Case Management  Ext. [phone]

## 2020-11-24 NOTE — PT/OT/SLP DISCHARGE
Physical Therapy Discharge Summary    Name: Chon Hurtado  MRN: [id number]   Principal Problem: NSTEMI (non-ST elevated myocardial infarction)     Patient Discharged from acute Physical Therapy on 20.  Please refer to prior PT noted date on 20 for functional status.     Assessment:     Patient has not met goals. Code Blue called several hours after today's PT session and pt transferred to ICU.     Objective:     GOALS:   Multidisciplinary Problems     Physical Therapy Goals     Not on file          Multidisciplinary Problems (Resolved)        Problem: Physical Therapy Goal    Goal Priority Disciplines Outcome Goal Variances Interventions   Physical Therapy Goal   (Resolved)     PT, PT/OT Met     Description: Goals to be met by:      Patient will increase functional independence with mobility by performin. Supine to/from sit with MInimal Assistance  2. Sit to stand transfer with Minimal Assistance  3. Bed to chair transfer with Minimal Assistance using Rolling Walker  4. Gait  x 50 feet with Minimal Assistance using Rolling Walker.                      Reasons for Discontinuation of Therapy Services  Transfer to alternate level of care and will need new PT order if appropriate to participate with PT.      Plan:     Patient Discharged to: ICU due to decline in medical status with Code Blue called..    Kenzie Lipscomb, PT  2020

## 2020-11-24 NOTE — PROGRESS NOTES
"[facility]  Adult Nutrition   Progress Note (Follow-Up)    SUMMARY     Recommendations  Recommendation/Intervention:   1. Continue Clinimix as per MD order. Recommend increasing rate to 50 ml/hr and adding 250 ml of 20% ILE daily.    2. Consider PEG placement if PO intake remains inadequate   3. Encourage oral intake as able  Goals: 1. Pt to meet at least 75% of estimated needs via oral diet and PPN 2. Electrolytes to trend towards target ranges  Nutrition Goal Status: progressing towards goal  Communication of RD Recs: reviewed with physician(reviewed with SLP and RN, with MD via secure chat)    Dietitian Rounds Brief    Pt seen for f/u. Intake remains poor per RN. Very little consumed per son at bedside. Noted worsening renal function--nephrology consulted. Hyperkalemia noted. Per SLP notes, cognitive based dysphagia. Unsure if family will want PEG placement--but needs to be considered to maintain long term nutrition. Unsure if pt can meet needs orally.    Reason for Assessment  Reason For Assessment: RD follow-up  Diagnosis: other (see comments)(NSTEMI)  Relevant Medical History: CAD; CHF; COPD; T2DM; NSTEMI; PE  Interdisciplinary Rounds: did not attend    Nutrition Risk Screen  Nutrition Risk Screen: no indicators present     MST Score: 1  Have you recently lost weight without trying?: No  Weight loss score: 0  Have you been eating poorly because of a decreased appetite?: Yes  Appetite score: 1       Nutrition/Diet History  Spiritual, Cultural Beliefs, Caodaism Practices, Values that Affect Care: no  Food Allergies: NKFA  Factors Affecting Nutritional Intake: decreased appetite, impaired cognitive status/motor control    Anthropometrics  Temp: 97.8 °F (36.6 °C)  Height Method: Stated  Height: 6' 2" (188 cm)  Height (inches): 74 in  Weight Method: Bed Scale  Weight: 95.6 kg (210 lb 12.2 oz)  Weight (lb): 210.76 lb  Ideal Body Weight (IBW), Male: 190 lb  % Ideal Body Weight, Male (lb): 110.93 " %  BMI (Calculated): 27  BMI Grade: 25 - 29.9 - overweight       Weight History:  Wt Readings from Last 10 Encounters:   11/20/20 95.6 kg (210 lb 12.2 oz)   11/20/20 95.3 kg (210 lb)   11/15/20 97.5 kg (215 lb)   11/12/20 93.4 kg (206 lb)   11/06/20 94.4 kg (208 lb 1.8 oz)   09/02/20 96.4 kg (212 lb 8.4 oz)   07/23/20 94.8 kg (209 lb)   05/12/20 99.8 kg (220 lb)   12/13/19 94.7 kg (208 lb 12.4 oz)   11/07/19 91 kg (200 lb 11.7 oz)       Lab/Procedures/Meds: Pertinent Labs Reviewed    Clinical Chemistry:  Recent Labs   Lab 11/24/20  0533   *   K 5.5*      CO2 13*   *   BUN 86*   CREATININE 2.9*   CALCIUM 10.3   ANIONGAP 14   ESTGFRAFRICA 22.3*   EGFRNONAA 19.3*   MG 2.6       CBC:   Recent Labs   Lab 11/24/20  0533   WBC 15.99*   RBC 3.37*   HGB 9.8*   HCT 31.0*      MCV 92   MCH 29.1   MCHC 31.6*         Cardiac Profile:  Recent Labs   Lab 11/23/20  1511   TROPONINI 0.470*       Inflammatory Labs:  Recent Labs   Lab 11/22/20  0702 11/23/20  0634 11/24/20  0533   CRP 17.45* 14.76* 16.22*         Medications: Pertinent Medications reviewed    Scheduled Meds:   aspirin  81 mg Oral Daily    calcitRIOL  0.25 mcg Oral Daily    ceFAZolin (ANCEF) IVPB  2 g Intravenous Q8H    clotrimazole  10 mg Oral TID    cyanocobalamin  2,000 mcg Oral Daily    dronabinoL  2.5 mg Oral BID    enoxparin  1 mg/kg Subcutaneous Q24H    ferrous sulfate  325 mg Oral Daily with breakfast    insulin detemir U-100  10 Units Subcutaneous QHS    Lactobacillus acidoph-L.bulgar  4 tablet Oral TID WM    lidocaine  1 patch Transdermal Q24H    memantine  5 mg Oral BID    metoprolol succinate  25 mg Oral Daily    pantoprazole  40 mg Intravenous Daily    polyethylene glycol  17 g Oral Daily    sertraline  100 mg Oral Daily       Continuous Infusions:   amino acid 4.25 % in D5W 1,000 mL (11/23/20 1519)       PRN Meds:.acetaminophen, calcium chloride IVPB, calcium chloride IVPB, calcium chloride IVPB, dextrose 50%,  dextrose 50%, famotidine, HYDROcodone-acetaminophen, insulin aspart U-100, insulin regular, magnesium oxide, magnesium sulfate IVPB, magnesium sulfate IVPB, magnesium sulfate IVPB, magnesium sulfate IVPB, meclizine, morphine, nitroGLYCERIN, potassium chloride in water, potassium chloride in water, potassium chloride in water, potassium chloride in water, potassium chloride, potassium chloride, potassium chloride, potassium chloride, promethazine (PHENERGAN) IVPB, promethazine (PHENERGAN) IVPB, simethicone, sodium phosphate IVPB, sodium phosphate IVPB, sodium phosphate IVPB, sodium phosphate IVPB, sodium phosphate IVPB    Estimated/Assessed Needs  Weight Used For Calorie Calculations: 95.6 kg (210 lb 12.2 oz)  Energy Calorie Requirements (kcal): 4088-3368 kcal/day (20-25 kcal/kg)  Energy Need Method: Kcal/kg  Protein Requirements: 57-77 g/day (0.6-0.8 g/kg 2' GFR)  Weight Used For Protein Calculations: 95.6 kg (210 lb 12.2 oz)     Estimated Fluid Requirement Method: RDA Method  RDA Method (mL): 1900       Nutrition Prescription Ordered  Current Diet Order: full liquids  Nutrition Order Comments: mechanical soft or puree item upon request  Current Nutrition Support Formula Ordered: Clinimix 4.25/5  Current Nutrition Support Rate Ordered: 30 (ml)  Current Nutrition Support Frequency Ordered: ml/hr continuously      Evaluation of Received Nutrient/Fluid Intake  Parenteral Calories (kcal): 245  Parenteral Protein (gm): 31  Parenteral Fluid (mL): 720  Energy Calories Required: not meeting needs  Protein Required: not meeting needs     Intake/Output Summary (Last 24 hours) at 11/24/2020 1334  Last data filed at 11/24/2020 0700  Gross per 24 hour   Intake 330 ml   Output --   Net 330 ml        % Meal Intake: 0 - 25 %    Nutrition Risk  Level of Risk/Frequency of Follow-up: high     Monitor and Evaluation  Food and Nutrient Intake: food and beverage intake, energy intake, parenteral nutrition intake  Food and Nutrient  Adminstration: enteral and parenteral nutrition administration, diet order  Physical Activity and Function: nutrition-related ADLs and IADLs  Anthropometric Measurements: weight, weight change  Biochemical Data, Medical Tests and Procedures: electrolyte and renal panel, gastrointestinal profile, glucose/endocrine profile  Nutrition-Focused Physical Findings: overall appearance     Nutrition Follow-Up  RD Follow-up?: Yes    Alexia Amador RD 11/24/2020 1:34 PM

## 2020-11-24 NOTE — NURSING
1315- Jessy Robert was told by [facility] that patient was clammy and his chest was hurting.  She checked on patient and he was pale, clammy, and SOB. Pt stated that he was hot and could not breath. She also checked his blood sugar which was 298. Did vitals, pt was tachycardic,blood  pressure was low, no fever. Tele called said patient was in V-tach. Jessy called charge nurse Clara.     1330- Rapid Response called. /49    1331- BP 96/41    1333- Blood pressure 80/51 HR-100, Rapid response team at bed side. NS bolus started.     1335- Blood gas drawn, blood pressure 84/41 HR-88    1339- Heart rate 42, Code Blue called    1341- Bicarb given    1343- Calcium given, 10 units insulin given    1345- ET tube placed     1346-Epi given    1347- pulse check, pt has pulse    1348- calcium given, blood pressure 90/35, HR-79    1351- 1 mg Epi given    1352- 16 [place] OG in, contents returned    1354- heart rate 81    1355- pt transferred to ICU

## 2020-11-24 NOTE — PLAN OF CARE
20 1511   Discharge Reassessment   Assessment Type Discharge Planning Reassessment   Pt was transferred to ICU after pt had coded in room 1118.    Cm spoke with pt after death to see if she needs anything. She stated that she did not need She was tearful and stated to let MOY Martha Jon know that he . Cm stated she will let her know.

## 2020-11-24 NOTE — PLAN OF CARE
Problem: Physical Therapy Goal  Goal: Physical Therapy Goal  Description: Goals to be met by:      Patient will increase functional independence with mobility by performin. Supine to/from sit with MInimal Assistance  2. Sit to stand transfer with Minimal Assistance  3. Bed to chair transfer with Minimal Assistance using Rolling Walker  4. Gait  x 50 feet with Minimal Assistance using Rolling Walker.     Outcome: Ongoing, Progressing   Pt continues to progress towards goals. Limited by back pain.

## 2020-11-24 NOTE — PLAN OF CARE
Problem: Physical Therapy Goal  Goal: Physical Therapy Goal  Description: Goals to be met by:      Patient will increase functional independence with mobility by performin. Supine to/from sit with MInimal Assistance  2. Sit to stand transfer with Minimal Assistance  3. Bed to chair transfer with Minimal Assistance using Rolling Walker  4. Gait  x 50 feet with Minimal Assistance using Rolling Walker.     Outcome: Unable to Meet, Plan Revised     Discharge PT due to decline in medical status. Pt had Code Blue called and transferred to ICU. Will need new orders to participate with PT if appropriate.

## 2020-11-24 NOTE — NURSING
Pt complaining of left sided chest pain. Dr. Farmer notified. EKG and troponin were ordered and completed. VS stable /63 HR 88. Nitro was given at 1505.     1800- Pt complaining of chest tightness. Rates a 7 out of 10. /65. Dr. Farmer notified.

## 2020-11-24 NOTE — PT/OT/SLP PROGRESS
Occupational Therapy   Treatment    Name: Chon Hurtado  MRN: [id number]  Admitting Diagnosis:  NSTEMI (non-ST elevated myocardial infarction)  4 Days Post-Op    Recommendations:     Discharge Recommendations: nursing facility, skilled  Discharge Equipment Recommendations:  none  Barriers to discharge:  Decreased caregiver support    Assessment:     Chon Hurtado is a 84 y.o. male with a medical diagnosis of NSTEMI (non-ST elevated myocardial infarction).  He presents with closed eyes, but will open momentarily upon request, delayed responses, incredible fatigue and limited endurance. Pt is in too much pain to attempt EOB, and MI restrictions keep him from performing overhead activity, but he is participatory with BUE AROM to the elbow and w/ CGA.  Performance deficits affecting function are weakness, impaired endurance, impaired self care skills, impaired functional mobilty, impaired cognition, decreased upper extremity function, decreased lower extremity function, pain, decreased ROM, impaired cardiopulmonary response to activity.     Rehab Prognosis:  Poor; patient would benefit from acute skilled OT services to address these deficits and reach maximum level of function.       Plan:     Patient to be seen 5 x/week to address the above listed problems via self-care/home management, therapeutic activities, therapeutic exercises  · Plan of Care Expires: 12/19/20  · Plan of Care Reviewed with: patient, son    Subjective     Pain/Comfort:  · Pain Rating 1: 7/10  · Location 1: back  · Pain Addressed 1: Pre-medicate for activity, Distraction, Other (see comments)(only light therapy today)  · Pain Rating Post-Intervention 1: 7/10  · Pain Rating 2: 7/10  · Location - Orientation 2: midline  · Location 2: abdomen  · Pain Addressed 2: Pre-medicate for activity, Distraction, Other (see comments)(only light therapy today)    Objective:     Communicated with: Saba ROLAND prior to session.  Patient found HOB elevated and all  extremities positioned on pillows with son present with pressure relief boots, telemetry, oxygen upon OT entry to room.    General Precautions: Standard, fall, pureed diet   Orthopedic Precautions:N/A   Braces: N/A     Occupational Performance:     Bed Mobility:    · Pain limiting mobility. Pt declines.    Activities of Daily Living:  · Pt declines ADLs at this time. HYATT encouraging pt's son to allow/encourage him to self-feed as much as possible to maintain and allow for possible increase in BUE AROM and mobility.    [facility] 6 Click ADL: 12    Treatment & Education:  -BUE AROM with CGA for initiation and proper technique: grasp/release x 10, wrist circumduction x 10, wrist extension x 10, elbow flexion x 10. Pt requires rest breaks between each.  -HYATT ed pt son to have him perform BUE AROM several times throughout the day as he will tolerate. Pt and son v/u and agreeable.    Patient left with HOB raised and all extremities with pillow support with all lines intact, RNSaba notified and son presentEducation:      GOALS:   Multidisciplinary Problems     Occupational Therapy Goals        Problem: Occupational Therapy Goal    Goal Priority Disciplines Outcome Interventions   Occupational Therapy Goal     OT, PT/OT Ongoing, Not Progressing    Description: Goals to be met by: D/C     Patient will increase functional independence with ADLs by performing:    Feeding with Set-up Assistance.  UE Dressing with Stand-by Assistance.  LE Dressing with Minimal Assistance.  Grooming while seated at sink with Stand-by Assistance.  Toileting from bedside commode with Minimal Assistance for hygiene and clothing management.   Toilet transfer to bedside commode with Minimal Assistance.                     Time Tracking:     OT Date of Treatment: 11/24/20  OT Start Time: 1121  OT Stop Time: 1139  OT Total Time (min): 18 min    Billable Minutes:Therapeutic Exercise 18 min    DONTE Cheung  11/24/2020

## 2020-11-24 NOTE — RESPIRATORY THERAPY
11/24/20 0753   Patient Assessment/Suction   Level of Consciousness (AVPU) alert   Respiratory Effort Normal;Unlabored   Expansion/Accessory Muscles/Retractions no use of accessory muscles   PRE-TX-O2   O2 Device (Oxygen Therapy) nasal cannula   $ Is the patient on Low Flow Oxygen? Yes   Flow (L/min) 2   SpO2 98 %   Pulse Oximetry Type Intermittent   $ Pulse Oximetry - Multiple Charge Pulse Oximetry - Multiple   Pulse 84   Resp 18

## 2020-11-24 NOTE — PROGRESS NOTES
Code/Critical Care    Pt coded while in hospital.  I arrived at the bedside at about 1340.  He had had tachycardia and then developed bradycardia and went to asystole.  ACLS was started and he had chest compressions.  He had multiple pushes given including epinephrine, bicarbonate, calcium.  He had some return of rhythm but then also had PEA.  He was intubated with good bilateral BS.  He was given fluid boluses.  After 15 minutes or so of resuscitation he had return of a palpable pulse, pressors were started, NG was placed and he was moved to ICU.  While he was being settled in there he developed bradycardia which worsened to asystole again.  Compressions were restarted and he was given multiple pushes ( epinephrine, etc).  He was bagged with good BS, he was given volume boluses.  We had some brief return of pulse (very thready) but also had PEA and episodes of asystole.  Despite all efforts we were not able to maintain a rhythm and pulse and he became asystolic again at 1424 and the code was called.  Time of death - 1424 11/2/2020.    · Case discussed with Meek Gaytan, John, respiratory and nursing  · Assessed ABG - had severe metabolic respiratory acidosis with partial respiratory compensation and adequate oxygenation  · ETT position verified by auscultation several times during the codes  · Labs reviewed - H/H ok, BS ok, BNP and troponin were  Elevated  · EKG looked like a new bundle branch block  · CXR from this AM was reviewed  · Assisted with management of the code    Was at bedside from about 1340 until 1424 providing critical care services for this pt.      Marvin Valentin MD  [facility] Pulmonary/Critical Care

## 2020-11-24 NOTE — PT/OT/SLP PROGRESS
Physical Therapy Treatment    Patient Name:  Chon Hurtado   MRN:  [id number]    Recommendations:     Discharge Recommendations:  nursing facility, skilled   Discharge Equipment Recommendations: (TBD at next level of care)   Barriers to discharge: None    Assessment:     Chon Hurtado is a 84 y.o. male admitted with a medical diagnosis of NSTEMI (non-ST elevated myocardial infarction).  He presents with the following impairments/functional limitations:  weakness, impaired endurance, impaired self care skills, impaired functional mobilty, gait instability, impaired balance, decreased lower extremity function, decreased upper extremity function, edema.    Patient presents resting in bed with HOB elevated; agreeable to PT treatment. Patient's son present. Patient continues to be limited by back pain today reporting pain at 9/10 and increasing with supine > sit. Patient only able to tolerate sitting EOB for a few minutes before reporting that he needs to lay back down as pain was too intense. Positioned patient in bed with BLEs elevated in attempt to relieve back pain. Patient with noted increase in labored breathing with increase in pain however O2 sats 99% on 2L O2. PTA provided verbal cueing for patient to perform PLB technique to slow breathing down. Continue with PT and POC.    Rehab Prognosis: Fair; patient would benefit from acute skilled PT services to address these deficits and reach maximum level of function.    Recent Surgery: Procedure(s) (LRB):  Transesophageal echo (SHAYY) intra-procedure log documentation (N/A) 4 Days Post-Op    Plan:     During this hospitalization, patient to be seen 5 x/week to address the identified rehab impairments via gait training, therapeutic activities, therapeutic exercises and progress toward the following goals:    · Plan of Care Expires:  12/02/20    Subjective     Chief Complaint: Back pain  Patient/Family Comments/goals:   Pain/Comfort:  · Pain Rating 1: 9/10  · Location 1:  back  · Pain Addressed 1: Reposition, Cessation of Activity, Distraction      Objective:     Communicated with RN prior to session.  Patient found HOB elevated with pressure relief boots, bed alarm, telemetry upon PT entry to room.     General Precautions: Standard, fall   Orthopedic Precautions:N/A   Braces:       Functional Mobility:  · Bed Mobility:     · Rolling Left:  maximal assistance  · Scooting: maximal assistance and of 2 persons  · Supine to Sit: maximal assistance and of 2 persons (log rolling to decrease back pain)  · Sit to Supine: maximal assistance and of 2 persons (log rolling to decrease back pain)      AM-PAC 6 CLICK MOBILITY          Therapeutic Activities and Exercises:   bed mobility; sitting EOB for trunk control and midline orientation; positioning for pain relief; pursed lip breathing education    Patient left HOB elevated with all lines intact, call button in reach, bed alarm on and pt's son present..    GOALS:   Multidisciplinary Problems     Physical Therapy Goals        Problem: Physical Therapy Goal    Goal Priority Disciplines Outcome Goal Variances Interventions   Physical Therapy Goal     PT, PT/OT Ongoing, Progressing     Description: Goals to be met by:      Patient will increase functional independence with mobility by performin. Supine to/from sit with MInimal Assistance  2. Sit to stand transfer with Minimal Assistance  3. Bed to chair transfer with Minimal Assistance using Rolling Walker  4. Gait  x 50 feet with Minimal Assistance using Rolling Walker.                      Time Tracking:     PT Received On: 20  PT Start Time: 1043     PT Stop Time: 1106  PT Total Time (min): 23 min     Billable Minutes: Therapeutic Activity 23    Treatment Type: Treatment  PT/PTA: PTA     PTA Visit Number: Aide Ramirez Deni, PTA  2020

## 2020-11-24 NOTE — RESPIRATORY THERAPY
11/23/20 3319   Patient Assessment/Suction   Level of Consciousness (AVPU) alert   Respiratory Effort Normal;Unlabored   Expansion/Accessory Muscles/Retractions no use of accessory muscles;no retractions;expansion symmetric   All Lung Fields Breath Sounds equal bilaterally;clear;diminished   Rhythm/Pattern, Respiratory unlabored;pattern regular;depth regular   Cough Frequency no cough   PRE-TX-O2   O2 Device (Oxygen Therapy) nasal cannula   $ Is the patient on Low Flow Oxygen? Yes   Flow (L/min) 2   SpO2 98 %   Pulse Oximetry Type Intermittent   $ Pulse Oximetry - Multiple Charge Pulse Oximetry - Multiple   Pulse 87   Resp 16   Wound Care   $ Wound Care Tech Time 15 min   Area of Concern Left;Right;Behind ear;Cheek   Skin Color/Characteristics without discoloration   Skin Temperature cool   Respiratory Evaluation   $ Care Plan Tech Time 15 min

## 2020-11-24 NOTE — PLAN OF CARE
Problem: Occupational Therapy Goal  Goal: Occupational Therapy Goal  Description: Goals to be met by: D/C     Patient will increase functional independence with ADLs by performing:    Feeding with Set-up Assistance.- son reports he has not seen pt feed himself and PO intake is extremely poor.  UE Dressing with Stand-by Assistance.  LE Dressing with Minimal Assistance.  Grooming while seated at sink with Stand-by Assistance.  Toileting from bedside commode with Minimal Assistance for hygiene and clothing management.   Toilet transfer to bedside commode with Minimal Assistance.    Outcome: Ongoing, Not Progressing;pt with 7/10 pain of abdomen and back and declining ADL participation but agreeable to AROM to elbow.

## 2020-11-24 NOTE — SIGNIFICANT EVENT
Patient is complaining of left-sided chest pain and EKG was repeated and troponin was done. NGT  was given  EKG did not show any ischemia/ STEMI and troponin are down significantly  Will monitor   Morphine PRN

## 2020-11-25 LAB
ADV 40+41 DNA STL QL NAA+NON-PROBE: NOT DETECTED
ASTRO TYP 1-8 RNA STL QL NAA+NON-PROBE: NOT DETECTED
BACTERIA BLD CULT: NORMAL
C CAYETANENSIS DNA STL QL NAA+NON-PROBE: NOT DETECTED
C COLI+JEJ+UPSA DNA STL QL NAA+NON-PROBE: NOT DETECTED
C DIF TOX TCDA+TCDB STL QL NAA+NON-PROBE: NOT DETECTED
CRYPTOSP DNA STL QL NAA+NON-PROBE: NOT DETECTED
E COLI O157 DNA STL QL NAA+NON-PROBE: NORMAL
E HISTOLYT DNA STL QL NAA+NON-PROBE: NOT DETECTED
EC STX1+STX2 GENES STL QL NAA+NON-PROBE: NOT DETECTED
ENTEROAGGREGATIVE E COLI: NOT DETECTED
ENTEROPATHOGENIC E COLI: NOT DETECTED
ETEC LTA+ST1A+ST1B TOX ST NAA+NON-PROBE: NOT DETECTED
G LAMBLIA DNA STL QL NAA+NON-PROBE: NOT DETECTED
GPP - SALMONELLA: NOT DETECTED
GPP - VIBRIO CHOLERA: NOT DETECTED
GPP - YERSINIA ENTEROCOLITICA: NOT DETECTED
NOROVIRUS GI+II RNA STL QL NAA+NON-PROBE: NOT DETECTED
PLESIOMONAS SHIGELLOIDES: NOT DETECTED
RVA RNA STL QL NAA+NON-PROBE: NOT DETECTED
SAPO I+II+IV+V RNA STL QL NAA+NON-PROBE: NOT DETECTED
SHIGELLA SP+EIEC IPAH STL QL NAA+PROBE: NOT DETECTED
VIBRIO: NOT DETECTED

## 2020-11-25 NOTE — PT/OT/SLP DISCHARGE
Occupational Therapy Discharge Summary    Chon Hurtado  MRN: [id number]   Principal Problem: NSTEMI (non-ST elevated myocardial infarction)      Patient Discharged from acute Occupational Therapy on 2020.  Please refer to prior OT note dated 2020 for functional status.    Assessment:      Pt     Objective:     GOALS:   Multidisciplinary Problems     Occupational Therapy Goals        Problem: Occupational Therapy Goal    Goal Priority Disciplines Outcome Interventions   Occupational Therapy Goal     OT, PT/OT Ongoing, Not Progressing    Description: Goals to be met by: D/C     Patient will increase functional independence with ADLs by performing:    Feeding with Set-up Assistance.  UE Dressing with Stand-by Assistance.  LE Dressing with Minimal Assistance.  Grooming while seated at sink with Stand-by Assistance.  Toileting from bedside commode with Minimal Assistance for hygiene and clothing management.   Toilet transfer to bedside commode with Minimal Assistance.                     Reasons for Discontinuation of Therapy Services  Pt       Plan:     Patient Discharged to: Pt     Leonela Veliz, OT  2020

## 2020-11-25 NOTE — DISCHARGE SUMMARY
[facility] Discharge Summary    Date of Admit: 11/15/2020  Date of Discharge: 2020    Discharge to:   Condition:     Discharge Diagnoses     Problem Noted   Gram-Positive Bacteremia 2020   Chronic Respiratory Failure With Hypoxia, On Home Oxygen Therapy 1/3/2020   Chf (Congestive Heart Failure), Nyha Class III, Chronic, Systolic 1/3/2020   S/P Cabg (Coronary Artery Bypass Graft) 3/21/2018   Ckd (Chronic Kidney Disease) Stage 3, Gfr 30-59 Ml/Min 2016   Cad (Coronary Artery Disease)         Patient Active Problem List   Diagnosis    Other B-complex deficiencies    Other specified disorders of parathyroid gland    Adrenal insufficiency    CAD (coronary artery disease)    Prostate CA    Uncontrolled type 2 diabetes mellitus with hyperglycemia, with long-term current use of insulin    Hypertension associated with diabetes    Anemia    CKD (chronic kidney disease) stage 3, GFR 30-59 ml/min    Long term (current) use of anticoagulants [Z79.01]    DVT, bilateral lower limbs    Hyperlipidemia associated with type 2 diabetes mellitus    Encounter for long-term (current) use of insulin    S/P CABG (coronary artery bypass graft)    Leg abscess    Thrush    Cognitive decline    Mild cognitive impairment with memory loss    Restrictive lung disease    Restrictive lung disease    SOB (shortness of breath) on exertion    Thrombocytopenia    Chronic respiratory failure with hypoxia, on home oxygen therapy    CHF (congestive heart failure), NYHA class III, chronic, systolic    Angina pectoris, unspecified    NSTEMI (non-ST elevated myocardial infarction)    Diarrhea of presumed infectious origin    Type 2 diabetes mellitus without complication, with long-term current use of insulin    Gram-positive bacteremia    Lung granuloma    FUO (fever of unknown origin)    bilateral pleural effusions,small but new        Brief History of Present Illness      Chon WADDELL  Bryant is a 84 y.o. male who  has a past medical history of Adrenal insufficiency, Anemia, Anticoagulant long-term use, Blood transfusion, CAD (coronary artery disease), Cancer, Cataract, CHF (congestive heart failure), NYHA class III, chronic, systolic (1/3/2020), COPD (chronic obstructive pulmonary disease) (11/6/2013), Depression, Diabetes mellitus, Diabetes mellitus type II, DVT (deep venous thrombosis), Hemorrhoids, History of colon polyps (6/3/2015), Hypertension, NSTEMI (non-ST elevated myocardial infarction) (11/15/2020), PE (pulmonary embolism), Peripheral vascular disease, and Urinary tract infection.  The patient presented to [facility] on 11/15/2020 with a primary complaint of Chest Pain (Dizziness, generalized, fatigue, and fever since Thursday.)  .       For the full HPI please refer to the History & Physical from this admission.    Hospital Course     Mr. Hurtado was admitted with fatigue, fevers, and chest pain. Found to have elevated troponin and positive blood cultures.  He was treated with abx and medical management for NSTEMI - procedural intervention for NSTEMI was deferred by cardiology due to active infection. He was worked up by ID for source of bacteremia and abx managed per ID. Plan was ultimately for d/c to LTAC for IV abx and ongoing care, however today suffered cardiac arrest.   His son reports he suddenly started complaining of recurrent chest pain and SOB around 1300. Nursing staff went to check on him and he was diaphoretic and clammy, complaining of SOB. Telemetry initially reported he was in Vtach and rapid response was called to which I responded. On review of these tracings, he was not in vtach but in sinus tach with a new bundle branch block w/ wide QRS.   Initially he was tachycardic but then became bradycardic and hypotensive and then developed asystole.   ACLS was performed, he was intubated and has ROSC after approx 15 minutes of CPR and ACLS protocol and IV sodium bicarb.   He was  transferred to ICU where he again became bradycardic which progressed to asystole.   ACLS was again performed and he did briefly have return of pulses but quickly lost again and had asystole as well as PEA while ACLS was ongoing. Dr. Valentin was also at bedside and called code at 1424.   Given his EKG changes and c/o chest pain just prior to the event, suspect he had another acute MI or cardiac complication from his MI on admission. However, clearly too unstable for procedural intervention, and he was already anticoagulated, thus tPA not an option either.     Discussed with family at length, answered all their questions, and offered my condolences.     Physical exam     Unresponsive to noxious stimuli   Pupils fixed  Absent heart tones  Absent carotid pulses  No spontaneous respirations   Asystole on telemetry    Time of death was 1424 on 11/24/2020        Pete Eden MD  7:27 PM  11/24/2020

## 2020-11-26 LAB — BACTERIA BLD CULT: NORMAL

## 2020-11-27 LAB — BACTERIA BLD CULT: NORMAL

## 2021-03-16 NOTE — ED NOTES
Hospitalist at bedside. Per hospitalist daughter at bedside can come with patient upstairs r/t patient dementia.    patient into an appointment for the relevant concern      Nesha Tamayo

## 2022-03-02 NOTE — PROGRESS NOTES
formerly [facility] Medicine  Progress Note    Patient Name: Chon Hurtado  MRN: [id number]  Patient Class: IP- Inpatient   Admission Date: 11/15/2020  Length of Stay: 6 days  Attending Physician: Dre Farmer MD  Primary Care Provider: Doug Whitaker MD        Subjective:     Principal Problem:NSTEMI (non-ST elevated myocardial infarction)    Interval History:    Patient was seen and examined  He was straining to have bowel movement  He got injection for the nuclear scan  Deny chest pain      Review of Systems  Objective:     Vital Signs (Most Recent):  Temp: 98.7 °F (37.1 °C) (11/21/20 1145)  Pulse: 87 (11/21/20 1145)  Resp: 18 (11/21/20 1145)  BP: 130/77 (11/21/20 1145)  SpO2: 96 % (11/21/20 1145) Vital Signs (24h Range):  Temp:  [97.7 °F (36.5 °C)-98.8 °F (37.1 °C)] 98.7 °F (37.1 °C)  Pulse:  [74-93] 87  Resp:  [16-19] 18  SpO2:  [96 %-99 %] 96 %  BP: (118-163)/(61-77) 130/77     Weight: 95.6 kg (210 lb 12.2 oz)  Body mass index is 27.06 kg/m².    Intake/Output Summary (Last 24 hours) at 11/21/2020 1353  Last data filed at 11/21/2020 0000  Gross per 24 hour   Intake --   Output 350 ml   Net -350 ml      Physical Exam    Physical Exam:  General-alert   HEENT- PERRLA, EOMI,  Neck- No JVD, Lymphadenopathy, no neck pain   CV- Regular rate and rhythm, No Murmur/slava/rubs  Resp- Lungs CTA Bilaterally, No increased WOB  GI- Non tender/non-distended  Extrem- No cyanosis, clubbing, edema.   Neuro- non focal   Skin-  No masses, rashes or lesions noted on cursory skin exam.  Overview/Hospital Course:    Significant Labs:   CBC:   Recent Labs   Lab 11/21/20  0538   WBC 12.25   HGB 9.4*   HCT 30.6*        CMP:   Recent Labs   Lab 11/21/20  0538      K 4.8      CO2 19*   *   BUN 47*   CREATININE 1.7*   CALCIUM 9.9   ANIONGAP 11   EGFRNONAA 36.7*     Cardiac Markers: No results for input(s): CKMB, MYOGLOBIN, BNP, TROPISTAT in the last 48 hours.    Significant Imaging: I have  reviewed all pertinent imaging results/findings within the past 24 hours.    Assessment/Plan:      Active Diagnoses:    Diagnosis Date Noted POA    PRINCIPAL PROBLEM:  NSTEMI (non-ST elevated myocardial infarction) [I21.4] 11/15/2020 Yes    FUO (fever of unknown origin) [R50.9]  Yes    Gram-positive bacteremia [R78.81] 11/16/2020 Yes    Diarrhea of presumed infectious origin [R19.7] 11/15/2020 Yes    Type 2 diabetes mellitus without complication, with long-term current use of insulin [E11.9, Z79.4] 11/15/2020 Not Applicable    CHF (congestive heart failure), NYHA class III, chronic, systolic [I50.22] 01/03/2020 Yes    Chronic respiratory failure with hypoxia, on home oxygen therapy [J96.11, Z99.81] 01/03/2020 Not Applicable    S/P CABG (coronary artery bypass graft) [Z95.1] 03/21/2018 Not Applicable    CKD (chronic kidney disease) stage 3, GFR 30-59 ml/min [N18.30] 12/21/2016 Yes     Chronic    CAD (coronary artery disease) [I25.10]  Yes    Adrenal insufficiency [E27.40]  Yes      Problems Resolved During this Admission:     ASSESSMENT AND PLAN   Unsure source of fevers . Possible from GI tract , prostate , contamination or from chronic DVTs with ashley , repeat blood culture with coagulase-negative Staphylococcus species  Recent cultures still negative  cefazolin  SHAYY negative   Continue  Asa and lovenox    f/u stool studies PCR   For nuclear medicine infection localization scan finalize tomorrow  .  Serial blood cultures        VTE Risk Mitigation (From admission, onward)         Ordered     enoxaparin injection 100 mg  Every 24 hours (non-standard times)      11/16/20 1201     IP VTE HIGH RISK PATIENT  Once      11/15/20 0453     Place sequential compression device  Until discontinued      11/15/20 0453     Reason for No Pharmacological VTE Prophylaxis  Once     Question:  Reasons:  Answer:  Already adequately anticoagulated on oral Anticoagulants    11/15/20 0453                   Dre Farmer  MD  Department of Hospital Medicine   [facility]   Yes

## 2023-06-05 NOTE — PROGRESS NOTES
Patient, Chon Hurtado (MRN #[id number]), presented with a recent Platelet count less than 150 K/uL consistent with the definition of thrombocytopenia (ICD10 - D69.6).    Platelets   Date Value Ref Range Status   05/29/2017 135 (L) 150 - 350 K/uL Final     The patient's thrombocytopenia was monitored, evaluated, addressed and/or treated. This addendum to the medical record is made on 06/07/2017.   Start Consistent Carb Moderate (45-75 gm/meal) diet.

## 2023-08-22 NOTE — PT/OT/SLP PROGRESS
Speech Language Pathology      Chon Hurtado  MRN: [id number]    Patient not seen today secondary to: unable to participate. Patient vomiting outside of patient care time. Provided with suction system. RN aware. Patient continues with very poor intake, consuming most liquid or purees with PO meds.     Gaurav Pelayo, [place]-SLP     denies

## 2025-05-07 NOTE — TRANSFER OF CARE
"Anesthesia Transfer of Care Note    Patient: Chon D Hurtado    Procedure(s) Performed: Procedure(s) (LRB):  Transesophageal echo (SHAYY) intra-procedure log documentation (N/A)    Patient location: Telemetry/Step Down Unit    Anesthesia Type: MAC    Transport from OR: Transported from OR on room air with adequate spontaneous ventilation    Post pain: adequate analgesia    Post assessment: no apparent anesthetic complications    Post vital signs: stable    Level of consciousness: awake and alert    Nausea/Vomiting: no nausea/vomiting    Complications: none    Transfer of care protocol was followed      Last vitals:   Visit Vitals  BP (!) 118/53 (BP Location: Right arm, Patient Position: Lying)   Pulse 69   Temp 36.3 °C (97.3 °F) (Axillary)   Resp 16   Ht 6' 2" (1.88 m)   Wt 95.6 kg (210 lb 12.2 oz)   SpO2 100%   BMI 27.06 kg/m²     " 0 = swallows foods/liquids without difficulty

## 2025-06-25 NOTE — PATIENT INSTRUCTIONS
Counseling and Referral of Other Preventative  (Italic type indicates deductible and co-insurance are waived)    Patient Name: Chon Hurtado  Today's Date: 10/16/2017      SERVICE LIMITATIONS RECOMMENDATION    Vaccines    · Pneumococcal (once after 65)    · Influenza (annually)    · Hepatitis B (if medium/high risk)    · Prevnar 13      Hepatitis B medium/high risk factors:       - End-stage renal disease       - Hemophiliacs who received Factor VII or         IX concentrates       - Clients of institutions for the mentally             retarded       - Persons who live in the same house as          a HepB carrier       - Homosexual men       - Illicit injectable drug abusers     Pneumococcal: Scheduled - see appointments     Influenza: Done, repeat in one year     Hepatitis B: N/A     Prevnar 13: Done, no repeat necessary    Prostate cancer screening (annually to age 75)     Prostate specific antigen (PSA) Shared decision making with Provider. Sometimes a co-pay may be required if the patient decides to have this test. The USPSTF no longer recommends prostate cancer screening routinely in medicine: every 1 year    Colorectal cancer screening (to age 75)    · Fecal occult blood test (annual)  · Flexible sigmoidoscopy (5y)  · Screening colonoscopy (10y)  · Barium enema   N/A    Diabetes self-management training (no USPSTF recommendations)  Requires referral by treating physician for patient with diabetes or renal disease. 10 hours of initial DSMT sessions of no less than 30 minutes each in a continuous 12-month period. 2 hours of follow-up DSMT in subsequent years.  Done this year, repeat every year    Glaucoma screening (no USPSTF recommendation)  Diabetes mellitus, family history   , age 50 or over    American, age 65 or over  Recommend follow up with eye care professional regularly    Medical nutrition therapy for diabetes or renal disease (no recommended schedule)  Requires referral by  Previous patient of Dr. Martinez.  Requesting to follow with Dr. Vela. Patient states he recently had pneumonia and was advised to F/U with cardiology.  He believes he was supposed to have additional testing after his stents.    treating physician for patient with diabetes or renal disease or kidney transplant within the past 3 years.  Can be provided in same year as diabetes self-management training (DSMT), and CMS recommends medical nutrition therapy take place after DSMT. Up to 3 hours for initial year and 2 hours in subsequent years.  Done this year, repeat every year    Cardiovascular screening blood tests (every 5 years)  · Fasting lipid panel  Order as a panel if possible  Scheduled, see appointments    Diabetes screening tests (at least every 3 years, Medicare covers annually or at 6-month intervals for prediabetic patients)  · Fasting blood sugar (FBS) or glucose tolerance test (GTT)  Patient must be diagnosed with one of the following:       - Hypertension       - Dyslipidemia       - Obesity (BMI 30kg/m2)       - Previous elevated impaired FBS or GTT       ... or any two of the following:       - Overweight (BMI 25 but <30)       - Family history of diabetes       - Age 65 or older       - History of gestational diabetes or birth of baby weighing more than 9 pounds  Scheduled, see appointments    Abdominal aortic aneurysm screening (once)  · Sonogram   Limited to patients who meet one of the following criteria:       - Men who are 65-75 years old and have smoked more than 100 cigarette in their lifetime       - Anyone with a family history of abdominal aortic aneurysm       - Anyone recommended for screening by the USPSTF  N/A    HIV screening (annually for increased risk patients)  · HIV-1 and HIV-2 by EIA, or GEN, rapid antibody test or oral mucosa transudate  Patients must be at increased risk for HIV infection per USPSTF guidelines or pregnant. Tests covered annually for patient at increased risk or as requested by the patient. Pregnant patients may receive up to 3 tests during pregnancy.  Risks discussed, screening is not recommended    Smoking cessation counseling (up to 8 sessions per year)  Patients must be asymptomatic  of tobacco-related conditions to receive as a preventative service.  Non-smoker    Subsequent annual wellness visit  At least 12 months since last AWV  Return in one year     The following information is provided to all patients.  This information is to help you find resources for any of the problems found today that may be affecting your health:                Living healthy guide: www.[facility].[place].[facility]      Understanding Diabetes: www.diabetes.org      Eating healthy: www.cdc.gov/healthyweight      CDC home safety checklist: www.cdc.gov/steadi/patient.html      Agency on Aging: www.goea.[place].[facility]      Alcoholics anonymous (AA): www.aa.org      Physical Activity: www.madi.nih.gov/ym0ppnf      Tobacco use: www.quitwithusla.org